# Patient Record
Sex: MALE | Race: BLACK OR AFRICAN AMERICAN | NOT HISPANIC OR LATINO | Employment: FULL TIME | URBAN - METROPOLITAN AREA
[De-identification: names, ages, dates, MRNs, and addresses within clinical notes are randomized per-mention and may not be internally consistent; named-entity substitution may affect disease eponyms.]

---

## 2018-01-06 ENCOUNTER — HOSPITAL ENCOUNTER (EMERGENCY)
Facility: HOSPITAL | Age: 28
Discharge: HOME | End: 2018-01-06
Attending: EMERGENCY MEDICINE
Payer: MEDICAID

## 2018-01-06 VITALS
OXYGEN SATURATION: 99 % | RESPIRATION RATE: 18 BRPM | DIASTOLIC BLOOD PRESSURE: 68 MMHG | SYSTOLIC BLOOD PRESSURE: 118 MMHG | HEIGHT: 66 IN | HEART RATE: 67 BPM | WEIGHT: 160 LBS | BODY MASS INDEX: 25.71 KG/M2 | TEMPERATURE: 98 F

## 2018-01-06 DIAGNOSIS — R07.9 CHEST PAIN: ICD-10-CM

## 2018-01-06 DIAGNOSIS — R05.9 COUGH: ICD-10-CM

## 2018-01-06 DIAGNOSIS — B34.9 VIRAL ILLNESS: Primary | ICD-10-CM

## 2018-01-06 DIAGNOSIS — J40 BRONCHITIS: ICD-10-CM

## 2018-01-06 LAB
ANISOCYTOSIS BLD QL SMEAR: SLIGHT
APTT BLDCRRT: 27.1 SEC
BASOPHILS # BLD AUTO: 0.1 K/UL
BASOPHILS NFR BLD: 0.7 %
DIFFERENTIAL METHOD: ABNORMAL
EOSINOPHIL # BLD AUTO: 2.4 K/UL
EOSINOPHIL NFR BLD: 16.1 %
ERYTHROCYTE [DISTWIDTH] IN BLOOD BY AUTOMATED COUNT: 16 %
HCT VFR BLD AUTO: 32.4 %
HGB BLD-MCNC: 11.7 G/DL
INR PPP: 1.1
LYMPHOCYTES # BLD AUTO: 6.9 K/UL
LYMPHOCYTES NFR BLD: 45.9 %
MCH RBC QN AUTO: 29.8 PG
MCHC RBC AUTO-ENTMCNC: 36.1 G/DL
MCV RBC AUTO: 82 FL
MONOCYTES # BLD AUTO: 1.4 K/UL
MONOCYTES NFR BLD: 9.2 %
NEUTROPHILS # BLD AUTO: 4.2 K/UL
NEUTROPHILS NFR BLD: 28.3 %
PLATELET # BLD AUTO: 670 K/UL
PMV BLD AUTO: 9.3 FL
POLYCHROMASIA BLD QL SMEAR: ABNORMAL
PROTHROMBIN TIME: 11.1 SEC
RBC # BLD AUTO: 3.93 M/UL
TARGETS BLD QL SMEAR: ABNORMAL
WBC # BLD AUTO: 15.04 K/UL

## 2018-01-06 PROCEDURE — 85730 THROMBOPLASTIN TIME PARTIAL: CPT

## 2018-01-06 PROCEDURE — 85610 PROTHROMBIN TIME: CPT

## 2018-01-06 PROCEDURE — 93005 ELECTROCARDIOGRAM TRACING: CPT

## 2018-01-06 PROCEDURE — 99284 EMERGENCY DEPT VISIT MOD MDM: CPT | Mod: 25

## 2018-01-06 PROCEDURE — 25000003 PHARM REV CODE 250: Performed by: PHYSICIAN ASSISTANT

## 2018-01-06 PROCEDURE — 93010 ELECTROCARDIOGRAM REPORT: CPT | Mod: ,,, | Performed by: INTERNAL MEDICINE

## 2018-01-06 PROCEDURE — 85025 COMPLETE CBC W/AUTO DIFF WBC: CPT

## 2018-01-06 RX ORDER — LEVOFLOXACIN 750 MG/1
750 TABLET ORAL DAILY
Qty: 5 TABLET | Refills: 0 | Status: SHIPPED | OUTPATIENT
Start: 2018-01-06 | End: 2018-01-11

## 2018-01-06 RX ORDER — OSELTAMIVIR PHOSPHATE 75 MG/1
75 CAPSULE ORAL
Status: COMPLETED | OUTPATIENT
Start: 2018-01-06 | End: 2018-01-06

## 2018-01-06 RX ORDER — OSELTAMIVIR PHOSPHATE 75 MG/1
75 CAPSULE ORAL 2 TIMES DAILY
Qty: 10 CAPSULE | Refills: 0 | Status: SHIPPED | OUTPATIENT
Start: 2018-01-06 | End: 2018-01-11

## 2018-01-06 RX ORDER — ALBUTEROL SULFATE 90 UG/1
1-2 AEROSOL, METERED RESPIRATORY (INHALATION) EVERY 6 HOURS PRN
Qty: 1 INHALER | Refills: 0 | Status: ON HOLD | OUTPATIENT
Start: 2018-01-06 | End: 2019-08-20

## 2018-01-06 RX ADMIN — OSELTAMIVIR PHOSPHATE 75 MG: 75 CAPSULE ORAL at 10:01

## 2018-01-07 NOTE — ED PROVIDER NOTES
Encounter Date: 1/6/2018    SCRIBE #1 NOTE: I, Neptali Arellano, am scribing for, and in the presence of,  Cornelius Rai PA-C. I have scribed the following portions of the note - Other sections scribed: HPI/ROS.       History     Chief Complaint   Patient presents with    Hemoptysis     spitting up blood and coughing x 3 days; denies fever; chest pain with coughing-intermittent-none at this time;      CC: Hemoptysis     HPI: This 27 y.o. male with a medical history of sickle cell anemia presents to the ED for an evaluation of acute onset, intermittent hemoptysis (thick, dark, red blood), intermittent midsternal chest pain, and nosebleeds when sneezing for the past 3 days. Chest pain is sharp and stabbing that usually lasts 5-6 seconds. His last sickle cell crisis was 1 year ago. No recent sickness. No traumas to the chest. No chest or lung issues or surgeries. No modifying factors or prior tx today. Otherwise, patient denies fever, chills, back pain, abdominal pain, N/V/D, weight change, light-headedness, dizziness, numbness, weakness, and IV drug use. No alleviating or exacerbating factors. Symptoms intermittent.      The history is provided by the patient. No  was used.     Review of patient's allergies indicates:   Allergen Reactions    Penicillins Anaphylaxis     Past Medical History:   Diagnosis Date    Broken thumb     Sickle cell anemia     Sickle cell crisis      Past Surgical History:   Procedure Laterality Date    HAND SURGERY      spleenectomy       History reviewed. No pertinent family history.  Social History   Substance Use Topics    Smoking status: Former Smoker    Smokeless tobacco: Never Used    Alcohol use No     Review of Systems   Constitutional: Negative for chills, fever and unexpected weight change.   HENT: Positive for nosebleeds. Negative for congestion, ear pain, rhinorrhea and sore throat.    Eyes: Negative for pain and visual disturbance.   Respiratory:  Negative for shortness of breath.         (+) hemoptysis    Cardiovascular: Positive for chest pain.   Gastrointestinal: Negative for abdominal pain, diarrhea, nausea and vomiting.   Genitourinary: Negative for dysuria.   Musculoskeletal: Negative for back pain and neck pain.   Skin: Negative for rash.   Neurological: Negative for dizziness, light-headedness and headaches.       Physical Exam     Initial Vitals [01/06/18 2057]   BP Pulse Resp Temp SpO2   122/60 76 16 98.9 °F (37.2 °C) 96 %      MAP       80.67         Physical Exam    Nursing note and vitals reviewed.  Constitutional: He appears well-developed and well-nourished. He is not diaphoretic. No distress.   HENT:   Head: Normocephalic and atraumatic.   Nose: Nose normal.   Mouth/Throat: Oropharynx is clear and moist.   Eyes: Conjunctivae and EOM are normal. Pupils are equal, round, and reactive to light.   Neck: Normal range of motion. Neck supple.   Cardiovascular: Normal heart sounds and intact distal pulses.   No murmur heard.  Pulmonary/Chest: Breath sounds normal. No respiratory distress. He has no wheezes. He has no rhonchi. He exhibits no tenderness.   Abdominal: Soft. Bowel sounds are normal. He exhibits no distension and no mass. There is no tenderness. There is no rebound and no guarding.   Musculoskeletal: Normal range of motion. He exhibits no tenderness.   Neurological: He is alert and oriented to person, place, and time. He has normal strength.   Skin: Skin is warm and dry. Capillary refill takes less than 2 seconds. No rash and no abscess noted. No erythema.   Psychiatric: He has a normal mood and affect. His behavior is normal. Judgment and thought content normal.         ED Course   Procedures  Labs Reviewed   CBC W/ AUTO DIFFERENTIAL - Abnormal; Notable for the following:        Result Value    WBC 15.04 (*)     RBC 3.93 (*)     Hemoglobin 11.7 (*)     Hematocrit 32.4 (*)     MCHC 36.1 (*)     RDW 16.0 (*)     Platelets 670 (*)     Lymph  # 6.9 (*)     Mono # 1.4 (*)     Eos # 2.4 (*)     Gran% 28.3 (*)     Eosinophil% 16.1 (*)     All other components within normal limits   PROTIME-INR   APTT     EKG Readings: (Independently Interpreted)   EKG in normal sinus rhythm.  Ventricular rate 67 bpm.  Normal NE interval.  Normal QT interval.  Early repolarization.  No ST elevation.          Medical Decision Making:   Initial Assessment:   27-year-old male presents to ED with chief complaint 3 day history of intermittent hemoptysis, midsternal chest pain, blood-tinged nasal drainage.  Differential Diagnosis:   Thrombocytopenia, URI, bronchitis, pneumonia  Clinical Tests:   Lab Tests: Ordered and Reviewed  Radiological Study: Ordered and Reviewed  Medical Tests: Ordered and Reviewed  ED Management:  Patient overall well-appearing, in no acute distress, afebrile, vitals within normal limits.    Patient presents to ED complaining of cough productive of bloody sputum, blood-tinged nasal discharge, and intermittent chest pain over the past 3 days.  He denies shortness of breath.  He denies fever or chills.  No recent illness or recent sick contacts.  He denies lightheadedness or dizziness.  He denies history of anemia.  Patient states he does have a history of sickle cell anemia, status post splenectomy.  Patient takes folic acid daily.  He is overall well-appearing and nontoxic.  Speaking full sentences without issue.  No rib retractions or nasal flaring.  Lungs clear bilaterally.  He is in no respiratory distress.  He is not hypoxic.  He is not tachypneic.  X-ray with evidence of peribronchial cuffing without obvious consolidation.  Patient denies persistent chest pain, no left arm pain, diaphoresis.  I do not suspect sickle cell crisis or ischemia/infarction.  EKG negative.  Patient does have a white count of 15,000.  PT, INR, PTT all within normal limits.  He is not thrombocytopenic.  I do think this represents a viral illness, however I will also treat as  bronchitis with short course of antibiotics.  Patient is within 48 hours, therefore will discharge with Tamiflu.  I do feel he is safe and stable for discharge and management as an outpatient.  Return precautions given.  Other:   I have discussed this case with another health care provider.       <> Summary of the Discussion: I have discussed this case with Dr. Ambrosio.            Scribe Attestation:   Scribe #1: I performed the above scribed service and the documentation accurately describes the services I performed. I attest to the accuracy of the note.    Attending Attestation:     Physician Attestation Statement for NP/PA:   I discussed this assessment and plan of this patient with the NP/PA, but I did not personally examine the patient. The face to face encounter was performed by the NP/PA.        Physician Attestation for Scribe:  Physician Attestation Statement for Scribe #1: I, Cornelius Rai PA-C, reviewed documentation, as scribed by Neptali Arellano in my presence, and it is both accurate and complete.                 ED Course      Clinical Impression:   The primary encounter diagnosis was Viral illness. Diagnoses of Chest pain, Cough, and Bronchitis were also pertinent to this visit.    Disposition:   Disposition: Discharged  Condition: Stable                        Cornelius Rai PA-C  01/07/18 0010       Kyle Ambrosio MD  01/07/18 [id number]

## 2018-01-07 NOTE — ED TRIAGE NOTES
"Pt presents to ED with c/o "spitting up and coughing up blood" x 3 days ago. Reports intermittent chest pain that started 3 days ago. Denies any trauma but reports hand surgery 3 weeks ago. Denies any SOB.  "

## 2018-01-07 NOTE — DISCHARGE INSTRUCTIONS
You have been evaluated in the emergency department due to coughing up blood, chest pain.  You are being treated for bronchitis and a viral illness.    Take medications as prescribed.  Tamiflu twice daily.  Levaquin for the next 5 days.  Use albuterol rescue inhaler as needed for shortness of breath.    Follow-up with and establish care with a primary care physician next week for reevaluation of this treatment plan.  Return to ED if you become febrile, if you begin with shortness of breath or difficulty with respirations, if pain worsens, if you begin with lightheadedness or dizziness, if any other problems occur.

## 2018-02-07 DIAGNOSIS — D57.1 HB-SS DISEASE WITHOUT CRISIS: Primary | ICD-10-CM

## 2018-02-21 ENCOUNTER — HOSPITAL ENCOUNTER (OUTPATIENT)
Dept: RADIOLOGY | Facility: HOSPITAL | Age: 28
Discharge: HOME | End: 2018-02-21
Attending: INTERNAL MEDICINE
Payer: MEDICAID

## 2018-02-21 DIAGNOSIS — D57.1 HB-SS DISEASE WITHOUT CRISIS: ICD-10-CM

## 2018-02-21 PROCEDURE — 70450 CT HEAD/BRAIN W/O DYE: CPT | Mod: TC

## 2018-02-21 PROCEDURE — 70450 CT HEAD/BRAIN W/O DYE: CPT | Mod: 26,,, | Performed by: RADIOLOGY

## 2018-03-21 DIAGNOSIS — R05.9 COUGH: Primary | ICD-10-CM

## 2018-03-28 ENCOUNTER — HOSPITAL ENCOUNTER (OUTPATIENT)
Dept: RADIOLOGY | Facility: HOSPITAL | Age: 28
Discharge: HOME | End: 2018-03-28
Attending: INTERNAL MEDICINE
Payer: MEDICAID

## 2018-03-28 DIAGNOSIS — R05.9 COUGH: ICD-10-CM

## 2018-03-28 PROCEDURE — 71046 X-RAY EXAM CHEST 2 VIEWS: CPT | Mod: 26,,, | Performed by: RADIOLOGY

## 2018-03-28 PROCEDURE — 71046 X-RAY EXAM CHEST 2 VIEWS: CPT | Mod: TC,FY

## 2018-04-18 DIAGNOSIS — M79.642 PAIN OF LEFT HAND: Primary | ICD-10-CM

## 2018-05-11 ENCOUNTER — HOSPITAL ENCOUNTER (EMERGENCY)
Facility: HOSPITAL | Age: 28
Discharge: HOME | End: 2018-05-11
Attending: EMERGENCY MEDICINE
Payer: MEDICAID

## 2018-05-11 VITALS
TEMPERATURE: 99 F | HEART RATE: 68 BPM | OXYGEN SATURATION: 100 % | BODY MASS INDEX: 24.11 KG/M2 | SYSTOLIC BLOOD PRESSURE: 120 MMHG | DIASTOLIC BLOOD PRESSURE: 74 MMHG | RESPIRATION RATE: 16 BRPM | HEIGHT: 66 IN | WEIGHT: 150 LBS

## 2018-05-11 DIAGNOSIS — R06.02 SHORTNESS OF BREATH: Primary | ICD-10-CM

## 2018-05-11 DIAGNOSIS — D57.20 SICKLE CELL DISEASE, TYPE SC, WITHOUT CRISIS: ICD-10-CM

## 2018-05-11 LAB
ALBUMIN SERPL BCP-MCNC: 4.2 G/DL
ALLENS TEST: ABNORMAL
ALP SERPL-CCNC: 61 U/L
ALT SERPL W/O P-5'-P-CCNC: 16 U/L
ANION GAP SERPL CALC-SCNC: 7 MMOL/L
ANISOCYTOSIS BLD QL SMEAR: SLIGHT
AST SERPL-CCNC: 15 U/L
BASOPHILS # BLD AUTO: 0.11 K/UL
BASOPHILS NFR BLD: 1.3 %
BILIRUB SERPL-MCNC: 0.9 MG/DL
BNP SERPL-MCNC: 13 PG/ML
BUN SERPL-MCNC: 5 MG/DL
CALCIUM SERPL-MCNC: 9.5 MG/DL
CHLORIDE SERPL-SCNC: 106 MMOL/L
CO2 SERPL-SCNC: 26 MMOL/L
CREAT SERPL-MCNC: 0.9 MG/DL
DELSYS: ABNORMAL
DIFFERENTIAL METHOD: ABNORMAL
EOSINOPHIL # BLD AUTO: 0.4 K/UL
EOSINOPHIL NFR BLD: 5.1 %
ERYTHROCYTE [DISTWIDTH] IN BLOOD BY AUTOMATED COUNT: 15.5 %
EST. GFR  (AFRICAN AMERICAN): >60 ML/MIN/1.73 M^2
EST. GFR  (NON AFRICAN AMERICAN): >60 ML/MIN/1.73 M^2
GLUCOSE SERPL-MCNC: 52 MG/DL
HCO3 UR-SCNC: 26.4 MMOL/L (ref 24–28)
HCT VFR BLD AUTO: 32.9 %
HGB BLD-MCNC: 12.2 G/DL
LYMPHOCYTES # BLD AUTO: 3.8 K/UL
LYMPHOCYTES NFR BLD: 44.1 %
MCH RBC QN AUTO: 31.2 PG
MCHC RBC AUTO-ENTMCNC: 37.1 G/DL
MCV RBC AUTO: 84 FL
MONOCYTES # BLD AUTO: 1.1 K/UL
MONOCYTES NFR BLD: 12.7 %
NEUTROPHILS # BLD AUTO: 3.2 K/UL
NEUTROPHILS NFR BLD: 37.4 %
PCO2 BLDA: 42.7 MMHG (ref 35–45)
PH SMN: 7.4 [PH] (ref 7.35–7.45)
PLATELET # BLD AUTO: 547 K/UL
PMV BLD AUTO: 10.3 FL
PO2 BLDA: 30 MMHG (ref 40–60)
POC BE: 1 MMOL/L
POC SATURATED O2: 58 % (ref 95–100)
POC TCO2: 28 MMOL/L (ref 24–29)
POTASSIUM SERPL-SCNC: 3.8 MMOL/L
PROT SERPL-MCNC: 7.5 G/DL
RBC # BLD AUTO: 3.91 M/UL
RETICS/RBC NFR AUTO: 5.9 %
SAMPLE: ABNORMAL
SITE: ABNORMAL
SODIUM SERPL-SCNC: 139 MMOL/L
TARGETS BLD QL SMEAR: ABNORMAL
TROPONIN I SERPL DL<=0.01 NG/ML-MCNC: <0.006 NG/ML
WBC # BLD AUTO: 8.61 K/UL

## 2018-05-11 PROCEDURE — 99284 EMERGENCY DEPT VISIT MOD MDM: CPT

## 2018-05-11 PROCEDURE — 36600 WITHDRAWAL OF ARTERIAL BLOOD: CPT

## 2018-05-11 PROCEDURE — 80053 COMPREHEN METABOLIC PANEL: CPT

## 2018-05-11 PROCEDURE — 99900035 HC TECH TIME PER 15 MIN (STAT)

## 2018-05-11 PROCEDURE — 85025 COMPLETE CBC W/AUTO DIFF WBC: CPT

## 2018-05-11 PROCEDURE — 82803 BLOOD GASES ANY COMBINATION: CPT

## 2018-05-11 PROCEDURE — 83880 ASSAY OF NATRIURETIC PEPTIDE: CPT

## 2018-05-11 PROCEDURE — 85045 AUTOMATED RETICULOCYTE COUNT: CPT

## 2018-05-11 PROCEDURE — 84484 ASSAY OF TROPONIN QUANT: CPT

## 2018-05-11 NOTE — ED PROVIDER NOTES
"Encounter Date: 5/11/2018    SCRIBE #1 NOTE: I, Anais Pattie, am scribing for, and in the presence of,  Kyle Ambrosio MD. I have scribed the following portions of the note - Other sections scribed: HPI amd ROS .       History     Chief Complaint   Patient presents with    Shortness of Breath     " I came for a referral for my shortness of breath."      Chief Complaint: SOB    HPI: This 28 y.o. Male with sickle cell anemia and hx of a splenectomy presents to the ED c/o SOB. Symptoms have been intermittent x 2-3 months. There's associated intermittent cough and chest pain. Patient is attempting treatment with Albuterol, he is also on a folic acid regimen. Patient wants a referral for symptoms. He denies fever, chills, nausea, vomiting, diarrhea, or abdominal pain.      The history is provided by the patient. No  was used.     Review of patient's allergies indicates:   Allergen Reactions    Penicillins Anaphylaxis     Past Medical History:   Diagnosis Date    Broken thumb     Sickle cell anemia     Sickle cell crisis      Past Surgical History:   Procedure Laterality Date    HAND SURGERY      spleenectomy       History reviewed. No pertinent family history.  Social History   Substance Use Topics    Smoking status: Current Every Day Smoker     Packs/day: 0.25    Smokeless tobacco: Never Used      Comment: encouraged to quit.     Alcohol use No     Review of Systems   Constitutional: Negative for chills and fever.   HENT: Negative for ear pain and sore throat.    Eyes: Negative for pain.   Respiratory: Positive for cough and shortness of breath.    Cardiovascular: Positive for chest pain.   Gastrointestinal: Negative for abdominal pain, diarrhea, nausea and vomiting.   Genitourinary: Negative for dysuria.   Musculoskeletal: Negative for myalgias (arm or leg pain).   Skin: Negative for rash.   Neurological: Negative for headaches.       Physical Exam     Initial Vitals [05/11/18 1541]   BP " Pulse Resp Temp SpO2   139/65 83 20 98.7 °F (37.1 °C) 100 %      MAP       89.67         Physical Exam  The patient was examined specifically for the following:   General:No significant distress, Good color, Warm and dry. Head and neck:Scalp atraumatic, Neck supple. Neurological:Appropriate conversation, Gross motor deficits. Eyes:Conjugate gaze, Clear corneas. ENT: No epistaxis. Cardiac: Regular rate and rhythm, Grossly normal heart tones. Pulmonary: Wheezing, Rales. Gastrointestinal: Abdominal tenderness, Abdominal distention. Musculoskeletal: Extremity deformity, Apparent pain with range of motion of the joints. Skin: Rash.   The findings on examination were normal.  Lungs are clear.  The heart tones are normal. The abdomen is nontender.  Extremities are nontender.  There is no pain with range of motion of any joints.  The patient has no rash.  ED Course   Procedures  Labs Reviewed   COMPREHENSIVE METABOLIC PANEL - Abnormal; Notable for the following:        Result Value    Glucose 52 (*)     BUN, Bld 5 (*)     Anion Gap 7 (*)     All other components within normal limits   CBC W/ AUTO DIFFERENTIAL - Abnormal; Notable for the following:     RBC 3.91 (*)     Hemoglobin 12.2 (*)     Hematocrit 32.9 (*)     MCH 31.2 (*)     MCHC 37.1 (*)     RDW 15.5 (*)     Platelets 547 (*)     Mono # 1.1 (*)     Gran% 37.4 (*)     All other components within normal limits   RETICULOCYTES - Abnormal; Notable for the following:     Retic 5.9 (*)     All other components within normal limits   ISTAT PROCEDURE - Abnormal; Notable for the following:     POC PO2 30 (*)     POC SATURATED O2 58 (*)     All other components within normal limits   TROPONIN I   B-TYPE NATRIURETIC PEPTIDE     EKG Readings: (Independently Interpreted)   This patient is in a normal sinus rhythm with a heart rate of 67.  The p.r. QRS and QT intervals are normal. This patient may have left ventricular hypertrophy.  There are nonspecific ST segment changes.  This  looks like early repolarization.       X-Rays:   Independently Interpreted Readings:   Other Readings:  Chest x-ray fails to reveal pneumothorax pneumonia pleural effusion.        Medical decision making:  Given the above this patient complains of shortness of breath coming and going off and on for 3 months.  The patient has no clinical evidence of respiratory distress today.  His diagnostic evaluation is unremarkably is no significant anemia pneumothorax pneumonia pleural effusion pulmonary edema. I doubt pulmonary embolus.  The patient is not tachycardic he has normal blood gases.  Back the patient had a venous blood gas drawn showing a normal pH and normal pCO2.  His oxygen saturations are 100%.  The patient is not short of breath now.  I could find no malignant abnormalities that with redness patient in the short term.  I will refer him to follow up with Hematology.          Scribe Attestation:   Scribe #1: I performed the above scribed service and the documentation accurately describes the services I performed. I attest to the accuracy of the note.    Attending Attestation:           Physician Attestation for Scribe:  Physician Attestation Statement for Scribe #1: I, Kyle Flores MD, reviewed documentation, as scribed by Anais Blankenship in my presence, and it is both accurate and complete.                    Clinical Impression:   The primary encounter diagnosis was Shortness of breath. A diagnosis of Sickle cell disease, type sc, without crisis was also pertinent to this visit.                           Kyle Ambrosio MD  05/11/18 [id number]

## 2018-05-11 NOTE — DISCHARGE INSTRUCTIONS
Please return immediately ifYou get worse or if new problems develop.  Please make an appointment to see your primary care doctor this week.  Rest.  You may also see the hematologist above.  Usual medicines.

## 2018-08-19 ENCOUNTER — HOSPITAL ENCOUNTER (EMERGENCY)
Facility: HOSPITAL | Age: 28
Discharge: HOME | End: 2018-08-19
Attending: EMERGENCY MEDICINE
Payer: MEDICAID

## 2018-08-19 VITALS
DIASTOLIC BLOOD PRESSURE: 69 MMHG | TEMPERATURE: 98 F | HEART RATE: 73 BPM | OXYGEN SATURATION: 96 % | HEIGHT: 66 IN | WEIGHT: 165 LBS | SYSTOLIC BLOOD PRESSURE: 129 MMHG | BODY MASS INDEX: 26.52 KG/M2 | RESPIRATION RATE: 18 BRPM

## 2018-08-19 DIAGNOSIS — S62.341A NONDISPLACED FRACTURE OF BASE OF SECOND METACARPAL BONE, LEFT HAND, INITIAL ENCOUNTER FOR CLOSED FRACTURE: Primary | ICD-10-CM

## 2018-08-19 PROCEDURE — 99283 EMERGENCY DEPT VISIT LOW MDM: CPT | Mod: 25

## 2018-08-19 PROCEDURE — 29125 APPL SHORT ARM SPLINT STATIC: CPT | Mod: LT

## 2018-08-19 RX ORDER — OXYCODONE AND ACETAMINOPHEN 5; 325 MG/1; MG/1
1 TABLET ORAL EVERY 4 HOURS PRN
Qty: 12 TABLET | Refills: 0 | Status: ON HOLD | OUTPATIENT
Start: 2018-08-19 | End: 2019-08-20 | Stop reason: SDUPTHER

## 2018-08-19 RX ORDER — FERROUS SULFATE, DRIED 160(50) MG
1 TABLET, EXTENDED RELEASE ORAL 2 TIMES DAILY WITH MEALS
COMMUNITY
End: 2023-04-11

## 2018-08-19 RX ORDER — IBUPROFEN 600 MG/1
600 TABLET ORAL EVERY 6 HOURS PRN
Qty: 20 TABLET | Refills: 0 | Status: SHIPPED | OUTPATIENT
Start: 2018-08-19 | End: 2019-10-29
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
## 2018-08-20 NOTE — DISCHARGE INSTRUCTIONS
Please rest your injury. RICE - Rest, Ice, Compress, Elevate    Refer to the handout for instructions on how to care for your splint.    Take Ibuprofen for pain and Percocet for breakthrough pain.  Please do not take Percocet while working, drinking alcohol, driving/operating heavy machinery, swimming. It may cause drowsiness, impair judgment, and reduce physical capabilities.    Follow up with orthopedics in 1 week if symptoms have not improved.  You can call the number listed or get a referral from your primary care doctor.    If you would like to follow-up with the [facility] orthopedic clinic for further care of your fracture, please ensure that you have a CD with your x-rays or other images taken at your visit today.  Please bring the CD and discharge papers [facility], [address] [place] [phone], first floor Registration Desk in the Ambulatory Care building.  Upon receipt of your information and CD, you will be scheduled for your follow-up in the orthopedic clinic.    Please return to the ER for any new or worsening symptoms or concerns.                Fiberglass Splint Care    Follow these guidelines when caring for your splint:  It will take up to 2 hours for your fiberglass splint to fully harden. Don?t put any pressure on it during that time or it may break.  To prevent swelling under the splint, do this for the first 2 days (48 hours):  For a splint on your arm, keep it in a sling or raised to shoulder level when you are sitting or standing. Rest it on your chest or on a pillow at your side when you are lying down.  For a splint on your foot, keep it propped up above the level of your waist when sitting or lying. Avoid crutch walking as much as possible during this time.  Keep the splint or cast dry at all times. Bathe with your splint or cast well out of the water. Protect it with a large plastic bag, rubber-banded at the top end. If a fiberglass cast or splint gets wet, you can dry  it with a hair dryer.  Put an ice pack on the injured area. Do this for 20 minutes every 1 to 2 hours the first day for pain relief. You can make an ice pack by wrapping a plastic bag of ice cubes in a thin towel. As the ice melts, be careful that the splint doesn?t get wet. Continue using the ice pack 3 to 4 times a day for the next 2 days. Then use the ice pack as needed to ease pain and swelling.  Your health care provider may prescribe medicines for pain or swelling. Follow your provider?s instructions for taking these medicines. If no pain medicine was prescribed, you may use acetaminophen or ibuprofen to control pain. If you have chronic liver or kidney disease, talk with your health care provider before using these medicines. Also talk with your provider if you?ve had a stomach ulcer or GI bleeding.  Follow-up care  Follow up with your health care provider, or as advised.  When to seek medical advice  Call your health care provider right away if any of these occur:  Bad odor from the splint or wound fluid stains the splint  The splint cracks or stays wet for more than 24 hours  Tightness or pressure under the splint gets worse  Fingers or toes become swollen, cold, blue, numb, or tingly  You can?t move your fingers or toes  Pain under the splint gets worse  The skin around the splint becomes red  Fever of more than 101°F (38°C)  Date Last Reviewed: 2/17/2015  © 5186-7673 [employer]. [address] [place] [address], [place] [address]. All rights reserved. This information is not intended as a substitute for professional medical care. Always follow your healthcare professional's instructions.

## 2018-12-10 DIAGNOSIS — R07.9 CHEST PAIN, UNSPECIFIED: Primary | ICD-10-CM

## 2019-08-18 ENCOUNTER — HOSPITAL ENCOUNTER (INPATIENT)
Facility: HOSPITAL | Age: 29
LOS: 2 days | Discharge: HOME | DRG: 202 | End: 2019-08-20
Attending: EMERGENCY MEDICINE | Admitting: EMERGENCY MEDICINE
Payer: MEDICAID

## 2019-08-18 DIAGNOSIS — R06.89 HYPERCAPNIA: ICD-10-CM

## 2019-08-18 DIAGNOSIS — R06.03 RESPIRATORY DISTRESS: ICD-10-CM

## 2019-08-18 DIAGNOSIS — D57.00 HB-SS DISEASE WITH CRISIS: ICD-10-CM

## 2019-08-18 DIAGNOSIS — J45.41 MODERATE PERSISTENT ASTHMA WITH ACUTE EXACERBATION: Primary | ICD-10-CM

## 2019-08-18 DIAGNOSIS — R09.02 HYPOXIA: ICD-10-CM

## 2019-08-18 DIAGNOSIS — J45.901 EXACERBATION OF ASTHMA, UNSPECIFIED ASTHMA SEVERITY, UNSPECIFIED WHETHER PERSISTENT: ICD-10-CM

## 2019-08-18 DIAGNOSIS — R06.02 SHORTNESS OF BREATH: ICD-10-CM

## 2019-08-18 PROBLEM — D57.1 SICKLE CELL ANEMIA: Status: ACTIVE | Noted: 2019-08-18

## 2019-08-18 PROBLEM — Z72.0 TOBACCO ABUSE: Status: ACTIVE | Noted: 2019-08-18

## 2019-08-18 LAB
ALBUMIN SERPL BCP-MCNC: 3.9 G/DL (ref 3.5–5.2)
ALLENS TEST: ABNORMAL
ALLENS TEST: ABNORMAL
ALP SERPL-CCNC: 68 U/L (ref 55–135)
ALT SERPL W/O P-5'-P-CCNC: 11 U/L (ref 10–44)
ANION GAP SERPL CALC-SCNC: 8 MMOL/L (ref 8–16)
ANISOCYTOSIS BLD QL SMEAR: SLIGHT
AST SERPL-CCNC: 15 U/L (ref 10–40)
BASOPHILS # BLD AUTO: 0.11 K/UL (ref 0–0.2)
BASOPHILS NFR BLD: 0.6 % (ref 0–1.9)
BILIRUB SERPL-MCNC: 1.3 MG/DL (ref 0.1–1)
BUN SERPL-MCNC: 5 MG/DL (ref 6–20)
CALCIUM SERPL-MCNC: 9 MG/DL (ref 8.7–10.5)
CHLORIDE SERPL-SCNC: 105 MMOL/L (ref 95–110)
CO2 SERPL-SCNC: 29 MMOL/L (ref 23–29)
CREAT SERPL-MCNC: 0.8 MG/DL (ref 0.5–1.4)
DELSYS: ABNORMAL
DELSYS: ABNORMAL
DIFFERENTIAL METHOD: ABNORMAL
EOSINOPHIL # BLD AUTO: 4.2 K/UL (ref 0–0.5)
EOSINOPHIL NFR BLD: 23 % (ref 0–8)
ERYTHROCYTE [DISTWIDTH] IN BLOOD BY AUTOMATED COUNT: 16.4 % (ref 11.5–14.5)
ERYTHROCYTE [SEDIMENTATION RATE] IN BLOOD BY WESTERGREN METHOD: 12 MM/H
EST. GFR  (AFRICAN AMERICAN): >60 ML/MIN/1.73 M^2
EST. GFR  (NON AFRICAN AMERICAN): >60 ML/MIN/1.73 M^2
FIO2: 40
FLOW: 15
GLUCOSE SERPL-MCNC: 90 MG/DL (ref 70–110)
HCO3 UR-SCNC: 27.4 MMOL/L (ref 24–28)
HCO3 UR-SCNC: 28.5 MMOL/L (ref 24–28)
HCT VFR BLD AUTO: 30.6 % (ref 40–54)
HGB BLD-MCNC: 10.6 G/DL (ref 14–18)
HYPOCHROMIA BLD QL SMEAR: ABNORMAL
LACTATE SERPL-SCNC: 1.1 MMOL/L (ref 0.5–2.2)
LYMPHOCYTES # BLD AUTO: 5.5 K/UL (ref 1–4.8)
LYMPHOCYTES NFR BLD: 29.9 % (ref 18–48)
MCH RBC QN AUTO: 28.8 PG (ref 27–31)
MCHC RBC AUTO-ENTMCNC: 34.6 G/DL (ref 32–36)
MCV RBC AUTO: 83 FL (ref 82–98)
MODE: ABNORMAL
MODE: ABNORMAL
MONOCYTES # BLD AUTO: 2.4 K/UL (ref 0.3–1)
MONOCYTES NFR BLD: 12.9 % (ref 4–15)
NEUTROPHILS # BLD AUTO: 6.1 K/UL (ref 1.8–7.7)
NEUTROPHILS NFR BLD: 33.6 % (ref 38–73)
OVALOCYTES BLD QL SMEAR: ABNORMAL
PCO2 BLDA: 54.4 MMHG (ref 35–45)
PCO2 BLDA: 59 MMHG (ref 35–45)
PH SMN: 7.28 [PH] (ref 7.35–7.45)
PH SMN: 7.33 [PH] (ref 7.35–7.45)
PLATELET # BLD AUTO: 747 K/UL (ref 150–350)
PLATELET BLD QL SMEAR: ABNORMAL
PMV BLD AUTO: 9 FL (ref 9.2–12.9)
PO2 BLDA: 46 MMHG (ref 40–60)
PO2 BLDA: 49 MMHG (ref 80–100)
POC BE: 0 MMOL/L
POC BE: 2 MMOL/L
POC SATURATED O2: 75 % (ref 95–100)
POC SATURATED O2: 80 % (ref 95–100)
POC TCO2: 29 MMOL/L (ref 24–29)
POC TCO2: 30 MMOL/L (ref 23–27)
POIKILOCYTOSIS BLD QL SMEAR: SLIGHT
POLYCHROMASIA BLD QL SMEAR: ABNORMAL
POTASSIUM SERPL-SCNC: 3.7 MMOL/L (ref 3.5–5.1)
PROT SERPL-MCNC: 7.8 G/DL (ref 6–8.4)
RBC # BLD AUTO: 3.68 M/UL (ref 4.6–6.2)
RETICS/RBC NFR AUTO: 6.7 % (ref 0.4–2)
SAMPLE: ABNORMAL
SAMPLE: ABNORMAL
SITE: ABNORMAL
SITE: ABNORMAL
SODIUM SERPL-SCNC: 142 MMOL/L (ref 136–145)
SP02: 100
SP02: 98
TARGETS BLD QL SMEAR: ABNORMAL
TROPONIN I SERPL DL<=0.01 NG/ML-MCNC: <0.006 NG/ML (ref 0–0.03)
WBC # BLD AUTO: 18.42 K/UL (ref 3.9–12.7)

## 2019-08-18 PROCEDURE — 36600 WITHDRAWAL OF ARTERIAL BLOOD: CPT

## 2019-08-18 PROCEDURE — 27100171 HC OXYGEN HIGH FLOW UP TO 24 HOURS

## 2019-08-18 PROCEDURE — 93010 ELECTROCARDIOGRAM REPORT: CPT | Mod: ,,, | Performed by: INTERNAL MEDICINE

## 2019-08-18 PROCEDURE — 25000242 PHARM REV CODE 250 ALT 637 W/ HCPCS: Performed by: EMERGENCY MEDICINE

## 2019-08-18 PROCEDURE — 84484 ASSAY OF TROPONIN QUANT: CPT

## 2019-08-18 PROCEDURE — 94761 N-INVAS EAR/PLS OXIMETRY MLT: CPT

## 2019-08-18 PROCEDURE — 96365 THER/PROPH/DIAG IV INF INIT: CPT

## 2019-08-18 PROCEDURE — 99900035 HC TECH TIME PER 15 MIN (STAT)

## 2019-08-18 PROCEDURE — 93005 ELECTROCARDIOGRAM TRACING: CPT

## 2019-08-18 PROCEDURE — 85025 COMPLETE CBC W/AUTO DIFF WBC: CPT

## 2019-08-18 PROCEDURE — 93010 EKG 12-LEAD: ICD-10-PCS | Mod: ,,, | Performed by: INTERNAL MEDICINE

## 2019-08-18 PROCEDURE — 27100092 HC HIGH FLOW DELIVERY CANNULA

## 2019-08-18 PROCEDURE — 94644 CONT INHLJ TX 1ST HOUR: CPT

## 2019-08-18 PROCEDURE — 63600175 PHARM REV CODE 636 W HCPCS: Performed by: EMERGENCY MEDICINE

## 2019-08-18 PROCEDURE — 99291 CRITICAL CARE FIRST HOUR: CPT | Mod: 25

## 2019-08-18 PROCEDURE — 96366 THER/PROPH/DIAG IV INF ADDON: CPT

## 2019-08-18 PROCEDURE — 96375 TX/PRO/DX INJ NEW DRUG ADDON: CPT

## 2019-08-18 PROCEDURE — 96372 THER/PROPH/DIAG INJ SC/IM: CPT | Mod: 59

## 2019-08-18 PROCEDURE — 83605 ASSAY OF LACTIC ACID: CPT

## 2019-08-18 PROCEDURE — 25000003 PHARM REV CODE 250: Performed by: EMERGENCY MEDICINE

## 2019-08-18 PROCEDURE — 80053 COMPREHEN METABOLIC PANEL: CPT

## 2019-08-18 PROCEDURE — 85045 AUTOMATED RETICULOCYTE COUNT: CPT

## 2019-08-18 PROCEDURE — 12000002 HC ACUTE/MED SURGE SEMI-PRIVATE ROOM

## 2019-08-18 PROCEDURE — 96376 TX/PRO/DX INJ SAME DRUG ADON: CPT

## 2019-08-18 PROCEDURE — 82803 BLOOD GASES ANY COMBINATION: CPT

## 2019-08-18 RX ORDER — IPRATROPIUM BROMIDE AND ALBUTEROL SULFATE 2.5; .5 MG/3ML; MG/3ML
3 SOLUTION RESPIRATORY (INHALATION) EVERY 4 HOURS PRN
Status: DISCONTINUED | OUTPATIENT
Start: 2019-08-18 | End: 2019-08-18

## 2019-08-18 RX ORDER — METHYLPREDNISOLONE SOD SUCC 125 MG
80 VIAL (EA) INJECTION EVERY 8 HOURS
Status: DISCONTINUED | OUTPATIENT
Start: 2019-08-18 | End: 2019-08-19

## 2019-08-18 RX ORDER — IPRATROPIUM BROMIDE 0.5 MG/2.5ML
1.5 SOLUTION RESPIRATORY (INHALATION) CONTINUOUS
Status: DISCONTINUED | OUTPATIENT
Start: 2019-08-18 | End: 2019-08-18

## 2019-08-18 RX ORDER — METHYLPREDNISOLONE SOD SUCC 125 MG
125 VIAL (EA) INJECTION
Status: COMPLETED | OUTPATIENT
Start: 2019-08-18 | End: 2019-08-18

## 2019-08-18 RX ORDER — OXYCODONE AND ACETAMINOPHEN 5; 325 MG/1; MG/1
1 TABLET ORAL EVERY 4 HOURS PRN
Status: DISCONTINUED | OUTPATIENT
Start: 2019-08-18 | End: 2019-08-20 | Stop reason: HOSPADM

## 2019-08-18 RX ORDER — IBUPROFEN 200 MG
1 TABLET ORAL DAILY
Status: DISCONTINUED | OUTPATIENT
Start: 2019-08-19 | End: 2019-08-20 | Stop reason: HOSPADM

## 2019-08-18 RX ORDER — MONTELUKAST SODIUM 10 MG/1
10 TABLET ORAL DAILY
Status: DISCONTINUED | OUTPATIENT
Start: 2019-08-18 | End: 2019-08-20 | Stop reason: HOSPADM

## 2019-08-18 RX ORDER — AMOXICILLIN 250 MG
1 CAPSULE ORAL 2 TIMES DAILY
Status: DISCONTINUED | OUTPATIENT
Start: 2019-08-18 | End: 2019-08-20 | Stop reason: HOSPADM

## 2019-08-18 RX ORDER — MAGNESIUM SULFATE HEPTAHYDRATE 40 MG/ML
2 INJECTION, SOLUTION INTRAVENOUS
Status: COMPLETED | OUTPATIENT
Start: 2019-08-18 | End: 2019-08-18

## 2019-08-18 RX ORDER — SODIUM CHLORIDE 0.9 % (FLUSH) 0.9 %
10 SYRINGE (ML) INJECTION
Status: DISCONTINUED | OUTPATIENT
Start: 2019-08-18 | End: 2019-08-20 | Stop reason: HOSPADM

## 2019-08-18 RX ORDER — EPINEPHRINE 0.3 MG/.3ML
0.3 INJECTION SUBCUTANEOUS
Status: COMPLETED | OUTPATIENT
Start: 2019-08-18 | End: 2019-08-18

## 2019-08-18 RX ORDER — ACETAMINOPHEN 325 MG/1
650 TABLET ORAL EVERY 6 HOURS PRN
Status: DISCONTINUED | OUTPATIENT
Start: 2019-08-18 | End: 2019-08-20 | Stop reason: HOSPADM

## 2019-08-18 RX ORDER — ALBUTEROL SULFATE 2.5 MG/.5ML
15 SOLUTION RESPIRATORY (INHALATION) CONTINUOUS
Status: DISCONTINUED | OUTPATIENT
Start: 2019-08-18 | End: 2019-08-18

## 2019-08-18 RX ORDER — ENOXAPARIN SODIUM 100 MG/ML
40 INJECTION SUBCUTANEOUS EVERY 24 HOURS
Status: DISCONTINUED | OUTPATIENT
Start: 2019-08-18 | End: 2019-08-20 | Stop reason: HOSPADM

## 2019-08-18 RX ORDER — IPRATROPIUM BROMIDE AND ALBUTEROL SULFATE 2.5; .5 MG/3ML; MG/3ML
3 SOLUTION RESPIRATORY (INHALATION) EVERY 6 HOURS
Status: DISCONTINUED | OUTPATIENT
Start: 2019-08-18 | End: 2019-08-20 | Stop reason: HOSPADM

## 2019-08-18 RX ADMIN — ENOXAPARIN SODIUM 40 MG: 100 INJECTION SUBCUTANEOUS at 09:08

## 2019-08-18 RX ADMIN — SODIUM CHLORIDE 1000 ML: 0.9 INJECTION, SOLUTION INTRAVENOUS at 04:08

## 2019-08-18 RX ADMIN — SENNOSIDES, DOCUSATE SODIUM 1 TABLET: 50; 8.6 TABLET, FILM COATED ORAL at 09:08

## 2019-08-18 RX ADMIN — EPINEPHRINE 0.3 MG: 0.3 INJECTION INTRAMUSCULAR at 04:08

## 2019-08-18 RX ADMIN — METHYLPREDNISOLONE SODIUM SUCCINATE 80 MG: 125 INJECTION, POWDER, FOR SOLUTION INTRAMUSCULAR; INTRAVENOUS at 09:08

## 2019-08-18 RX ADMIN — MAGNESIUM SULFATE 2 G: 2 INJECTION INTRAVENOUS at 04:08

## 2019-08-18 RX ADMIN — IPRATROPIUM BROMIDE 1.5 MG: 0.5 SOLUTION RESPIRATORY (INHALATION) at 04:08

## 2019-08-18 RX ADMIN — ALBUTEROL SULFATE 15 MG: 2.5 SOLUTION RESPIRATORY (INHALATION) at 04:08

## 2019-08-18 RX ADMIN — METHYLPREDNISOLONE SODIUM SUCCINATE 125 MG: 125 INJECTION, POWDER, FOR SOLUTION INTRAMUSCULAR; INTRAVENOUS at 04:08

## 2019-08-18 NOTE — ED TRIAGE NOTES
Pt reports shortness of breath starting this morning. Hx of asthma. Is out of asthma medications.

## 2019-08-18 NOTE — ED PROVIDER NOTES
Encounter Date: 8/18/2019       History     Chief Complaint   Patient presents with    Shortness of Breath     PMH of asthma, with audible wheezes in triage. Pt denies having breathing tx today. + retractions.      Chief complaint:  Shortness of breath    History of present illness:  29-year-old male with history of asthma and sickle cell disease and frequent pneumonias presents emergency department with shortness of breath that progressed throughout the day today.  Patient also has a cough.  Patient is unable to provide further history due to shortness of breath.        Review of patient's allergies indicates:   Allergen Reactions    Penicillins Anaphylaxis     Past Medical History:   Diagnosis Date    Asthma     Broken thumb     Sickle cell anemia     Sickle cell crisis      Past Surgical History:   Procedure Laterality Date    HAND SURGERY      spleenectomy       History reviewed. No pertinent family history.  Social History     Tobacco Use    Smoking status: Current Every Day Smoker     Packs/day: 0.50     Types: Cigarettes    Smokeless tobacco: Never Used    Tobacco comment: encouraged to quit.    Substance Use Topics    Alcohol use: Yes     Comment: socially    Drug use: No     Review of Systems   Unable to perform ROS: Acuity of condition       Physical Exam     Initial Vitals   BP Pulse Resp Temp SpO2   08/18/19 1626 08/18/19 1626 08/18/19 1626 08/18/19 1646 08/18/19 1626   (!) 142/88 (!) 124 (!) 24 97.6 °F (36.4 °C) (!) 85 %      MAP       --                Physical Exam    Nursing note and vitals reviewed.  Constitutional: He appears well-developed and well-nourished. He is not diaphoretic. He appears distressed.   HENT:   Head: Normocephalic and atraumatic.   Right Ear: External ear normal.   Left Ear: External ear normal.   Nose: Nose normal.   Mouth/Throat: Oropharynx is clear and moist.   Eyes: Conjunctivae and EOM are normal. Pupils are equal, round, and reactive to light. Right eye  exhibits no discharge. Left eye exhibits no discharge. No scleral icterus.   Neck: Normal range of motion. Neck supple. No JVD present.   Cardiovascular: Regular rhythm, normal heart sounds and intact distal pulses. Exam reveals no gallop and no friction rub.    No murmur heard.  Tachycardic, regular rhythm.   Pulmonary/Chest: No stridor. He is in respiratory distress. He has wheezes. He has no rhonchi. He has no rales. He exhibits no tenderness.   Patient is in severe respiratory distress.  There are wheezes auscultated throughout all lung fields.  No rales or rhonchi.   Abdominal: Soft. Bowel sounds are normal. He exhibits no distension and no mass. There is no tenderness. There is no rebound and no guarding.   Musculoskeletal: Normal range of motion. He exhibits no edema or tenderness.   Neurological: He is alert and oriented to person, place, and time. He has normal strength. No cranial nerve deficit or sensory deficit.   Skin: Skin is warm and dry. No rash noted. No erythema. No pallor.   Psychiatric: He has a normal mood and affect. His behavior is normal. Judgment and thought content normal.         ED Course   Critical Care  Date/Time: 8/18/2019 5:47 PM  Performed by: Alexis Moran Jr., MD  Authorized by: Alexis Moran Jr., MD   Direct patient critical care time: 10 minutes  Additional history critical care time: 10 minutes  Ordering / reviewing critical care time: 5 minutes  Documentation critical care time: 5 minutes  Consulting other physicians critical care time: 5 minutes  Consult with family critical care time: 5 minutes  Total critical care time (exclusive of procedural time) : 40 minutes  Critical care was necessary to treat or prevent imminent or life-threatening deterioration of the following conditions: respiratory failure.  Critical care was time spent personally by me on the following activities: development of treatment plan with patient or surrogate, discussions with consultants,  interpretation of cardiac output measurements, evaluation of patient's response to treatment, examination of patient, obtaining history from patient or surrogate, ordering and performing treatments and interventions, ordering and review of laboratory studies, ordering and review of radiographic studies, pulse oximetry, re-evaluation of patient's condition and review of old charts.  Comments: BiPAP        Labs Reviewed   CBC W/ AUTO DIFFERENTIAL - Abnormal; Notable for the following components:       Result Value    WBC 18.42 (*)     RBC 3.68 (*)     Hemoglobin 10.6 (*)     Hematocrit 30.6 (*)     RDW 16.4 (*)     Platelets 747 (*)     MPV 9.0 (*)     Lymph # 5.5 (*)     Mono # 2.4 (*)     Eos # 4.2 (*)     Gran% 33.6 (*)     Eosinophil% 23.0 (*)     Platelet Estimate Increased (*)     All other components within normal limits   COMPREHENSIVE METABOLIC PANEL - Abnormal; Notable for the following components:    BUN, Bld 5 (*)     Total Bilirubin 1.3 (*)     All other components within normal limits   RETICULOCYTES - Abnormal; Notable for the following components:    Retic 6.7 (*)     All other components within normal limits   ISTAT PROCEDURE - Abnormal; Notable for the following components:    POC PH 7.275 (*)     POC PCO2 59.0 (*)     POC SATURATED O2 75 (*)     All other components within normal limits   LACTIC ACID, PLASMA   TROPONIN I     EKG Readings: (Independently Interpreted)   Initial Reading: No STEMI. Ectopy: No Ectopy.   EKG reviewed and interpreted by me shows sinus tachycardia rate of 106.  P.r., QRS, QT intervals within normal limits.  There is normal axis.  There is normal R-wave progression.  There are no ST segment or T-wave ischemic findings.         Imaging Results          X-Ray Chest AP Portable (Final result)  Result time 08/18/19 16:59:11    Final result by Osiel Newton MD (08/18/19 16:59:11)                 Impression:      Mild diffuse nonspecific interstitial coarsening which can be seen  with pulmonary edema, interstitial pneumonia or chronic interstitial lung disease, without focal consolidation.      Electronically signed by: Osiel Newton MD  Date:    08/18/2019  Time:    16:59             Narrative:    EXAMINATION:  XR CHEST AP PORTABLE    CLINICAL HISTORY:  Shortness of breath    TECHNIQUE:  Single frontal view of the chest was performed.    COMPARISON:  Chest radiograph 05/11/2018    FINDINGS:  Monitoring leads overlie the chest.  Mild diffuse nonspecific interstitial coarsening.  No focal consolidation, pleural effusion or pneumothorax.  Cardiomediastinal silhouette is midline and within normal limits.  Hilar contours are within normal limits.  Osseous structures appear intact.  PA and lateral views can be obtained.                              X-Rays:   Independently Interpreted Readings:   Other Readings:  No infiltrate or consolidation or pneumothorax.  There is nonspecific diffuse interstitial coarsening, according to radiology read.    Medical Decision Making:   ED Management:  This is the emergent evaluation of a 29-year-old male presents emergency department with shortness of breath. Differential diagnosis at the time of initial evaluation included, but was not limited to:  Asthma exacerbation, pneumonia, pneumothorax.  I considered but doubt pulmonary embolus.  I also considered sepsis secondary to pneumonia.     This patient was in significant respiratory distress and was agitated upon arrival.  He received continuous DuoNeb treatments in addition to 2 g of magnesium, Solu-Medrol, and intramuscular epinephrine.  He did improve significantly with his work of breathing but he continues to have wheezing and accessory muscle use.  Initial oxygen saturations were between 78 and 85%.  His oxygenation has improved.  However, on VBG, he is hypercapnic.  He appears tired but is easily arousable and follows commands.  At this time, I will put him on BiPAP.  The rest of his laboratory evaluation  reveals chronic anemia as well as leukocytosis.  He does not have a fever.  Chest x-ray reveals nonspecific interstitial findings but nothing to suggest acute pneumonia.  I doubt acute chest syndrome.  Given the above, patient will need ICU monitoring.   I discussed the case with the admitting ICU hospitalist, Dr. Ernst.  He also advised consulting Pulmonology and getting an ABG and 3 hr which I have ordered.    Update:  Patient would not tolerate BiPAP as he did not like the way it felt on his face.  Therefore, I ordered high-flow Vapotherm nasal cannula.  This was communicated to the respiratory therapist.                      Clinical Impression:   Acute asthma exacerbation, sickle cell disease, hypoxia, hypercapnia                             Alexis Moran Jr., MD  08/18/19 [id number]       Alexis Moran Jr., MD  08/18/19 3114

## 2019-08-18 NOTE — CARE UPDATE
Continuous tx given as ordered. VBG collected abd BIPAP order by MD. Patient placed on 10/5, but didn't not tolerate the BIPAP for longer than a minute. multiple attempts made. MD notified that patient didn't tolerate device. Vapotherm ordered. Patient placed on Vapotherm 15 lpm 40% without issue.

## 2019-08-19 PROBLEM — J96.01 ACUTE RESPIRATORY FAILURE WITH HYPOXIA: Status: ACTIVE | Noted: 2019-08-18

## 2019-08-19 LAB
ALBUMIN SERPL BCP-MCNC: 3.5 G/DL (ref 3.5–5.2)
ALLENS TEST: ABNORMAL
ALP SERPL-CCNC: 66 U/L (ref 55–135)
ALT SERPL W/O P-5'-P-CCNC: 10 U/L (ref 10–44)
ANION GAP SERPL CALC-SCNC: 7 MMOL/L (ref 8–16)
AST SERPL-CCNC: 11 U/L (ref 10–40)
BASOPHILS # BLD AUTO: 0.03 K/UL (ref 0–0.2)
BASOPHILS NFR BLD: 0.2 % (ref 0–1.9)
BILIRUB SERPL-MCNC: 0.8 MG/DL (ref 0.1–1)
BUN SERPL-MCNC: 6 MG/DL (ref 6–20)
CALCIUM SERPL-MCNC: 9.2 MG/DL (ref 8.7–10.5)
CHLORIDE SERPL-SCNC: 105 MMOL/L (ref 95–110)
CO2 SERPL-SCNC: 27 MMOL/L (ref 23–29)
CREAT SERPL-MCNC: 0.7 MG/DL (ref 0.5–1.4)
DELSYS: ABNORMAL
DIFFERENTIAL METHOD: ABNORMAL
EOSINOPHIL # BLD AUTO: 0 K/UL (ref 0–0.5)
EOSINOPHIL NFR BLD: 0 % (ref 0–8)
ERYTHROCYTE [DISTWIDTH] IN BLOOD BY AUTOMATED COUNT: 16.2 % (ref 11.5–14.5)
ERYTHROCYTE [SEDIMENTATION RATE] IN BLOOD BY WESTERGREN METHOD: 14 MM/H
EST. GFR  (AFRICAN AMERICAN): >60 ML/MIN/1.73 M^2
EST. GFR  (NON AFRICAN AMERICAN): >60 ML/MIN/1.73 M^2
FIO2: 40
FLOW: 15
GLUCOSE SERPL-MCNC: 139 MG/DL (ref 70–110)
HCO3 UR-SCNC: 27.9 MMOL/L (ref 24–28)
HCT VFR BLD AUTO: 30.2 % (ref 40–54)
HGB BLD-MCNC: 10.4 G/DL (ref 14–18)
HYPOCHROMIA BLD QL SMEAR: ABNORMAL
LYMPHOCYTES # BLD AUTO: 1.9 K/UL (ref 1–4.8)
LYMPHOCYTES NFR BLD: 13.7 % (ref 18–48)
MAGNESIUM SERPL-MCNC: 2.2 MG/DL (ref 1.6–2.6)
MCH RBC QN AUTO: 28.8 PG (ref 27–31)
MCHC RBC AUTO-ENTMCNC: 34.4 G/DL (ref 32–36)
MCV RBC AUTO: 84 FL (ref 82–98)
MODE: ABNORMAL
MONOCYTES # BLD AUTO: 0.1 K/UL (ref 0.3–1)
MONOCYTES NFR BLD: 0.9 % (ref 4–15)
NEUTROPHILS # BLD AUTO: 11.8 K/UL (ref 1.8–7.7)
NEUTROPHILS NFR BLD: 85.2 % (ref 38–73)
PCO2 BLDA: 45.6 MMHG (ref 35–45)
PH SMN: 7.39 [PH] (ref 7.35–7.45)
PHOSPHATE SERPL-MCNC: 4.2 MG/DL (ref 2.7–4.5)
PLATELET # BLD AUTO: 674 K/UL (ref 150–350)
PLATELET BLD QL SMEAR: ABNORMAL
PMV BLD AUTO: 10 FL (ref 9.2–12.9)
PO2 BLDA: 100 MMHG (ref 80–100)
POC BE: 3 MMOL/L
POC SATURATED O2: 98 % (ref 95–100)
POC TCO2: 29 MMOL/L (ref 23–27)
POLYCHROMASIA BLD QL SMEAR: ABNORMAL
POTASSIUM SERPL-SCNC: 4.6 MMOL/L (ref 3.5–5.1)
PROT SERPL-MCNC: 7.5 G/DL (ref 6–8.4)
RBC # BLD AUTO: 3.61 M/UL (ref 4.6–6.2)
SAMPLE: ABNORMAL
SITE: ABNORMAL
SODIUM SERPL-SCNC: 139 MMOL/L (ref 136–145)
SP02: 99
TARGETS BLD QL SMEAR: ABNORMAL
WBC # BLD AUTO: 13.91 K/UL (ref 3.9–12.7)

## 2019-08-19 PROCEDURE — 80053 COMPREHEN METABOLIC PANEL: CPT

## 2019-08-19 PROCEDURE — 25000003 PHARM REV CODE 250: Performed by: HOSPITALIST

## 2019-08-19 PROCEDURE — 82803 BLOOD GASES ANY COMBINATION: CPT

## 2019-08-19 PROCEDURE — 94799 UNLISTED PULMONARY SVC/PX: CPT

## 2019-08-19 PROCEDURE — 99233 PR SUBSEQUENT HOSPITAL CARE,LEVL III: ICD-10-PCS | Mod: 25,,, | Performed by: INTERNAL MEDICINE

## 2019-08-19 PROCEDURE — 25000242 PHARM REV CODE 250 ALT 637 W/ HCPCS: Performed by: HOSPITALIST

## 2019-08-19 PROCEDURE — S4991 NICOTINE PATCH NONLEGEND: HCPCS | Performed by: HOSPITALIST

## 2019-08-19 PROCEDURE — 25000003 PHARM REV CODE 250: Performed by: EMERGENCY MEDICINE

## 2019-08-19 PROCEDURE — 84100 ASSAY OF PHOSPHORUS: CPT

## 2019-08-19 PROCEDURE — 63600175 PHARM REV CODE 636 W HCPCS: Performed by: HOSPITALIST

## 2019-08-19 PROCEDURE — 99900035 HC TECH TIME PER 15 MIN (STAT)

## 2019-08-19 PROCEDURE — 99406 PR TOBACCO USE CESSATION INTERMEDIATE 3-10 MINUTES: ICD-10-PCS | Mod: ,,, | Performed by: INTERNAL MEDICINE

## 2019-08-19 PROCEDURE — 63600175 PHARM REV CODE 636 W HCPCS: Performed by: INTERNAL MEDICINE

## 2019-08-19 PROCEDURE — 36600 WITHDRAWAL OF ARTERIAL BLOOD: CPT

## 2019-08-19 PROCEDURE — 99233 SBSQ HOSP IP/OBS HIGH 50: CPT | Mod: 25,,, | Performed by: INTERNAL MEDICINE

## 2019-08-19 PROCEDURE — 83735 ASSAY OF MAGNESIUM: CPT

## 2019-08-19 PROCEDURE — 99406 BEHAV CHNG SMOKING 3-10 MIN: CPT | Mod: ,,, | Performed by: INTERNAL MEDICINE

## 2019-08-19 PROCEDURE — 94761 N-INVAS EAR/PLS OXIMETRY MLT: CPT

## 2019-08-19 PROCEDURE — 94640 AIRWAY INHALATION TREATMENT: CPT

## 2019-08-19 PROCEDURE — 21400001 HC TELEMETRY ROOM

## 2019-08-19 PROCEDURE — 36415 COLL VENOUS BLD VENIPUNCTURE: CPT

## 2019-08-19 PROCEDURE — 85025 COMPLETE CBC W/AUTO DIFF WBC: CPT

## 2019-08-19 PROCEDURE — 27000221 HC OXYGEN, UP TO 24 HOURS

## 2019-08-19 RX ORDER — PREDNISONE 20 MG/1
40 TABLET ORAL DAILY
Status: DISCONTINUED | OUTPATIENT
Start: 2019-08-19 | End: 2019-08-20 | Stop reason: HOSPADM

## 2019-08-19 RX ORDER — SODIUM CHLORIDE 450 MG/100ML
1000 INJECTION, SOLUTION INTRAVENOUS CONTINUOUS
Status: DISCONTINUED | OUTPATIENT
Start: 2019-08-19 | End: 2019-08-19

## 2019-08-19 RX ORDER — FLUTICASONE FUROATE AND VILANTEROL 200; 25 UG/1; UG/1
1 POWDER RESPIRATORY (INHALATION) DAILY
Status: DISCONTINUED | OUTPATIENT
Start: 2019-08-19 | End: 2019-08-20 | Stop reason: HOSPADM

## 2019-08-19 RX ADMIN — MONTELUKAST 10 MG: 10 TABLET, FILM COATED ORAL at 08:08

## 2019-08-19 RX ADMIN — IPRATROPIUM BROMIDE AND ALBUTEROL SULFATE 3 ML: .5; 3 SOLUTION RESPIRATORY (INHALATION) at 12:08

## 2019-08-19 RX ADMIN — PREDNISONE 40 MG: 20 TABLET ORAL at 02:08

## 2019-08-19 RX ADMIN — SODIUM CHLORIDE 1000 ML: 0.45 INJECTION, SOLUTION INTRAVENOUS at 05:08

## 2019-08-19 RX ADMIN — ENOXAPARIN SODIUM 40 MG: 100 INJECTION SUBCUTANEOUS at 05:08

## 2019-08-19 RX ADMIN — SENNOSIDES, DOCUSATE SODIUM 1 TABLET: 50; 8.6 TABLET, FILM COATED ORAL at 08:08

## 2019-08-19 RX ADMIN — NICOTINE 1 PATCH: 21 PATCH, EXTENDED RELEASE TRANSDERMAL at 08:08

## 2019-08-19 RX ADMIN — IPRATROPIUM BROMIDE AND ALBUTEROL SULFATE 3 ML: .5; 3 SOLUTION RESPIRATORY (INHALATION) at 11:08

## 2019-08-19 RX ADMIN — IPRATROPIUM BROMIDE AND ALBUTEROL SULFATE 3 ML: .5; 3 SOLUTION RESPIRATORY (INHALATION) at 08:08

## 2019-08-19 RX ADMIN — METHYLPREDNISOLONE SODIUM SUCCINATE 80 MG: 125 INJECTION, POWDER, FOR SOLUTION INTRAMUSCULAR; INTRAVENOUS at 08:08

## 2019-08-19 NOTE — CONSULTS
[facility]  Pulmonology  Consult Note    Patient Name: Katie Parham  MRN: [id number]  Admission Date: 8/18/2019  Hospital Length of Stay: 1 days  Code Status: Full Code  Attending Physician: Kenna Gudino MD  Primary Care Provider: Kieran Reed MD   Principal Problem: Acute asthma exacerbation    Inpatient consult to Pulmonology  Consult performed by: Arian Ca MD  Consult ordered by: Mauricio Luis MD  Reason for consult: asthma exacerbation        Subjective:     HPI:  29 y.o. male that (in part)  has a past medical history of Asthma, Broken thumb, Sickle cell anemia, and Sickle cell crisis.  has a past surgical history that includes Hand surgery and spleenectomy. Presents to [facility] Emergency Department complaining of severe shortness of breath.  Associated symptoms include audible wheezing.  Use using accessory muscles.  History of severe recurrent asthma as well as pneumonia.  Denies sick contacts or recent travel.  History is limited due to respiratory distress     In the emergency department routine laboratory studies and chest x-ray was obtained along with ABG.  There was no evidence of infiltrate on chest x-ray.  Patient was hypoxemic saturating in the upper 70s with respiratory distress. ABG showed hypoxemia and hypercapnia. BiPAP initiated and was tolerated for short period of time in which he received albuterol, Solu-Medrol, albuterol/Atrovent, epinephrine, and magnesium sulfate. Some improvement with his respiratory status was achieved and patient was able to be weaned to high-flow nasal oxygen , as he did not like the BiPAP.    Pulmonary consulted for asthma exacerbation. Patient currently on 4 L NC. He reports continued smoking.   He is not compliant with his inhaler at home.     Past Medical History:   Diagnosis Date    Asthma     Broken thumb     Sickle cell anemia     Sickle cell crisis        Past Surgical History:    Procedure Laterality Date    HAND SURGERY      spleenectomy         Review of patient's allergies indicates:   Allergen Reactions    Penicillins Anaphylaxis       Family History     None        Tobacco Use    Smoking status: Current Every Day Smoker     Packs/day: 0.50     Types: Cigarettes    Smokeless tobacco: Never Used    Tobacco comment: encouraged to quit.    Substance and Sexual Activity    Alcohol use: Yes     Comment: socially    Drug use: No    Sexual activity: Yes     Partners: Female     Birth control/protection: Condom         Review of Systems   Constitutional: Negative for activity change, appetite change and fever.   HENT: Negative for congestion and postnasal drip.    Eyes: Negative for discharge and itching.   Respiratory: Positive for cough, shortness of breath and wheezing.    Cardiovascular: Negative for chest pain and leg swelling.   Gastrointestinal: Negative for abdominal distention and abdominal pain.   Endocrine: Negative for cold intolerance and heat intolerance.   Genitourinary: Negative for difficulty urinating and dysuria.   Musculoskeletal: Negative for arthralgias and back pain.   Allergic/Immunologic: Negative for environmental allergies and food allergies.   Neurological: Negative for dizziness and facial asymmetry.   Psychiatric/Behavioral: Negative for agitation and confusion.     Objective:     Vital Signs (Most Recent):  Temp: 98 °F (36.7 °C) (08/19/19 1131)  Pulse: 89 (08/19/19 1131)  Resp: 19 (08/19/19 1131)  BP: (!) 121/55 (08/19/19 1131)  SpO2: 97 % (08/19/19 1131) Vital Signs (24h Range):  Temp:  [97.6 °F (36.4 °C)-98.5 °F (36.9 °C)] 98 °F (36.7 °C)  Pulse:  [] 89  Resp:  [10-34] 19  SpO2:  [85 %-100 %] 97 %  BP: ()/(51-88) 121/55     Weight: 62.4 kg (137 lb 9.1 oz)  Body mass index is 22.2 kg/m².      Intake/Output Summary (Last 24 hours) at 8/19/2019 1356  Last data filed at 8/18/2019 1845  Gross per 24 hour   Intake 1050 ml   Output --   Net 1050  ml       Physical Exam    Vents:  Oxygen Concentration (%): 40 (08/19/19 0008)    Lines/Drains/Airways     Peripheral Intravenous Line                 Peripheral IV - Single Lumen 08/18/19 1646 20 G Left Antecubital less than 1 day         Peripheral IV - Single Lumen 08/18/19 1806 20 G Left Forearm less than 1 day                Significant Labs:    CBC/Anemia Profile:  Recent Labs   Lab 08/18/19 1642 08/19/19  0513   WBC 18.42* 13.91*   HGB 10.6* 10.4*   HCT 30.6* 30.2*   * 674*   MCV 83 84   RDW 16.4* 16.2*   RETIC 6.7*  --         Chemistries:  Recent Labs   Lab 08/18/19 1642 08/19/19 0513    139   K 3.7 4.6    105   CO2 29 27   BUN 5* 6   CREATININE 0.8 0.7   CALCIUM 9.0 9.2   ALBUMIN 3.9 3.5   PROT 7.8 7.5   BILITOT 1.3* 0.8   ALKPHOS 68 66   ALT 11 10   AST 15 11   MG  --  2.2   PHOS  --  4.2       All pertinent labs within the past 24 hours have been reviewed.    Significant Imaging:   I have reviewed and interpreted all pertinent imaging results/findings within the past 24 hours.    Assessment/Plan:     * Acute asthma exacerbation  Patient with acute asthma exacerbation. Patient needed Bipap in the ED but currently on NC. Patient is no longer wheezing.   -Wean O2 to keep sat > 90%.   -Will switch steroids to prednisone.   -Start Breo--ease of compliance.       Tobacco abuse  Tobacco cessation counseling for > 5 minutes. Risk of continued smoking on heart and lung disease discussed.               Thank you for your consult. I will sign off. Please contact us if you have any additional questions.     Arian Ca MD  Pulmonology  [facility]

## 2019-08-19 NOTE — ASSESSMENT & PLAN NOTE
Tobacco cessation counseling for > 5 minutes. Risk of continued smoking on heart and lung disease discussed.

## 2019-08-19 NOTE — HPI
29 y.o. male that (in part)  has a past medical history of Asthma, Broken thumb, Sickle cell anemia, and Sickle cell crisis.  has a past surgical history that includes Hand surgery and spleenectomy. Presents to [facility] Emergency Department complaining of severe shortness of breath.  Associated symptoms include audible wheezing.  Use using accessory muscles.  History of severe recurrent asthma as well as pneumonia.  Denies sick contacts or recent travel.  History is limited due to respiratory distress     In the emergency department routine laboratory studies and chest x-ray was obtained along with ABG.  There was no evidence of infiltrate on chest x-ray.  Patient was hypoxemic saturating in the upper 70s with respiratory distress. ABG showed hypoxemia and hypercapnia. BiPAP initiated and was tolerated for short period of time in which he received albuterol, Solu-Medrol, albuterol/Atrovent, epinephrine, and magnesium sulfate. Some improvement with his respiratory status was achieved and patient was able to be weaned to high-flow nasal oxygen , as he did not like the BiPAP.    Pulmonary consulted for asthma exacerbation. Patient currently on 4 L NC. He reports continued smoking.   He is not compliant with his inhaler at home.

## 2019-08-19 NOTE — SUBJECTIVE & OBJECTIVE
Interval History: still is wheezing,  Review of Systems   Constitutional: Positive for activity change and appetite change.   HENT: Negative for congestion and dental problem.    Eyes: Negative for discharge and itching.   Respiratory: Positive for shortness of breath and wheezing.    Cardiovascular: Negative for chest pain and leg swelling.   Gastrointestinal: Negative for abdominal distention and abdominal pain.   Neurological: Negative for dizziness and facial asymmetry.   Hematological: Negative for adenopathy. Does not bruise/bleed easily.     Objective:     Vital Signs (Most Recent):  Temp: 98.5 °F (36.9 °C) (08/19/19 0729)  Pulse: 67 (08/19/19 0729)  Resp: 19 (08/19/19 0729)  BP: 123/60 (08/19/19 0729)  SpO2: 98 % (08/19/19 0729) Vital Signs (24h Range):  Temp:  [97.6 °F (36.4 °C)-98.5 °F (36.9 °C)] 98.5 °F (36.9 °C)  Pulse:  [] 67  Resp:  [10-34] 19  SpO2:  [85 %-100 %] 98 %  BP: ()/(51-88) 123/60     Weight: 62.4 kg (137 lb 9.1 oz)  Body mass index is 22.2 kg/m².    Intake/Output Summary (Last 24 hours) at 8/19/2019 1021  Last data filed at 8/18/2019 1845  Gross per 24 hour   Intake 1050 ml   Output --   Net 1050 ml      Physical Exam   Constitutional: He is oriented to person, place, and time. No distress.   HENT:   Head: Atraumatic.   Neck: Neck supple.   Cardiovascular: Normal rate.   Pulmonary/Chest: Effort normal. He has wheezes.   Musculoskeletal: Normal range of motion.   Neurological: He is oriented to person, place, and time.       Significant Labs:   BMP:   Recent Labs   Lab 08/19/19  0513   *      K 4.6      CO2 27   BUN 6   CREATININE 0.7   CALCIUM 9.2   MG 2.2     CBC:   Recent Labs   Lab 08/18/19  1642 08/19/19  0513   WBC 18.42* 13.91*   HGB 10.6* 10.4*   HCT 30.6* 30.2*   * 674*       Significant Imaging: reviewed.

## 2019-08-19 NOTE — SUBJECTIVE & OBJECTIVE
Past Medical History:   Diagnosis Date    Asthma     Broken thumb     Sickle cell anemia     Sickle cell crisis        Past Surgical History:   Procedure Laterality Date    HAND SURGERY      spleenectomy         Review of patient's allergies indicates:   Allergen Reactions    Penicillins Anaphylaxis       Family History     None        Tobacco Use    Smoking status: Current Every Day Smoker     Packs/day: 0.50     Types: Cigarettes    Smokeless tobacco: Never Used    Tobacco comment: encouraged to quit.    Substance and Sexual Activity    Alcohol use: Yes     Comment: socially    Drug use: No    Sexual activity: Yes     Partners: Female     Birth control/protection: Condom         Review of Systems   Constitutional: Negative for activity change, appetite change and fever.   HENT: Negative for congestion and postnasal drip.    Eyes: Negative for discharge and itching.   Respiratory: Positive for cough, shortness of breath and wheezing.    Cardiovascular: Negative for chest pain and leg swelling.   Gastrointestinal: Negative for abdominal distention and abdominal pain.   Endocrine: Negative for cold intolerance and heat intolerance.   Genitourinary: Negative for difficulty urinating and dysuria.   Musculoskeletal: Negative for arthralgias and back pain.   Allergic/Immunologic: Negative for environmental allergies and food allergies.   Neurological: Negative for dizziness and facial asymmetry.   Psychiatric/Behavioral: Negative for agitation and confusion.     Objective:     Vital Signs (Most Recent):  Temp: 98 °F (36.7 °C) (08/19/19 1131)  Pulse: 89 (08/19/19 1131)  Resp: 19 (08/19/19 1131)  BP: (!) 121/55 (08/19/19 1131)  SpO2: 97 % (08/19/19 1131) Vital Signs (24h Range):  Temp:  [97.6 °F (36.4 °C)-98.5 °F (36.9 °C)] 98 °F (36.7 °C)  Pulse:  [] 89  Resp:  [10-34] 19  SpO2:  [85 %-100 %] 97 %  BP: ()/(51-88) 121/55     Weight: 62.4 kg (137 lb 9.1 oz)  Body mass index is 22.2  kg/m².      Intake/Output Summary (Last 24 hours) at 8/19/2019 1356  Last data filed at 8/18/2019 1845  Gross per 24 hour   Intake 1050 ml   Output --   Net 1050 ml       Physical Exam    Vents:  Oxygen Concentration (%): 40 (08/19/19 0008)    Lines/Drains/Airways     Peripheral Intravenous Line                 Peripheral IV - Single Lumen 08/18/19 1646 20 G Left Antecubital less than 1 day         Peripheral IV - Single Lumen 08/18/19 1806 20 G Left Forearm less than 1 day                Significant Labs:    CBC/Anemia Profile:  Recent Labs   Lab 08/18/19 1642 08/19/19  0513   WBC 18.42* 13.91*   HGB 10.6* 10.4*   HCT 30.6* 30.2*   * 674*   MCV 83 84   RDW 16.4* 16.2*   RETIC 6.7*  --         Chemistries:  Recent Labs   Lab 08/18/19 1642 08/19/19  0513    139   K 3.7 4.6    105   CO2 29 27   BUN 5* 6   CREATININE 0.8 0.7   CALCIUM 9.0 9.2   ALBUMIN 3.9 3.5   PROT 7.8 7.5   BILITOT 1.3* 0.8   ALKPHOS 68 66   ALT 11 10   AST 15 11   MG  --  2.2   PHOS  --  4.2       All pertinent labs within the past 24 hours have been reviewed.    Significant Imaging:   I have reviewed and interpreted all pertinent imaging results/findings within the past 24 hours.

## 2019-08-19 NOTE — ASSESSMENT & PLAN NOTE
Patient with acute asthma exacerbation. Patient needed Bipap in the ED but currently on NC. Patient is no longer wheezing.   -Wean O2 to keep sat > 90%.   -Will switch steroids to prednisone.

## 2019-08-19 NOTE — HOSPITAL COURSE
Katie Parham is a 29 y.o. male that (in part)  has a past medical history of Asthma, Broken thumb, Sickle cell anemia, and Sickle cell crisis.  has a past surgical history that includes Hand surgery and spleenectomy. Presents to [facility] complaining of severe shortness of breath.  Associated symptoms include audible wheezing.  Use using accessory muscles.  History of severe recurrent asthma as well as pneumonia.  Denies sick contacts or recent travel.  History was limited due to respiratory distress  In the emergency department routine laboratory studies and chest x-ray was obtained along with ABG.  There was no evidence of infiltrate on chest x-ray.  Patient was hypoxemic saturating in the upper 70s with respiratory distress. ABG showed hypoxemia and hypercapnia. BiPAP initiated and was tolerated for short period of time in which he received albuterol, Solu-Medrol, albuterol/Atrovent, epinephrine, and magnesium sulfate. Some improvement with his respiratory status was achieved and patient was able to be weaned to high-flow nasal oxygen , as he did not like the BiPAP.  Continue with IV solumedrol,singular,nebulizer,his respiratory status is much improved,wheeizng is resolved,he was stable on RA,patient has been discharged home with home nebulizer,inhaler,sigular,short course of prednison and follow up with PCP in next few days.

## 2019-08-19 NOTE — NURSING
Bedside report received from RAPHAEL Alejo. Patient lying in bed with eyes closed. Respirations even and unlabored. Easily aroused by verbal stimuli. NAD noted at this time. All safety precautions in place. Will continue to monitor.

## 2019-08-19 NOTE — HPI
Katie Parham is a 29 y.o. male that (in part)  has a past medical history of Asthma, Broken thumb, Sickle cell anemia, and Sickle cell crisis.  has a past surgical history that includes Hand surgery and spleenectomy. Presents to [facility] complaining of severe shortness of breath.  Associated symptoms include audible wheezing.  Use using accessory muscles.  History of severe recurrent asthma as well as pneumonia.  Denies sick contacts or recent travel.  History is limited due to respiratory distress    In the emergency department routine laboratory studies and chest x-ray was obtained along with ABG.  There was no evidence of infiltrate on chest x-ray.  Patient was hypoxemic saturating in the upper 70s with respiratory distress. ABG showed hypoxemia and hypercapnia. BiPAP initiated and was tolerated for short period of time in which he received albuterol, Solu-Medrol, albuterol/Atrovent, epinephrine, and magnesium sulfate. Some improvement with his respiratory status was achieved and patient was able to be weaned to high-flow nasal oxygen , as he did not like the BiPAP.    Hospital medicine has been asked to admit for further evaluation and treatment.

## 2019-08-19 NOTE — H&P
[facility] Hospital Medicine  History & Physical    Patient Name: Katie Parham  MRN: [id number]  Admission Date: 08/18/2019  Attending Physician: Mauricio Luis MD, MPH      PCP:     Kieran Reed MD    CC:     Chief Complaint   Patient presents with    Shortness of Breath     PMH of asthma, with audible wheezes in triage. Pt denies having breathing tx today. + retractions.        HISTORY OF PRESENT ILLNESS:     Katie Parham is a 29 y.o. male that (in part)  has a past medical history of Asthma, Broken thumb, Sickle cell anemia, and Sickle cell crisis.  has a past surgical history that includes Hand surgery and spleenectomy. Presents to [facility] complaining of severe shortness of breath.  Associated symptoms include audible wheezing.  Use using accessory muscles.  History of severe recurrent asthma as well as pneumonia.  Denies sick contacts or recent travel.  History is limited due to respiratory distress    In the emergency department routine laboratory studies and chest x-ray was obtained along with ABG.  There was no evidence of infiltrate on chest x-ray.  Patient was hypoxemic saturating in the upper 70s with respiratory distress. ABG showed hypoxemia and hypercapnia. BiPAP initiated and was tolerated for short period of time in which he received albuterol, Solu-Medrol, albuterol/Atrovent, epinephrine, and magnesium sulfate. Some improvement with his respiratory status was achieved and patient was able to be weaned to high-flow nasal oxygen , as he did not like the BiPAP.    Hospital medicine has been asked to admit for further evaluation and treatment.       REVIEW OF SYSTEMS:     -- Constitutional: No fever or chills.  -- Eyes: No visual changes, diplopia, pain, tearing, blind spots, or discharge.   -- Ears, nose, mouth, throat, and face: No congestion, sore throat, epistaxis, d/c, bleeding gums, neck stiffness masses, or dental  issues.  -- Respiratory:  As above in the HPI.  -- Cardiovascular:  Dyspnea with exertion.  No chest pain, syncope, PND, edema, cyanosis, or palpitations.   -- Gastrointestinal: No vomiting, abdominal pain, hematemesis, melena, dyspepsia, or change in bowel habits.  -- Genitourinary: No hematuria, dysuria, frequency, urgency, nocturia, polyuria, stones, or incontinence.  -- Integument/breast: No rash, pruritis, pigmentation changes, dryness, or changes in hair  -- Hematologic/lymphatic:  History of sickle cell anemia.  No easy bruising or lymphadenopathy.   -- Musculoskeletal: No acute arthralgias, acute myalgias, joint swelling, acute limitations of ROM, or acute muscular weakness.  -- Neurological: No seizures, headaches, incoordination, paraesthesias, ataxia, vertigo, or tremors.  -- Behavioral/Psych: No auditory or visual hallucinations, depression, or suicidal/homicidal ideations.  -- Endocrine: No heat or cold intolerance, polydipsia, or unintentional weight gain / loss.  -- Allergy/Immunologic: No recurrent infections or adverse reaction to food, insects, or difficulty breathing.        PAST MEDICAL / SURGICAL HISTORY:     Past Medical History:   Diagnosis Date    Asthma     Broken thumb     Sickle cell anemia     Sickle cell crisis      Past Surgical History:   Procedure Laterality Date    HAND SURGERY      spleenectomy           FAMILY HISTORY:     Family history of cardiovascular disease      SOCIAL HISTORY:     Social History     Socioeconomic History    Marital status: Single     Spouse name: Not on file    Number of children: Not on file    Years of education: Not on file    Highest education level: Not on file   Occupational History    Not on file   Social Needs    Financial resource strain: Not on file    Food insecurity:     Worry: Not on file     Inability: Not on file    Transportation needs:     Medical: Not on file     Non-medical: Not on file   Tobacco Use    Smoking status:  Current Every Day Smoker     Packs/day: 0.50     Types: Cigarettes    Smokeless tobacco: Never Used    Tobacco comment: encouraged to quit.    Substance and Sexual Activity    Alcohol use: Yes     Comment: socially    Drug use: No    Sexual activity: Yes     Partners: Female     Birth control/protection: Condom   Lifestyle    Physical activity:     Days per week: Not on file     Minutes per session: Not on file    Stress: Not on file   Relationships    Social connections:     Talks on phone: Not on file     Gets together: Not on file     Attends Religion service: Not on file     Active member of club or organization: Not on file     Attends meetings of clubs or organizations: Not on file     Relationship status: Not on file   Other Topics Concern    Not on file   Social History Narrative    Not on file         ALLERGIES:       Review of patient's allergies indicates:   Allergen Reactions    Penicillins Anaphylaxis       HOME MEDICATIONS:     Prior to Admission medications    Medication Sig Start Date End Date Taking? Authorizing Provider   calcium-vitamin D3 500 mg(1,250mg) -200 unit per tablet Take 1 tablet by mouth 2 (two) times daily with meals.   Yes Historical Provider, MD   FOLIC ACID ORAL Take by mouth.   Yes Historical Provider, MD   ibuprofen (ADVIL,MOTRIN) 600 MG tablet Take 1 tablet (600 mg total) by mouth every 6 (six) hours as needed. 8/19/18  Yes Abril Fraga NP   oxyCODONE-acetaminophen (PERCOCET) 5-325 mg per tablet Take 1 tablet by mouth every 4 (four) hours as needed for Pain. 8/19/18  Yes Abril Fraga NP   albuterol 90 mcg/actuation inhaler Inhale 1-2 puffs into the lungs every 6 (six) hours as needed for Wheezing or Shortness of Breath. Rescue 1/6/18 1/6/19  Cornelius Rai PAElisabethC          [facility] MEDICATIONS:     Scheduled Meds:    albuterol-ipratropium  3 mL Nebulization Q6H    enoxaparin  40 mg Subcutaneous Daily    methylPREDNISolone sodium succinate  80 mg  Intravenous Q8H    senna-docusate 8.6-50 mg  1 tablet Oral BID     Continuous Infusions:   PRN Meds: acetaminophen, oxyCODONE-acetaminophen, sodium chloride 0.9%      PHYSICAL EXAM:     Wt Readings from Last 1 Encounters:   08/18/19 1626 74.8 kg (165 lb)     Body mass index is 26.63 kg/m².  Vitals:    08/18/19 1839 08/18/19 1902 08/18/19 1931 08/18/19 2019   BP: (!) 113/56 (!) 108/58 108/60    BP Location:       Patient Position:       Pulse: 87 89 83 82   Resp: 11 (!) 21 13 10   Temp:       TempSrc:       SpO2: 96% 97% 99% 97%   Weight:       Height:              -- General appearance: well developed. appears stated age   -- Head: normocephalic, atraumatic   -- Eyes: conjunctivae clear. Extraocular muscles intact  -- Nose:  High-flow nasal cannula in place.  Nares normal. Septum midline.   -- Mouth/Throat: lips, mucosa, and tongue normal. no throat erythema.   -- Neck: supple, symmetrical, trachea midline, no JVD and thyroid not grossly enlarged, appears symmetric  -- Lungs:  Diffuse expiratory wheezing bilaterally. normal respiratory effort. No use of accessory muscles.   -- Chest wall: no tenderness. equal bilateral chest rise   -- Heart: regular rate and regular rhythm. S1, S2 normal.  no click, rub or gallop   -- Abdomen: soft, non-tender, non-distended, non-tympanic; bowel sounds normal; no masses  -- Extremities: no cyanosis, clubbing or edema.   -- Pulses: 2+ and symmetric   -- Skin:  Turgor normal. Color normal. Texture normal. No rashes or lesions.   -- Neurologic: Normal strength and tone. No focal numbness or weakness. CNII-XII intact. Johnny coma scale: eyes open spontaneously-4, oriented & converses-5, obeys commands-6.      LABORATORY STUDIES:     Recent Results (from the past 36 hour(s))   CBC auto differential    Collection Time: 08/18/19  4:42 PM   Result Value Ref Range    WBC 18.42 (H) 3.90 - 12.70 K/uL    RBC 3.68 (L) 4.60 - 6.20 M/uL    Hemoglobin 10.6 (L) 14.0 - 18.0 g/dL    Hematocrit 30.6  (L) 40.0 - 54.0 %    Mean Corpuscular Volume 83 82 - 98 fL    Mean Corpuscular Hemoglobin 28.8 27.0 - 31.0 pg    Mean Corpuscular Hemoglobin Conc 34.6 32.0 - 36.0 g/dL    RDW 16.4 (H) 11.5 - 14.5 %    Platelets 747 (H) 150 - 350 K/uL    MPV 9.0 (L) 9.2 - 12.9 fL    Gran # (ANC) 6.1 1.8 - 7.7 K/uL    Lymph # 5.5 (H) 1.0 - 4.8 K/uL    Mono # 2.4 (H) 0.3 - 1.0 K/uL    Eos # 4.2 (H) 0.0 - 0.5 K/uL    Baso # 0.11 0.00 - 0.20 K/uL    Gran% 33.6 (L) 38.0 - 73.0 %    Lymph% 29.9 18.0 - 48.0 %    Mono% 12.9 4.0 - 15.0 %    Eosinophil% 23.0 (H) 0.0 - 8.0 %    Basophil% 0.6 0.0 - 1.9 %    Platelet Estimate Increased (A)     Aniso Slight     Poik Slight     Poly Occasional     Hypo Occasional     Ovalocytes Occasional     Target Cells Moderate     Differential Method Automated    Comprehensive metabolic panel    Collection Time: 08/18/19  4:42 PM   Result Value Ref Range    Sodium 142 136 - 145 mmol/L    Potassium 3.7 3.5 - 5.1 mmol/L    Chloride 105 95 - 110 mmol/L    CO2 29 23 - 29 mmol/L    Glucose 90 70 - 110 mg/dL    BUN, Bld 5 (L) 6 - 20 mg/dL    Creatinine 0.8 0.5 - 1.4 mg/dL    Calcium 9.0 8.7 - 10.5 mg/dL    Total Protein 7.8 6.0 - 8.4 g/dL    Albumin 3.9 3.5 - 5.2 g/dL    Total Bilirubin 1.3 (H) 0.1 - 1.0 mg/dL    Alkaline Phosphatase 68 55 - 135 U/L    AST 15 10 - 40 U/L    ALT 11 10 - 44 U/L    Anion Gap 8 8 - 16 mmol/L    eGFR if African American >60 >60 mL/min/1.73 m^2    eGFR if non African American >60 >60 mL/min/1.73 m^2   Reticulocytes    Collection Time: 08/18/19  4:42 PM   Result Value Ref Range    Retic 6.7 (H) 0.4 - 2.0 %   Troponin I    Collection Time: 08/18/19  4:42 PM   Result Value Ref Range    Troponin I <0.006 0.000 - 0.026 ng/mL   Lactic acid, plasma    Collection Time: 08/18/19  5:20 PM   Result Value Ref Range    Lactate (Lactic Acid) 1.1 0.5 - 2.2 mmol/L   ISTAT PROCEDURE    Collection Time: 08/18/19  5:43 PM   Result Value Ref Range    POC PH 7.275 (L) 7.35 - 7.45    POC PCO2 59.0 (H) 35 - 45  mmHg    POC PO2 46 40 - 60 mmHg    POC HCO3 27.4 24 - 28 mmol/L    POC BE 0 -2 to 2 mmol/L    POC SATURATED O2 75 (L) 95 - 100 %    POC TCO2 29 24 - 29 mmol/L    Sample VENOUS     Site Other     Allens Test N/A     DelSys Aero Mask     Mode SPONT     Sp02 100    ISTAT PROCEDURE    Collection Time: 08/18/19  8:19 PM   Result Value Ref Range    POC PH 7.328 (L) 7.35 - 7.45    POC PCO2 54.4 (H) 35 - 45 mmHg    POC PO2 49 (LL) 80 - 100 mmHg    POC HCO3 28.5 (H) 24 - 28 mmol/L    POC BE 2 -2 to 2 mmol/L    POC SATURATED O2 80 (L) 95 - 100 %    POC TCO2 30 (H) 23 - 27 mmol/L    Rate 12     Sample ARTERIAL     Site RR     Allens Test Pass     DelSys HFDD     Mode SPONT     Flow 15     FiO2 40     Sp02 98        Lab Results   Component Value Date    INR 1.1 01/06/2018     No results found for: HGBA1C  No results for input(s): POCTGLUCOSE in the last 72 hours.            IMAGING:     Imaging Results          X-Ray Chest AP Portable (Final result)  Result time 08/18/19 16:59:11    Final result by Osiel Newton MD (08/18/19 16:59:11)                 Impression:      Mild diffuse nonspecific interstitial coarsening which can be seen with pulmonary edema, interstitial pneumonia or chronic interstitial lung disease, without focal consolidation.      Electronically signed by: Osiel Newton MD  Date:    08/18/2019  Time:    16:59             Narrative:    EXAMINATION:  XR CHEST AP PORTABLE    CLINICAL HISTORY:  Shortness of breath    TECHNIQUE:  Single frontal view of the chest was performed.    COMPARISON:  Chest radiograph 05/11/2018    FINDINGS:  Monitoring leads overlie the chest.  Mild diffuse nonspecific interstitial coarsening.  No focal consolidation, pleural effusion or pneumothorax.  Cardiomediastinal silhouette is midline and within normal limits.  Hilar contours are within normal limits.  Osseous structures appear intact.  PA and lateral views can be obtained.                                  CONSULTS:     IP CONSULT  TO PULMONOLOGY       ASSESSMENT & PLAN:     Primary Diagnosis:  Acute asthma exacerbation    Active Hospital Problems    Diagnosis  POA    *Acute asthma exacerbation [J45.901]  Yes     Priority: 1 - High    Hypoxia [R09.02]  Yes     Priority: 2     Sickle cell anemia [D57.1]  Yes    Tobacco abuse [Z72.0]  Yes      Resolved Hospital Problems   No resolved problems to display.         acute asthma exacerbation with hypoxemia  · History of severe recurrent asthma as well as history of recurrent pneumonias.  · No evidence of infiltrate on chest x-ray.  Patient was hypoxemic saturating in the upper 70s with respiratory distress. BiPAP initiated and was tolerated for short period of time in which she received albuterol, Solu-Medrol, albuterol/Atrovent, epinephrine, and magnesium sulfate.  · Some improvement with his respiratory status and patient was able to be weaned to high-flow nasal oxygen.  He did not like the BiPAP.  · Continue duo nebs and supplemental oxygen  · Continue steroids  · Pulmonology consult  · Repeat ABG shows minimal improvement, but is going the right direction.    Tobacco abuse   · Chronic tobacco use  · Tobacco cessation counseling  · Nicotine patch offered; consider Wellbutrin or Chantix through PCP as an outpatient (will require closer monitoring)  · I discussed with the patient regarding the hazardous effects of smoking on increasing risk of heart attack and stroke, worsening lung functions, and increasing cancer risk.   Patient was urged to stop smoking now.  I also offered nicotine taper (such as nicotine patch and gum) to help ease the craving to smoke.    Sickle cell Anemia   · The patient's H/H is stable and consistent with previous laboratory measurements.  · Elevated reticulocyte count of 6.7  · The patient exhibits no signs or symptoms of acute bleeding.  · There is no indication for transfusion.  · Will continue to monitor.      VTE Risk Mitigation (From admission, onward)         Ordered     enoxaparin injection 40 mg  Daily      08/18/19 1949     Place ELENA hose  Until discontinued      08/18/19 1949            Adult PRN medications available   DVT prophylaxis given       DISPOSITION:     Will admit to the Hospital Medicine service for further evaluation and treatment.    Chart reviewed and updated where applicable.    High Risk Conditions:  Patient has a condition that poses threat to life and bodily function: Severe Respiratory Distress      ===============================================================    Mauricio Luis MD, MPH  Department of Hospital Medicine   [facility]  [phone] pg  (7pm - 6am)          This note is dictated using [employer] voice recognition software.  There are word recognition mistakes that are occasionally missed on review.

## 2019-08-19 NOTE — ASSESSMENT & PLAN NOTE
Will continue with solumedrol,nebulzier and singular,Patient at this time is stable on high flow NC O 2,still is wheezing,

## 2019-08-19 NOTE — CARE UPDATE
Results for ADITYA, MR BURGESS (MRN [id number]) as of 8/18/2019 21:20   Ref. Range 8/18/2019 20:19   POC PH Latest Ref Range: 7.35 - 7.45  7.328 (L)   POC PCO2 Latest Ref Range: 35 - 45 mmHg 54.4 (H)   POC PO2 Latest Ref Range: 80 - 100 mmHg 49 (LL)   POC BE Latest Ref Range: -2 to 2 mmol/L 2   POC HCO3 Latest Ref Range: 24 - 28 mmol/L 28.5 (H)   POC SATURATED O2 Latest Ref Range: 95 - 100 % 80 (L)   POC TCO2 Latest Ref Range: 23 - 27 mmol/L 30 (H)   FiO2 Unknown 40   Flow Unknown 15   Sample Unknown ARTERIAL   DelSys Unknown HFDD   Allens Test Unknown Pass   Site Unknown RR   Mode Unknown SPONT   Rate Unknown 12     VBG results reported to Dr. Neto MD.

## 2019-08-19 NOTE — PROGRESS NOTES
[facility] Hospital Medicine  Progress Note    Patient Name: Katie Parham  MRN: [id number]  Patient Class: IP- Inpatient   Admission Date: 8/18/2019  Length of Stay: 1 days  Attending Physician: Kenna Gudino MD  Primary Care Provider: Kieran Reed MD        Subjective:     Principal Problem:Acute asthma exacerbation        HPI:  Katie Parham is a 29 y.o. male that (in part)  has a past medical history of Asthma, Broken thumb, Sickle cell anemia, and Sickle cell crisis.  has a past surgical history that includes Hand surgery and spleenectomy. Presents to [facility] Emergency Department complaining of severe shortness of breath.  Associated symptoms include audible wheezing.  Use using accessory muscles.  History of severe recurrent asthma as well as pneumonia.  Denies sick contacts or recent travel.  History is limited due to respiratory distress    In the emergency department routine laboratory studies and chest x-ray was obtained along with ABG.  There was no evidence of infiltrate on chest x-ray.  Patient was hypoxemic saturating in the upper 70s with respiratory distress. ABG showed hypoxemia and hypercapnia. BiPAP initiated and was tolerated for short period of time in which he received albuterol, Solu-Medrol, albuterol/Atrovent, epinephrine, and magnesium sulfate. Some improvement with his respiratory status was achieved and patient was able to be weaned to high-flow nasal oxygen , as he did not like the BiPAP.    Hospital medicine has been asked to admit for further evaluation and treatment.       Overview/Hospital Course:  Katie Parham is a 29 y.o. male that (in part)  has a past medical history of Asthma, Broken thumb, Sickle cell anemia, and Sickle cell crisis.  has a past surgical history that includes Hand surgery and spleenectomy. Presents to [facility] Emergency Department complaining of severe shortness of breath.   Associated symptoms include audible wheezing.  Use using accessory muscles.  History of severe recurrent asthma as well as pneumonia.  Denies sick contacts or recent travel.  History is limited due to respiratory distress    In the emergency department routine laboratory studies and chest x-ray was obtained along with ABG.  There was no evidence of infiltrate on chest x-ray.  Patient was hypoxemic saturating in the upper 70s with respiratory distress. ABG showed hypoxemia and hypercapnia. BiPAP initiated and was tolerated for short period of time in which he received albuterol, Solu-Medrol, albuterol/Atrovent, epinephrine, and magnesium sulfate. Some improvement with his respiratory status was achieved and patient was able to be weaned to high-flow nasal oxygen , as he did not like the BiPAP.  Patient at this time is stable on high flow NC O 2,still is wheezing,    Interval History: still is wheezing,  Review of Systems   Constitutional: Positive for activity change and appetite change.   HENT: Negative for congestion and dental problem.    Eyes: Negative for discharge and itching.   Respiratory: Positive for shortness of breath and wheezing.    Cardiovascular: Negative for chest pain and leg swelling.   Gastrointestinal: Negative for abdominal distention and abdominal pain.   Neurological: Negative for dizziness and facial asymmetry.   Hematological: Negative for adenopathy. Does not bruise/bleed easily.     Objective:     Vital Signs (Most Recent):  Temp: 98.5 °F (36.9 °C) (08/19/19 0729)  Pulse: 67 (08/19/19 0729)  Resp: 19 (08/19/19 0729)  BP: 123/60 (08/19/19 0729)  SpO2: 98 % (08/19/19 0729) Vital Signs (24h Range):  Temp:  [97.6 °F (36.4 °C)-98.5 °F (36.9 °C)] 98.5 °F (36.9 °C)  Pulse:  [] 67  Resp:  [10-34] 19  SpO2:  [85 %-100 %] 98 %  BP: ()/(51-88) 123/60     Weight: 62.4 kg (137 lb 9.1 oz)  Body mass index is 22.2 kg/m².    Intake/Output Summary (Last 24 hours) at 8/19/2019 1021  Last data  filed at 8/18/2019 1841  Gross per 24 hour   Intake 1050 ml   Output --   Net 1050 ml      Physical Exam   Constitutional: He is oriented to person, place, and time. No distress.   HENT:   Head: Atraumatic.   Neck: Neck supple.   Cardiovascular: Normal rate.   Pulmonary/Chest: Effort normal. He has wheezes.   Musculoskeletal: Normal range of motion.   Neurological: He is oriented to person, place, and time.       Significant Labs:   BMP:   Recent Labs   Lab 08/19/19  0513   *      K 4.6      CO2 27   BUN 6   CREATININE 0.7   CALCIUM 9.2   MG 2.2     CBC:   Recent Labs   Lab 08/18/19  1642 08/19/19  0513   WBC 18.42* 13.91*   HGB 10.6* 10.4*   HCT 30.6* 30.2*   * 674*       Significant Imaging: reviewed.      Assessment/Plan:      * Acute asthma exacerbation  Will continue with solumedrol,nebulzier and singular,Patient at this time is stable on high flow NC O 2,still is wheezing,      Tobacco abuse  Consulted over 5 minutes need quit smoking.      Sickle cell anemia  Stable,will be monitored,      Acute respiratory failure with hypoxia  Duo to asthma exacerbation,on high flow oxygen.        VTE Risk Mitigation (From admission, onward)        Ordered     enoxaparin injection 40 mg  Daily      08/18/19 1949     Place ELENA hose  Until discontinued      08/18/19 1949                Kenna Gudino MD  Department of Hospital Medicine   [facility]

## 2019-08-20 VITALS
HEIGHT: 66 IN | WEIGHT: 137.56 LBS | TEMPERATURE: 98 F | HEART RATE: 92 BPM | BODY MASS INDEX: 22.11 KG/M2 | DIASTOLIC BLOOD PRESSURE: 61 MMHG | SYSTOLIC BLOOD PRESSURE: 117 MMHG | OXYGEN SATURATION: 96 % | RESPIRATION RATE: 18 BRPM

## 2019-08-20 PROCEDURE — 27000221 HC OXYGEN, UP TO 24 HOURS

## 2019-08-20 PROCEDURE — 94640 AIRWAY INHALATION TREATMENT: CPT

## 2019-08-20 PROCEDURE — 94761 N-INVAS EAR/PLS OXIMETRY MLT: CPT

## 2019-08-20 PROCEDURE — 63600175 PHARM REV CODE 636 W HCPCS: Performed by: INTERNAL MEDICINE

## 2019-08-20 PROCEDURE — 25000242 PHARM REV CODE 250 ALT 637 W/ HCPCS: Performed by: INTERNAL MEDICINE

## 2019-08-20 PROCEDURE — S4991 NICOTINE PATCH NONLEGEND: HCPCS | Performed by: HOSPITALIST

## 2019-08-20 PROCEDURE — 25000242 PHARM REV CODE 250 ALT 637 W/ HCPCS: Performed by: HOSPITALIST

## 2019-08-20 PROCEDURE — 25000003 PHARM REV CODE 250: Performed by: HOSPITALIST

## 2019-08-20 RX ORDER — ALBUTEROL SULFATE 90 UG/1
1-2 AEROSOL, METERED RESPIRATORY (INHALATION) EVERY 6 HOURS PRN
Qty: 1 INHALER | Refills: 0 | Status: SHIPPED | OUTPATIENT
Start: 2019-08-20 | End: 2019-09-19

## 2019-08-20 RX ORDER — BUDESONIDE AND FORMOTEROL FUMARATE DIHYDRATE 80; 4.5 UG/1; UG/1
2 AEROSOL RESPIRATORY (INHALATION) 2 TIMES DAILY
Qty: 1 INHALER | Refills: 0 | Status: SHIPPED | OUTPATIENT
Start: 2019-08-20 | End: 2019-11-26

## 2019-08-20 RX ORDER — MONTELUKAST SODIUM 10 MG/1
10 TABLET ORAL DAILY
Qty: 30 TABLET | Refills: 0 | Status: SHIPPED | OUTPATIENT
Start: 2019-08-20 | End: 2019-08-20

## 2019-08-20 RX ORDER — OXYCODONE AND ACETAMINOPHEN 5; 325 MG/1; MG/1
1 TABLET ORAL EVERY 4 HOURS PRN
Qty: 12 TABLET | Refills: 0 | Status: SHIPPED | OUTPATIENT
Start: 2019-08-20 | End: 2019-12-15 | Stop reason: CLARIF

## 2019-08-20 RX ORDER — PREDNISONE 10 MG/1
10 TABLET ORAL DAILY
Qty: 5 TABLET | Refills: 0 | Status: SHIPPED | OUTPATIENT
Start: 2019-08-20 | End: 2019-08-20 | Stop reason: SDUPTHER

## 2019-08-20 RX ORDER — ALBUTEROL SULFATE 0.63 MG/3ML
0.63 SOLUTION RESPIRATORY (INHALATION) EVERY 6 HOURS PRN
Qty: 1 BOX | Refills: 0 | Status: SHIPPED | OUTPATIENT
Start: 2019-08-20 | End: 2019-08-20 | Stop reason: SDUPTHER

## 2019-08-20 RX ORDER — BUDESONIDE AND FORMOTEROL FUMARATE DIHYDRATE 80; 4.5 UG/1; UG/1
2 AEROSOL RESPIRATORY (INHALATION) 2 TIMES DAILY
Qty: 1 INHALER | Refills: 0 | Status: SHIPPED | OUTPATIENT
Start: 2019-08-20 | End: 2019-08-20 | Stop reason: SDUPTHER

## 2019-08-20 RX ORDER — PREDNISONE 10 MG/1
10 TABLET ORAL DAILY
Qty: 5 TABLET | Refills: 0 | Status: SHIPPED | OUTPATIENT
Start: 2019-08-20 | End: 2019-08-25

## 2019-08-20 RX ORDER — ALBUTEROL SULFATE 0.63 MG/3ML
0.63 SOLUTION RESPIRATORY (INHALATION) EVERY 6 HOURS PRN
Qty: 1 BOX | Refills: 0 | Status: SHIPPED | OUTPATIENT
Start: 2019-08-20 | End: 2019-09-19

## 2019-08-20 RX ORDER — MONTELUKAST SODIUM 10 MG/1
10 TABLET ORAL DAILY
Qty: 30 TABLET | Refills: 0 | Status: SHIPPED | OUTPATIENT
Start: 2019-08-20 | End: 2019-09-19

## 2019-08-20 RX ADMIN — IPRATROPIUM BROMIDE AND ALBUTEROL SULFATE 3 ML: .5; 3 SOLUTION RESPIRATORY (INHALATION) at 07:08

## 2019-08-20 RX ADMIN — FLUTICASONE FUROATE AND VILANTEROL TRIFENATATE 1 PUFF: 200; 25 POWDER RESPIRATORY (INHALATION) at 09:08

## 2019-08-20 RX ADMIN — MONTELUKAST 10 MG: 10 TABLET, FILM COATED ORAL at 09:08

## 2019-08-20 RX ADMIN — PREDNISONE 40 MG: 20 TABLET ORAL at 09:08

## 2019-08-20 RX ADMIN — IPRATROPIUM BROMIDE AND ALBUTEROL SULFATE 3 ML: .5; 3 SOLUTION RESPIRATORY (INHALATION) at 12:08

## 2019-08-20 RX ADMIN — NICOTINE 1 PATCH: 21 PATCH, EXTENDED RELEASE TRANSDERMAL at 10:08

## 2019-08-20 NOTE — PROGRESS NOTES
Patient says he goes to [facility] for sickle cell anemia and goes to Dr. Derek Quiros for his asthma..Linette Veloz RN, BSN, [facility]  8/20/2019  '

## 2019-08-20 NOTE — PLAN OF CARE
"   08/20/19 1052   Final Note   Assessment Type Final Discharge Note   Anticipated Discharge Disposition Home   What phone number can be called within the next 1-3 days to see how you are doing after discharge?   (Listed in chart)   Hospital Follow Up  Appt(s) scheduled? Yes   Discharge plans and expectations educations in teach back method with documentation complete? Yes   Right Care Referral Info   Post Acute Recommendation No Care     EDUCATION:  TN provided with educational information on Asthma.  Information reviewed and placed in :My Healthcare Packet" to be brought home for pt to use as resource after discharge.  Information included:  signs and symptoms to look for and call the doctor if experiencing, and symptoms that may indicate a medical emergency: CALL 911.  All questions answered.  Teach back method used.    Patient stated, "I will call if I have a problem ".    TN informed floor nurseKalyani, care management is complete and can proceed with discharge teaching.        "

## 2019-08-20 NOTE — NURSING
Patient discharge instructions given to patient at the time of discharge. Patient girlfriend with him. Both state understanding of information provided. IV and tele monitor discontinued. Tolerated well. All patient belongings with him at the time of discharge. Work excuse given to patient from 8/18/19-8/21/19. Patient walked independently to personal vehicle. Accompanied by myself.

## 2019-08-20 NOTE — PLAN OF CARE
08/20/19 1048   Discharge Assessment   Assessment Type Discharge Planning Assessment   Confirmed/corrected address and phone number on facesheet? Yes   Assessment information obtained from? Patient   Communicated expected length of stay with patient/caregiver no   Prior to hospitilization cognitive status: Alert/Oriented   Prior to hospitalization functional status: Independent   Current cognitive status: Alert/Oriented   Current Functional Status: Independent   Lives With alone   Able to Return to Prior Arrangements yes   Is patient able to care for self after discharge? Yes   Who are your caregiver(s) and their phone number(s)?   (Significant other, Latrice Deal, [phone])   Patient's perception of discharge disposition home or selfcare   Readmission Within the Last 30 Days no previous admission in last 30 days   Patient currently being followed by outpatient case management? No   Equipment Currently Used at Home none   Do you have any problems affording any of your prescribed medications? No   Is the patient taking medications as prescribed? yes   Does the patient have transportation home? Yes   Transportation Anticipated family or friend will provide   Does the patient receive services at the [facility]madin Clinic? No   Discharge Plan A Home

## 2019-08-20 NOTE — PROGRESS NOTES
1018 TN contacted PCP's office, Dr. Reed, at [phone]; spoke with Gladys; scheduled a f/u appt on 11:40 AM.    1025 TN contacted respiratory and informed approval from [facility] for nebulizer; will bring to room and educate pt.

## 2019-08-20 NOTE — NURSING
Bedside report received from RAPHAEL Doherty. Patient lying in bed with eyes closed. Easily aroused by verbal stimuli. NAD noted at this time. All safety precautions in place. Will continue to monitor.

## 2019-08-20 NOTE — NURSING
Bedside report given to RAPHAEL Doherty. Patient awake and alert. NAD noted at this time. All safety precautions in place.   12 hr chart check complete

## 2019-08-20 NOTE — PROGRESS NOTES
Follow-up Information     Kieran Reed MD On 8/29/2019.    Specialties:  Internal Medicine, Pediatrics  Why:  Outpatient Services PCP Follow-Up Thursday at 11:40 AM  Contact information:  [address]  [address]  [facility] [address]  [phone]                 PLEASE BRING TO ALL FOLLOW UP APPOINTMENTS:   1) A COPY YOUR DISCHARGE INSTRUCTIONS   2) ALL MEDICINES YOU ARE CURRENTLY TAKING IN THEIR ORIGINAL BOTTLES   3) IDENTIFICATION CARD   4) INSURANCE CARD    **PLEASE ARRIVE 15 MINUTES AHEAD OF SCHEDULED APPOINTMENT TIME   ++PLEASE CALL 24 HOURS IN ADVANCE IF YOU MUST RESCHEDULE YOUR APPOINTMENT DAY AND/OR TIME     [facility] CARE MANAGEMENT WRITTEN DISCHARGE INFORMATION    APPOINTMENTS AND RESOURCES TO HELP YOU MANAGE YOUR CARE AT HOME BASED ON YOUR PREFERENCES:  (If an appointment is not scheduled for you when you leave the hospital, call your doctor to schedule a follow up visit within a week)        Healthy Living Instructions to HELP MANAGE YOUR CARE AT HOME:  Things You are responsible for:  1.    Getting your prescriptions filled   2.    Taking your medications as directed, DO NOT MISS ANY DOSES!  3.    Following the diet and exercise recommended by your doctor  4.    Going to your follow-up doctor appointment. This is important because it allows the doctor to monitor your progress and determine if any changes need to made to your treatment plan.  5. If you have any questions about MANAGING YOUR CARE AT HOME Call the Nurse Care Line for 24/7 Assistance [phone]       Please answer any calls you may receive from [facility]. We want to continue to support you as you manage your healthcare needs. [facility] is happy to have the opportunity to serve you.      Thank you for choosing [facility] for your healthcare needs!  Your [facility] Case Management Team,    Asha Dumont, RN [place]  Registered Nurse Transition Navigator  [phone]

## 2019-08-20 NOTE — DISCHARGE SUMMARY
[facility] Hospital Medicine  Discharge Summary      Patient Name: Katie Parham  MRN: [id number]  Admission Date: 8/18/2019  Hospital Length of Stay: 2 days  Discharge Date and Time:  08/20/2019 1:50 PM  Attending Physician: Kenna Gudino MD   Discharging Provider: Kenna Gudino MD  Primary Care Provider: Kieran Reed MD      HPI:   Katie Parham is a 29 y.o. male that (in part)  has a past medical history of Asthma, Broken thumb, Sickle cell anemia, and Sickle cell crisis.  has a past surgical history that includes Hand surgery and spleenectomy. Presents to [facility] Emergency Department complaining of severe shortness of breath.  Associated symptoms include audible wheezing.  Use using accessory muscles.  History of severe recurrent asthma as well as pneumonia.  Denies sick contacts or recent travel.  History is limited due to respiratory distress    In the emergency department routine laboratory studies and chest x-ray was obtained along with ABG.  There was no evidence of infiltrate on chest x-ray.  Patient was hypoxemic saturating in the upper 70s with respiratory distress. ABG showed hypoxemia and hypercapnia. BiPAP initiated and was tolerated for short period of time in which he received albuterol, Solu-Medrol, albuterol/Atrovent, epinephrine, and magnesium sulfate. Some improvement with his respiratory status was achieved and patient was able to be weaned to high-flow nasal oxygen , as he did not like the BiPAP.    Hospital medicine has been asked to admit for further evaluation and treatment.       * No surgery found *      Hospital Course:   Katie Parham is a 29 y.o. male that (in part)  has a past medical history of Asthma, Broken thumb, Sickle cell anemia, and Sickle cell crisis.  has a past surgical history that includes Hand surgery and spleenectomy. Presents to [facility] Emergency Department complaining of  severe shortness of breath.  Associated symptoms include audible wheezing.  Use using accessory muscles.  History of severe recurrent asthma as well as pneumonia.  Denies sick contacts or recent travel.  History was limited due to respiratory distress  In the emergency department routine laboratory studies and chest x-ray was obtained along with ABG.  There was no evidence of infiltrate on chest x-ray.  Patient was hypoxemic saturating in the upper 70s with respiratory distress. ABG showed hypoxemia and hypercapnia. BiPAP initiated and was tolerated for short period of time in which he received albuterol, Solu-Medrol, albuterol/Atrovent, epinephrine, and magnesium sulfate. Some improvement with his respiratory status was achieved and patient was able to be weaned to high-flow nasal oxygen , as he did not like the BiPAP.  Continue with IV solumedrol,singular,nebulizer,his respiratory status is much improved,wheeizng is resolved,he was stable on RA,patient has been discharged home with home nebulizer,inhaler,sigular,short course of prednison and follow up with PCP in next few days.     Consults:   Consults (From admission, onward)        Status Ordering Provider     Inpatient consult to Pulmonology  Once     Provider:  Arian Ca MD    Completed KIRAN AMES          No new Assessment & Plan notes have been filed under this hospital service since the last note was generated.  Service: Hospital Medicine    Final Active Diagnoses:    Diagnosis Date Noted POA    PRINCIPAL PROBLEM:  Acute asthma exacerbation [J45.901] 08/18/2019 Yes    Acute respiratory failure with hypoxia [J96.01] 08/18/2019 Yes    Sickle cell anemia [D57.1] 08/18/2019 Yes    Tobacco abuse [Z72.0] 08/18/2019 Yes      Problems Resolved During this Admission:       Discharged Condition: stable    Disposition: Home or Self Care    Follow Up:  Follow-up Information     Kieran Reed MD On 8/29/2019.    Specialties:  Internal  "Medicine, Pediatrics  Why:  Outpatient Services PCP Follow-Up Thursday at 11:40 AM  Contact information:  [address]   [address]  [facility] [address]  [phone]             [facility]    Specialty:  DME Provider  Why:  DME, Nebulizer  Contact information:  [address]  [address]  [facility] [address]  [phone]             Schedule an appointment as soon as possible for a visit with [facility].    Specialty:  Internal Medicine  Why:  out patient services SEES SICKLE CELL ANEMIA/ HEMO PHYSICIAN AT [facility]  Contact information:  [address] [facility] [address]  [phone]                 Patient Instructions:      NEBULIZER FOR HOME USE     Order Specific Question Answer Comments   Height: 5' 6" (1.676 m)    Weight: 62.4 kg (137 lb 9.1 oz)    Does patient have medical equipment at home? none    Length of need (1-99 months): 99      NEBULIZER KIT (SUPPLIES) FOR HOME USE     Order Specific Question Answer Comments   Height: 5' 6" (1.676 m)    Weight: 62.4 kg (137 lb 9.1 oz)    Does patient have medical equipment at home? none    Length of need (1-99 months): 99    Mask or Mouthpiece? Mask      Activity as tolerated       Significant Diagnostic Studies: Labs:   BMP:   Recent Labs   Lab 08/18/19  1642 08/19/19  0513   GLU 90 139*    139   K 3.7 4.6    105   CO2 29 27   BUN 5* 6   CREATININE 0.8 0.7   CALCIUM 9.0 9.2   MG  --  2.2    and CBC   Recent Labs   Lab 08/18/19  1642 08/19/19  0513   WBC 18.42* 13.91*   HGB 10.6* 10.4*   HCT 30.6* 30.2*   * 674*     Radiology: X-Ray: CXR: X-Ray Chest 1 View (CXR): No results found for this visit on 08/18/19. and X-Ray Chest PA and Lateral (CXR): No results found for this visit on 08/18/19.    Pending Diagnostic Studies:     None         Medications:  Reconciled Home Medications:      Medication List      START taking these medications    budesonide-formoterol 80-4.5 mcg 80-4.5 mcg/actuation Hfaa  Commonly known " as:  SYMBICORT  Inhale 2 puffs into the lungs 2 (two) times daily. Controller     montelukast 10 mg tablet  Commonly known as:  SINGULAIR  Take 1 tablet (10 mg total) by mouth once daily.     predniSONE 10 MG tablet  Commonly known as:  DELTASONE  Take 1 tablet (10 mg total) by mouth once daily. for 5 days        CHANGE how you take these medications    * albuterol 90 mcg/actuation inhaler  Commonly known as:  PROVENTIL/VENTOLIN HFA  Inhale 1-2 puffs into the lungs every 6 (six) hours as needed for Wheezing or Shortness of Breath. Rescue  What changed:  Another medication with the same name was added. Make sure you understand how and when to take each.     * albuterol 0.63 mg/3 mL Nebu  Commonly known as:  ACCUNEB  Take 3 mLs (0.63 mg total) by nebulization every 6 (six) hours as needed. Rescue  What changed:  You were already taking a medication with the same name, and this prescription was added. Make sure you understand how and when to take each.         * This list has 2 medication(s) that are the same as other medications prescribed for you. Read the directions carefully, and ask your doctor or other care provider to review them with you.            CONTINUE taking these medications    calcium-vitamin D3 500 mg(1,250mg) -200 unit per tablet  Commonly known as:  OS-JOÃO 500 + D3  Take 1 tablet by mouth 2 (two) times daily with meals.     FOLIC ACID ORAL  Take by mouth.     ibuprofen 600 MG tablet  Commonly known as:  ADVIL,MOTRIN  Take 1 tablet (600 mg total) by mouth every 6 (six) hours as needed.     oxyCODONE-acetaminophen 5-325 mg per tablet  Commonly known as:  PERCOCET  Take 1 tablet by mouth every 4 (four) hours as needed for Pain.            Indwelling Lines/Drains at time of discharge:   Lines/Drains/Airways          None          Time spent on the discharge of patient: over 30  minutes  Patient was seen and examined on the date of discharge and determined to be suitable for discharge.         Kenna  MD Shahab  Department of Hospital Medicine  [facility]

## 2019-08-20 NOTE — PLAN OF CARE
08/20/19 1047   Post-Acute Status   Post-Acute Authorization Placement  (Home)   Post-Acute Placement Status Set-up Complete   Discharge Delays   (Awaiting nebulizer to be brought to pt's room.)

## 2019-08-21 NOTE — PHYSICIAN QUERY
PT Name: Katie Parham  MR #: [id number]    Physician Query Form - Asthma Clarification      CDS/: Sheryl Alejandro               Contact information:[email or web address]  This form is a permanent document in the medical record.    Query Date: August 21, 2019    By submitting this query, we are merely seeking further clarification of documentation. Please utilize your independent clinical judgment when addressing the question(s) below.    The Medical Record contains the following:     Indicators Supporting Clinical Findings Location in Medical Record   x Asthma or Reactive Airway Disease documented Acute asthma exacerbation    DS 8/20   x Acute/Chronic Illness Acute asthma exacerbation     Acute respiratory failure with hypoxia      DS 8/20   x Radiology Findings Mild diffuse nonspecific interstitial coarsening which can be seen with pulmonary edema, interstitial pneumonia or chronic interstitial lung disease, without focal consolidation.   CXR 8/18   x RR     Blood Gases     O2 sats ABG showed hypoxemia and hypercapnia    O2 Sat = 85% RA  RR = 24-34   DS 8/20    V/S Flowsheet 8/18   x BiPAP/Intubation/Supplemental O2 BiPAP initiated     was able to be weaned to high-flow nasal oxygen      DS 8/20   x SOB, Wheezing, Productive Cough, Use of Accessory Muscles, Respiratory Distress, Hypoxia, etc. complaining of severe shortness of breath. Associated symptoms include audible wheezing. Use using accessory muscles.    History is limited due to respiratory distress     Patient was hypoxemic saturating in the upper 70s with respiratory distress             DS 8/20   x Treatment he received albuterol, Solu-Medrol, albuterol/Atrovent, epinephrine, and magnesium sulfate.    DS 8/20    Other       Provider, please specify diagnosis or diagnoses associated with above clinical findings.  [   ] Mild intermittent asthma, with acute exacerbation   [   ] Mild persistent asthma, with acute exacerbation   [x   ] Moderate  persistent asthma, with acute exacerbation   [   ] Severe persistent asthma, with acute exacerbation   [   ] Other asthma type (please specify): ___   [   ]  Clinically Undetermined       Please document in your progress notes daily for the duration of treatment until resolved and include in your discharge summary.

## 2019-09-05 ENCOUNTER — HOSPITAL ENCOUNTER (OUTPATIENT)
Facility: HOSPITAL | Age: 29
Discharge: HOME | End: 2019-09-06
Attending: EMERGENCY MEDICINE | Admitting: EMERGENCY MEDICINE
Payer: MEDICAID

## 2019-09-05 DIAGNOSIS — M25.579 ANKLE PAIN: ICD-10-CM

## 2019-09-05 DIAGNOSIS — L03.115 CELLULITIS OF BOTH FEET: Primary | ICD-10-CM

## 2019-09-05 DIAGNOSIS — L03.116 CELLULITIS OF BOTH FEET: Primary | ICD-10-CM

## 2019-09-05 DIAGNOSIS — D57.00 SICKLE CELL ANEMIA WITH PAIN: ICD-10-CM

## 2019-09-05 PROBLEM — J45.909 ASTHMA: Status: ACTIVE | Noted: 2019-09-05

## 2019-09-05 LAB
ALBUMIN SERPL BCP-MCNC: 3.3 G/DL (ref 3.5–5.2)
ALP SERPL-CCNC: 60 U/L (ref 55–135)
ALT SERPL W/O P-5'-P-CCNC: 15 U/L (ref 10–44)
ANION GAP SERPL CALC-SCNC: 6 MMOL/L (ref 8–16)
ANISOCYTOSIS BLD QL SMEAR: SLIGHT
AST SERPL-CCNC: 13 U/L (ref 10–40)
BASOPHILS # BLD AUTO: 0.09 K/UL (ref 0–0.2)
BASOPHILS NFR BLD: 0.6 % (ref 0–1.9)
BILIRUB SERPL-MCNC: 0.6 MG/DL (ref 0.1–1)
BILIRUB UR QL STRIP: NEGATIVE
BUN SERPL-MCNC: 3 MG/DL (ref 6–20)
CALCIUM SERPL-MCNC: 8.6 MG/DL (ref 8.7–10.5)
CHLORIDE SERPL-SCNC: 103 MMOL/L (ref 95–110)
CLARITY UR: CLEAR
CO2 SERPL-SCNC: 29 MMOL/L (ref 23–29)
COLOR UR: YELLOW
CREAT SERPL-MCNC: 0.7 MG/DL (ref 0.5–1.4)
CRP SERPL-MCNC: 24.8 MG/L (ref 0–8.2)
DIFFERENTIAL METHOD: ABNORMAL
EOSINOPHIL # BLD AUTO: 1.8 K/UL (ref 0–0.5)
EOSINOPHIL NFR BLD: 12.6 % (ref 0–8)
ERYTHROCYTE [DISTWIDTH] IN BLOOD BY AUTOMATED COUNT: 16.1 % (ref 11.5–14.5)
ERYTHROCYTE [SEDIMENTATION RATE] IN BLOOD BY WESTERGREN METHOD: 10 MM/HR (ref 0–10)
EST. GFR  (AFRICAN AMERICAN): >60 ML/MIN/1.73 M^2
EST. GFR  (NON AFRICAN AMERICAN): >60 ML/MIN/1.73 M^2
GLUCOSE SERPL-MCNC: 90 MG/DL (ref 70–110)
GLUCOSE UR QL STRIP: NEGATIVE
HCT VFR BLD AUTO: 30 % (ref 40–54)
HGB BLD-MCNC: 10.6 G/DL (ref 14–18)
HGB UR QL STRIP: NEGATIVE
HYPOCHROMIA BLD QL SMEAR: ABNORMAL
KETONES UR QL STRIP: NEGATIVE
LACTATE SERPL-SCNC: 0.9 MMOL/L (ref 0.5–2.2)
LEUKOCYTE ESTERASE UR QL STRIP: NEGATIVE
LYMPHOCYTES # BLD AUTO: 3.1 K/UL (ref 1–4.8)
LYMPHOCYTES NFR BLD: 21.8 % (ref 18–48)
MAGNESIUM SERPL-MCNC: 1.9 MG/DL (ref 1.6–2.6)
MCH RBC QN AUTO: 29.2 PG (ref 27–31)
MCHC RBC AUTO-ENTMCNC: 35.3 G/DL (ref 32–36)
MCV RBC AUTO: 83 FL (ref 82–98)
MONOCYTES # BLD AUTO: 2.2 K/UL (ref 0.3–1)
MONOCYTES NFR BLD: 15.5 % (ref 4–15)
NEUTROPHILS # BLD AUTO: 7.1 K/UL (ref 1.8–7.7)
NEUTROPHILS NFR BLD: 49.9 % (ref 38–73)
NITRITE UR QL STRIP: NEGATIVE
OVALOCYTES BLD QL SMEAR: ABNORMAL
PH UR STRIP: 8 [PH] (ref 5–8)
PLATELET # BLD AUTO: 541 K/UL (ref 150–350)
PLATELET BLD QL SMEAR: ABNORMAL
PMV BLD AUTO: 9.7 FL (ref 9.2–12.9)
POIKILOCYTOSIS BLD QL SMEAR: SLIGHT
POLYCHROMASIA BLD QL SMEAR: ABNORMAL
POTASSIUM SERPL-SCNC: 4.1 MMOL/L (ref 3.5–5.1)
PROT SERPL-MCNC: 6.7 G/DL (ref 6–8.4)
PROT UR QL STRIP: NEGATIVE
RBC # BLD AUTO: 3.63 M/UL (ref 4.6–6.2)
RETICS/RBC NFR AUTO: 2.9 % (ref 0.4–2)
SODIUM SERPL-SCNC: 138 MMOL/L (ref 136–145)
SP GR UR STRIP: 1.01 (ref 1–1.03)
TARGETS BLD QL SMEAR: ABNORMAL
URN SPEC COLLECT METH UR: NORMAL
UROBILINOGEN UR STRIP-ACNC: NEGATIVE EU/DL
WBC # BLD AUTO: 14.25 K/UL (ref 3.9–12.7)

## 2019-09-05 PROCEDURE — 99285 EMERGENCY DEPT VISIT HI MDM: CPT | Mod: 25

## 2019-09-05 PROCEDURE — 85025 COMPLETE CBC W/AUTO DIFF WBC: CPT

## 2019-09-05 PROCEDURE — S0077 INJECTION, CLINDAMYCIN PHOSP: HCPCS | Performed by: EMERGENCY MEDICINE

## 2019-09-05 PROCEDURE — 83735 ASSAY OF MAGNESIUM: CPT

## 2019-09-05 PROCEDURE — 83605 ASSAY OF LACTIC ACID: CPT

## 2019-09-05 PROCEDURE — 80053 COMPREHEN METABOLIC PANEL: CPT

## 2019-09-05 PROCEDURE — 25000003 PHARM REV CODE 250: Performed by: EMERGENCY MEDICINE

## 2019-09-05 PROCEDURE — 86140 C-REACTIVE PROTEIN: CPT

## 2019-09-05 PROCEDURE — 81003 URINALYSIS AUTO W/O SCOPE: CPT

## 2019-09-05 PROCEDURE — G0378 HOSPITAL OBSERVATION PER HR: HCPCS

## 2019-09-05 PROCEDURE — 96365 THER/PROPH/DIAG IV INF INIT: CPT

## 2019-09-05 PROCEDURE — 63600175 PHARM REV CODE 636 W HCPCS: Performed by: EMERGENCY MEDICINE

## 2019-09-05 PROCEDURE — S5010 5% DEXTROSE AND 0.45% SALINE: HCPCS | Performed by: EMERGENCY MEDICINE

## 2019-09-05 PROCEDURE — 96361 HYDRATE IV INFUSION ADD-ON: CPT

## 2019-09-05 PROCEDURE — 96375 TX/PRO/DX INJ NEW DRUG ADDON: CPT

## 2019-09-05 PROCEDURE — 87040 BLOOD CULTURE FOR BACTERIA: CPT | Mod: 59

## 2019-09-05 PROCEDURE — 85652 RBC SED RATE AUTOMATED: CPT

## 2019-09-05 PROCEDURE — 85045 AUTOMATED RETICULOCYTE COUNT: CPT

## 2019-09-05 PROCEDURE — 96376 TX/PRO/DX INJ SAME DRUG ADON: CPT

## 2019-09-05 RX ORDER — MORPHINE SULFATE 10 MG/ML
4 INJECTION INTRAMUSCULAR; INTRAVENOUS; SUBCUTANEOUS
Status: COMPLETED | OUTPATIENT
Start: 2019-09-05 | End: 2019-09-05

## 2019-09-05 RX ORDER — CLINDAMYCIN PHOSPHATE 600 MG/50ML
600 INJECTION, SOLUTION INTRAVENOUS
Status: DISCONTINUED | OUTPATIENT
Start: 2019-09-06 | End: 2019-09-06 | Stop reason: HOSPADM

## 2019-09-05 RX ORDER — MONTELUKAST SODIUM 10 MG/1
10 TABLET ORAL DAILY
Status: DISCONTINUED | OUTPATIENT
Start: 2019-09-06 | End: 2019-09-06 | Stop reason: HOSPADM

## 2019-09-05 RX ORDER — KETOROLAC TROMETHAMINE 30 MG/ML
30 INJECTION, SOLUTION INTRAMUSCULAR; INTRAVENOUS EVERY 6 HOURS PRN
Status: DISCONTINUED | OUTPATIENT
Start: 2019-09-05 | End: 2019-09-05

## 2019-09-05 RX ORDER — IPRATROPIUM BROMIDE AND ALBUTEROL SULFATE 2.5; .5 MG/3ML; MG/3ML
3 SOLUTION RESPIRATORY (INHALATION) EVERY 4 HOURS PRN
Status: DISCONTINUED | OUTPATIENT
Start: 2019-09-05 | End: 2019-09-06 | Stop reason: HOSPADM

## 2019-09-05 RX ORDER — FOLIC ACID 1 MG/1
1 TABLET ORAL DAILY
Status: DISCONTINUED | OUTPATIENT
Start: 2019-09-06 | End: 2019-09-06 | Stop reason: HOSPADM

## 2019-09-05 RX ORDER — DEXTROSE MONOHYDRATE AND SODIUM CHLORIDE 5; .45 G/100ML; G/100ML
INJECTION, SOLUTION INTRAVENOUS CONTINUOUS
Status: DISCONTINUED | OUTPATIENT
Start: 2019-09-05 | End: 2019-09-06 | Stop reason: HOSPADM

## 2019-09-05 RX ORDER — MORPHINE SULFATE 10 MG/ML
5 INJECTION INTRAMUSCULAR; INTRAVENOUS; SUBCUTANEOUS EVERY 4 HOURS PRN
Status: DISCONTINUED | OUTPATIENT
Start: 2019-09-05 | End: 2019-09-06

## 2019-09-05 RX ORDER — AMOXICILLIN 250 MG
1 CAPSULE ORAL 2 TIMES DAILY
Status: DISCONTINUED | OUTPATIENT
Start: 2019-09-05 | End: 2019-09-06 | Stop reason: HOSPADM

## 2019-09-05 RX ORDER — SODIUM CHLORIDE 0.9 % (FLUSH) 0.9 %
10 SYRINGE (ML) INJECTION
Status: DISCONTINUED | OUTPATIENT
Start: 2019-09-05 | End: 2019-09-06 | Stop reason: HOSPADM

## 2019-09-05 RX ORDER — DIPHENHYDRAMINE HCL 25 MG
25 CAPSULE ORAL EVERY 4 HOURS PRN
Status: DISCONTINUED | OUTPATIENT
Start: 2019-09-05 | End: 2019-09-05

## 2019-09-05 RX ORDER — OXYCODONE HYDROCHLORIDE 5 MG/1
5 TABLET ORAL EVERY 6 HOURS PRN
Status: DISCONTINUED | OUTPATIENT
Start: 2019-09-05 | End: 2019-09-06 | Stop reason: HOSPADM

## 2019-09-05 RX ORDER — ACETAMINOPHEN 500 MG
500 TABLET ORAL EVERY 6 HOURS PRN
Status: DISCONTINUED | OUTPATIENT
Start: 2019-09-05 | End: 2019-09-06 | Stop reason: HOSPADM

## 2019-09-05 RX ORDER — NALOXONE HCL 0.4 MG/ML
0.02 VIAL (ML) INJECTION
Status: DISCONTINUED | OUTPATIENT
Start: 2019-09-05 | End: 2019-09-06 | Stop reason: HOSPADM

## 2019-09-05 RX ORDER — CLINDAMYCIN PHOSPHATE 600 MG/50ML
600 INJECTION, SOLUTION INTRAVENOUS
Status: COMPLETED | OUTPATIENT
Start: 2019-09-05 | End: 2019-09-05

## 2019-09-05 RX ADMIN — CLINDAMYCIN IN 5 PERCENT DEXTROSE 600 MG: 12 INJECTION, SOLUTION INTRAVENOUS at 06:09

## 2019-09-05 RX ADMIN — DEXTROSE AND SODIUM CHLORIDE: 5; .45 INJECTION, SOLUTION INTRAVENOUS at 09:09

## 2019-09-05 RX ADMIN — MORPHINE SULFATE 5 MG: 10 INJECTION INTRAVENOUS at 06:09

## 2019-09-05 RX ADMIN — MORPHINE SULFATE 5 MG: 10 INJECTION INTRAVENOUS at 11:09

## 2019-09-05 RX ADMIN — MORPHINE SULFATE 4 MG: 10 INJECTION INTRAVENOUS at 04:09

## 2019-09-05 RX ADMIN — SENNOSIDES, DOCUSATE SODIUM 1 TABLET: 50; 8.6 TABLET, FILM COATED ORAL at 09:09

## 2019-09-05 RX ADMIN — MORPHINE SULFATE 4 MG: 10 INJECTION INTRAVENOUS at 02:09

## 2019-09-05 RX ADMIN — SODIUM CHLORIDE 2040 ML: 0.9 INJECTION, SOLUTION INTRAVENOUS at 02:09

## 2019-09-05 NOTE — ED PROVIDER NOTES
Encounter Date: 9/5/2019    SCRIBE #1 NOTE: I, Rivera Boswell, am scribing for, and in the presence of,  Souarv Brunson MD. I have scribed the following portions of the note - Other sections scribed: HPI, ROS, PE.       History     Chief Complaint   Patient presents with    Sickle Cell Pain Crisis     pain to bilat legs and feet swollen     CC: Sickle Cell Pain Crisis    HPI:  This 29 y.o male with medical h/o sickle cell anemia, spleenectomy presents to the ED for emergent evaluation of constant, severe (9/10) bilateral leg pain and bilateral ankle swelling (R>L) since yesterday. Pt also c/o intermittent chills and diaphoresis since onset. He has experienced sickle cell crises in the past but not in the lower extremities before. He denies any recent falls, trauma. Denies fever, CP, SOB, nausea, dysuria, hematuria, frequency changes, penile discharge, rashes. Reports compliance with daily meds (Folic acid, Percocet, inhaler). He has no other complaints at this time.   Reports  Spending a lot time on his feet at his job at this [employer]    The history is provided by the patient. No  was used.     Review of patient's allergies indicates:   Allergen Reactions    Penicillins Anaphylaxis     Past Medical History:   Diagnosis Date    Asthma     Broken thumb     Sickle cell anemia     Sickle cell crisis      Past Surgical History:   Procedure Laterality Date    HAND SURGERY      spleenectomy       History reviewed. No pertinent family history.  Social History     Tobacco Use    Smoking status: Current Every Day Smoker     Packs/day: 0.50     Types: Cigarettes    Smokeless tobacco: Never Used    Tobacco comment: encouraged to quit.    Substance Use Topics    Alcohol use: Yes     Comment: socially    Drug use: No     Review of Systems   Constitutional: Positive for diaphoresis. Negative for chills and fever.   HENT: Negative for ear pain, rhinorrhea and sore throat.    Eyes:  Negative for visual disturbance.   Respiratory: Negative for cough and shortness of breath.    Cardiovascular: Negative for chest pain.   Gastrointestinal: Negative for abdominal pain, nausea and vomiting.   Genitourinary: Negative for discharge, dysuria, frequency and hematuria.   Musculoskeletal: Positive for joint swelling (bilateral ankle, R>L) and myalgias (bilateral leg). Negative for back pain and neck pain.   Skin: Negative for rash.   Neurological: Negative for syncope.   All other systems reviewed and are negative.      Physical Exam     Initial Vitals [09/05/19 1227]   BP Pulse Resp Temp SpO2   124/60 98 15 98.6 °F (37 °C) 96 %      MAP       --         Physical Exam    Nursing note and vitals reviewed.  Constitutional: He is not diaphoretic. No distress.   HENT:   Head: Normocephalic and atraumatic.   Mouth/Throat: Oropharynx is clear and moist.   Eyes: Conjunctivae and EOM are normal. Pupils are equal, round, and reactive to light. No scleral icterus.   Neck: Normal range of motion. Neck supple. No JVD present.   Cardiovascular: Normal rate, regular rhythm and intact distal pulses.   Pulmonary/Chest: Breath sounds normal. No stridor. No respiratory distress.   Abdominal: Soft. Bowel sounds are normal. He exhibits no distension. There is no tenderness.   Musculoskeletal: Normal range of motion. He exhibits edema and tenderness.   Mild edema to bilateral ankle and feet with erythema, warmth to touch. No purulence or fluctuance. Small scabs to the distal anterior tibia bilaterally, possible insect bites.   Neurological: He is alert and oriented to person, place, and time. He has normal strength. No cranial nerve deficit or sensory deficit.   Skin: Skin is warm and dry. No rash noted.   Psychiatric: He has a normal mood and affect.         ED Course   Procedures  Labs Reviewed   CBC W/ AUTO DIFFERENTIAL - Abnormal; Notable for the following components:       Result Value    WBC 14.25 (*)     RBC 3.63 (*)      Hemoglobin 10.6 (*)     Hematocrit 30.0 (*)     RDW 16.1 (*)     Platelets 541 (*)     Mono # 2.2 (*)     Eos # 1.8 (*)     Mono% 15.5 (*)     Eosinophil% 12.6 (*)     Platelet Estimate Increased (*)     All other components within normal limits   COMPREHENSIVE METABOLIC PANEL - Abnormal; Notable for the following components:    BUN, Bld 3 (*)     Calcium 8.6 (*)     Albumin 3.3 (*)     Anion Gap 6 (*)     All other components within normal limits    Narrative:     Recoll. [id number] by [facility] at 09/05/2019 14:55, reason: Specimen   hemolyzed 09/05/2019  14:54   RETICULOCYTES - Abnormal; Notable for the following components:    Retic 2.9 (*)     All other components within normal limits   C-REACTIVE PROTEIN - Abnormal; Notable for the following components:    CRP 24.8 (*)     All other components within normal limits    Narrative:     Recoll. [id number] by [facility] at 09/05/2019 14:55, reason: Specimen   hemolyzed 09/05/2019  14:54   LACTIC ACID, PLASMA   URINALYSIS, REFLEX TO URINE CULTURE    Narrative:     Preferred Collection Type->Urine, Clean Catch   MAGNESIUM    Narrative:     Recoll. [id number] by [facility] at 09/05/2019 14:55, reason: Specimen   hemolyzed 09/05/2019  14:54   SEDIMENTATION RATE   LACTIC ACID, PLASMA          Imaging Results          X-Ray Ankle Complete Bilateral (Final result)  Result time 09/05/19 13:58:14    Final result by Jose Vivar MD (09/05/19 13:58:14)                 Impression:      1. Soft tissue swelling over the right lateral malleolus.  2. Ankle radiographs otherwise are unremarkable.  No fractures or dislocations.      Electronically signed by: Jose Vivar MD  Date:    09/05/2019  Time:    13:58             Narrative:    EXAMINATION:  XR ANKLE COMPLETE 3 VIEW BILATERAL    CLINICAL HISTORY:  Pain in unspecified ankle and joints of unspecified foot    TECHNIQUE:  AP, lateral and oblique views of both ankles were performed.    COMPARISON:  None    FINDINGS:  Right ankle:    There  is soft tissue swelling over the lateral malleolus.  No acute fractures or dislocations.  Ankle mortise is within normal limits.  Normal appearance of the talus and the calcaneus.  There are no osteoblastic or lytic lesions.    Left ankle: There are no acute fractures or dislocations.  Ankle mortise is within normal limits.  Soft tissues are unremarkable.  There is a small ossific density along the superior margin of the distal talus possibly representing an old fracture.  No definite signs for acute fracture however noted.  Normal appearance of the subtalar joints.                                 Medical Decision Making:   History:   Old Medical Records: I decided to obtain old medical records.  Old Records Summarized: records from previous admission(s).       <> Summary of Records:  Recent admission for asthma exacerbation  Differential Diagnosis:   Cellulitis,  Vaso-occlusive crisis, dependent edema,  Dactylitis, hand/foot syndrome  Clinical Tests:   Lab Tests: Ordered and Reviewed  Radiological Study: Ordered and Reviewed  ED Management:   Patient is afebrile and in mild painful distress at time history and physical.  He has bilateral erythema  And edema to bilateral feet.  He has no calf edema or calf tenderness.   There is warmth to touch.  Labs with a white count of 14.2.  Unclear if this is related to patient's asplenia and sickle cell.    Patient given IV hydration and analgesia in the emergency department.  On reassessment he continues to have hand.  Given additional analgesia.   Due to  Ongoing pain and concern for cellulitis  Patient is to be placed in observation for pain control, IV antibiotics, Hematology evaluation  Other:   I have discussed this case with another health care provider.       <> Summary of the Discussion: I have discussed the patient's presentation and workup with the hospitalist who agrees with placing the patient in the hospital.  I have placed orders for the hospitalist.     This  chart was completed using dictation software, as a result there may be some transcription errors.                       Clinical Impression:       ICD-10-CM ICD-9-CM   1. Cellulitis of both feet L03.115 682.7    L03.116    2. Ankle pain M25.579 719.47   3. Sickle cell anemia with pain D57.00 282.62       Scribe Attestation: I, Sourav Brunson , personally performed the services described in this documentation. All medical record entries made by the scribe were at my direction and in my presence. I have reviewed the chart and agree that the record reflects my personal performance and is accurate and complete.   Disposition:   Disposition: Placed in Observation  Condition: Stable                        Sourav Brunson MD  09/05/19 6531

## 2019-09-05 NOTE — ASSESSMENT & PLAN NOTE
Has bilateral swelling of both ankle with increased warmth. No wounds seen, afebrile lactic acid normal. Leukocytosis of 14, near baseline.   -Clindamycin  -blood cultures pending  Continue IVF  Anti-pyretics

## 2019-09-05 NOTE — ASSESSMENT & PLAN NOTE
H&H today of 10.6&30.0. No indication for transfusion at this time. No bette seen on CBC, bilirubin normal, retic count 2.9. He does not appear to be in a crisis  Will start IVF with D5 1/2NS  Pain control  Heme/Onc consulted  Incentive spirometry

## 2019-09-05 NOTE — H&P
[facility] Hospital Medicine  History & Physical    Patient Name: Katie Parham  MRN: [id number]  Admission Date: 9/5/2019  Attending Physician: Eunice Fitzpatrick MD   Primary Care Provider: Kieran Reed MD         Patient information was obtained from patient, past medical records and ER records.     Subjective:     Principal Problem:Cellulitis of both feet    Chief Complaint:   Chief Complaint   Patient presents with    Sickle Cell Pain Crisis     pain to bilat legs and feet swollen        HPI: Katie Parham 29 y.o. male with asthma, sickle cell disease presents to the hospital with a chief complaint of sickle cell crisis. He reports this morning he began to experience severe pain to his bilateral feet described as a throbbing without radiation and was constant. It has been helped with pain medication in the ED. He reports he normally experiences pain from sickle cell crisis in his thighs and back. He denies any fever. He is not on hydroxyurea due to what he reports a physician reporting they need to add run more tests before starting. He denies any trauma, scratches, or itching to his bilateral feet. He has never experienced lower pain such as this before. He does wear steel toe boots for work. He denies fever, chest pain, SOB, N/V/D, abdominal pain, back pain, dizziness, syncope, and numbness in feet. He reports he has been experiencing frequent sickle cell crisis this year. He smokes 1/2 ppd.     Per  his last pain prescription was 8/2/2019 for a 5 day supply.    In the ED, WBC of 14.25, H&H 10.6&30.0, retic 2.9, no bette on smear, bilirubin 0.6, ankle x-ray with soft tissue swelling over the right lateral malleolus.     Past Medical History:   Diagnosis Date    Asthma     Broken thumb     Sickle cell anemia     Sickle cell crisis        Past Surgical History:   Procedure Laterality Date    HAND SURGERY      spleenectomy         Review of patient's allergies indicates:   Allergen  Reactions    Penicillins Anaphylaxis       No current facility-administered medications on file prior to encounter.      Current Outpatient Medications on File Prior to Encounter   Medication Sig    albuterol (ACCUNEB) 0.63 mg/3 mL Nebu Take 3 mLs (0.63 mg total) by nebulization every 6 (six) hours as needed. Rescue    albuterol (PROVENTIL/VENTOLIN HFA) 90 mcg/actuation inhaler Inhale 1-2 puffs into the lungs every 6 (six) hours as needed for Wheezing or Shortness of Breath. Rescue    budesonide-formoterol 80-4.5 mcg (SYMBICORT) 80-4.5 mcg/actuation HFAA Inhale 2 puffs into the lungs 2 (two) times daily. Controller    calcium-vitamin D3 500 mg(1,250mg) -200 unit per tablet Take 1 tablet by mouth 2 (two) times daily with meals.    FOLIC ACID ORAL Take by mouth.    ibuprofen (ADVIL,MOTRIN) 600 MG tablet Take 1 tablet (600 mg total) by mouth every 6 (six) hours as needed.    montelukast (SINGULAIR) 10 mg tablet Take 1 tablet (10 mg total) by mouth once daily.    oxyCODONE-acetaminophen (PERCOCET) 5-325 mg per tablet Take 1 tablet by mouth every 4 (four) hours as needed for Pain.     Family History     None        Tobacco Use    Smoking status: Current Every Day Smoker     Packs/day: 0.50     Types: Cigarettes    Smokeless tobacco: Never Used    Tobacco comment: encouraged to quit.    Substance and Sexual Activity    Alcohol use: Yes     Comment: socially    Drug use: No    Sexual activity: Yes     Partners: Female     Birth control/protection: Condom     Review of Systems   Constitutional: Negative for chills and fever.   HENT: Negative for nosebleeds and tinnitus.    Eyes: Negative for photophobia and visual disturbance.   Respiratory: Negative for shortness of breath and wheezing.    Cardiovascular: Negative for chest pain, palpitations and leg swelling.   Gastrointestinal: Negative for abdominal distention, nausea and vomiting.   Genitourinary: Negative for dysuria, flank pain and hematuria.    Musculoskeletal: Positive for arthralgias and joint swelling. Negative for gait problem.   Skin: Positive for color change. Negative for rash and wound.   Neurological: Negative for seizures and syncope.     Objective:     Vital Signs (Most Recent):  Temp: 98.6 °F (37 °C) (09/05/19 1227)  Pulse: 77 (09/05/19 1732)  Resp: 16 (09/05/19 1732)  BP: 118/86 (09/05/19 1732)  SpO2: 100 % (09/05/19 1732) Vital Signs (24h Range):  Temp:  [98.6 °F (37 °C)] 98.6 °F (37 °C)  Pulse:  [73-98] 77  Resp:  [15-19] 16  SpO2:  [96 %-100 %] 100 %  BP: (111-137)/(60-86) 118/86     Weight: 68 kg (150 lb)  Body mass index is 23.49 kg/m².    Physical Exam   Constitutional: He is oriented to person, place, and time. He appears well-developed and well-nourished. No distress.   HENT:   Head: Normocephalic and atraumatic.   Right Ear: External ear normal.   Left Ear: External ear normal.   Eyes: Conjunctivae and EOM are normal. Right eye exhibits no discharge. Left eye exhibits no discharge.   Neck: Normal range of motion. No thyromegaly present.   Cardiovascular: Normal rate and regular rhythm.   No murmur heard.  Pulmonary/Chest: Effort normal and breath sounds normal. No respiratory distress.   Abdominal: Soft. Bowel sounds are normal. He exhibits no distension and no mass. There is no tenderness.   Musculoskeletal: He exhibits edema (bilateral lateral ankles). He exhibits no deformity.   2+ dorsalis pedis pulses. Picture of ankle swelling placed in chart. ROM of ankle intact though limited secondary to pain   Neurological: He is alert and oriented to person, place, and time.   Skin: Skin is warm and dry.   Psychiatric: He has a normal mood and affect. His behavior is normal.   Nursing note and vitals reviewed.        CRANIAL NERVES     CN III, IV, VI   Extraocular motions are normal.        Significant Labs:   CBC:   Recent Labs   Lab 09/05/19  1419   WBC 14.25*   HGB 10.6*   HCT 30.0*   *     CMP:   Recent Labs   Lab  09/05/19  1524      K 4.1      CO2 29   GLU 90   BUN 3*   CREATININE 0.7   CALCIUM 8.6*   PROT 6.7   ALBUMIN 3.3*   BILITOT 0.6   ALKPHOS 60   AST 13   ALT 15   ANIONGAP 6*   EGFRNONAA >60     Lactic Acid:   Recent Labs   Lab 09/05/19  1419   LACTATE 0.9     Urine Studies:   Recent Labs   Lab 09/05/19  1340   COLORU Yellow   APPEARANCEUA Clear   PHUR 8.0   SPECGRAV 1.010   PROTEINUA Negative   GLUCUA Negative   KETONESU Negative   BILIRUBINUA Negative   OCCULTUA Negative   NITRITE Negative   UROBILINOGEN Negative   LEUKOCYTESUR Negative       Significant Imaging:   Imaging Results          X-Ray Ankle Complete Bilateral (Final result)  Result time 09/05/19 13:58:14    Final result by Jose Vivar MD (09/05/19 13:58:14)                 Impression:      1. Soft tissue swelling over the right lateral malleolus.  2. Ankle radiographs otherwise are unremarkable.  No fractures or dislocations.      Electronically signed by: Jose Vivar MD  Date:    09/05/2019  Time:    13:58             Narrative:    EXAMINATION:  XR ANKLE COMPLETE 3 VIEW BILATERAL    CLINICAL HISTORY:  Pain in unspecified ankle and joints of unspecified foot    TECHNIQUE:  AP, lateral and oblique views of both ankles were performed.    COMPARISON:  None    FINDINGS:  Right ankle:    There is soft tissue swelling over the lateral malleolus.  No acute fractures or dislocations.  Ankle mortise is within normal limits.  Normal appearance of the talus and the calcaneus.  There are no osteoblastic or lytic lesions.    Left ankle: There are no acute fractures or dislocations.  Ankle mortise is within normal limits.  Soft tissues are unremarkable.  There is a small ossific density along the superior margin of the distal talus possibly representing an old fracture.  No definite signs for acute fracture however noted.  Normal appearance of the subtalar joints.                                  Assessment/Plan:     * Cellulitis of both feet  Has  bilateral swelling of both ankle with increased warmth. No wounds seen, afebrile lactic acid normal. Leukocytosis of 14, near baseline.   -Clindamycin  -blood cultures pending  Continue IVF  Anti-pyretics    Asthma  No complaints of chest pain/SOB. No wheezing on exam. Prn duo-nebs    Tobacco abuse  Reports smokes 1/2ppd. Greater than 3 minutes spent counseling patient on dangers of continued tobacco use. Will provide tobacco cessation education    Sickle cell anemia  H&H today of 10.6&30.0. No indication for transfusion at this time. No bette seen on CBC, bilirubin normal, retic count 2.9. He does not appear to be in a crisis  Will start IVF with D5 1/2NS  Pain control  Heme/Onc consulted  Incentive spirometry      VTE Risk Mitigation (From admission, onward)        Ordered     IP VTE HIGH RISK PATIENT  Once      09/05/19 1806     Place sequential compression device  Until discontinued      09/05/19 1806     Place ELENA hose  Until discontinued      09/05/19 1806        VTE: ELENA/SCD  Code: Full  Diet: regular  Dispo: pending antibiotics overnight and Heme/Onc marisol Littlejohn PA-C  Department of Hospital Medicine   [facility]

## 2019-09-05 NOTE — HPI
Katie Parham 29 y.o. male with asthma, sickle cell disease presents to the hospital with a chief complaint of sickle cell crisis. He reports this morning he began to experience severe pain to his bilateral feet described as a throbbing without radiation and was constant. It has been helped with pain medication in the ED. He reports he normally experiences pain from sickle cell crisis in his thighs and back. He denies any fever. He is not on hydroxyurea due to what he reports a physician reporting they need to add run more tests before starting. He denies any trauma, scratches, or itching to his bilateral feet. He has never experienced lower pain such as this before. He does wear steel toe boots for work. He denies fever, chest pain, SOB, N/V/D, abdominal pain, back pain, dizziness, syncope, and numbness in feet. He reports he has been experiencing frequent sickle cell crisis this year. He smokes 1/2 ppd.     Per  his last pain prescription was 8/2/2019 for a 5 day supply.    In the ED, WBC of 14.25, H&H 10.6&30.0, retic 2.9, no bette on smear, bilirubin 0.6, ankle x-ray with soft tissue swelling over the right lateral malleolus.

## 2019-09-05 NOTE — ASSESSMENT & PLAN NOTE
Reports smokes 1/2ppd. Greater than 3 minutes spent counseling patient on dangers of continued tobacco use. Will provide tobacco cessation education

## 2019-09-05 NOTE — SUBJECTIVE & OBJECTIVE
Past Medical History:   Diagnosis Date    Asthma     Broken thumb     Sickle cell anemia     Sickle cell crisis        Past Surgical History:   Procedure Laterality Date    HAND SURGERY      spleenectomy         Review of patient's allergies indicates:   Allergen Reactions    Penicillins Anaphylaxis       No current facility-administered medications on file prior to encounter.      Current Outpatient Medications on File Prior to Encounter   Medication Sig    albuterol (ACCUNEB) 0.63 mg/3 mL Nebu Take 3 mLs (0.63 mg total) by nebulization every 6 (six) hours as needed. Rescue    albuterol (PROVENTIL/VENTOLIN HFA) 90 mcg/actuation inhaler Inhale 1-2 puffs into the lungs every 6 (six) hours as needed for Wheezing or Shortness of Breath. Rescue    budesonide-formoterol 80-4.5 mcg (SYMBICORT) 80-4.5 mcg/actuation HFAA Inhale 2 puffs into the lungs 2 (two) times daily. Controller    calcium-vitamin D3 500 mg(1,250mg) -200 unit per tablet Take 1 tablet by mouth 2 (two) times daily with meals.    FOLIC ACID ORAL Take by mouth.    ibuprofen (ADVIL,MOTRIN) 600 MG tablet Take 1 tablet (600 mg total) by mouth every 6 (six) hours as needed.    montelukast (SINGULAIR) 10 mg tablet Take 1 tablet (10 mg total) by mouth once daily.    oxyCODONE-acetaminophen (PERCOCET) 5-325 mg per tablet Take 1 tablet by mouth every 4 (four) hours as needed for Pain.     Family History     None        Tobacco Use    Smoking status: Current Every Day Smoker     Packs/day: 0.50     Types: Cigarettes    Smokeless tobacco: Never Used    Tobacco comment: encouraged to quit.    Substance and Sexual Activity    Alcohol use: Yes     Comment: socially    Drug use: No    Sexual activity: Yes     Partners: Female     Birth control/protection: Condom     Review of Systems   Constitutional: Negative for chills and fever.   HENT: Negative for nosebleeds and tinnitus.    Eyes: Negative for photophobia and visual disturbance.   Respiratory:  Negative for shortness of breath and wheezing.    Cardiovascular: Negative for chest pain, palpitations and leg swelling.   Gastrointestinal: Negative for abdominal distention, nausea and vomiting.   Genitourinary: Negative for dysuria, flank pain and hematuria.   Musculoskeletal: Positive for arthralgias and joint swelling. Negative for gait problem.   Skin: Positive for color change. Negative for rash and wound.   Neurological: Negative for seizures and syncope.     Objective:     Vital Signs (Most Recent):  Temp: 98.6 °F (37 °C) (09/05/19 1227)  Pulse: 77 (09/05/19 1732)  Resp: 16 (09/05/19 1732)  BP: 118/86 (09/05/19 1732)  SpO2: 100 % (09/05/19 1732) Vital Signs (24h Range):  Temp:  [98.6 °F (37 °C)] 98.6 °F (37 °C)  Pulse:  [73-98] 77  Resp:  [15-19] 16  SpO2:  [96 %-100 %] 100 %  BP: (111-137)/(60-86) 118/86     Weight: 68 kg (150 lb)  Body mass index is 23.49 kg/m².    Physical Exam   Constitutional: He is oriented to person, place, and time. He appears well-developed and well-nourished. No distress.   HENT:   Head: Normocephalic and atraumatic.   Right Ear: External ear normal.   Left Ear: External ear normal.   Eyes: Conjunctivae and EOM are normal. Right eye exhibits no discharge. Left eye exhibits no discharge.   Neck: Normal range of motion. No thyromegaly present.   Cardiovascular: Normal rate and regular rhythm.   No murmur heard.  Pulmonary/Chest: Effort normal and breath sounds normal. No respiratory distress.   Abdominal: Soft. Bowel sounds are normal. He exhibits no distension and no mass. There is no tenderness.   Musculoskeletal: He exhibits edema (bilateral lateral ankles). He exhibits no deformity.   2+ dorsalis pedis pulses. Picture of ankle swelling placed in chart. ROM of ankle intact though limited secondary to pain   Neurological: He is alert and oriented to person, place, and time.   Skin: Skin is warm and dry.   Psychiatric: He has a normal mood and affect. His behavior is normal.    Nursing note and vitals reviewed.        CRANIAL NERVES     CN III, IV, VI   Extraocular motions are normal.        Significant Labs:   CBC:   Recent Labs   Lab 09/05/19  1419   WBC 14.25*   HGB 10.6*   HCT 30.0*   *     CMP:   Recent Labs   Lab 09/05/19  1524      K 4.1      CO2 29   GLU 90   BUN 3*   CREATININE 0.7   CALCIUM 8.6*   PROT 6.7   ALBUMIN 3.3*   BILITOT 0.6   ALKPHOS 60   AST 13   ALT 15   ANIONGAP 6*   EGFRNONAA >60     Lactic Acid:   Recent Labs   Lab 09/05/19  1419   LACTATE 0.9     Urine Studies:   Recent Labs   Lab 09/05/19  1340   COLORU Yellow   APPEARANCEUA Clear   PHUR 8.0   SPECGRAV 1.010   PROTEINUA Negative   GLUCUA Negative   KETONESU Negative   BILIRUBINUA Negative   OCCULTUA Negative   NITRITE Negative   UROBILINOGEN Negative   LEUKOCYTESUR Negative       Significant Imaging:   Imaging Results          X-Ray Ankle Complete Bilateral (Final result)  Result time 09/05/19 13:58:14    Final result by Jose Vivar MD (09/05/19 13:58:14)                 Impression:      1. Soft tissue swelling over the right lateral malleolus.  2. Ankle radiographs otherwise are unremarkable.  No fractures or dislocations.      Electronically signed by: Jose Vivar MD  Date:    09/05/2019  Time:    13:58             Narrative:    EXAMINATION:  XR ANKLE COMPLETE 3 VIEW BILATERAL    CLINICAL HISTORY:  Pain in unspecified ankle and joints of unspecified foot    TECHNIQUE:  AP, lateral and oblique views of both ankles were performed.    COMPARISON:  None    FINDINGS:  Right ankle:    There is soft tissue swelling over the lateral malleolus.  No acute fractures or dislocations.  Ankle mortise is within normal limits.  Normal appearance of the talus and the calcaneus.  There are no osteoblastic or lytic lesions.    Left ankle: There are no acute fractures or dislocations.  Ankle mortise is within normal limits.  Soft tissues are unremarkable.  There is a small ossific density along the  superior margin of the distal talus possibly representing an old fracture.  No definite signs for acute fracture however noted.  Normal appearance of the subtalar joints.

## 2019-09-06 VITALS
DIASTOLIC BLOOD PRESSURE: 84 MMHG | RESPIRATION RATE: 18 BRPM | HEIGHT: 67 IN | TEMPERATURE: 98 F | BODY MASS INDEX: 24.26 KG/M2 | HEART RATE: 76 BPM | WEIGHT: 154.56 LBS | SYSTOLIC BLOOD PRESSURE: 128 MMHG | OXYGEN SATURATION: 98 %

## 2019-09-06 LAB
ALBUMIN SERPL BCP-MCNC: 2.9 G/DL (ref 3.5–5.2)
ALP SERPL-CCNC: 57 U/L (ref 55–135)
ALT SERPL W/O P-5'-P-CCNC: 14 U/L (ref 10–44)
ANION GAP SERPL CALC-SCNC: 5 MMOL/L (ref 8–16)
ANISOCYTOSIS BLD QL SMEAR: SLIGHT
AST SERPL-CCNC: 9 U/L (ref 10–40)
BASOPHILS # BLD AUTO: 0.07 K/UL (ref 0–0.2)
BASOPHILS NFR BLD: 0.6 % (ref 0–1.9)
BILIRUB SERPL-MCNC: 0.6 MG/DL (ref 0.1–1)
BUN SERPL-MCNC: 4 MG/DL (ref 6–20)
CALCIUM SERPL-MCNC: 8.3 MG/DL (ref 8.7–10.5)
CHLORIDE SERPL-SCNC: 106 MMOL/L (ref 95–110)
CO2 SERPL-SCNC: 30 MMOL/L (ref 23–29)
CREAT SERPL-MCNC: 0.7 MG/DL (ref 0.5–1.4)
DIFFERENTIAL METHOD: ABNORMAL
EOSINOPHIL # BLD AUTO: 1.7 K/UL (ref 0–0.5)
EOSINOPHIL NFR BLD: 15.2 % (ref 0–8)
ERYTHROCYTE [DISTWIDTH] IN BLOOD BY AUTOMATED COUNT: 16.1 % (ref 11.5–14.5)
EST. GFR  (AFRICAN AMERICAN): >60 ML/MIN/1.73 M^2
EST. GFR  (NON AFRICAN AMERICAN): >60 ML/MIN/1.73 M^2
GLUCOSE SERPL-MCNC: 90 MG/DL (ref 70–110)
HCT VFR BLD AUTO: 29.6 % (ref 40–54)
HGB BLD-MCNC: 10.4 G/DL (ref 14–18)
HYPOCHROMIA BLD QL SMEAR: ABNORMAL
LYMPHOCYTES # BLD AUTO: 4.9 K/UL (ref 1–4.8)
LYMPHOCYTES NFR BLD: 42.5 % (ref 18–48)
MCH RBC QN AUTO: 29.5 PG (ref 27–31)
MCHC RBC AUTO-ENTMCNC: 35.1 G/DL (ref 32–36)
MCV RBC AUTO: 84 FL (ref 82–98)
MONOCYTES # BLD AUTO: 1.5 K/UL (ref 0.3–1)
MONOCYTES NFR BLD: 13.1 % (ref 4–15)
NEUTROPHILS # BLD AUTO: 3.3 K/UL (ref 1.8–7.7)
NEUTROPHILS NFR BLD: 28.8 % (ref 38–73)
OVALOCYTES BLD QL SMEAR: ABNORMAL
PLATELET # BLD AUTO: 560 K/UL (ref 150–350)
PLATELET BLD QL SMEAR: ABNORMAL
PMV BLD AUTO: 9.6 FL (ref 9.2–12.9)
POIKILOCYTOSIS BLD QL SMEAR: SLIGHT
POLYCHROMASIA BLD QL SMEAR: ABNORMAL
POTASSIUM SERPL-SCNC: 3.4 MMOL/L (ref 3.5–5.1)
PROT SERPL-MCNC: 6 G/DL (ref 6–8.4)
RBC # BLD AUTO: 3.53 M/UL (ref 4.6–6.2)
RETICS/RBC NFR AUTO: 2.6 % (ref 0.4–2)
SODIUM SERPL-SCNC: 141 MMOL/L (ref 136–145)
TARGETS BLD QL SMEAR: ABNORMAL
URATE SERPL-MCNC: 4.5 MG/DL (ref 3.4–7)
WBC # BLD AUTO: 11.41 K/UL (ref 3.9–12.7)

## 2019-09-06 PROCEDURE — 94761 N-INVAS EAR/PLS OXIMETRY MLT: CPT

## 2019-09-06 PROCEDURE — 99900035 HC TECH TIME PER 15 MIN (STAT)

## 2019-09-06 PROCEDURE — 25000003 PHARM REV CODE 250: Performed by: EMERGENCY MEDICINE

## 2019-09-06 PROCEDURE — 80053 COMPREHEN METABOLIC PANEL: CPT

## 2019-09-06 PROCEDURE — 96376 TX/PRO/DX INJ SAME DRUG ADON: CPT

## 2019-09-06 PROCEDURE — 27000221 HC OXYGEN, UP TO 24 HOURS

## 2019-09-06 PROCEDURE — 96361 HYDRATE IV INFUSION ADD-ON: CPT

## 2019-09-06 PROCEDURE — S0077 INJECTION, CLINDAMYCIN PHOSP: HCPCS | Performed by: PHYSICIAN ASSISTANT

## 2019-09-06 PROCEDURE — 25000003 PHARM REV CODE 250: Performed by: PHYSICIAN ASSISTANT

## 2019-09-06 PROCEDURE — 85045 AUTOMATED RETICULOCYTE COUNT: CPT

## 2019-09-06 PROCEDURE — 25000003 PHARM REV CODE 250: Performed by: NURSE PRACTITIONER

## 2019-09-06 PROCEDURE — 85025 COMPLETE CBC W/AUTO DIFF WBC: CPT

## 2019-09-06 PROCEDURE — 94799 UNLISTED PULMONARY SVC/PX: CPT

## 2019-09-06 PROCEDURE — G0378 HOSPITAL OBSERVATION PER HR: HCPCS

## 2019-09-06 PROCEDURE — 63600175 PHARM REV CODE 636 W HCPCS: Performed by: EMERGENCY MEDICINE

## 2019-09-06 PROCEDURE — 36415 COLL VENOUS BLD VENIPUNCTURE: CPT

## 2019-09-06 PROCEDURE — S5010 5% DEXTROSE AND 0.45% SALINE: HCPCS | Performed by: EMERGENCY MEDICINE

## 2019-09-06 PROCEDURE — 84550 ASSAY OF BLOOD/URIC ACID: CPT

## 2019-09-06 RX ORDER — CLINDAMYCIN HYDROCHLORIDE 150 MG/1
450 CAPSULE ORAL EVERY 8 HOURS
Qty: 30 CAPSULE | Refills: 0 | Status: ON HOLD | OUTPATIENT
Start: 2019-09-06 | End: 2019-11-13 | Stop reason: HOSPADM

## 2019-09-06 RX ORDER — POTASSIUM CHLORIDE 20 MEQ/1
40 TABLET, EXTENDED RELEASE ORAL ONCE
Status: COMPLETED | OUTPATIENT
Start: 2019-09-06 | End: 2019-09-06

## 2019-09-06 RX ADMIN — OXYCODONE HYDROCHLORIDE 5 MG: 5 TABLET ORAL at 08:09

## 2019-09-06 RX ADMIN — FOLIC ACID 1 MG: 1 TABLET ORAL at 08:09

## 2019-09-06 RX ADMIN — CLINDAMYCIN IN 5 PERCENT DEXTROSE 600 MG: 12 INJECTION, SOLUTION INTRAVENOUS at 12:09

## 2019-09-06 RX ADMIN — CLINDAMYCIN IN 5 PERCENT DEXTROSE 600 MG: 12 INJECTION, SOLUTION INTRAVENOUS at 03:09

## 2019-09-06 RX ADMIN — SENNOSIDES, DOCUSATE SODIUM 1 TABLET: 50; 8.6 TABLET, FILM COATED ORAL at 08:09

## 2019-09-06 RX ADMIN — MORPHINE SULFATE 5 MG: 10 INJECTION INTRAVENOUS at 03:09

## 2019-09-06 RX ADMIN — MONTELUKAST 10 MG: 10 TABLET, FILM COATED ORAL at 08:09

## 2019-09-06 RX ADMIN — DEXTROSE AND SODIUM CHLORIDE: 5; .45 INJECTION, SOLUTION INTRAVENOUS at 06:09

## 2019-09-06 RX ADMIN — POTASSIUM CHLORIDE 40 MEQ: 1500 TABLET, EXTENDED RELEASE ORAL at 12:09

## 2019-09-06 NOTE — HOSPITAL COURSE
Mr. Parham is a 29-year-old female who was admitted to observation for both lower extremity cellulitis. Cellulitis possible from dirty from sewage that got into his boots. No wounds on exam. In ED x-rayed of ankle shows soft tissue swelling over the right lateral malleolus otherwise no fractures or dislocation.  Clindamycin started in ED.  On 09/06/2019, cellulitis improved.  Uric acid 4.5 normal.  Patient able to ambulate.  Patient remained afebrile.  Leukocytosis improved to normal range.  Blood cultures pending on discharge. Encourage smoking cessation.  Patient courage to keep follow-up appoint with hematologist at [facility] next month. Patient hemodynamically stable for discharge home with clindamycin times 10 days.

## 2019-09-06 NOTE — NURSING
"Pt arrive on unit on stretcher with pt transport and friends at his side,asssisted to bed O2@2L per NC started,c/o pain 4/10 prn pain medication was given before pt was transported,pt refused ELENA states "my foot hurt to bad", SCD started,pt AAOX4 verbalize he understands his plan of care,pt oriented to room and call light,repodt given to oncoming nurse,continue monitoring.  "

## 2019-09-06 NOTE — NURSING
Pt discharged to home after discharge instructions explained. Peripheral IV discontinued and dressing applied. Questions answered and pt encouraged to increase fluid intake. Active range of motion encouraged and rationale given for instructions to facilitate ambulation. Pt feet remains tender with steps more on the right foot than left. Gait unstable and pt. needing assistance with ambulating.

## 2019-09-06 NOTE — PROGRESS NOTES
WRITTEN HEALTHCARE DISCHARGE INFORMATION      Things that YOU are RESPONSIBLE for to Manage Your Care At Home:     1. Getting your prescriptions filled.  2. Taking you medications as directed. DO NOT MISS ANY DOSES!  3. Going to your follow-up doctor appointments. This is important because it allows the doctor to monitor your progress and to determine if any changes need to be made to your treatment plan.     If you are unable to make your follow up appointments, please call the number listed and reschedule this appointment.      ____________HELP AT HOME____________________     Experiencing any SIGNS or SYMPTOMS: YOU CAN     Schedule a same day appopintment with your Primary Care Doctor or  you can call [facility] On Call Nurse Care Line for 24/7 assistance at [phone]     If you are experience any signs or symptoms that have become severe, Call 911 and come to your nearest Emergency Room.     Thank you for choosing [facility] and allowing us to care for you.   From your care management team:      You should receive a call from [facility] Discharge Department within 48-72 hours to help manage your care after discharge. Please try to make sure that you answer your phone for this important phone call.    Follow-up Information     Kieran Reed MD. Schedule an appointment as soon as possible for a visit in 3 days.    Specialties:  Internal Medicine, Pediatrics  Contact information:  [address]   [address]  [facility] [address]  [phone]             Estela Ramirez On 9/26/2019.    Why:  please keep scheduled follow up appointment for September 26th at schedueld time.  Contact information:  Estela Ramirez MD    [address] [place] [id number] [phone] (Fax)

## 2019-09-06 NOTE — PROGRESS NOTES
Sickle Cell Pain Crisis  Sickle cell anemia is an inherited disease that affects your red blood cells. It occurs most often in people of  descent. The most common symptom of sickle cell anemia is a pain crisis. This occurs when the red blood cells change shape and block blood flow in the smaller blood vessels. Pain crisis can affect the bones, joints, chest, or abdomen. This may happen if you:  · Are dehydrated  · Have an infection  · Drink too much alcohol  · Are stressed  · Are exhausted  Having too little oxygen in your blood may also trigger a pain crisis.  Home care  Follow these guidelines when caring for yourself at home:  · Drink at least 3 quarts of fluid (12 8-ounce glasses) over the next 24 hours. This will make sure you are not dehydrated.  · Rest until all your pain is gone.  · Apply heat to the painful areas.  · Take any prescribed pain medicines as directed. You may take acetaminophen or ibuprofen instead for milder pain. If you have chronic liver or kidney disease, talk with your healthcare provider before using these medicines. Also talk with your provider if youve had a stomach ulcer or GI bleeding.  Preventing future attacks  · Keep yourself well-hydrated. Drink at least 8 glasses of water and other fluids every day. Drink more when you are sick with a fever, driving at high altitudes, or traveling by air.  · Avoid overexerting yourself, exposure to cold temperatures, and prolonged strenuous work. These may make you overly tired or dehydrated.  · Use oxygen during air travel. Contact the airline to make these arrangements.  · Limit how much alcohol you drink. An occasional drink may be OK when you dont have symptoms.  · Dont allow smoking in your home.  Follow-up care  Follow up with your healthcare provider in 1 week, or as advised.  When to seek medical advice  Call your healthcare provider right away if any of these occur:  · Pain that doesnt get better after taking the medicines  prescribed  · Fever of 100.4°F (38ºC) or higher, or as directed by your healthcare provider  · Cough with dark sputum or shortness of breath  · Blood (pink, brown, or red) in your urine  · Difficulty with speech or vision  · Any painful joint that becomes hot, swollen, or red  · Headache that is different from normal  · Not being able to move, or having part of your body become numb  · Sudden weakness or numbness  · Painful erection that does not go away

## 2019-09-06 NOTE — DISCHARGE SUMMARY
[facility] Hospital Medicine  Discharge Summary      Patient Name: Katie Parham  MRN: [id number]  Admission Date: 9/5/2019  Hospital Length of Stay: 0 days  Discharge Date and Time:  09/06/2019 3:31 PM  Attending Physician: No att. providers found   Discharging Provider: Betzaida Salinas NP  Primary Care Provider: Kieran Reed MD      HPI:   Katie Parham 29 y.o. male with asthma, sickle cell disease presents to the hospital with a chief complaint of sickle cell crisis. He reports this morning he began to experience severe pain to his bilateral feet described as a throbbing without radiation and was constant. It has been helped with pain medication in the ED. He reports he normally experiences pain from sickle cell crisis in his thighs and back. He denies any fever. He is not on hydroxyurea due to what he reports a physician reporting they need to add run more tests before starting. He denies any trauma, scratches, or itching to his bilateral feet. He has never experienced lower pain such as this before. He does wear steel toe boots for work. He denies fever, chest pain, SOB, N/V/D, abdominal pain, back pain, dizziness, syncope, and numbness in feet. He reports he has been experiencing frequent sickle cell crisis this year. He smokes 1/2 ppd.     Per  his last pain prescription was 8/2/2019 for a 5 day supply.    In the ED, WBC of 14.25, H&H 10.6&30.0, retic 2.9, no bette on smear, bilirubin 0.6, ankle x-ray with soft tissue swelling over the right lateral malleolus.     * No surgery found *      Hospital Course:   Mr. Parham is a 29-year-old female who was admitted to observation for both lower extremity cellulitis. Cellulitis possible from dirty from sewage that got into his boots. No wounds on exam. In ED x-rayed of ankle shows soft tissue swelling over the right lateral malleolus otherwise no fractures or dislocation.  Clindamycin started in ED.  On 09/06/2019, cellulitis improved.   Uric acid 4.5 normal.  Patient able to ambulate.  Patient remained afebrile.  Leukocytosis improved to normal range.  Blood cultures pending on discharge. Encourage smoking cessation.  Patient courage to keep follow-up appoint with hematologist at [facility] next month. Patient hemodynamically stable for discharge home with clindamycin times 10 days.     Consults:     No new Assessment & Plan notes have been filed under this hospital service since the last note was generated.  Service: Hospital Medicine    Final Active Diagnoses:    Diagnosis Date Noted POA    PRINCIPAL PROBLEM:  Cellulitis of both feet [L03.115, L03.116] 09/05/2019 Yes    Asthma [J45.909] 09/05/2019 Unknown    Sickle cell anemia [D57.1] 08/18/2019 Yes    Tobacco abuse [Z72.0] 08/18/2019 Yes      Problems Resolved During this Admission:       Discharged Condition: good    Disposition: Home or Self Care    Follow Up:  Follow-up Information     Kieran Reed MD. Schedule an appointment as soon as possible for a visit in 3 days.    Specialties:  Internal Medicine, Pediatrics  Contact information:  [address]  [address]  [facility] [id number] [phone]             Estela Ramirez On 9/26/2019.    Why:  please keep scheduled follow up appointment for September 26th at sche[place] time.  Contact information:  Estela Ramirez MD    [address] [place] [address] [phone] (Fax)                 Patient Instructions:      Notify your health care provider if you experience any of the following:  temperature >100.4     Notify your health care provider if you experience any of the following:  severe uncontrolled pain     Activity as tolerated       Significant Diagnostic Studies: Labs:   BMP:   Recent Labs   Lab 09/05/19  1524 09/06/19  0358   GLU 90 90    141   K 4.1 3.4*    106   CO2 29 30*   BUN 3* 4*   CREATININE 0.7 0.7   CALCIUM 8.6* 8.3*   MG 1.9  --    , CMP   Recent Labs   Lab  09/05/19  1524 09/06/19  0358    141   K 4.1 3.4*    106   CO2 29 30*   GLU 90 90   BUN 3* 4*   CREATININE 0.7 0.7   CALCIUM 8.6* 8.3*   PROT 6.7 6.0   ALBUMIN 3.3* 2.9*   BILITOT 0.6 0.6   ALKPHOS 60 57   AST 13 9*   ALT 15 14   ANIONGAP 6* 5*   ESTGFRAFRICA >60 >60   EGFRNONAA >60 >60   , CBC   Recent Labs   Lab 09/05/19  1419 09/06/19  0358   WBC 14.25* 11.41   HGB 10.6* 10.4*   HCT 30.0* 29.6*   * 560*   , INR   Lab Results   Component Value Date    INR 1.1 01/06/2018   , Lipid Panel No results found for: CHOL, HDL, LDLCALC, TRIG, CHOLHDL, Troponin No results for input(s): TROPONINI in the last 168 hours., A1C: No results for input(s): HGBA1C in the last 4320 hours. and All labs within the past 24 hours have been reviewed    Pending Diagnostic Studies:     None         Medications:  Reconciled Home Medications:      Medication List      START taking these medications    clindamycin 150 MG capsule  Commonly known as:  CLEOCIN  Take 3 capsules (450 mg total) by mouth every 8 (eight) hours.        CONTINUE taking these medications    * albuterol 90 mcg/actuation inhaler  Commonly known as:  PROVENTIL/VENTOLIN HFA  Inhale 1-2 puffs into the lungs every 6 (six) hours as needed for Wheezing or Shortness of Breath. Rescue     * albuterol 0.63 mg/3 mL Nebu  Commonly known as:  ACCUNEB  Take 3 mLs (0.63 mg total) by nebulization every 6 (six) hours as needed. Rescue     budesonide-formoterol 80-4.5 mcg 80-4.5 mcg/actuation Hfaa  Commonly known as:  SYMBICORT  Inhale 2 puffs into the lungs 2 (two) times daily. Controller     calcium-vitamin D3 500 mg(1,250mg) -200 unit per tablet  Commonly known as:  OS-JOÃO 500 + D3  Take 1 tablet by mouth 2 (two) times daily with meals.     FOLIC ACID ORAL  Take by mouth.     ibuprofen 600 MG tablet  Commonly known as:  ADVIL,MOTRIN  Take 1 tablet (600 mg total) by mouth every 6 (six) hours as needed.     montelukast 10 mg tablet  Commonly known as:  SINGULAIR  Take 1  tablet (10 mg total) by mouth once daily.     oxyCODONE-acetaminophen 5-325 mg per tablet  Commonly known as:  PERCOCET  Take 1 tablet by mouth every 4 (four) hours as needed for Pain.         * This list has 2 medication(s) that are the same as other medications prescribed for you. Read the directions carefully, and ask your doctor or other care provider to review them with you.                Indwelling Lines/Drains at time of discharge:   Lines/Drains/Airways          None          Time spent on the discharge of patient: 30 minutes  Patient was seen and examined on the date of discharge and determined to be suitable for discharge.         Betzaida Salinas NP  Department of Hospital Medicine  [facility]

## 2019-09-06 NOTE — PLAN OF CARE
Problem: Adult Inpatient Plan of Care  Goal: Plan of Care Review     09/06/19 0442   Plan of Care Review   Plan of Care Reviewed With patient   Patient currently resting in bed with eyes closed. Easily awaken by touch stimuli. Resp. Even non- labored. No acute distress noted. Pain medication is being administered for pain management. Patient continues on IV ABX for cellulitis to bilateral lower extremity. Safety maintained, bed locked and in lowest position with call light in reach. Will monitor.

## 2019-09-06 NOTE — NURSING
Received patient from [facility] via w/c accompanied by staff and friends. Patient AA&Ox4 able to make needs known. Patient oriented to call light system, restroom and staff. Patient with swelling to Bilat. Lower extremity. Safety maintained, bed alarm activated, bed locked and in lowest position with call light in reach. Will monitor.

## 2019-09-06 NOTE — PLAN OF CARE
09/06/19 1020   Discharge Assessment   Assessment Type Discharge Planning Assessment   Assessment information obtained from? Medical Record   Prior to hospitilization cognitive status: Alert/Oriented   Prior to hospitalization functional status: Independent   Current cognitive status: Alert/Oriented   Current Functional Status: Independent   Facility Arrived From: home   Lives With significant other   Able to Return to Prior Arrangements yes   Is patient able to care for self after discharge? Yes   Who are your caregiver(s) and their phone number(s)? Magdalena   Patient's perception of discharge disposition home or selfcare   Readmission Within the Last 30 Days no previous admission in last 30 days   Patient currently being followed by outpatient case management? No   Patient currently receives any other outside agency services? No   Equipment Currently Used at Home none   Do you have any problems affording any of your prescribed medications? No   Is the patient taking medications as prescribed? yes   Does the patient have transportation home? Yes   Transportation Anticipated family or friend will provide   Does the patient receive services at the [facility]? No   Discharge Plan A Home with family   Discharge Plan B Home with family   DME Needed Upon Discharge  none   Patient/Family in Agreement with Plan yes     [id number] - [place], [place]  [address]  [facility] [address]  [place] [address]  Phone: [phone] Fax: [phone]

## 2019-09-06 NOTE — PLAN OF CARE
"   09/06/19 1300   Final Note   Assessment Type Final Discharge Note   Anticipated Discharge Disposition Home   Hospital Follow Up  Appt(s) scheduled? Yes   Discharge plans and expectations educations in teach back method with documentation complete? Yes   Right Care Referral Info   Post Acute Recommendation No Care   pts nurse Pilar notified that the pt can d/c from CM standpoint.    EDUCATION:   provided with educational information on sickle cell crisis.  Information reviewed and placed in :My Healthcare Packet" to be brought home to use as resource after discharge.  Information included:  signs and symptoms to look for and call the doctor if experiencing, and symptoms that may indicate a medical emergency: CALL 911.      All questions answered.  Teach back method used.    Patient stated, " to go to scheduled appointment and take medications as prescribed  ".        "

## 2019-09-10 LAB
BACTERIA BLD CULT: NORMAL
BACTERIA BLD CULT: NORMAL

## 2019-10-29 ENCOUNTER — HOSPITAL ENCOUNTER (EMERGENCY)
Facility: HOSPITAL | Age: 29
Discharge: HOME | End: 2019-10-29
Attending: EMERGENCY MEDICINE
Payer: MEDICAID

## 2019-10-29 VITALS
TEMPERATURE: 100 F | DIASTOLIC BLOOD PRESSURE: 66 MMHG | BODY MASS INDEX: 24.11 KG/M2 | OXYGEN SATURATION: 98 % | HEIGHT: 66 IN | HEART RATE: 94 BPM | SYSTOLIC BLOOD PRESSURE: 119 MMHG | RESPIRATION RATE: 22 BRPM | WEIGHT: 150 LBS

## 2019-10-29 DIAGNOSIS — Z87.898 HISTORY OF LEFT FLANK PAIN: ICD-10-CM

## 2019-10-29 DIAGNOSIS — R10.9 ACUTE LEFT FLANK PAIN: ICD-10-CM

## 2019-10-29 DIAGNOSIS — J43.9 PULMONARY EMPHYSEMA, UNSPECIFIED EMPHYSEMA TYPE: ICD-10-CM

## 2019-10-29 DIAGNOSIS — R07.9 CHEST PAIN WITH LOW RISK FOR CARDIAC ETIOLOGY: Primary | ICD-10-CM

## 2019-10-29 DIAGNOSIS — D57.20 SICKLE CELL-HEMOGLOBIN C DISEASE WITHOUT CRISIS: ICD-10-CM

## 2019-10-29 LAB
ALBUMIN SERPL BCP-MCNC: 3.7 G/DL (ref 3.5–5.2)
ALP SERPL-CCNC: 75 U/L (ref 55–135)
ALT SERPL W/O P-5'-P-CCNC: 12 U/L (ref 10–44)
ANION GAP SERPL CALC-SCNC: 6 MMOL/L (ref 8–16)
ANISOCYTOSIS BLD QL SMEAR: SLIGHT
AST SERPL-CCNC: 13 U/L (ref 10–40)
BASOPHILS # BLD AUTO: 0.12 K/UL (ref 0–0.2)
BASOPHILS NFR BLD: 0.5 % (ref 0–1.9)
BILIRUB SERPL-MCNC: 1.3 MG/DL (ref 0.1–1)
BILIRUB UR QL STRIP: NEGATIVE
BNP SERPL-MCNC: <10 PG/ML (ref 0–99)
BNP SERPL-MCNC: <10 PG/ML (ref 0–99)
BUN SERPL-MCNC: 4 MG/DL (ref 6–20)
CALCIUM SERPL-MCNC: 8.8 MG/DL (ref 8.7–10.5)
CHLORIDE SERPL-SCNC: 105 MMOL/L (ref 95–110)
CLARITY UR: CLEAR
CO2 SERPL-SCNC: 29 MMOL/L (ref 23–29)
COLOR UR: YELLOW
CREAT SERPL-MCNC: 0.8 MG/DL (ref 0.5–1.4)
DIFFERENTIAL METHOD: ABNORMAL
EOSINOPHIL # BLD AUTO: 1 K/UL (ref 0–0.5)
EOSINOPHIL NFR BLD: 3.9 % (ref 0–8)
ERYTHROCYTE [DISTWIDTH] IN BLOOD BY AUTOMATED COUNT: 14.6 % (ref 11.5–14.5)
EST. GFR  (AFRICAN AMERICAN): >60 ML/MIN/1.73 M^2
EST. GFR  (NON AFRICAN AMERICAN): >60 ML/MIN/1.73 M^2
GLUCOSE SERPL-MCNC: 68 MG/DL (ref 70–110)
GLUCOSE UR QL STRIP: NEGATIVE
HCT VFR BLD AUTO: 33.1 % (ref 40–54)
HGB BLD-MCNC: 11.8 G/DL (ref 14–18)
HGB UR QL STRIP: NEGATIVE
HYPOCHROMIA BLD QL SMEAR: ABNORMAL
IMM GRANULOCYTES # BLD AUTO: 0.15 K/UL (ref 0–0.04)
IMM GRANULOCYTES NFR BLD AUTO: 0.6 % (ref 0–0.5)
KETONES UR QL STRIP: NEGATIVE
LEUKOCYTE ESTERASE UR QL STRIP: NEGATIVE
LYMPHOCYTES # BLD AUTO: 1.9 K/UL (ref 1–4.8)
LYMPHOCYTES NFR BLD: 7.5 % (ref 18–48)
MCH RBC QN AUTO: 31.2 PG (ref 27–31)
MCHC RBC AUTO-ENTMCNC: 35.6 G/DL (ref 32–36)
MCV RBC AUTO: 88 FL (ref 82–98)
MONOCYTES # BLD AUTO: 2.8 K/UL (ref 0.3–1)
MONOCYTES NFR BLD: 10.7 % (ref 4–15)
NEUTROPHILS # BLD AUTO: 19.7 K/UL (ref 1.8–7.7)
NEUTROPHILS NFR BLD: 76.8 % (ref 38–73)
NITRITE UR QL STRIP: NEGATIVE
NRBC BLD-RTO: 0 /100 WBC
OVALOCYTES BLD QL SMEAR: ABNORMAL
PH UR STRIP: 6 [PH] (ref 5–8)
PLATELET # BLD AUTO: 694 K/UL (ref 150–350)
PLATELET BLD QL SMEAR: ABNORMAL
PMV BLD AUTO: 10.2 FL (ref 9.2–12.9)
POLYCHROMASIA BLD QL SMEAR: ABNORMAL
POTASSIUM SERPL-SCNC: 3.6 MMOL/L (ref 3.5–5.1)
PROT SERPL-MCNC: 6.8 G/DL (ref 6–8.4)
PROT UR QL STRIP: NEGATIVE
RBC # BLD AUTO: 3.78 M/UL (ref 4.6–6.2)
RETICS/RBC NFR AUTO: 6.8 % (ref 0.4–2)
SCHISTOCYTES BLD QL SMEAR: ABNORMAL
SODIUM SERPL-SCNC: 140 MMOL/L (ref 136–145)
SP GR UR STRIP: 1.01 (ref 1–1.03)
TARGETS BLD QL SMEAR: ABNORMAL
TROPONIN I SERPL DL<=0.01 NG/ML-MCNC: <0.006 NG/ML (ref 0–0.03)
URN SPEC COLLECT METH UR: NORMAL
UROBILINOGEN UR STRIP-ACNC: NEGATIVE EU/DL
WBC # BLD AUTO: 25.59 K/UL (ref 3.9–12.7)

## 2019-10-29 PROCEDURE — 96376 TX/PRO/DX INJ SAME DRUG ADON: CPT

## 2019-10-29 PROCEDURE — 93010 ELECTROCARDIOGRAM REPORT: CPT | Mod: ,,, | Performed by: INTERNAL MEDICINE

## 2019-10-29 PROCEDURE — 85025 COMPLETE CBC W/AUTO DIFF WBC: CPT

## 2019-10-29 PROCEDURE — 63600175 PHARM REV CODE 636 W HCPCS: Performed by: PHYSICIAN ASSISTANT

## 2019-10-29 PROCEDURE — 94640 AIRWAY INHALATION TREATMENT: CPT

## 2019-10-29 PROCEDURE — 63600175 PHARM REV CODE 636 W HCPCS: Performed by: EMERGENCY MEDICINE

## 2019-10-29 PROCEDURE — 93005 ELECTROCARDIOGRAM TRACING: CPT

## 2019-10-29 PROCEDURE — 85045 AUTOMATED RETICULOCYTE COUNT: CPT

## 2019-10-29 PROCEDURE — 25000242 PHARM REV CODE 250 ALT 637 W/ HCPCS: Performed by: EMERGENCY MEDICINE

## 2019-10-29 PROCEDURE — 96361 HYDRATE IV INFUSION ADD-ON: CPT

## 2019-10-29 PROCEDURE — 83880 ASSAY OF NATRIURETIC PEPTIDE: CPT | Mod: 91

## 2019-10-29 PROCEDURE — 84484 ASSAY OF TROPONIN QUANT: CPT

## 2019-10-29 PROCEDURE — 96374 THER/PROPH/DIAG INJ IV PUSH: CPT | Mod: 59

## 2019-10-29 PROCEDURE — 81003 URINALYSIS AUTO W/O SCOPE: CPT

## 2019-10-29 PROCEDURE — 96375 TX/PRO/DX INJ NEW DRUG ADDON: CPT | Mod: 59

## 2019-10-29 PROCEDURE — 99285 EMERGENCY DEPT VISIT HI MDM: CPT | Mod: 25

## 2019-10-29 PROCEDURE — 80053 COMPREHEN METABOLIC PANEL: CPT

## 2019-10-29 PROCEDURE — 25000003 PHARM REV CODE 250: Performed by: EMERGENCY MEDICINE

## 2019-10-29 PROCEDURE — 93010 EKG 12-LEAD: ICD-10-PCS | Mod: ,,, | Performed by: INTERNAL MEDICINE

## 2019-10-29 PROCEDURE — 94760 N-INVAS EAR/PLS OXIMETRY 1: CPT

## 2019-10-29 PROCEDURE — 83880 ASSAY OF NATRIURETIC PEPTIDE: CPT

## 2019-10-29 PROCEDURE — 96365 THER/PROPH/DIAG IV INF INIT: CPT

## 2019-10-29 RX ORDER — MORPHINE SULFATE 10 MG/ML
2 INJECTION INTRAMUSCULAR; INTRAVENOUS; SUBCUTANEOUS
Status: COMPLETED | OUTPATIENT
Start: 2019-10-29 | End: 2019-10-29

## 2019-10-29 RX ORDER — IBUPROFEN 600 MG/1
600 TABLET ORAL EVERY 6 HOURS PRN
Qty: 20 TABLET | Refills: 0 | Status: SHIPPED | OUTPATIENT
Start: 2019-10-29 | End: 2021-09-08

## 2019-10-29 RX ORDER — AZITHROMYCIN 500 MG/1
500 TABLET, FILM COATED ORAL DAILY
Qty: 7 TABLET | Refills: 0 | Status: SHIPPED | OUTPATIENT
Start: 2019-10-29 | End: 2019-11-05

## 2019-10-29 RX ORDER — HYDROMORPHONE HYDROCHLORIDE 2 MG/ML
1 INJECTION, SOLUTION INTRAMUSCULAR; INTRAVENOUS; SUBCUTANEOUS
Status: COMPLETED | OUTPATIENT
Start: 2019-10-29 | End: 2019-10-29

## 2019-10-29 RX ORDER — DIPHENHYDRAMINE HYDROCHLORIDE 50 MG/ML
25 INJECTION INTRAMUSCULAR; INTRAVENOUS
Status: COMPLETED | OUTPATIENT
Start: 2019-10-29 | End: 2019-10-29

## 2019-10-29 RX ORDER — IPRATROPIUM BROMIDE AND ALBUTEROL SULFATE 2.5; .5 MG/3ML; MG/3ML
3 SOLUTION RESPIRATORY (INHALATION)
Status: COMPLETED | OUTPATIENT
Start: 2019-10-29 | End: 2019-10-29

## 2019-10-29 RX ORDER — AZITHROMYCIN 250 MG/1
500 TABLET, FILM COATED ORAL
Status: COMPLETED | OUTPATIENT
Start: 2019-10-29 | End: 2019-10-29

## 2019-10-29 RX ORDER — KETOROLAC TROMETHAMINE 30 MG/ML
15 INJECTION, SOLUTION INTRAMUSCULAR; INTRAVENOUS
Status: COMPLETED | OUTPATIENT
Start: 2019-10-29 | End: 2019-10-29

## 2019-10-29 RX ORDER — ALBUTEROL SULFATE 90 UG/1
1-2 AEROSOL, METERED RESPIRATORY (INHALATION) EVERY 6 HOURS PRN
Qty: 1 INHALER | Refills: 1 | Status: SHIPPED | OUTPATIENT
Start: 2019-10-29 | End: 2019-11-26 | Stop reason: SDUPTHER

## 2019-10-29 RX ADMIN — SODIUM CHLORIDE 1000 ML: 0.9 INJECTION, SOLUTION INTRAVENOUS at 01:10

## 2019-10-29 RX ADMIN — SODIUM CHLORIDE 1000 ML: 0.9 INJECTION, SOLUTION INTRAVENOUS at 02:10

## 2019-10-29 RX ADMIN — PROMETHAZINE HYDROCHLORIDE 12.5 MG: 25 INJECTION INTRAMUSCULAR; INTRAVENOUS at 02:10

## 2019-10-29 RX ADMIN — SODIUM CHLORIDE 1000 ML: 0.9 INJECTION, SOLUTION INTRAVENOUS at 05:10

## 2019-10-29 RX ADMIN — AZITHROMYCIN MONOHYDRATE 500 MG: 250 TABLET ORAL at 05:10

## 2019-10-29 RX ADMIN — HYDROMORPHONE HYDROCHLORIDE 1 MG: 2 INJECTION, SOLUTION INTRAMUSCULAR; INTRAVENOUS; SUBCUTANEOUS at 02:10

## 2019-10-29 RX ADMIN — IPRATROPIUM BROMIDE AND ALBUTEROL SULFATE 3 ML: .5; 3 SOLUTION RESPIRATORY (INHALATION) at 02:10

## 2019-10-29 RX ADMIN — DIPHENHYDRAMINE HYDROCHLORIDE 25 MG: 50 INJECTION INTRAMUSCULAR; INTRAVENOUS at 02:10

## 2019-10-29 RX ADMIN — MORPHINE SULFATE 2 MG: 10 INJECTION INTRAVENOUS at 04:10

## 2019-10-29 RX ADMIN — SODIUM CHLORIDE 1000 ML: 0.9 INJECTION, SOLUTION INTRAVENOUS at 04:10

## 2019-10-29 RX ADMIN — MORPHINE SULFATE 2 MG: 10 INJECTION INTRAVENOUS at 08:10

## 2019-10-29 RX ADMIN — KETOROLAC TROMETHAMINE 15 MG: 30 INJECTION, SOLUTION INTRAMUSCULAR; INTRAVENOUS at 01:10

## 2019-10-29 NOTE — ED PROVIDER NOTES
Encounter Date: 10/29/2019  SORT:   30 y/o male with history of sickle cell anemia and asthma presenting for evaluation of L lumbar back pain and chest pain that began this morning. He reports having cough since yesterday, SOB, wheezing. Uses inhaler at home which he used today. He reports similar to asthma exacerbation and pain crisis. Denies fever. VANESSA Valentine PA-C   SCRIBE #1 NOTE: I, Neptali Arellano, am scribing for, and in the presence of,  Kyle Ambrosio MD. I have scribed the following portions of the note - Other sections scribed: HPI/ROS.       History     Chief Complaint   Patient presents with    Chest Pain     Reports chest pain that started on this morning; reports having back pain and coughing since yesterday. Hx of sickle cell     CC: Chest Pain     HPI: This 29 y.o. male with a medical history of sickle cell anemia and crisis presents to the ED for an emergent evaluation of intermittent sternal chest pain and L flank pain beginning this AM. Chest pain is exacerbated with coughing, laughing, deep breathing, and palpation. Flank pain is exacerbated with movement. No recent falls, traumas, or injuries. Pt also reports chills and a productive cough with green sputum. Pt notes intermittent SOB this AM, but symptom has now resolved. No hx of kidney stones or kidney/bladder infections. He is allergic to penicillins. 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 No recent IV drug use. He does smoke cigarettes. Otherwise, pt denies fever, rhinorrhea, dysuria, sore throat, headache, ear pain, SOB, abdominal pain, arm/leg pain, and any other associated symptoms.    The history is provided by the patient. No  was used.     Review of patient's allergies indicates:   Allergen Reactions    Penicillins Anaphylaxis     Past Medical History:   Diagnosis Date    Asthma     Broken thumb     Cigarette smoker     Sickle cell anemia     Sickle cell crisis      Past Surgical History:    Procedure Laterality Date    HAND SURGERY Left 12/21/2017    ORIF    ORIF mandible  01/31/2013    spleenectomy       History reviewed. No pertinent family history.  Social History     Tobacco Use    Smoking status: Current Every Day Smoker     Packs/day: 0.50     Types: Cigarettes    Smokeless tobacco: Never Used    Tobacco comment: encouraged to quit.    Substance Use Topics    Alcohol use: Yes     Comment: socially    Drug use: No     Review of Systems   Constitutional: Positive for chills. Negative for fever.   HENT: Negative for congestion, rhinorrhea and sore throat.    Eyes: Negative for pain and visual disturbance.   Respiratory: Positive for cough and shortness of breath (now resolved).    Cardiovascular: Positive for chest pain.   Gastrointestinal: Negative for abdominal pain, diarrhea, nausea and vomiting.   Genitourinary: Positive for flank pain. Negative for dysuria.   Musculoskeletal: Negative for myalgias and neck stiffness.   Skin: Negative for rash.   Neurological: Negative for headaches.       Physical Exam     Initial Vitals [10/29/19 1221]   BP Pulse Resp Temp SpO2   131/70 (!) 119 20 98.3 °F (36.8 °C) 96 %      MAP       --         Physical Exam  The patient was examined specifically for the following:   General:No significant distress, Good color, Warm and dry. Head and neck:Scalp atraumatic, Neck supple. Neurological:Appropriate conversation, Gross motor deficits. Eyes:Conjugate gaze, Clear corneas. ENT: No epistaxis. Cardiac: Regular rate and rhythm, Grossly normal heart tones. Pulmonary: Wheezing, Rales. Gastrointestinal: Abdominal tenderness, Abdominal distention. Musculoskeletal: Extremity deformity, Apparent pain with range of motion of the joints. Skin: Rash.   The findings on examination were normal except for the following:  The patient has tenderness of the left flank and of the high sternal chest.  Palpation there reproduces the pain of the chief complaint.  The lungs are  clear, except for mild scattered wheezes.  The heart tones are normal. The patient has regular tachycardia.  He appears to be in pain.  The abdomen is nontender.  ED Course   Procedures  Labs Reviewed   CBC W/ AUTO DIFFERENTIAL - Abnormal; Notable for the following components:       Result Value    WBC 25.59 (*)     RBC 3.78 (*)     Hemoglobin 11.8 (*)     Hematocrit 33.1 (*)     Mean Corpuscular Hemoglobin 31.2 (*)     RDW 14.6 (*)     Platelets 694 (*)     Immature Granulocytes 0.6 (*)     Gran # (ANC) 19.7 (*)     Immature Grans (Abs) 0.15 (*)     Mono # 2.8 (*)     Eos # 1.0 (*)     Gran% 76.8 (*)     Lymph% 7.5 (*)     Platelet Estimate Increased (*)     All other components within normal limits   COMPREHENSIVE METABOLIC PANEL - Abnormal; Notable for the following components:    Glucose 68 (*)     BUN, Bld 4 (*)     Total Bilirubin 1.3 (*)     Anion Gap 6 (*)     All other components within normal limits    Narrative:     Recoll. [id number] by [facility] at 10/29/2019 13:39, reason: Specimen   hemolyzed,Tube has been refrigerated. called to Meron Garcia   10/29/2019  13:39   RETICULOCYTES - Abnormal; Notable for the following components:    Retic 6.8 (*)     All other components within normal limits   TROPONIN I    Narrative:     Recoll. [id number] by [facility] at 10/29/2019 13:40, reason: Specimen   hemolyzed.Tube has been refrigerated.   B-TYPE NATRIURETIC PEPTIDE   URINALYSIS, REFLEX TO URINE CULTURE    Narrative:     Preferred Collection Type->Urine, Clean Catch   B-TYPE NATRIURETIC PEPTIDE     EKG Readings: (Independently Interpreted)   This patient such cardiogram reveals a sinus rhythm with a heart rate of 124.  There are nonspecific ST segment and T-wave changes.  There is no evidence of acute myocardial infarction or malignant arrhythmia.     ECG Results          EKG 12-lead (In process)  Result time 10/29/19 13:25:12    In process by Interface, Lab In [facility] (10/29/19 13:25:12)                 Narrative:     Test Reason : R07.9,    Vent. Rate : 124 BPM     Atrial Rate : 124 BPM     P-R Int : 154 ms          QRS Dur : 094 ms      QT Int : 298 ms       P-R-T Axes : 076 074 080 degrees     QTc Int : 428 ms    Sinus tachycardia  Biatrial enlargement  Nonspecific ST and T wave abnormality  Abnormal ECG  When compared with ECG of 18-AUG-2019 16:42,  Significant changes have occurred    Referred By: [email or web address]   SELF           Confirmed By:                   In process by Interface, Lab In [facility] (10/29/19 13:24:48)                 Narrative:    Test Reason : R07.9,    Vent. Rate : 124 BPM     Atrial Rate : 124 BPM     P-R Int : 154 ms          QRS Dur : 094 ms      QT Int : 298 ms       P-R-T Axes : 076 074 080 degrees     QTc Int : 428 ms    Sinus tachycardia  Biatrial enlargement  Nonspecific ST and T wave abnormality  Abnormal ECG  When compared with ECG of 18-AUG-2019 16:42,  Significant changes have occurred    Referred By: [email or web address]   SELF           Confirmed By:                   In process by Interface, Lab In [facility] (10/29/19 13:18:55)                 Narrative:    Test Reason : R07.9,    Vent. Rate : 124 BPM     Atrial Rate : 124 BPM     P-R Int : 154 ms          QRS Dur : 094 ms      QT Int : 298 ms       P-R-T Axes : 076 074 080 degrees     QTc Int : 428 ms    Sinus tachycardia  Biatrial enlargement  Nonspecific ST and T wave abnormality  Abnormal ECG  When compared with ECG of 18-AUG-2019 16:42,  Significant changes have occurred    Referred By: [email or web address]   SELF           Confirmed By:                             Imaging Results          X-Ray Chest PA And Lateral (Final result)  Result time 10/29/19 13:21:05    Final result by Alonzo Albright MD (10/29/19 13:21:05)                 Impression:      Interval increased interstitial markings with hilar prominence, findings may be reflective of reactive airways disease, however cannot exclude underlying causes of previously noted nonspecific  interstitial changes such as interstitial pneumonia or chronic interstitial lung disease.      Electronically signed by: Alonzo Clements  Date:    10/29/2019  Time:    13:21             Narrative:    EXAMINATION:  XR CHEST PA AND LATERAL    CLINICAL HISTORY:  Chest pain, unspecified    TECHNIQUE:  PA and lateral views of the chest were performed.    COMPARISON:  08/18/2019    FINDINGS:  The trachea approximates midline.  The cardiomediastinal silhouette is normal in stable in size and contour.  Prominent interstitial markings are again noted with increased prominence of the bilateral nicole.  There is no focal consolidation, pleural effusion or pneumothorax.  The visualized abdomen and osseous structures are intact..                              Medical decision making:  Given the above, this patient presents to the emergency room with a cough and sharp chest pain with cough.  The patient initially presented with a heart rate of 119.  The tachycardia resolved with fluids and IV narcotics.  The patient has not been febrile.  The chest x-ray is chronically abnormal with changes that suggest interstitial lung disease or bronchial pulmonary infiltrates.  Review of the old record reveals that this has been studied extensively [facility].  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  The patient has left flank pain. It is worse with movement , and patient is tender there.  I doubt sickle cell pain crisis.  I believe the flank pain is more likely musculoskeletal given of the tenderness, exacerbation with movement and unilateral nature of this pain. The patient is on narcotics chronically and apparently has a pain contract.  He declined Percocet prescription at discharge. He reports that this flank pain is a sickle cell pain crisis, and would like to be admitted for pain control.  There is no clinical evidence of respiratory distress.  The tachycardia resolved with narcotics and IV  fluids.  There is no evidence of urinary tract infection.  I doubt pyelonephritis.  Ureteral calculus seems unlikely as well. I will treat the patient with IV antibiotics in case there is a bacterial bronchopneumonia.  Note should be made that the chest x-ray is chronically abnormal but may be worse today.  I reviewed this case in its entirety with Dr. Camacho, who agrees with the disposition and plan.                  Scribe Attestation:   Scribe #1: I performed the above scribed service and the documentation accurately describes the services I performed. I attest to the accuracy of the note.              I personally performed the services described in this documentation.  All medical record  entries made by the scribe are at my direction and in my presence.  Signed, Dr. Ambrosio  Clinical Impression:       ICD-10-CM ICD-9-CM   1. Chest pain with low risk for cardiac etiology R07.9 786.50   2. Acute left flank pain R10.9 789.09     338.19   3. History of left flank pain Z87.898 V13.89   4. Pulmonary emphysema, unspecified emphysema type J43.9 492.8   5. 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

## 2019-10-29 NOTE — ED TRIAGE NOTES
Pt presents to ED with c/o of intermittent chest pain substernal 5/10 and lower back pain 10/10. Pt reports taking Oxycontin at 9am this morning. Pt denies n/v, and dizziness but reports weakness.

## 2019-10-29 NOTE — DISCHARGE INSTRUCTIONS
Please return immediately if you get worse or if new problems develop.  Please follow-up with your primary care doctor this week.  Rest.  Please follow-up with your pulmonologist as scheduled.  Antibiotics as directed.  Continue albuterol.  Ibuprofen for pain. Please follow-up with your primary care doctor for pain management.

## 2019-11-09 ENCOUNTER — HOSPITAL ENCOUNTER (INPATIENT)
Facility: HOSPITAL | Age: 29
LOS: 4 days | Discharge: HOME | DRG: 871 | End: 2019-11-13
Attending: EMERGENCY MEDICINE | Admitting: EMERGENCY MEDICINE
Payer: MEDICAID

## 2019-11-09 DIAGNOSIS — R07.9 CHEST PAIN: ICD-10-CM

## 2019-11-09 DIAGNOSIS — J18.9 PNEUMONIA OF LEFT UPPER LOBE DUE TO INFECTIOUS ORGANISM: Primary | ICD-10-CM

## 2019-11-09 DIAGNOSIS — Z78.9 FAILURE OF OUTPATIENT TREATMENT: ICD-10-CM

## 2019-11-09 PROCEDURE — 93010 EKG 12-LEAD: ICD-10-PCS | Mod: ,,, | Performed by: INTERNAL MEDICINE

## 2019-11-09 PROCEDURE — 93005 ELECTROCARDIOGRAM TRACING: CPT

## 2019-11-09 PROCEDURE — 99285 EMERGENCY DEPT VISIT HI MDM: CPT | Mod: 25

## 2019-11-09 PROCEDURE — 12000002 HC ACUTE/MED SURGE SEMI-PRIVATE ROOM

## 2019-11-09 PROCEDURE — 93010 ELECTROCARDIOGRAM REPORT: CPT | Mod: ,,, | Performed by: INTERNAL MEDICINE

## 2019-11-09 RX ORDER — KETOROLAC TROMETHAMINE 30 MG/ML
15 INJECTION, SOLUTION INTRAMUSCULAR; INTRAVENOUS
Status: COMPLETED | OUTPATIENT
Start: 2019-11-09 | End: 2019-11-10

## 2019-11-09 RX ORDER — MORPHINE SULFATE 10 MG/ML
4 INJECTION INTRAMUSCULAR; INTRAVENOUS; SUBCUTANEOUS
Status: COMPLETED | OUTPATIENT
Start: 2019-11-09 | End: 2019-11-10

## 2019-11-10 PROBLEM — J18.9 HCAP (HEALTHCARE-ASSOCIATED PNEUMONIA): Status: ACTIVE | Noted: 2019-11-10

## 2019-11-10 PROBLEM — J18.9 PNEUMONIA OF LEFT UPPER LOBE DUE TO INFECTIOUS ORGANISM: Status: ACTIVE | Noted: 2019-11-10

## 2019-11-10 LAB
ALBUMIN SERPL BCP-MCNC: 3.2 G/DL (ref 3.5–5.2)
ALBUMIN SERPL BCP-MCNC: 3.4 G/DL (ref 3.5–5.2)
ALP SERPL-CCNC: 72 U/L (ref 55–135)
ALP SERPL-CCNC: 76 U/L (ref 55–135)
ALT SERPL W/O P-5'-P-CCNC: 19 U/L (ref 10–44)
ALT SERPL W/O P-5'-P-CCNC: 22 U/L (ref 10–44)
ANION GAP SERPL CALC-SCNC: 10 MMOL/L (ref 8–16)
ANION GAP SERPL CALC-SCNC: 12 MMOL/L (ref 8–16)
ANISOCYTOSIS BLD QL SMEAR: SLIGHT
AST SERPL-CCNC: 17 U/L (ref 10–40)
AST SERPL-CCNC: 19 U/L (ref 10–40)
BASOPHILS # BLD AUTO: 0.1 K/UL (ref 0–0.2)
BASOPHILS # BLD AUTO: 0.14 K/UL (ref 0–0.2)
BASOPHILS NFR BLD: 0.5 % (ref 0–1.9)
BASOPHILS NFR BLD: 0.6 % (ref 0–1.9)
BILIRUB SERPL-MCNC: 1 MG/DL (ref 0.1–1)
BILIRUB SERPL-MCNC: 1.1 MG/DL (ref 0.1–1)
BILIRUB UR QL STRIP: NEGATIVE
BNP SERPL-MCNC: <10 PG/ML (ref 0–99)
BUN SERPL-MCNC: 4 MG/DL (ref 6–20)
BUN SERPL-MCNC: 5 MG/DL (ref 6–20)
CALCIUM SERPL-MCNC: 9.2 MG/DL (ref 8.7–10.5)
CALCIUM SERPL-MCNC: 9.3 MG/DL (ref 8.7–10.5)
CHLORIDE SERPL-SCNC: 97 MMOL/L (ref 95–110)
CHLORIDE SERPL-SCNC: 98 MMOL/L (ref 95–110)
CLARITY UR: CLEAR
CO2 SERPL-SCNC: 26 MMOL/L (ref 23–29)
CO2 SERPL-SCNC: 29 MMOL/L (ref 23–29)
COLOR UR: YELLOW
CREAT SERPL-MCNC: 0.8 MG/DL (ref 0.5–1.4)
CREAT SERPL-MCNC: 0.8 MG/DL (ref 0.5–1.4)
D DIMER PPP IA.FEU-MCNC: 0.73 MG/L FEU
DACRYOCYTES BLD QL SMEAR: ABNORMAL
DACRYOCYTES BLD QL SMEAR: ABNORMAL
DIFFERENTIAL METHOD: ABNORMAL
DIFFERENTIAL METHOD: ABNORMAL
EOSINOPHIL # BLD AUTO: 3.3 K/UL (ref 0–0.5)
EOSINOPHIL # BLD AUTO: 4.4 K/UL (ref 0–0.5)
EOSINOPHIL NFR BLD: 17.9 % (ref 0–8)
EOSINOPHIL NFR BLD: 19.7 % (ref 0–8)
ERYTHROCYTE [DISTWIDTH] IN BLOOD BY AUTOMATED COUNT: 17.4 % (ref 11.5–14.5)
ERYTHROCYTE [DISTWIDTH] IN BLOOD BY AUTOMATED COUNT: 17.4 % (ref 11.5–14.5)
EST. GFR  (AFRICAN AMERICAN): >60 ML/MIN/1.73 M^2
EST. GFR  (AFRICAN AMERICAN): >60 ML/MIN/1.73 M^2
EST. GFR  (NON AFRICAN AMERICAN): >60 ML/MIN/1.73 M^2
EST. GFR  (NON AFRICAN AMERICAN): >60 ML/MIN/1.73 M^2
GLUCOSE SERPL-MCNC: 95 MG/DL (ref 70–110)
GLUCOSE SERPL-MCNC: 99 MG/DL (ref 70–110)
GLUCOSE UR QL STRIP: NEGATIVE
HCT VFR BLD AUTO: 27.4 % (ref 40–54)
HCT VFR BLD AUTO: 27.7 % (ref 40–54)
HGB BLD-MCNC: 9.7 G/DL (ref 14–18)
HGB BLD-MCNC: 9.9 G/DL (ref 14–18)
HGB UR QL STRIP: NEGATIVE
HIV1+2 IGG SERPL QL IA.RAPID: NEGATIVE
HYPOCHROMIA BLD QL SMEAR: ABNORMAL
HYPOCHROMIA BLD QL SMEAR: ABNORMAL
IMM GRANULOCYTES # BLD AUTO: 0.08 K/UL (ref 0–0.04)
IMM GRANULOCYTES # BLD AUTO: 0.09 K/UL (ref 0–0.04)
IMM GRANULOCYTES NFR BLD AUTO: 0.4 % (ref 0–0.5)
IMM GRANULOCYTES NFR BLD AUTO: 0.4 % (ref 0–0.5)
INFLUENZA A, MOLECULAR: NEGATIVE
INFLUENZA B, MOLECULAR: NEGATIVE
KETONES UR QL STRIP: NEGATIVE
LEUKOCYTE ESTERASE UR QL STRIP: NEGATIVE
LIPASE SERPL-CCNC: 7 U/L (ref 4–60)
LYMPHOCYTES # BLD AUTO: 4.7 K/UL (ref 1–4.8)
LYMPHOCYTES # BLD AUTO: 5.7 K/UL (ref 1–4.8)
LYMPHOCYTES NFR BLD: 25.9 % (ref 18–48)
LYMPHOCYTES NFR BLD: 25.9 % (ref 18–48)
MCH RBC QN AUTO: 29.3 PG (ref 27–31)
MCH RBC QN AUTO: 29.3 PG (ref 27–31)
MCHC RBC AUTO-ENTMCNC: 35.4 G/DL (ref 32–36)
MCHC RBC AUTO-ENTMCNC: 35.7 G/DL (ref 32–36)
MCV RBC AUTO: 82 FL (ref 82–98)
MCV RBC AUTO: 83 FL (ref 82–98)
MONOCYTES # BLD AUTO: 1.4 K/UL (ref 0.3–1)
MONOCYTES # BLD AUTO: 2.2 K/UL (ref 0.3–1)
MONOCYTES NFR BLD: 10.1 % (ref 4–15)
MONOCYTES NFR BLD: 7.6 % (ref 4–15)
NEUTROPHILS # BLD AUTO: 8.7 K/UL (ref 1.8–7.7)
NEUTROPHILS # BLD AUTO: 9.6 K/UL (ref 1.8–7.7)
NEUTROPHILS NFR BLD: 43.3 % (ref 38–73)
NEUTROPHILS NFR BLD: 47.7 % (ref 38–73)
NITRITE UR QL STRIP: NEGATIVE
NRBC BLD-RTO: 0 /100 WBC
NRBC BLD-RTO: 0 /100 WBC
OVALOCYTES BLD QL SMEAR: ABNORMAL
PH UR STRIP: 6 [PH] (ref 5–8)
PLATELET # BLD AUTO: 820 K/UL (ref 150–350)
PLATELET # BLD AUTO: 820 K/UL (ref 150–350)
PLATELET BLD QL SMEAR: ABNORMAL
PLATELET BLD QL SMEAR: ABNORMAL
PMV BLD AUTO: 9.3 FL (ref 9.2–12.9)
PMV BLD AUTO: 9.5 FL (ref 9.2–12.9)
POLYCHROMASIA BLD QL SMEAR: ABNORMAL
POLYCHROMASIA BLD QL SMEAR: ABNORMAL
POTASSIUM SERPL-SCNC: 3.6 MMOL/L (ref 3.5–5.1)
POTASSIUM SERPL-SCNC: 3.8 MMOL/L (ref 3.5–5.1)
PROCALCITONIN SERPL IA-MCNC: 0.13 NG/ML
PROT SERPL-MCNC: 8 G/DL (ref 6–8.4)
PROT SERPL-MCNC: 8.1 G/DL (ref 6–8.4)
PROT UR QL STRIP: NEGATIVE
RBC # BLD AUTO: 3.31 M/UL (ref 4.6–6.2)
RBC # BLD AUTO: 3.38 M/UL (ref 4.6–6.2)
RETICS/RBC NFR AUTO: 3.3 % (ref 0.4–2)
SCHISTOCYTES BLD QL SMEAR: ABNORMAL
SODIUM SERPL-SCNC: 135 MMOL/L (ref 136–145)
SODIUM SERPL-SCNC: 137 MMOL/L (ref 136–145)
SP GR UR STRIP: 1.01 (ref 1–1.03)
SPECIMEN SOURCE: NORMAL
TARGETS BLD QL SMEAR: ABNORMAL
TARGETS BLD QL SMEAR: ABNORMAL
TROPONIN I SERPL DL<=0.01 NG/ML-MCNC: 0.02 NG/ML (ref 0–0.03)
TROPONIN I SERPL DL<=0.01 NG/ML-MCNC: <0.006 NG/ML (ref 0–0.03)
URN SPEC COLLECT METH UR: ABNORMAL
UROBILINOGEN UR STRIP-ACNC: ABNORMAL EU/DL
WBC # BLD AUTO: 18.23 K/UL (ref 3.9–12.7)
WBC # BLD AUTO: 22.1 K/UL (ref 3.9–12.7)

## 2019-11-10 PROCEDURE — 63600175 PHARM REV CODE 636 W HCPCS: Performed by: HOSPITALIST

## 2019-11-10 PROCEDURE — 87116 MYCOBACTERIA CULTURE: CPT

## 2019-11-10 PROCEDURE — 85025 COMPLETE CBC W/AUTO DIFF WBC: CPT | Mod: 91

## 2019-11-10 PROCEDURE — S4991 NICOTINE PATCH NONLEGEND: HCPCS | Performed by: HOSPITALIST

## 2019-11-10 PROCEDURE — 63600175 PHARM REV CODE 636 W HCPCS: Performed by: EMERGENCY MEDICINE

## 2019-11-10 PROCEDURE — 96374 THER/PROPH/DIAG INJ IV PUSH: CPT

## 2019-11-10 PROCEDURE — 94640 AIRWAY INHALATION TREATMENT: CPT

## 2019-11-10 PROCEDURE — 25000242 PHARM REV CODE 250 ALT 637 W/ HCPCS: Performed by: HOSPITALIST

## 2019-11-10 PROCEDURE — 87206 SMEAR FLUORESCENT/ACID STAI: CPT

## 2019-11-10 PROCEDURE — 84145 PROCALCITONIN (PCT): CPT

## 2019-11-10 PROCEDURE — 83690 ASSAY OF LIPASE: CPT

## 2019-11-10 PROCEDURE — 85045 AUTOMATED RETICULOCYTE COUNT: CPT

## 2019-11-10 PROCEDURE — 96375 TX/PRO/DX INJ NEW DRUG ADDON: CPT

## 2019-11-10 PROCEDURE — 94799 UNLISTED PULMONARY SVC/PX: CPT

## 2019-11-10 PROCEDURE — 83036 HEMOGLOBIN GLYCOSYLATED A1C: CPT

## 2019-11-10 PROCEDURE — 84484 ASSAY OF TROPONIN QUANT: CPT

## 2019-11-10 PROCEDURE — 85379 FIBRIN DEGRADATION QUANT: CPT

## 2019-11-10 PROCEDURE — 81003 URINALYSIS AUTO W/O SCOPE: CPT

## 2019-11-10 PROCEDURE — 99233 SBSQ HOSP IP/OBS HIGH 50: CPT | Mod: ,,, | Performed by: INTERNAL MEDICINE

## 2019-11-10 PROCEDURE — 80053 COMPREHEN METABOLIC PANEL: CPT

## 2019-11-10 PROCEDURE — 87040 BLOOD CULTURE FOR BACTERIA: CPT | Mod: 59

## 2019-11-10 PROCEDURE — 94761 N-INVAS EAR/PLS OXIMETRY MLT: CPT

## 2019-11-10 PROCEDURE — 80053 COMPREHEN METABOLIC PANEL: CPT | Mod: 91

## 2019-11-10 PROCEDURE — 11000001 HC ACUTE MED/SURG PRIVATE ROOM

## 2019-11-10 PROCEDURE — 86703 HIV-1/HIV-2 1 RESULT ANTBDY: CPT

## 2019-11-10 PROCEDURE — 87015 SPECIMEN INFECT AGNT CONCNTJ: CPT

## 2019-11-10 PROCEDURE — 25000003 PHARM REV CODE 250: Performed by: HOSPITALIST

## 2019-11-10 PROCEDURE — 36415 COLL VENOUS BLD VENIPUNCTURE: CPT

## 2019-11-10 PROCEDURE — 99233 PR SUBSEQUENT HOSPITAL CARE,LEVL III: ICD-10-PCS | Mod: ,,, | Performed by: INTERNAL MEDICINE

## 2019-11-10 PROCEDURE — 84484 ASSAY OF TROPONIN QUANT: CPT | Mod: 91

## 2019-11-10 PROCEDURE — 87502 INFLUENZA DNA AMP PROBE: CPT

## 2019-11-10 PROCEDURE — 83880 ASSAY OF NATRIURETIC PEPTIDE: CPT

## 2019-11-10 PROCEDURE — 99900035 HC TECH TIME PER 15 MIN (STAT)

## 2019-11-10 RX ORDER — FLUTICASONE FUROATE AND VILANTEROL 100; 25 UG/1; UG/1
1 POWDER RESPIRATORY (INHALATION) DAILY
Status: DISCONTINUED | OUTPATIENT
Start: 2019-11-10 | End: 2019-11-13 | Stop reason: HOSPADM

## 2019-11-10 RX ORDER — IBUPROFEN 200 MG
1 TABLET ORAL DAILY
Status: DISCONTINUED | OUTPATIENT
Start: 2019-11-10 | End: 2019-11-13 | Stop reason: HOSPADM

## 2019-11-10 RX ORDER — CEFEPIME HYDROCHLORIDE 1 G/50ML
1 INJECTION, SOLUTION INTRAVENOUS
Status: DISCONTINUED | OUTPATIENT
Start: 2019-11-11 | End: 2019-11-13

## 2019-11-10 RX ORDER — OXYCODONE AND ACETAMINOPHEN 5; 325 MG/1; MG/1
1 TABLET ORAL EVERY 4 HOURS PRN
Status: DISCONTINUED | OUTPATIENT
Start: 2019-11-10 | End: 2019-11-13 | Stop reason: HOSPADM

## 2019-11-10 RX ORDER — VANCOMYCIN HCL IN 5 % DEXTROSE 1G/250ML
15 PLASTIC BAG, INJECTION (ML) INTRAVENOUS
Status: DISCONTINUED | OUTPATIENT
Start: 2019-11-10 | End: 2019-11-11

## 2019-11-10 RX ORDER — IPRATROPIUM BROMIDE AND ALBUTEROL SULFATE 2.5; .5 MG/3ML; MG/3ML
3 SOLUTION RESPIRATORY (INHALATION) EVERY 6 HOURS
Status: DISCONTINUED | OUTPATIENT
Start: 2019-11-10 | End: 2019-11-13 | Stop reason: HOSPADM

## 2019-11-10 RX ORDER — GUAIFENESIN/DEXTROMETHORPHAN 100-10MG/5
10 SYRUP ORAL EVERY 6 HOURS
Status: DISCONTINUED | OUTPATIENT
Start: 2019-11-10 | End: 2019-11-13 | Stop reason: HOSPADM

## 2019-11-10 RX ORDER — ONDANSETRON 2 MG/ML
4 INJECTION INTRAMUSCULAR; INTRAVENOUS EVERY 8 HOURS PRN
Status: DISCONTINUED | OUTPATIENT
Start: 2019-11-10 | End: 2019-11-13 | Stop reason: HOSPADM

## 2019-11-10 RX ORDER — ONDANSETRON 2 MG/ML
8 INJECTION INTRAMUSCULAR; INTRAVENOUS EVERY 8 HOURS PRN
Status: DISCONTINUED | OUTPATIENT
Start: 2019-11-10 | End: 2019-11-13 | Stop reason: HOSPADM

## 2019-11-10 RX ORDER — AMOXICILLIN 250 MG
1 CAPSULE ORAL 2 TIMES DAILY
Status: DISCONTINUED | OUTPATIENT
Start: 2019-11-10 | End: 2019-11-13 | Stop reason: HOSPADM

## 2019-11-10 RX ORDER — MORPHINE SULFATE 10 MG/ML
4 INJECTION INTRAMUSCULAR; INTRAVENOUS; SUBCUTANEOUS
Status: COMPLETED | OUTPATIENT
Start: 2019-11-10 | End: 2019-11-10

## 2019-11-10 RX ORDER — IBUPROFEN 600 MG/1
600 TABLET ORAL EVERY 6 HOURS PRN
Status: DISCONTINUED | OUTPATIENT
Start: 2019-11-10 | End: 2019-11-13 | Stop reason: HOSPADM

## 2019-11-10 RX ORDER — ACETAMINOPHEN 325 MG/1
650 TABLET ORAL EVERY 8 HOURS PRN
Status: DISCONTINUED | OUTPATIENT
Start: 2019-11-10 | End: 2019-11-13 | Stop reason: HOSPADM

## 2019-11-10 RX ORDER — HYDROMORPHONE HYDROCHLORIDE 2 MG/ML
0.5 INJECTION, SOLUTION INTRAMUSCULAR; INTRAVENOUS; SUBCUTANEOUS EVERY 4 HOURS PRN
Status: DISCONTINUED | OUTPATIENT
Start: 2019-11-10 | End: 2019-11-13 | Stop reason: HOSPADM

## 2019-11-10 RX ORDER — BENZONATATE 100 MG/1
200 CAPSULE ORAL 3 TIMES DAILY
Status: DISCONTINUED | OUTPATIENT
Start: 2019-11-10 | End: 2019-11-13 | Stop reason: HOSPADM

## 2019-11-10 RX ORDER — ONDANSETRON 2 MG/ML
4 INJECTION INTRAMUSCULAR; INTRAVENOUS EVERY 8 HOURS PRN
Status: DISCONTINUED | OUTPATIENT
Start: 2019-11-10 | End: 2019-11-10

## 2019-11-10 RX ORDER — FAMOTIDINE 20 MG/1
20 TABLET, FILM COATED ORAL 2 TIMES DAILY
Status: DISCONTINUED | OUTPATIENT
Start: 2019-11-10 | End: 2019-11-13 | Stop reason: HOSPADM

## 2019-11-10 RX ORDER — HYDROMORPHONE HYDROCHLORIDE 2 MG/ML
1 INJECTION, SOLUTION INTRAMUSCULAR; INTRAVENOUS; SUBCUTANEOUS EVERY 4 HOURS PRN
Status: DISCONTINUED | OUTPATIENT
Start: 2019-11-10 | End: 2019-11-13 | Stop reason: HOSPADM

## 2019-11-10 RX ORDER — CEFEPIME HYDROCHLORIDE 1 G/50ML
1 INJECTION, SOLUTION INTRAVENOUS
Status: COMPLETED | OUTPATIENT
Start: 2019-11-10 | End: 2019-11-10

## 2019-11-10 RX ORDER — SODIUM CHLORIDE 0.9 % (FLUSH) 0.9 %
10 SYRINGE (ML) INJECTION
Status: DISCONTINUED | OUTPATIENT
Start: 2019-11-10 | End: 2019-11-13 | Stop reason: HOSPADM

## 2019-11-10 RX ADMIN — VANCOMYCIN HYDROCHLORIDE 1000 MG: 1 INJECTION, POWDER, LYOPHILIZED, FOR SOLUTION INTRAVENOUS at 05:11

## 2019-11-10 RX ADMIN — GUAIFENESIN AND DEXTROMETHORPHAN 10 ML: 100; 10 SYRUP ORAL at 11:11

## 2019-11-10 RX ADMIN — VANCOMYCIN HYDROCHLORIDE 1250 MG: 1.25 INJECTION, POWDER, LYOPHILIZED, FOR SOLUTION INTRAVENOUS at 03:11

## 2019-11-10 RX ADMIN — HYDROMORPHONE HYDROCHLORIDE 1 MG: 2 INJECTION, SOLUTION INTRAMUSCULAR; INTRAVENOUS; SUBCUTANEOUS at 08:11

## 2019-11-10 RX ADMIN — KETOROLAC TROMETHAMINE 15 MG: 30 INJECTION, SOLUTION INTRAMUSCULAR; INTRAVENOUS at 12:11

## 2019-11-10 RX ADMIN — HYDROMORPHONE HYDROCHLORIDE 1 MG: 2 INJECTION, SOLUTION INTRAMUSCULAR; INTRAVENOUS; SUBCUTANEOUS at 03:11

## 2019-11-10 RX ADMIN — MORPHINE SULFATE 4 MG: 10 INJECTION INTRAVENOUS at 12:11

## 2019-11-10 RX ADMIN — SENNOSIDES AND DOCUSATE SODIUM 1 TABLET: 8.6; 5 TABLET ORAL at 08:11

## 2019-11-10 RX ADMIN — BENZONATATE 200 MG: 100 CAPSULE ORAL at 05:11

## 2019-11-10 RX ADMIN — FAMOTIDINE 20 MG: 20 TABLET ORAL at 08:11

## 2019-11-10 RX ADMIN — IPRATROPIUM BROMIDE AND ALBUTEROL SULFATE 3 ML: .5; 3 SOLUTION RESPIRATORY (INHALATION) at 07:11

## 2019-11-10 RX ADMIN — MORPHINE SULFATE 4 MG: 10 INJECTION INTRAVENOUS at 02:11

## 2019-11-10 RX ADMIN — BENZONATATE 200 MG: 100 CAPSULE ORAL at 03:11

## 2019-11-10 RX ADMIN — GUAIFENESIN AND DEXTROMETHORPHAN 10 ML: 100; 10 SYRUP ORAL at 05:11

## 2019-11-10 RX ADMIN — NICOTINE 1 PATCH: 21 PATCH, EXTENDED RELEASE TRANSDERMAL at 08:11

## 2019-11-10 RX ADMIN — HYDROMORPHONE HYDROCHLORIDE 1 MG: 2 INJECTION, SOLUTION INTRAMUSCULAR; INTRAVENOUS; SUBCUTANEOUS at 11:11

## 2019-11-10 RX ADMIN — FLUTICASONE FUROATE AND VILANTEROL TRIFENATATE 1 PUFF: 100; 25 POWDER RESPIRATORY (INHALATION) at 07:11

## 2019-11-10 RX ADMIN — BENZONATATE 200 MG: 100 CAPSULE ORAL at 08:11

## 2019-11-10 RX ADMIN — HYDROMORPHONE HYDROCHLORIDE 1 MG: 2 INJECTION, SOLUTION INTRAMUSCULAR; INTRAVENOUS; SUBCUTANEOUS at 05:11

## 2019-11-10 RX ADMIN — CEFEPIME HYDROCHLORIDE 1 G: 1 INJECTION, SOLUTION INTRAVENOUS at 02:11

## 2019-11-10 NOTE — H&P
[facility] Hospital Medicine  History & Physical    Patient Name: Katie Parham  MRN: [id number]  Admission Date: 11/10/2019  Attending Physician: Mauricio Luis MD, MPH      PCP:     Kieran Reed MD    CC:     Chief Complaint   Patient presents with    Chest Pain     hx of sickle cell disease. reports left sided for the past hr/hr and a half. took his oxycodone earlier w/o relief and reports continued throbbing pain.        HISTORY OF PRESENT ILLNESS:     Katie Parham is a 29 y.o. male that (in part)  has a past medical history of Asthma, Broken thumb, Cigarette smoker, Sickle cell anemia, and Sickle cell crisis.  has a past surgical history that includes Hand surgery (Left, 12/21/2017); spleenectomy; and ORIF mandible (01/31/2013). Presents to [facility] Emergency Department complaining of shortness of breath and pain on the left side of his chest.  Subacute onset today with progressive worsening.  Patient to take oxycodone thinking this was associated with his sickle cell anemia however he did not have any relief.  He describes a throbbing pain that is associated with coughing and worsened with exertion and deep inspiration.  Located in his right chest.  Feels generalized malaise and subjective fever.  Denies palpitations, syncope, or paresthesias.  Moderate to severe intensity.    In the emergency department routine laboratory studies, chest x-ray, EKG, cardiac enzymes were obtained.  There is concern for right upper lobe consolidation.  It is a CT of the chest was ordered which demonstrated evidence of pneumonia.  He was given intravenous antibiotics after cultures were obtained.  He was hospitalized in late September and therefore being treated for hospital community-acquired pneumonia.    Hospital medicine has been asked to admit for further evaluation and treatment.       REVIEW OF SYSTEMS:     -- Constitutional: No fever or chills.  -- Eyes: No visual  changes, diplopia, pain, tearing, blind spots, or discharge.   -- Ears, nose, mouth, throat, and face: No congestion, sore throat, epistaxis, d/c, bleeding gums, neck stiffness masses, or dental issues.  -- Respiratory:  As above in the HPI.  -- Cardiovascular:  As above in the HPI.   -- Gastrointestinal: No vomiting, abdominal pain, hematemesis, melena, dyspepsia, or change in bowel habits.  -- Genitourinary: No hematuria, dysuria, frequency, urgency, nocturia, polyuria, stones, or incontinence.  -- Integument/breast: No rash, pruritis, pigmentation changes, dryness, or changes in hair  -- Hematologic/lymphatic: No easy bruising or lymphadenopathy.   -- Musculoskeletal: No acute arthralgias, acute myalgias, joint swelling, acute limitations of ROM, or acute muscular weakness.  -- Neurological: No seizures, headaches, incoordination, paraesthesias, ataxia, vertigo, or tremors.  -- Behavioral/Psych: No auditory or visual hallucinations, depression, or suicidal/homicidal ideations.  -- Endocrine: No heat or cold intolerance, polydipsia, or unintentional weight gain / loss.  -- Allergy/Immunologic: No recurrent infections or adverse reaction to food, insects, or difficulty breathing.      PAST MEDICAL / SURGICAL HISTORY:     Past Medical History:   Diagnosis Date    Asthma     Broken thumb     Cigarette smoker     Sickle cell anemia     Sickle cell crisis      Past Surgical History:   Procedure Laterality Date    HAND SURGERY Left 12/21/2017    ORIF    ORIF mandible  01/31/2013    spleenectomy           FAMILY HISTORY:     Family history of cardiovascular disease      SOCIAL HISTORY:     Social History     Socioeconomic History    Marital status: Single     Spouse name: Not on file    Number of children: Not on file    Years of education: Not on file    Highest education level: Not on file   Occupational History    Not on file   Social Needs    Financial resource strain: Not on file    Food insecurity:      Worry: Not on file     Inability: Not on file    Transportation needs:     Medical: Not on file     Non-medical: Not on file   Tobacco Use    Smoking status: Current Every Day Smoker     Packs/day: 0.50     Types: Cigarettes    Smokeless tobacco: Never Used    Tobacco comment: encouraged to quit.    Substance and Sexual Activity    Alcohol use: Not Currently     Comment: socially    Drug use: No    Sexual activity: Yes     Partners: Female     Birth control/protection: Condom   Lifestyle    Physical activity:     Days per week: Not on file     Minutes per session: Not on file    Stress: Not on file   Relationships    Social connections:     Talks on phone: Not on file     Gets together: Not on file     Attends Quaker service: Not on file     Active member of club or organization: Not on file     Attends meetings of clubs or organizations: Not on file     Relationship status: Not on file   Other Topics Concern    Not on file   Social History Narrative    Not on file         ALLERGIES:       Review of patient's allergies indicates:   Allergen Reactions    Penicillins Anaphylaxis         HEALTH SCREENING:     Influenza vaccine not up-to-date for this season.  Prevnar 13 pneumonia vaccine =  evidence of previous vaccination found in the medical record      HOME MEDICATIONS:     Prior to Admission medications    Medication Sig Start Date End Date Taking? Authorizing Provider   albuterol (PROVENTIL/VENTOLIN HFA) 90 mcg/actuation inhaler Inhale 1-2 puffs into the lungs every 6 (six) hours as needed for Wheezing. 10/29/19 11/28/19 Yes Kyle Ambrosio MD   calcium-vitamin D3 500 mg(1,250mg) -200 unit per tablet Take 1 tablet by mouth 2 (two) times daily with meals.   Yes Historical Provider, MD   FOLIC ACID ORAL Take by mouth.   Yes Historical Provider, MD   ibuprofen (ADVIL,MOTRIN) 600 MG tablet Take 1 tablet (600 mg total) by mouth every 6 (six) hours as needed for Pain. 10/29/19  Yes Kyle Ambrosio  "MD   oxyCODONE-acetaminophen (PERCOCET) 5-325 mg per tablet Take 1 tablet by mouth every 4 (four) hours as needed for Pain. 8/20/19  Yes Kenna Gudino MD   budesonide-formoterol 80-4.5 mcg (SYMBICORT) 80-4.5 mcg/actuation HFAA Inhale 2 puffs into the lungs 2 (two) times daily. Controller 8/20/19 9/19/19  Kenna Gudino MD   clindamycin (CLEOCIN) 150 MG capsule Take 3 capsules (450 mg total) by mouth every 8 (eight) hours. 9/6/19   Betzaida Salinas NP          HOSPITAL MEDICATIONS:     Scheduled Meds:    albuterol-ipratropium  3 mL Nebulization Q6H    benzonatate  200 mg Oral TID    ceFEPime (MAXIPIME) IVPB  1 g Intravenous Q24H    dextromethorphan-guaifenesin  mg/5 ml  10 mL Oral Q6H    famotidine  20 mg Oral BID    fluticasone furoate-vilanterol  1 puff Inhalation Daily    nicotine  1 patch Transdermal Daily    senna-docusate 8.6-50 mg  1 tablet Oral BID    vancomycin (VANCOCIN) IVPB  15 mg/kg Intravenous Q12H     Continuous Infusions:   PRN Meds: acetaminophen, HYDROmorphone, HYDROmorphone, ibuprofen, ondansetron, ondansetron, oxyCODONE-acetaminophen, sodium chloride 0.9%      PHYSICAL EXAM:     Wt Readings from Last 1 Encounters:   11/10/19 0340 64 kg (141 lb 1.5 oz)   11/09/19 2220 68 kg (150 lb)     Body mass index is 22.77 kg/m².  Vitals:    11/10/19 0239 11/10/19 0301 11/10/19 0332 11/10/19 0340   BP: 119/77 109/63 (!) 119/56    BP Location: Right arm  Left arm    Patient Position: Sitting  Lying    Pulse: 100 90 89    Resp: 20 13 18    Temp: 97.8 °F (36.6 °C)  97.6 °F (36.4 °C)    TempSrc: Oral  Oral    SpO2: (!) 94% (!) 92% (!) 93%    Weight:    64 kg (141 lb 1.5 oz)   Height:    5' 6" (1.676 m)        .  -- General appearance: well developed. appears stated age   -- Head: normocephalic, atraumatic   -- Eyes: conjunctivae clear. Extraocular muscles intact  -- Nose: Nares normal. Septum midline.   -- Mouth/Throat: lips, mucosa, and tongue normal. no throat erythema.   -- " Neck: supple, symmetrical, trachea midline, no JVD and thyroid not grossly enlarged, appears symmetric  -- Lungs:  Rales and rhonchi and right superior lung field.  Minimal expiratory wheezing clear to auscultation bilaterally. normal respiratory effort. No use of accessory muscles.   -- Chest wall: no tenderness. equal bilateral chest rise   -- Heart:  Rapid rate and regular rhythm. S1, S2 normal.  no click, rub or gallop   -- Abdomen: soft, non-tender, non-distended, non-tympanic; bowel sounds normal; no masses  -- Extremities: no cyanosis, clubbing or edema.   -- Pulses: 2+ and symmetric   -- Skin:  Turgor normal. Color normal. Texture normal. No rashes or lesions.   -- Neurologic: Normal strength and tone. No focal numbness or weakness. CNII-XII intact. [place] coma scale: eyes open spontaneously-4, oriented & converses-5, obeys commands-6.      LABORATORY STUDIES:     Recent Results (from the past 36 hour(s))   CBC auto differential    Collection Time: 11/10/19 12:31 AM   Result Value Ref Range    WBC 18.23 (H) 3.90 - 12.70 K/uL    RBC 3.38 (L) 4.60 - 6.20 M/uL    Hemoglobin 9.9 (L) 14.0 - 18.0 g/dL    Hematocrit 27.7 (L) 40.0 - 54.0 %    Mean Corpuscular Volume 82 82 - 98 fL    Mean Corpuscular Hemoglobin 29.3 27.0 - 31.0 pg    Mean Corpuscular Hemoglobin Conc 35.7 32.0 - 36.0 g/dL    RDW 17.4 (H) 11.5 - 14.5 %    Platelets 820 (H) 150 - 350 K/uL    MPV 9.5 9.2 - 12.9 fL    Immature Granulocytes 0.4 0.0 - 0.5 %    Gran # (ANC) 8.7 (H) 1.8 - 7.7 K/uL    Immature Grans (Abs) 0.08 (H) 0.00 - 0.04 K/uL    Lymph # 4.7 1.0 - 4.8 K/uL    Mono # 1.4 (H) 0.3 - 1.0 K/uL    Eos # 3.3 (H) 0.0 - 0.5 K/uL    Baso # 0.10 0.00 - 0.20 K/uL    nRBC 0 0 /100 WBC    Gran% 47.7 38.0 - 73.0 %    Lymph% 25.9 18.0 - 48.0 %    Mono% 7.6 4.0 - 15.0 %    Eosinophil% 17.9 (H) 0.0 - 8.0 %    Basophil% 0.5 0.0 - 1.9 %    Platelet Estimate Increased (A)     Poly Occasional     Hypo Occasional     Target Cells Moderate     Tear Drop Cells  Occasional     Fragmented Cells Occasional     Differential Method Automated    Comprehensive metabolic panel    Collection Time: 11/10/19 12:31 AM   Result Value Ref Range    Sodium 135 (L) 136 - 145 mmol/L    Potassium 3.6 3.5 - 5.1 mmol/L    Chloride 97 95 - 110 mmol/L    CO2 26 23 - 29 mmol/L    Glucose 99 70 - 110 mg/dL    BUN, Bld 4 (L) 6 - 20 mg/dL    Creatinine 0.8 0.5 - 1.4 mg/dL    Calcium 9.2 8.7 - 10.5 mg/dL    Total Protein 8.0 6.0 - 8.4 g/dL    Albumin 3.4 (L) 3.5 - 5.2 g/dL    Total Bilirubin 1.1 (H) 0.1 - 1.0 mg/dL    Alkaline Phosphatase 72 55 - 135 U/L    AST 17 10 - 40 U/L    ALT 22 10 - 44 U/L    Anion Gap 12 8 - 16 mmol/L    eGFR if African American >60 >60 mL/min/1.73 m^2    eGFR if non African American >60 >60 mL/min/1.73 m^2   Troponin I #1    Collection Time: 11/10/19 12:31 AM   Result Value Ref Range    Troponin I <0.006 0.000 - 0.026 ng/mL   B-Type natriuretic peptide (BNP)    Collection Time: 11/10/19 12:31 AM   Result Value Ref Range    BNP <10 0 - 99 pg/mL   D dimer, quantitative    Collection Time: 11/10/19 12:31 AM   Result Value Ref Range    D-Dimer 0.73 (H) <0.50 mg/L FEU   Lipase    Collection Time: 11/10/19 12:31 AM   Result Value Ref Range    Lipase 7 4 - 60 U/L   Reticulocytes    Collection Time: 11/10/19 12:31 AM   Result Value Ref Range    Retic 3.3 (H) 0.4 - 2.0 %   Urinalysis, Reflex to Urine Culture Urine, Clean Catch    Collection Time: 11/10/19  1:00 AM   Result Value Ref Range    Specimen UA Urine, Clean Catch     Color, UA Yellow Yellow, Straw, Sheryl    Appearance, UA Clear Clear    pH, UA 6.0 5.0 - 8.0    Specific Gravity, UA 1.010 1.005 - 1.030    Protein, UA Negative Negative    Glucose, UA Negative Negative    Ketones, UA Negative Negative    Bilirubin (UA) Negative Negative    Occult Blood UA Negative Negative    Nitrite, UA Negative Negative    Urobilinogen, UA 2.0-3.0 (A) <2.0 EU/dL    Leukocytes, UA Negative Negative       Lab Results   Component Value Date     INR 1.1 01/06/2018     No results found for: HGBA1C  No results for input(s): POCTGLUCOSE in the last 72 hours.        MICROBIOLOGY DATA:     No results found for: LABGENI, LABREFE, LABUPPE, LABURIN, LABAFB    Microbiology x 7d:   Microbiology Results (last 7 days)     Procedure Component Value Units Date/Time    Influenza A & B by Molecular [[id number]]     Order Status:  No result Specimen:  Nasopharyngeal Swab     Blood culture [[id number]] Collected:  11/10/19 0100    Order Status:  Sent Specimen:  Blood from Peripheral, Antecubital, Right Updated:  11/10/19 0107    Blood Culture #1 **CANNOT BE ORDERED STAT** [[id number]] Collected:  11/10/19 0030    Order Status:  Sent Specimen:  Blood from Peripheral, Antecubital, Left Updated:  11/10/19 0049            IMAGING:     Imaging Results          CT Chest Without Contrast (Final result)  Result time 11/10/19 01:35:24    Final result by Nixon Cano MD (11/10/19 01:35:24)                 Impression:      Dense consolidation within the left upper lobe.  Findings are most suggestive for pneumonia given short-term interval development since prior radiograph from 10/29/2019.  Future radiographic follow-up is recommended to ensure resolution.      Electronically signed by: Nixon Cano MD  Date:    11/10/2019  Time:    01:35             Narrative:    EXAMINATION:  CT CHEST WITHOUT CONTRAST    CLINICAL HISTORY:  Chest pain or SOB, pleurisy or effusion suspected;Hx of recurrent pneumonia with mechanical obstruction;    TECHNIQUE:  Low dose axial images, sagittal and coronal reformations were obtained from the thoracic inlet to the lung bases. Contrast was not administered.    COMPARISON:  Chest radiographs from 11/09/2019 and 10/29/2019.    FINDINGS:  Structures at the base of the neck are unremarkable.  Aorta is non-aneurysmal.  The heart is normal in size without pericardial effusion. Prominent mediastinal lymph nodes are seen with enlarged left-sided mediastinal  lymph node seen measuring 1.5 cm in short axis, possibly reactive in nature.  The esophagus is unremarkable along its course.    The trachea and bronchi are patent.  The lungs are symmetrically expanded.  Dense consolidative changes are seen within the left upper lobe.  Mild atelectasis or scarring is seen in the lateral aspect of the right middle lobe.  No evidence of pneumothorax or pleural effusion.    The visualized abdominal structures are unremarkable.  No acute osseous abnormality identified.  Extrathoracic soft tissues are unremarkable.                               X-Ray Chest PA And Lateral (Final result)  Result time 11/09/19 23:07:40    Final result by Nixon Cano MD (11/09/19 23:07:40)                 Impression:      New left upper lobe consolidation, most suggestive for pneumonia.  Future radiographic follow-up is recommended to ensure resolution.      Electronically signed by: Nixon Cano MD  Date:    11/09/2019  Time:    23:07             Narrative:    EXAMINATION:  XR CHEST PA AND LATERAL    CLINICAL HISTORY:  Chest Pain;    TECHNIQUE:  PA and lateral views of the chest were performed.    COMPARISON:  10/29/2019.    FINDINGS:  Cardiac silhouette is normal in size.  Lungs are symmetrically expanded.  Dense consolidation is seen within the left upper lobe perihilar region.  Right lung is clear.  No evidence of pneumothorax or pleural effusion.  No acute osseous abnormality identified.                                  CONSULTS:     IP CONSULT TO PULMONOLOGY  PHARMACY TO DOSE VANCOMYCIN CONSULT       ASSESSMENT & PLAN:     Primary Diagnosis:  Pneumonia of left upper lobe due to infectious organism    Active Hospital Problems    Diagnosis  POA    *Pneumonia of left upper lobe due to infectious organism [J18.1]  Yes     Priority: 1 - High    HCAP (healthcare-associated pneumonia) [J18.9]  Yes    Asthma [J45.909]  Yes    Sickle cell anemia [D57.1]  Yes    Tobacco abuse [Z72.0]  Yes       Resolved Hospital Problems   No resolved problems to display.         Sepsis secondary to Healthcare associated Pneumonia  · As evidence by history, physical exam, chest x-ray  · CT demonstrates Dense consolidation within the left upper lobe consistent with pneumonia   · 2 out of 4 SIRS criteria  · Initiate IV antibiotics for community-acquired pneumonia with cefepime and vancomycin.    · If clinical improvement is not seen by tomorrow, broaden antibiotic coverage, further tailored by sputum Gram stain and culture results  · Robitussin  · Tessalon Perles  · Incentive spirometry, aspiration precautions  · Consider chest physiotherapy as course dictates  · Nebulizer treatments scheduled  · If clinical improvement is not obtained with the treatment above consider ordering Upper Respiratory Panel TEM-PCR, PPD, quantiferon gold, histoplasma, blastomycosis urine antigens, aspergillus antigen, cryptococcus antigen, procalcitonin, and/or modified AFB.  · Pulmonology consult     History of asthma  · Duo nebs scheduled along with inhaled steroids  · Complicated by HCAP as noted above  · Not in respiratory distress currently  · Expiratory wheezing improving with treatment    Tobacco abuse   · Chronic tobacco use  · Tobacco cessation counseling  · Nicotine patch offered; consider Wellbutrin or Chantix through PCP as an outpatient (will require closer monitoring)  · I discussed with the patient regarding the hazardous effects of smoking on increasing risk of heart attack and stroke, worsening lung functions, and increasing cancer risk.   Patient was urged to stop smoking now.  I also offered nicotine taper (such as nicotine patch and gum) to help ease the craving to smoke.        Sickle cell anemia  · The patient's H/H is stable and consistent with previous laboratory measurements.  · The patient exhibits no signs or symptoms of acute bleeding.  · There is no indication for transfusion.  · Will continue to  monitor.  · Reticulocyte count 3.3        VTE Risk Mitigation (From admission, onward)         Ordered     IP VTE HIGH RISK PATIENT  Once      11/10/19 0341     Place ELENA hose  Until discontinued      11/10/19 0341     Place sequential compression device  Until discontinued      11/10/19 0341                  Adult PRN medications available   DVT prophylaxis given       DISPOSITION:     Will admit to the Hospital Medicine service for further evaluation and treatment.    Chart reviewed and updated where applicable.    High Risk Conditions:  Patient has a condition that poses threat to life and bodily function:  Healthcare associated pneumonia      ===============================================================    Mauricio Luis MD, MPH  Department of Hospital Medicine   [facility]  [phone] pg  (7pm - 6am)          This note is dictated using "[employer] voice recognition software.  There are word recognition mistakes that are occasionally missed on review.

## 2019-11-10 NOTE — NURSING
Patient brought to floor from ED via stretcher accompanied by transport. Patient able to get self in bed. Patient oriented to room and call light in reach.

## 2019-11-10 NOTE — PLAN OF CARE
Problem: Adult Inpatient Plan of Care  Goal: Plan of Care Review  Outcome: Ongoing, Progressing  Flowsheets (Taken 11/10/2019 6444)  Plan of Care Reviewed With: patient   Pt free from falls, injury or any further trauma throughout shift. Pt AAOx4. Continued medications as ordered. Complaints of pain throughout shift, moderately controlled with medications. Breathing Tx per orders, awaiting sputum sample. Pt in no distress. Will cont to monitor.

## 2019-11-10 NOTE — ED TRIAGE NOTES
Pt reports to ED with CC of left-sided 8/10 chest pain x 2 hours. Pt reports current sinus infection.     Pt denies N/V/D, SOB.    Pt is AAOx4, able to verbalize and follow commands, ambulate independently, brother at the bedside.

## 2019-11-10 NOTE — PLAN OF CARE
"SW met with patient to complete discharge needs assessment. SW reviewed with patient contents of "[facility] Packet" including "help at home", "things patient responsible for to manage his health at home" and "preferences". Patient was able to verbalize his help at home is Magdalena Deal.   SW discussed with patient the things he's responsible for to manage his health at home would be by going on his doctor appointments, taking medications as prescribed, and getting prescriptions filled. SW wrote name and phone number on white communication board. Patient prefers afternoon appointments.    Kieran Reed MD     Extended Emergency Contact Information  Primary Emergency Contact: SayMagdalena  Address: [address] [place] [address] [address]           [place] [address] [facility]  Home Phone: [phone]  Mobile Phone: [phone]  Relation: Significant other       [id number] [address] [facility] [place] [address] [address]  [facility] [address]  Phone: [phone] Fax: [phone]       11/10/19 1402   Discharge Assessment   Assessment Type Discharge Planning Assessment   Assessment information obtained from? Patient   Prior to hospitilization cognitive status: Alert/Oriented   Prior to hospitalization functional status: Independent   Current cognitive status: Alert/Oriented   Current Functional Status: Independent   Lives With significant other   Able to Return to Prior Arrangements yes   Is patient able to care for self after discharge? Yes   Who are your caregiver(s) and their phone number(s)? Magdalena Deal (significant other/ [phone])   Patient's perception of discharge disposition home or selfcare   Readmission Within the Last 30 Days no previous admission in last 30 days   Patient currently being followed by outpatient case management? No   Patient currently receives any other outside agency services? No   Equipment " Currently Used at Home none   Do you have any problems affording any of your prescribed medications? No   Is the patient taking medications as prescribed? yes   Does the patient have transportation home? Yes   Transportation Anticipated family or friend will provide   Does the patient receive services at the Coumadin Clinic? No   Discharge Plan A Home   DME Needed Upon Discharge  none   Patient/Family in Agreement with Plan yes

## 2019-11-10 NOTE — NURSING
Pt educated for need of sputum culture. Specimen cup placed @ bedside, pt stated understanding. Will cont to monitor.

## 2019-11-10 NOTE — SUBJECTIVE & OBJECTIVE
Past Medical History:   Diagnosis Date    Asthma     Broken thumb     Cigarette smoker     Sickle cell anemia     Sickle cell crisis        Past Surgical History:   Procedure Laterality Date    HAND SURGERY Left 12/21/2017    ORIF    ORIF mandible  01/31/2013    spleenectomy         Review of patient's allergies indicates:   Allergen Reactions    Penicillins Anaphylaxis       Family History     None        Tobacco Use    Smoking status: Current Every Day Smoker     Packs/day: 0.50     Types: Cigarettes    Smokeless tobacco: Never Used    Tobacco comment: encouraged to quit.    Substance and Sexual Activity    Alcohol use: Not Currently     Comment: socially    Drug use: No    Sexual activity: Yes     Partners: Female     Birth control/protection: Condom         Review of Systems   Constitutional: Positive for activity change, appetite change, chills, diaphoresis, fatigue and fever.   HENT: Negative for facial swelling, postnasal drip, rhinorrhea and sinus pressure.    Eyes: Negative for redness.   Respiratory: Positive for cough and shortness of breath. Negative for wheezing and stridor.    Cardiovascular: Positive for chest pain. Negative for palpitations and leg swelling.   Gastrointestinal: Negative for diarrhea and nausea.   Musculoskeletal: Negative for myalgias.   Skin: Negative for color change.   Neurological: Negative for syncope.   Hematological: Does not bruise/bleed easily.   Psychiatric/Behavioral: Negative for sleep disturbance.     Objective:     Vital Signs (Most Recent):  Temp: 97.4 °F (36.3 °C) (11/10/19 1149)  Pulse: 93 (11/10/19 1149)  Resp: 18 (11/10/19 1149)  BP: 113/66 (11/10/19 1149)  SpO2: (!) 92 % (11/10/19 1149) Vital Signs (24h Range):  Temp:  [97.4 °F (36.3 °C)-98.6 °F (37 °C)] 97.4 °F (36.3 °C)  Pulse:  [] 93  Resp:  [13-22] 18  SpO2:  [90 %-100 %] 92 %  BP: (109-129)/(56-77) 113/66     Weight: 64 kg (141 lb 1.5 oz)  Body mass index is 22.77  kg/m².      Intake/Output Summary (Last 24 hours) at 11/10/2019 1312  Last data filed at 11/10/2019 1153  Gross per 24 hour   Intake 240 ml   Output 400 ml   Net -160 ml       Physical Exam   Constitutional: He is oriented to person, place, and time. He appears well-nourished. He appears distressed.   HENT:   Mouth/Throat: Oropharynx is clear and moist.   Eyes: Pupils are equal, round, and reactive to light. No scleral icterus.   Neck: No JVD present. No tracheal deviation present.   Cardiovascular: Normal rate, regular rhythm and intact distal pulses.   Pulmonary/Chest: Effort normal and breath sounds normal. No stridor. He has no wheezes. He has no rales. He exhibits no tenderness.   Abdominal: Bowel sounds are normal.   Musculoskeletal: Normal range of motion. He exhibits no edema.   Neurological: He is alert and oriented to person, place, and time.   Skin: Skin is warm and dry. Capillary refill takes less than 2 seconds. No erythema.   Psychiatric: He has a normal mood and affect.   Nursing note and vitals reviewed.      Vents:       Lines/Drains/Airways     Peripheral Intravenous Line                 Peripheral IV - Single Lumen 11/10/19 0232 20 G Right Antecubital less than 1 day                Significant Labs:    CBC/Anemia Profile:  Recent Labs   Lab 11/10/19  0031 11/10/19  0539   WBC 18.23* 22.10*   HGB 9.9* 9.7*   HCT 27.7* 27.4*   * 820*   MCV 82 83   RDW 17.4* 17.4*   RETIC 3.3*  --         Chemistries:  Recent Labs   Lab 11/10/19  0031 11/10/19  0539   * 137   K 3.6 3.8   CL 97 98   CO2 26 29   BUN 4* 5*   CREATININE 0.8 0.8   CALCIUM 9.2 9.3   ALBUMIN 3.4* 3.2*   PROT 8.0 8.1   BILITOT 1.1* 1.0   ALKPHOS 72 76   ALT 22 19   AST 17 19     AFB smear/culture on 7.12.19 from bronchoscopy at [facility]  Culture, Acid Fast Bacilli Acid Fast Culture Positive (AA)     Culture, 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    This is an edited result. Previous organism was Mycobacterium species on 8/22/2019 at 1152 CDT     Acid Fast Stain Smear Negative for Acid Fast Bacilli          All pertinent labs within the past 24 hours have been reviewed.    Significant Imaging:   I have reviewed and interpreted all pertinent imaging results/findings within the past 24 hours.     CXR 11.9.19 Images personally reviewed. I agree w/ the radiologist who notes  New left upper lobe consolidation, most suggestive for pneumonia.     CT Chest 11.10.19 Images personally reviewed. I agree w/ the radiologist who notes  Dense consolidation within the left upper lobe.  Findings are most suggestive for pneumonia given short-term interval development since prior radiograph from 10/29/2019.     CT Chest 7.10.19- care everywhere  1. Worsened left lower lobe consolidation as well as increased linear bandlike opacity in the right middle lobe and new tree-in-bud opacities within the left lung apex. Findings may reflect that of multifocal infection. Follow-up to ensure resolution.  2. Persistent left bronchial wall thickening, bronchiectasis, and mucous plugging. Follow-up to ensure resolution.  3. Persistent mediastinal lymphadenopathy.  4. No definite evidence of central.

## 2019-11-10 NOTE — PLAN OF CARE
Problem: Adult Inpatient Plan of Care  Goal: Plan of Care Review  Outcome: Ongoing, Progressing  Flowsheets (Taken 11/10/2019 8213)  Plan of Care Reviewed With: patient

## 2019-11-10 NOTE — CARE UPDATE
Patient has been seen and examinated,agree with A/P of ,he is afebrile at this time,will continue with   broad spectrum IV Abx for HCAP.

## 2019-11-10 NOTE — PROGRESS NOTES
Pharmacokinetic Initial Assessment: IV Vancomycin    Assessment/Plan:    Initiate intravenous vancomycin with loading dose of 1250 mg once followed by a maintenance dose of vancomycin 1000mg IV every 12 hours  Desired empiric serum trough concentration is 10 to 20 mcg/mL  Draw vancomycin trough level 30 min prior to fourth dose on 11/11/19 at approximately 1445   Pharmacy will continue to follow and monitor vancomycin.      Please contact pharmacy at extension [phone] with any questions regarding this assessment.     Thank you for the consult,   Alesha Villafuerte       Patient brief summary:  Katie Parham is a 29 y.o. male initiated on antimicrobial therapy with IV Vancomycin for treatment of suspected pneumonia     Drug Allergies:   Review of patient's allergies indicates:   Allergen Reactions    Penicillins Anaphylaxis       Actual Body Weight:   64 kg    Renal Function:   Estimated Creatinine Clearance: 122.9 mL/min (based on SCr of 0.8 mg/dL).,     Dialysis Method (if applicable):  N/A    CBC (last 72 hours):  Recent Labs   Lab Result Units 11/10/19  0031   WBC K/uL 18.23*   Hemoglobin g/dL 9.9*   Hematocrit % 27.7*   Platelets K/uL 820*   Gran% % 47.7   Lymph% % 25.9   Mono% % 7.6   Eosinophil% % 17.9*   Basophil% % 0.5   Differential Method  Automated       Metabolic Panel (last 72 hours):  Recent Labs   Lab Result Units 11/10/19  0031 11/10/19  0100   Sodium mmol/L 135*  --    Potassium mmol/L 3.6  --    Chloride mmol/L 97  --    CO2 mmol/L 26  --    Glucose mg/dL 99  --    Glucose, UA   --  Negative   BUN, Bld mg/dL 4*  --    Creatinine mg/dL 0.8  --    Albumin g/dL 3.4*  --    Total Bilirubin mg/dL 1.1*  --    Alkaline Phosphatase U/L 72  --    AST U/L 17  --    ALT U/L 22  --        Drug levels (last 3 results):  No results for input(s): VANCOMYCINRA, VANCOMYCINPE, VANCOMYCINTR in the last 72 hours.    Microbiologic Results:  Microbiology Results (last 7 days)       Procedure Component Value Units Date/Time     Influenza A & B by Molecular [[id number]]     Order Status:  No result Specimen:  Nasopharyngeal Swab     Blood culture [[id number]] Collected:  11/10/19 0100    Order Status:  Sent Specimen:  Blood from Peripheral, Antecubital, Right Updated:  11/10/19 0107    Blood Culture #1 **CANNOT BE ORDERED STAT** [[id number]] Collected:  11/10/19 0030    Order Status:  Sent Specimen:  Blood from Peripheral, Antecubital, Left Updated:  11/10/19 0049

## 2019-11-10 NOTE — ED PROVIDER NOTES
Encounter Date: 11/9/2019    SCRIBE #1 NOTE: IOsiel, am scribing for, and in the presence of, Vern Ellis MD. Other sections scribed: HPI, ROS, PE.       History     Chief Complaint   Patient presents with    Chest Pain     hx of sickle cell disease. reports left sided for the past hr/hr and a half. took his oxycodone earlier w/o relief and reports continued throbbing pain.      This is a 29 y.o. Male with a PMHx of sickle cell anemia, and asthma who presents to the ED with c/o continuous, left sided, throbbing chest pain with pleuritic pain that began 1.5 hrs pta. Pain rated 8/10. He took oxycodone tonight with no relief. Denies any sick contact, fever, chills, abd pain, leg swelling, recent travel, or any other worsening or alleviating factors. Pt was taking abx by mouth for sinus infection pta. The pt takes folic acid, nebulizer, and oxycodone as prescribed. He is allergic to penicillins. Current daily smoker of cigarettes.    The history is provided by the patient and medical records.     Review of patient's allergies indicates:   Allergen Reactions    Penicillins Anaphylaxis     Past Medical History:   Diagnosis Date    Asthma     Broken thumb     Cigarette smoker     Sickle cell anemia     Sickle cell crisis      Past Surgical History:   Procedure Laterality Date    HAND SURGERY Left 12/21/2017    ORIF    ORIF mandible  01/31/2013    spleenectomy       History reviewed. No pertinent family history.  Social History     Tobacco Use    Smoking status: Current Every Day Smoker     Packs/day: 0.50     Types: Cigarettes    Smokeless tobacco: Never Used    Tobacco comment: encouraged to quit.    Substance Use Topics    Alcohol use: Not Currently     Comment: socially    Drug use: No     Review of Systems   Constitutional: Negative for chills, diaphoresis and fever.   HENT: Negative for congestion and sore throat.    Eyes: Negative.  Negative for visual disturbance.   Respiratory: Negative  for cough and shortness of breath.    Cardiovascular: Positive for chest pain. Negative for palpitations.   Gastrointestinal: Negative for abdominal pain, blood in stool, diarrhea, nausea and vomiting.        NO melena or rectal bleeding   Genitourinary: Negative for dysuria, flank pain, frequency and testicular pain.   Musculoskeletal: Negative for back pain.   Skin: Negative for rash and wound.   Neurological: Negative for dizziness, syncope, speech difficulty, weakness, numbness and headaches.        No numbness   Psychiatric/Behavioral: Negative for confusion.   All other systems reviewed and are negative.      Physical Exam     Initial Vitals [11/09/19 2221]   BP Pulse Resp Temp SpO2   129/65 109 15 98.6 °F (37 °C) 96 %      MAP       --         Physical Exam    Nursing note and vitals reviewed.  Constitutional: He appears well-developed and well-nourished. He is not diaphoretic. No distress.   HENT:   Head: Normocephalic and atraumatic.   Eyes: Conjunctivae and EOM are normal. Pupils are equal, round, and reactive to light. Right eye exhibits no discharge. Left eye exhibits no discharge. No scleral icterus.   Neck: Normal range of motion. Neck supple. No tracheal deviation present.   Cardiovascular: Normal rate, regular rhythm, normal heart sounds and intact distal pulses. Exam reveals no gallop and no friction rub.    No murmur heard.  Pulmonary/Chest: No stridor. No respiratory distress. He has no wheezes. He has rhonchi. He has no rales. He exhibits no tenderness.   Abdominal: Soft. Bowel sounds are normal. He exhibits no distension and no mass. There is no tenderness. There is CVA tenderness (left flank). There is no rebound and no guarding.   Musculoskeletal: Normal range of motion. He exhibits no edema or tenderness.   Neurological: He is alert and oriented to person, place, and time. He has normal strength. No cranial nerve deficit.   Skin: Skin is warm and dry. No rash noted.   Psychiatric: He has a  normal mood and affect. His behavior is normal. Judgment and thought content normal.         ED Course   Procedures  Labs Reviewed   CBC W/ AUTO DIFFERENTIAL - Abnormal; Notable for the following components:       Result Value    WBC 18.23 (*)     RBC 3.38 (*)     Hemoglobin 9.9 (*)     Hematocrit 27.7 (*)     RDW 17.4 (*)     Platelets 820 (*)     Gran # (ANC) 8.7 (*)     Immature Grans (Abs) 0.08 (*)     Mono # 1.4 (*)     Eos # 3.3 (*)     Eosinophil% 17.9 (*)     Platelet Estimate Increased (*)     All other components within normal limits   COMPREHENSIVE METABOLIC PANEL - Abnormal; Notable for the following components:    Sodium 135 (*)     BUN, Bld 4 (*)     Albumin 3.4 (*)     Total Bilirubin 1.1 (*)     All other components within normal limits   D DIMER, QUANTITATIVE - Abnormal; Notable for the following components:    D-Dimer 0.73 (*)     All other components within normal limits   URINALYSIS, REFLEX TO URINE CULTURE - Abnormal; Notable for the following components:    Urobilinogen, UA 2.0-3.0 (*)     All other components within normal limits    Narrative:     Preferred Collection Type->Urine, Clean Catch   RETICULOCYTES - Abnormal; Notable for the following components:    Retic 3.3 (*)     All other components within normal limits   TROPONIN I   B-TYPE NATRIURETIC PEPTIDE   LIPASE     EKG Readings: (Independently Interpreted)   Initial Reading: No STEMI. Rhythm: Sinus Tachycardia. Heart Rate: 107 BPM. Ectopy: No Ectopy. ST Segments: Normal ST Segments. T Waves: Normal. Clinical Impression: Sinus Tachycardia   Right ventricular conduction delay       Imaging Results          CT Chest Without Contrast (Final result)  Result time 11/10/19 01:35:24    Final result by Nixon Cano MD (11/10/19 01:35:24)                 Impression:      Dense consolidation within the left upper lobe.  Findings are most suggestive for pneumonia given short-term interval development since prior radiograph from 10/29/2019.   Future radiographic follow-up is recommended to ensure resolution.      Electronically signed by: Nixon Cano MD  Date:    11/10/2019  Time:    01:35             Narrative:    EXAMINATION:  CT CHEST WITHOUT CONTRAST    CLINICAL HISTORY:  Chest pain or SOB, pleurisy or effusion suspected;Hx of recurrent pneumonia with mechanical obstruction;    TECHNIQUE:  Low dose axial images, sagittal and coronal reformations were obtained from the thoracic inlet to the lung bases. Contrast was not administered.    COMPARISON:  Chest radiographs from 11/09/2019 and 10/29/2019.    FINDINGS:  Structures at the base of the neck are unremarkable.  Aorta is non-aneurysmal.  The heart is normal in size without pericardial effusion. Prominent mediastinal lymph nodes are seen with enlarged left-sided mediastinal lymph node seen measuring 1.5 cm in short axis, possibly reactive in nature.  The esophagus is unremarkable along its course.    The trachea and bronchi are patent.  The lungs are symmetrically expanded.  Dense consolidative changes are seen within the left upper lobe.  Mild atelectasis or scarring is seen in the lateral aspect of the right middle lobe.  No evidence of pneumothorax or pleural effusion.    The visualized abdominal structures are unremarkable.  No acute osseous abnormality identified.  Extrathoracic soft tissues are unremarkable.                               X-Ray Chest PA And Lateral (Final result)  Result time 11/09/19 23:07:40    Final result by Nixon Cano MD (11/09/19 23:07:40)                 Impression:      New left upper lobe consolidation, most suggestive for pneumonia.  Future radiographic follow-up is recommended to ensure resolution.      Electronically signed by: Nixon Cano MD  Date:    11/09/2019  Time:    23:07             Narrative:    EXAMINATION:  XR CHEST PA AND LATERAL    CLINICAL HISTORY:  Chest Pain;    TECHNIQUE:  PA and lateral views of the chest were  performed.    COMPARISON:  10/29/2019.    FINDINGS:  Cardiac silhouette is normal in size.  Lungs are symmetrically expanded.  Dense consolidation is seen within the left upper lobe perihilar region.  Right lung is clear.  No evidence of pneumothorax or pleural effusion.  No acute osseous abnormality identified.                                 Medical Decision Making:   History:   Old Medical Records: I decided to obtain old medical records.  Initial Assessment:   29-year-old male with history of sickle cell disease, recurrent pneumonia presenting with cough, shortness of breath, pleuritic left-sided chest pain. Pain is sharp, worse with inspiration and cough.  Denies hemoptysis, lower extremity edema, history of blood clots.  History of sickle cell disease.  Patient is perc negative. Doubt PE.  Pneumonia is in same location as prior episodes of pneumonia that had been associated with mucus plug.  CT without evidence of mucous plug, confirms pneumonia as seen on chest x-ray.  Patient had been on outpatient azithromycin for sinusitis.  Patient requiring multiple episodes of morphine.  Given comorbidity, requiring pain control, high risk patient given sickle cell disease, will admit for failure of outpatient treatment and treatment of pneumonia in a sickle cell patient.  Clinical Tests:   Radiological Study: Ordered and Reviewed  Medical Tests: Ordered and Reviewed            Scribe Attestation:   Scribe #1: I performed the above scribed service and the documentation accurately describes the services I performed. I attest to the accuracy of the note.                          Clinical Impression:       ICD-10-CM ICD-9-CM   1. Pneumonia of left upper lobe due to infectious organism J18.1 486   2. Chest pain R07.9 786.50   3. Failure of outpatient treatment Z78.9 V49.89         Disposition:   Disposition: Admitted  Condition: Stable     I, Vern Ellis, personally performed the services described in this  documentation. All medical record entries made by the scribe were at my direction and in my presence.  I have reviewed the chart and agree that the record reflects my personal performance and is accurate and complete                  Vern Ellis MD  11/10/19 0794

## 2019-11-10 NOTE — ASSESSMENT & PLAN NOTE
New consolidation on CXR from 10.29.19. CT in July did have RANULFO TIB opacities and bronchoscopy afb culture was positive from m gordonae at 2 months. It's possible he has an underlying NTM infection with a community acquired bacterial superinfection. Patient does have chronic constitutional symptoms although he does have SS disease with frequent flares.     - Induced sputum for afb smear/culture, ordered  - He needs pulmonary follow up with a CT Chest in 6-8 weeks to eval for resolution  - Respiratory culture pending  - Influenza swab negative  - Check RVP  - Pain control per primary  - Check CXR if patient develops hypoxia. If CXR shows developing, bilateral opacities, should consider acute chest syndrome     - currently on RA  - IVF per primary

## 2019-11-10 NOTE — CONSULTS
[facility]  Pulmonology  Consult Note    Patient Name: Katie Parham  MRN: [id number]  Admission Date: 11/9/2019  Hospital Length of Stay: 0 days  Code Status: Full Code  Attending Physician: Kenna Gudino MD  Primary Care Provider: Kieran Reed MD   Principal Problem: Pneumonia of left upper lobe due to infectious organism    Inpatient consult to Pulmonology  Consult performed by: Desean Casillas MD  Consult ordered by: Mauricio Luis MD        Subjective:     HPI:  Mr Parham is a 30 yo w/ h/o sickle cell anemia with recent flare, tobacco abuse and h/o persistent PNA ni 7/2019 who presents w/ subacute onset of left sided chest pain. Patient states the pain is similar to the pain he experiences during sickle cell crises, but he reports never having the pain in his chest. He took an oxycodone without improvement and presented to the ED.    Patient reports a daily cough that is productive of green sputum. Cough and sputum production have worsened in the last 2-3 days. He also reports weight loss, malaise and night sweats.     In July of this year, he was hospitalized at [facility] with a PNA. He had persistent opacities in his LLL and underwent a bronchoscopy. Cultures grew GAS and his sx improved w/ 2 weeks of doxy. Biopsies were negative for infection, but afb culture grew m gordonae at 2 months. He did have RANULFO TIB opacities at that time.    In the ED, a CXR showed a RANULFO opacity that was new from Xray on 10.29. A CT Chest showed a RANULFO consolidation. Patient admitted for pain control and treatment of pneumonia.    Pulmonary consulted for pneumonia.    Past Medical History:   Diagnosis Date    Asthma     Broken thumb     Cigarette smoker     Sickle cell anemia     Sickle cell crisis        Past Surgical History:   Procedure Laterality Date    HAND SURGERY Left 12/21/2017    ORIF    ORIF mandible  01/31/2013    spleenectomy         Review of patient's allergies indicates:    Allergen Reactions    Penicillins Anaphylaxis       Family History     None        Tobacco Use    Smoking status: Current Every Day Smoker     Packs/day: 0.50     Types: Cigarettes    Smokeless tobacco: Never Used    Tobacco comment: encouraged to quit.    Substance and Sexual Activity    Alcohol use: Not Currently     Comment: socially    Drug use: No    Sexual activity: Yes     Partners: Female     Birth control/protection: Condom         Review of Systems   Constitutional: Positive for activity change, appetite change, chills, diaphoresis, fatigue and fever.   HENT: Negative for facial swelling, postnasal drip, rhinorrhea and sinus pressure.    Eyes: Negative for redness.   Respiratory: Positive for cough and shortness of breath. Negative for wheezing and stridor.    Cardiovascular: Positive for chest pain. Negative for palpitations and leg swelling.   Gastrointestinal: Negative for diarrhea and nausea.   Musculoskeletal: Negative for myalgias.   Skin: Negative for color change.   Neurological: Negative for syncope.   Hematological: Does not bruise/bleed easily.   Psychiatric/Behavioral: Negative for sleep disturbance.     Objective:     Vital Signs (Most Recent):  Temp: 97.4 °F (36.3 °C) (11/10/19 1149)  Pulse: 93 (11/10/19 1149)  Resp: 18 (11/10/19 1149)  BP: 113/66 (11/10/19 1149)  SpO2: (!) 92 % (11/10/19 1149) Vital Signs (24h Range):  Temp:  [97.4 °F (36.3 °C)-98.6 °F (37 °C)] 97.4 °F (36.3 °C)  Pulse:  [] 93  Resp:  [13-22] 18  SpO2:  [90 %-100 %] 92 %  BP: (109-129)/(56-77) 113/66     Weight: 64 kg (141 lb 1.5 oz)  Body mass index is 22.77 kg/m².      Intake/Output Summary (Last 24 hours) at 11/10/2019 1312  Last data filed at 11/10/2019 1153  Gross per 24 hour   Intake 240 ml   Output 400 ml   Net -160 ml       Physical Exam   Constitutional: He is oriented to person, place, and time. He appears well-nourished. He appears distressed.   HENT:   Mouth/Throat: Oropharynx is clear and moist.    Eyes: Pupils are equal, round, and reactive to light. No scleral icterus.   Neck: No JVD present. No tracheal deviation present.   Cardiovascular: Normal rate, regular rhythm and intact distal pulses.   Pulmonary/Chest: Effort normal and breath sounds normal. No stridor. He has no wheezes. He has no rales. He exhibits no tenderness.   Abdominal: Bowel sounds are normal.   Musculoskeletal: Normal range of motion. He exhibits no edema.   Neurological: He is alert and oriented to person, place, and time.   Skin: Skin is warm and dry. Capillary refill takes less than 2 seconds. No erythema.   Psychiatric: He has a normal mood and affect.   Nursing note and vitals reviewed.      Vents:       Lines/Drains/Airways     Peripheral Intravenous Line                 Peripheral IV - Single Lumen 11/10/19 0232 20 G Right Antecubital less than 1 day                Significant Labs:    CBC/Anemia Profile:  Recent Labs   Lab 11/10/19  0031 11/10/19  0539   WBC 18.23* 22.10*   HGB 9.9* 9.7*   HCT 27.7* 27.4*   * 820*   MCV 82 83   RDW 17.4* 17.4*   RETIC 3.3*  --         Chemistries:  Recent Labs   Lab 11/10/19  0031 11/10/19  0539   * 137   K 3.6 3.8   CL 97 98   CO2 26 29   BUN 4* 5*   CREATININE 0.8 0.8   CALCIUM 9.2 9.3   ALBUMIN 3.4* 3.2*   PROT 8.0 8.1   BILITOT 1.1* 1.0   ALKPHOS 72 76   ALT 22 19   AST 17 19     AFB smear/culture on 7.12.19 from bronchoscopy at [facility]  Culture, Acid Fast Bacilli Acid Fast Culture Positive (AA)     Culture, Acid Fast Bacilli From Broth Only Mycobacterium gordonae (A)   Comment:  Identification performed by [facility], [address], [place] [address].    This is an edited result. Previous organism was Mycobacterium species on 8/22/2019 at 1152 CDT     Acid Fast Stain Smear Negative for Acid Fast Bacilli          All pertinent labs within the past 24 hours have been reviewed.    Significant Imaging:   I have reviewed and interpreted all pertinent imaging  results/findings within the past 24 hours.     CXR 11.9.19 Images personally reviewed. I agree w/ the radiologist who notes  New left upper lobe consolidation, most suggestive for pneumonia.     CT Chest 11.10.19 Images personally reviewed. I agree w/ the radiologist who notes  Dense consolidation within the left upper lobe.  Findings are most suggestive for pneumonia given short-term interval development since prior radiograph from 10/29/2019.     CT Chest 7.10.19- care everywhere  1. Worsened left lower lobe consolidation as well as increased linear bandlike opacity in the right middle lobe and new tree-in-bud opacities within the left lung apex. Findings may reflect that of multifocal infection. Follow-up to ensure resolution.  2. Persistent left bronchial wall thickening, bronchiectasis, and mucous plugging. Follow-up to ensure resolution.  3. Persistent mediastinal lymphadenopathy.  4. No definite evidence of central.        Assessment/Plan:     * Pneumonia of left upper lobe due to infectious organism  New consolidation on CXR from 10.29.19. CT in July did have RANULFO TIB opacities and bronchoscopy afb culture was positive from m gordonae at 2 months. It's possible he has an underlying NTM infection with a community acquired bacterial superinfection. Patient does have chronic constitutional symptoms although he does have SS disease with frequent flares.     - Induced sputum for afb smear/culture, ordered  - Agree w/ BSAbx as patient at risk for resistant organisms. If no MRSA at 48hrs, can d/c vanc  - He needs pulmonary follow up with a CT Chest in 6-8 weeks to eval for resolution  - Respiratory culture pending  - Influenza swab negative  - Check RVP  - Pain control per primary  - Check CXR if patient develops hypoxia. If CXR shows developing, bilateral opacities, should consider acute chest syndrome     - currently on RA  - IVF per primary     Tobacco abuse  Counseled extensively on cessation. Denies using  electronic forms of tobacco or marijuana    Sickle cell anemia  Management per primary. Retic count elevated, but Hgb at baseline and TBili normal.           Thank you for your consult. I will sign off. Please contact us if you have any additional questions.     Desean Casillas MD  Pulmonology  [facility]

## 2019-11-10 NOTE — HPI
Katie Parham is a 29 y.o. male that (in part)  has a past medical history of Asthma, Broken thumb, Cigarette smoker, Sickle cell anemia, and Sickle cell crisis.  has a past surgical history that includes Hand surgery (Left, 12/21/2017); spleenectomy; and ORIF mandible (01/31/2013). Presents to [facility] Emergency Department complaining of shortness of breath and pain on the left side of his chest.  Subacute onset today with progressive worsening.  Patient to take oxycodone thinking this was associated with his sickle cell anemia however he did not have any relief.  He describes a throbbing pain that is associated with coughing and worsened with exertion and deep inspiration.  Located in his right chest.  Feels generalized malaise and subjective fever.  Denies palpitations, syncope, or paresthesias.  Moderate to severe intensity.    In the emergency department routine laboratory studies, chest x-ray, EKG, cardiac enzymes were obtained.  There is concern for right upper lobe consolidation.  It is a CT of the chest was ordered which demonstrated evidence of pneumonia.  He was given intravenous antibiotics after cultures were obtained.  He was hospitalized in late September and therefore being treated for hospital community-acquired pneumonia.    Hospital medicine has been asked to admit for further evaluation and treatment.

## 2019-11-10 NOTE — HPI
Mr Parham is a 28 yo w/ h/o sickle cell anemia with recent flare, tobacco abuse and h/o persistent PNA ni 7/2019 who presents w/ subacute onset of left sided chest pain. Patient states the pain is similar to the pain he experiences during sickle cell crises, but he reports never having the pain in his chest. He took an oxycodone without improvement and presented to the ED.    Patient reports a daily cough that is productive of green sputum. Cough and sputum production have worsened in the last 2-3 days. He also reports weight loss, malaise and night sweats.     In July of this year, he was hospitalized at [facility] with a PNA. He had persistent opacities in his LLL and underwent a bronchoscopy. Cultures grew GAS and his sx improved w/ 2 weeks of doxy. Biopsies were negative for infection, but afb culture grew m gordonae at 2 months. He did have RANULFO TIB opacities at that time.    In the ED, a CXR showed a RANULFO opacity that was new from Xray on 10.29. A CT Chest showed a RANULFO consolidation. Patient admitted for pain control and treatment of pneumonia.    Pulmonary consulted for pneumonia.

## 2019-11-11 LAB
ALBUMIN SERPL BCP-MCNC: 3 G/DL (ref 3.5–5.2)
ALP SERPL-CCNC: 71 U/L (ref 55–135)
ALT SERPL W/O P-5'-P-CCNC: 16 U/L (ref 10–44)
ANION GAP SERPL CALC-SCNC: 6 MMOL/L (ref 8–16)
ANISOCYTOSIS BLD QL SMEAR: SLIGHT
AST SERPL-CCNC: 13 U/L (ref 10–40)
BASOPHILS # BLD AUTO: 0.14 K/UL (ref 0–0.2)
BASOPHILS NFR BLD: 0.5 % (ref 0–1.9)
BILIRUB SERPL-MCNC: 1 MG/DL (ref 0.1–1)
BUN SERPL-MCNC: 4 MG/DL (ref 6–20)
CALCIUM SERPL-MCNC: 9.1 MG/DL (ref 8.7–10.5)
CHLORIDE SERPL-SCNC: 99 MMOL/L (ref 95–110)
CO2 SERPL-SCNC: 29 MMOL/L (ref 23–29)
CREAT SERPL-MCNC: 0.8 MG/DL (ref 0.5–1.4)
DACRYOCYTES BLD QL SMEAR: ABNORMAL
DIFFERENTIAL METHOD: ABNORMAL
EOSINOPHIL # BLD AUTO: 2.2 K/UL (ref 0–0.5)
EOSINOPHIL NFR BLD: 8.4 % (ref 0–8)
ERYTHROCYTE [DISTWIDTH] IN BLOOD BY AUTOMATED COUNT: 17.8 % (ref 11.5–14.5)
EST. GFR  (AFRICAN AMERICAN): >60 ML/MIN/1.73 M^2
EST. GFR  (NON AFRICAN AMERICAN): >60 ML/MIN/1.73 M^2
ESTIMATED AVG GLUCOSE: ABNORMAL MG/DL (ref 68–131)
GLUCOSE SERPL-MCNC: 72 MG/DL (ref 70–110)
HBA1C MFR BLD HPLC: <4 % (ref 4–5.6)
HCT VFR BLD AUTO: 27.3 % (ref 40–54)
HGB BLD-MCNC: 9.7 G/DL (ref 14–18)
HYPOCHROMIA BLD QL SMEAR: ABNORMAL
IMM GRANULOCYTES # BLD AUTO: 0.13 K/UL (ref 0–0.04)
IMM GRANULOCYTES NFR BLD AUTO: 0.5 % (ref 0–0.5)
LYMPHOCYTES # BLD AUTO: 3.3 K/UL (ref 1–4.8)
LYMPHOCYTES NFR BLD: 12.9 % (ref 18–48)
MAGNESIUM SERPL-MCNC: 2.1 MG/DL (ref 1.6–2.6)
MCH RBC QN AUTO: 29.1 PG (ref 27–31)
MCHC RBC AUTO-ENTMCNC: 35.5 G/DL (ref 32–36)
MCV RBC AUTO: 82 FL (ref 82–98)
MONOCYTES # BLD AUTO: 2 K/UL (ref 0.3–1)
MONOCYTES NFR BLD: 7.9 % (ref 4–15)
NEUTROPHILS # BLD AUTO: 18.1 K/UL (ref 1.8–7.7)
NEUTROPHILS NFR BLD: 69.8 % (ref 38–73)
NRBC BLD-RTO: 0 /100 WBC
OVALOCYTES BLD QL SMEAR: ABNORMAL
PHOSPHATE SERPL-MCNC: 3.5 MG/DL (ref 2.7–4.5)
PLATELET # BLD AUTO: 891 K/UL (ref 150–350)
PLATELET BLD QL SMEAR: ABNORMAL
PMV BLD AUTO: 9.4 FL (ref 9.2–12.9)
POLYCHROMASIA BLD QL SMEAR: ABNORMAL
POTASSIUM SERPL-SCNC: 3.8 MMOL/L (ref 3.5–5.1)
PROT SERPL-MCNC: 7.8 G/DL (ref 6–8.4)
RBC # BLD AUTO: 3.33 M/UL (ref 4.6–6.2)
SODIUM SERPL-SCNC: 134 MMOL/L (ref 136–145)
STOMATOCYTES BLD QL SMEAR: PRESENT
TARGETS BLD QL SMEAR: ABNORMAL
VANCOMYCIN TROUGH SERPL-MCNC: 4.4 UG/ML (ref 10–22)
WBC # BLD AUTO: 25.91 K/UL (ref 3.9–12.7)

## 2019-11-11 PROCEDURE — 63600175 PHARM REV CODE 636 W HCPCS: Performed by: HOSPITALIST

## 2019-11-11 PROCEDURE — 11000001 HC ACUTE MED/SURG PRIVATE ROOM

## 2019-11-11 PROCEDURE — 80202 ASSAY OF VANCOMYCIN: CPT

## 2019-11-11 PROCEDURE — 80053 COMPREHEN METABOLIC PANEL: CPT

## 2019-11-11 PROCEDURE — 83735 ASSAY OF MAGNESIUM: CPT

## 2019-11-11 PROCEDURE — 94640 AIRWAY INHALATION TREATMENT: CPT

## 2019-11-11 PROCEDURE — 94799 UNLISTED PULMONARY SVC/PX: CPT

## 2019-11-11 PROCEDURE — 85025 COMPLETE CBC W/AUTO DIFF WBC: CPT

## 2019-11-11 PROCEDURE — 94761 N-INVAS EAR/PLS OXIMETRY MLT: CPT

## 2019-11-11 PROCEDURE — 84100 ASSAY OF PHOSPHORUS: CPT

## 2019-11-11 PROCEDURE — 99900035 HC TECH TIME PER 15 MIN (STAT)

## 2019-11-11 PROCEDURE — 25000242 PHARM REV CODE 250 ALT 637 W/ HCPCS: Performed by: HOSPITALIST

## 2019-11-11 PROCEDURE — 25000003 PHARM REV CODE 250: Performed by: HOSPITALIST

## 2019-11-11 PROCEDURE — S4991 NICOTINE PATCH NONLEGEND: HCPCS | Performed by: HOSPITALIST

## 2019-11-11 PROCEDURE — 36415 COLL VENOUS BLD VENIPUNCTURE: CPT

## 2019-11-11 RX ADMIN — NICOTINE 1 PATCH: 21 PATCH, EXTENDED RELEASE TRANSDERMAL at 08:11

## 2019-11-11 RX ADMIN — GUAIFENESIN AND DEXTROMETHORPHAN 10 ML: 100; 10 SYRUP ORAL at 01:11

## 2019-11-11 RX ADMIN — GUAIFENESIN AND DEXTROMETHORPHAN 10 ML: 100; 10 SYRUP ORAL at 12:11

## 2019-11-11 RX ADMIN — IPRATROPIUM BROMIDE AND ALBUTEROL SULFATE 3 ML: .5; 3 SOLUTION RESPIRATORY (INHALATION) at 12:11

## 2019-11-11 RX ADMIN — SENNOSIDES AND DOCUSATE SODIUM 1 TABLET: 8.6; 5 TABLET ORAL at 09:11

## 2019-11-11 RX ADMIN — VANCOMYCIN HYDROCHLORIDE 1000 MG: 1 INJECTION, POWDER, LYOPHILIZED, FOR SOLUTION INTRAVENOUS at 06:11

## 2019-11-11 RX ADMIN — HYDROMORPHONE HYDROCHLORIDE 0.5 MG: 2 INJECTION, SOLUTION INTRAMUSCULAR; INTRAVENOUS; SUBCUTANEOUS at 01:11

## 2019-11-11 RX ADMIN — BENZONATATE 200 MG: 100 CAPSULE ORAL at 10:11

## 2019-11-11 RX ADMIN — HYDROMORPHONE HYDROCHLORIDE 0.5 MG: 2 INJECTION, SOLUTION INTRAMUSCULAR; INTRAVENOUS; SUBCUTANEOUS at 06:11

## 2019-11-11 RX ADMIN — HYDROMORPHONE HYDROCHLORIDE 1 MG: 2 INJECTION, SOLUTION INTRAMUSCULAR; INTRAVENOUS; SUBCUTANEOUS at 10:11

## 2019-11-11 RX ADMIN — FAMOTIDINE 20 MG: 20 TABLET ORAL at 09:11

## 2019-11-11 RX ADMIN — BENZONATATE 200 MG: 100 CAPSULE ORAL at 08:11

## 2019-11-11 RX ADMIN — FLUTICASONE FUROATE AND VILANTEROL TRIFENATATE 1 PUFF: 100; 25 POWDER RESPIRATORY (INHALATION) at 08:11

## 2019-11-11 RX ADMIN — GUAIFENESIN AND DEXTROMETHORPHAN 10 ML: 100; 10 SYRUP ORAL at 06:11

## 2019-11-11 RX ADMIN — CEFEPIME HYDROCHLORIDE 1 G: 1 INJECTION, SOLUTION INTRAVENOUS at 02:11

## 2019-11-11 RX ADMIN — SENNOSIDES AND DOCUSATE SODIUM 1 TABLET: 8.6; 5 TABLET ORAL at 08:11

## 2019-11-11 RX ADMIN — IPRATROPIUM BROMIDE AND ALBUTEROL SULFATE 3 ML: .5; 3 SOLUTION RESPIRATORY (INHALATION) at 07:11

## 2019-11-11 RX ADMIN — HYDROMORPHONE HYDROCHLORIDE 1 MG: 2 INJECTION, SOLUTION INTRAMUSCULAR; INTRAVENOUS; SUBCUTANEOUS at 04:11

## 2019-11-11 RX ADMIN — BENZONATATE 200 MG: 100 CAPSULE ORAL at 06:11

## 2019-11-11 RX ADMIN — IPRATROPIUM BROMIDE AND ALBUTEROL SULFATE 3 ML: .5; 3 SOLUTION RESPIRATORY (INHALATION) at 08:11

## 2019-11-11 RX ADMIN — FAMOTIDINE 20 MG: 20 TABLET ORAL at 08:11

## 2019-11-11 RX ADMIN — OXYCODONE HYDROCHLORIDE AND ACETAMINOPHEN 1 TABLET: 5; 325 TABLET ORAL at 08:11

## 2019-11-11 RX ADMIN — VANCOMYCIN HYDROCHLORIDE 1000 MG: 1 INJECTION, POWDER, LYOPHILIZED, FOR SOLUTION INTRAVENOUS at 05:11

## 2019-11-11 RX ADMIN — GUAIFENESIN AND DEXTROMETHORPHAN 10 ML: 100; 10 SYRUP ORAL at 05:11

## 2019-11-11 RX ADMIN — HYDROMORPHONE HYDROCHLORIDE 1 MG: 2 INJECTION, SOLUTION INTRAMUSCULAR; INTRAVENOUS; SUBCUTANEOUS at 12:11

## 2019-11-11 NOTE — ASSESSMENT & PLAN NOTE
Per CT and clinical presentation,with recent Hospitalization,  he is on broad spectrum IV Abx with vanc and cefepime,still has leucocytosis,

## 2019-11-11 NOTE — SUBJECTIVE & OBJECTIVE
Past Medical History:   Diagnosis Date    Asthma     Broken thumb     Cigarette smoker     Sickle cell anemia     Sickle cell crisis        Past Surgical History:   Procedure Laterality Date    HAND SURGERY Left 12/21/2017    ORIF    ORIF mandible  01/31/2013    spleenectomy         Review of patient's allergies indicates:   Allergen Reactions    Penicillins Anaphylaxis       Family History     None        Tobacco Use    Smoking status: Current Every Day Smoker     Packs/day: 0.50     Types: Cigarettes    Smokeless tobacco: Never Used    Tobacco comment: encouraged to quit.    Substance and Sexual Activity    Alcohol use: Not Currently     Comment: socially    Drug use: No    Sexual activity: Yes     Partners: Female     Birth control/protection: Condom         Review of Systems   Constitutional: Positive for activity change, appetite change, chills, diaphoresis, fatigue and fever.   HENT: Negative for facial swelling, postnasal drip, rhinorrhea and sinus pressure.    Eyes: Negative for redness.   Respiratory: Positive for cough and shortness of breath. Negative for wheezing and stridor.    Cardiovascular: Positive for chest pain. Negative for palpitations and leg swelling.   Gastrointestinal: Negative for diarrhea and nausea.   Musculoskeletal: Negative for myalgias.   Skin: Negative for color change.   Neurological: Negative for syncope.   Hematological: Does not bruise/bleed easily.   Psychiatric/Behavioral: Negative for sleep disturbance.     Objective:     Vital Signs (Most Recent):  Temp: 98.2 °F (36.8 °C) (11/11/19 0723)  Pulse: 85 (11/11/19 0808)  Resp: 17 (11/11/19 0808)  BP: (!) 110/56 (11/11/19 0723)  SpO2: 95 % (11/11/19 0808) Vital Signs (24h Range):  Temp:  [97.4 °F (36.3 °C)-98.8 °F (37.1 °C)] 98.2 °F (36.8 °C)  Pulse:  [85-99] 85  Resp:  [17-18] 17  SpO2:  [92 %-97 %] 95 %  BP: (110-122)/(56-66) 110/56     Weight: 64 kg (141 lb 1.5 oz)  Body mass index is 22.77  kg/m².      Intake/Output Summary (Last 24 hours) at 11/11/2019 1034  Last data filed at 11/11/2019 0627  Gross per 24 hour   Intake 480 ml   Output 1800 ml   Net -1320 ml       Physical Exam   Constitutional: He is oriented to person, place, and time. He appears well-nourished. He appears distressed.   HENT:   Mouth/Throat: Oropharynx is clear and moist.   Eyes: Pupils are equal, round, and reactive to light. No scleral icterus.   Neck: No JVD present. No tracheal deviation present.   Cardiovascular: Normal rate, regular rhythm and intact distal pulses.   Pulmonary/Chest: Effort normal and breath sounds normal. No stridor. He has no wheezes. He has no rales. He exhibits no tenderness.   Abdominal: Bowel sounds are normal.   Musculoskeletal: Normal range of motion. He exhibits no edema.   Neurological: He is alert and oriented to person, place, and time.   Skin: Skin is warm and dry. Capillary refill takes less than 2 seconds. No erythema.   Psychiatric: He has a normal mood and affect.   Nursing note and vitals reviewed.      Vents:       Lines/Drains/Airways     Peripheral Intravenous Line                 Peripheral IV - Single Lumen 11/10/19 0232 20 G Right Antecubital 1 day                Significant Labs:    CBC/Anemia Profile:  Recent Labs   Lab 11/10/19  0031 11/10/19  0539 11/11/19  0409   WBC 18.23* 22.10* 25.91*   HGB 9.9* 9.7* 9.7*   HCT 27.7* 27.4* 27.3*   * 820* 891*   MCV 82 83 82   RDW 17.4* 17.4* 17.8*   RETIC 3.3*  --   --         Chemistries:  Recent Labs   Lab 11/10/19  0031 11/10/19  0539 11/11/19  0409   * 137 134*   K 3.6 3.8 3.8   CL 97 98 99   CO2 26 29 29   BUN 4* 5* 4*   CREATININE 0.8 0.8 0.8   CALCIUM 9.2 9.3 9.1   ALBUMIN 3.4* 3.2* 3.0*   PROT 8.0 8.1 7.8   BILITOT 1.1* 1.0 1.0   ALKPHOS 72 76 71   ALT 22 19 16   AST 17 19 13   MG  --   --  2.1   PHOS  --   --  3.5     AFB smear/culture on 7.12.19 from bronchoscopy [facility]  Culture, Acid Fast Bacilli Acid Fast Culture  Positive (AA)     Culture, Acid Fast Bacilli From Broth Only Mycobacterium gordonae (A)   Comment:  Identification performed by [facility], [address], [place] [address].    This is an edited result. Previous organism was Mycobacterium species on 8/22/2019 at 1152 CDT     Acid Fast Stain Smear Negative for Acid Fast Bacilli          All pertinent labs within the past 24 hours have been reviewed.    Significant Imaging:   I have reviewed and interpreted all pertinent imaging results/findings within the past 24 hours.     CXR 11.9.19 Images personally reviewed. I agree w/ the radiologist who notes  New left upper lobe consolidation, most suggestive for pneumonia.     CT Chest 11.10.19 Images personally reviewed. I agree w/ the radiologist who notes  Dense consolidation within the left upper lobe.  Findings are most suggestive for pneumonia given short-term interval development since prior radiograph from 10/29/2019.     CT Chest 7.10.19- care everywhere  1. Worsened left lower lobe consolidation as well as increased linear bandlike opacity in the right middle lobe and new tree-in-bud opacities within the left lung apex. Findings may reflect that of multifocal infection. Follow-up to ensure resolution.  2. Persistent left bronchial wall thickening, bronchiectasis, and mucous plugging. Follow-up to ensure resolution.  3. Persistent mediastinal lymphadenopathy.  4. No definite evidence of central.

## 2019-11-11 NOTE — ASSESSMENT & PLAN NOTE
[er CTa dn clinical presentation,he is on broad spectrum IV Abx with vanc and cefepime,still has leucocytosis,

## 2019-11-11 NOTE — NURSING
Patient complaint of pain treated with PRN PO pain medication @ 0848. Patient called again at 0915 with more complaints of pain. Educated patient on the time it takes for po pain medication to take effect. Patient verbalized understanding. Patient called again at 1000 asking for more pain medication. Educated patient on pain medication time intervals, as well as the patient's respiratory status this A.M. Explained to patient that the pain medication would not be available until 1248. patient verbalized understanding, and frustration. Will continue to monitor.

## 2019-11-11 NOTE — PLAN OF CARE
Problem: Adult Inpatient Plan of Care  Goal: Plan of Care Review  Outcome: Ongoing, Progressing  Goal: Patient-Specific Goal (Individualization)  Outcome: Ongoing, Progressing  Goal: Absence of Hospital-Acquired Illness or Injury  Outcome: Ongoing, Progressing  Goal: Optimal Comfort and Wellbeing  Outcome: Ongoing, Progressing  Goal: Readiness for Transition of Care  Outcome: Ongoing, Progressing  Goal: Rounds/Family Conference  Outcome: Ongoing, Progressing   No falls, trauma or injury thus far this shift. Pain managed with IV Dilaudid every 4 hour as needed. Patient tolerating respiratory treatments. Infection managed with IV antibiotics. Continue with plan of care and continue to monitor patient.

## 2019-11-12 LAB
ALBUMIN SERPL BCP-MCNC: 2.9 G/DL (ref 3.5–5.2)
ALP SERPL-CCNC: 65 U/L (ref 55–135)
ALT SERPL W/O P-5'-P-CCNC: 10 U/L (ref 10–44)
ANION GAP SERPL CALC-SCNC: 7 MMOL/L (ref 8–16)
ANISOCYTOSIS BLD QL SMEAR: SLIGHT
AST SERPL-CCNC: 12 U/L (ref 10–40)
BASOPHILS # BLD AUTO: 0.11 K/UL (ref 0–0.2)
BASOPHILS NFR BLD: 0.5 % (ref 0–1.9)
BILIRUB SERPL-MCNC: 0.8 MG/DL (ref 0.1–1)
BUN SERPL-MCNC: 3 MG/DL (ref 6–20)
CALCIUM SERPL-MCNC: 9.3 MG/DL (ref 8.7–10.5)
CHLORIDE SERPL-SCNC: 99 MMOL/L (ref 95–110)
CO2 SERPL-SCNC: 29 MMOL/L (ref 23–29)
CREAT SERPL-MCNC: 0.8 MG/DL (ref 0.5–1.4)
DACRYOCYTES BLD QL SMEAR: ABNORMAL
DIFFERENTIAL METHOD: ABNORMAL
EOSINOPHIL # BLD AUTO: 2.9 K/UL (ref 0–0.5)
EOSINOPHIL NFR BLD: 14.2 % (ref 0–8)
ERYTHROCYTE [DISTWIDTH] IN BLOOD BY AUTOMATED COUNT: 17.7 % (ref 11.5–14.5)
EST. GFR  (AFRICAN AMERICAN): >60 ML/MIN/1.73 M^2
EST. GFR  (NON AFRICAN AMERICAN): >60 ML/MIN/1.73 M^2
GIANT PLATELETS BLD QL SMEAR: PRESENT
GLUCOSE SERPL-MCNC: 80 MG/DL (ref 70–110)
HCT VFR BLD AUTO: 27.5 % (ref 40–54)
HGB BLD-MCNC: 9.6 G/DL (ref 14–18)
HYPOCHROMIA BLD QL SMEAR: ABNORMAL
IMM GRANULOCYTES # BLD AUTO: 0.11 K/UL (ref 0–0.04)
IMM GRANULOCYTES NFR BLD AUTO: 0.5 % (ref 0–0.5)
LYMPHOCYTES # BLD AUTO: 3.6 K/UL (ref 1–4.8)
LYMPHOCYTES NFR BLD: 17.7 % (ref 18–48)
MCH RBC QN AUTO: 28.7 PG (ref 27–31)
MCHC RBC AUTO-ENTMCNC: 34.9 G/DL (ref 32–36)
MCV RBC AUTO: 82 FL (ref 82–98)
MONOCYTES # BLD AUTO: 2.3 K/UL (ref 0.3–1)
MONOCYTES NFR BLD: 11.2 % (ref 4–15)
NEUTROPHILS # BLD AUTO: 11.3 K/UL (ref 1.8–7.7)
NEUTROPHILS NFR BLD: 55.9 % (ref 38–73)
NRBC BLD-RTO: 0 /100 WBC
OVALOCYTES BLD QL SMEAR: ABNORMAL
PLATELET # BLD AUTO: 932 K/UL (ref 150–350)
PLATELET BLD QL SMEAR: ABNORMAL
PMV BLD AUTO: 9.7 FL (ref 9.2–12.9)
POLYCHROMASIA BLD QL SMEAR: ABNORMAL
POTASSIUM SERPL-SCNC: 3.9 MMOL/L (ref 3.5–5.1)
PROT SERPL-MCNC: 7.8 G/DL (ref 6–8.4)
RBC # BLD AUTO: 3.34 M/UL (ref 4.6–6.2)
SODIUM SERPL-SCNC: 135 MMOL/L (ref 136–145)
STOMATOCYTES BLD QL SMEAR: PRESENT
TARGETS BLD QL SMEAR: ABNORMAL
WBC # BLD AUTO: 20.3 K/UL (ref 3.9–12.7)

## 2019-11-12 PROCEDURE — 25000242 PHARM REV CODE 250 ALT 637 W/ HCPCS: Performed by: HOSPITALIST

## 2019-11-12 PROCEDURE — 63600175 PHARM REV CODE 636 W HCPCS: Performed by: HOSPITALIST

## 2019-11-12 PROCEDURE — 36415 COLL VENOUS BLD VENIPUNCTURE: CPT

## 2019-11-12 PROCEDURE — 94640 AIRWAY INHALATION TREATMENT: CPT

## 2019-11-12 PROCEDURE — S4991 NICOTINE PATCH NONLEGEND: HCPCS | Performed by: HOSPITALIST

## 2019-11-12 PROCEDURE — 80053 COMPREHEN METABOLIC PANEL: CPT

## 2019-11-12 PROCEDURE — 99900035 HC TECH TIME PER 15 MIN (STAT)

## 2019-11-12 PROCEDURE — 85025 COMPLETE CBC W/AUTO DIFF WBC: CPT

## 2019-11-12 PROCEDURE — 25000003 PHARM REV CODE 250: Performed by: HOSPITALIST

## 2019-11-12 PROCEDURE — 11000001 HC ACUTE MED/SURG PRIVATE ROOM

## 2019-11-12 RX ADMIN — FAMOTIDINE 20 MG: 20 TABLET ORAL at 08:11

## 2019-11-12 RX ADMIN — GUAIFENESIN AND DEXTROMETHORPHAN 10 ML: 100; 10 SYRUP ORAL at 12:11

## 2019-11-12 RX ADMIN — BENZONATATE 200 MG: 100 CAPSULE ORAL at 08:11

## 2019-11-12 RX ADMIN — IPRATROPIUM BROMIDE AND ALBUTEROL SULFATE 3 ML: .5; 3 SOLUTION RESPIRATORY (INHALATION) at 08:11

## 2019-11-12 RX ADMIN — CEFEPIME HYDROCHLORIDE 1 G: 1 INJECTION, SOLUTION INTRAVENOUS at 02:11

## 2019-11-12 RX ADMIN — GUAIFENESIN AND DEXTROMETHORPHAN 10 ML: 100; 10 SYRUP ORAL at 05:11

## 2019-11-12 RX ADMIN — HYDROMORPHONE HYDROCHLORIDE 1 MG: 2 INJECTION, SOLUTION INTRAMUSCULAR; INTRAVENOUS; SUBCUTANEOUS at 06:11

## 2019-11-12 RX ADMIN — BENZONATATE 200 MG: 100 CAPSULE ORAL at 02:11

## 2019-11-12 RX ADMIN — NICOTINE 1 PATCH: 21 PATCH, EXTENDED RELEASE TRANSDERMAL at 08:11

## 2019-11-12 RX ADMIN — SENNOSIDES AND DOCUSATE SODIUM 1 TABLET: 8.6; 5 TABLET ORAL at 08:11

## 2019-11-12 RX ADMIN — VANCOMYCIN HYDROCHLORIDE 1250 MG: 1.25 INJECTION, POWDER, LYOPHILIZED, FOR SOLUTION INTRAVENOUS at 05:11

## 2019-11-12 RX ADMIN — HYDROMORPHONE HYDROCHLORIDE 1 MG: 2 INJECTION, SOLUTION INTRAMUSCULAR; INTRAVENOUS; SUBCUTANEOUS at 12:11

## 2019-11-12 RX ADMIN — HYDROMORPHONE HYDROCHLORIDE 0.5 MG: 2 INJECTION, SOLUTION INTRAMUSCULAR; INTRAVENOUS; SUBCUTANEOUS at 02:11

## 2019-11-12 RX ADMIN — IPRATROPIUM BROMIDE AND ALBUTEROL SULFATE 3 ML: .5; 3 SOLUTION RESPIRATORY (INHALATION) at 01:11

## 2019-11-12 RX ADMIN — FLUTICASONE FUROATE AND VILANTEROL TRIFENATATE 1 PUFF: 100; 25 POWDER RESPIRATORY (INHALATION) at 10:11

## 2019-11-12 NOTE — ASSESSMENT & PLAN NOTE
Per CT and clinical presentation,with recent Hospitalization,  he is on broad spectrum IV Abx with vanc and cefepime,still has leucocytosis,will repeat chest x ray today.

## 2019-11-12 NOTE — PROGRESS NOTES
[facility] Hospital Medicine  Progress Note    Patient Name: Katie Parham  MRN: [id number]  Patient Class: IP- Inpatient   Admission Date: 11/9/2019  Length of Stay: 2 days  Attending Physician: Kenna Gudino MD  Primary Care Provider: Kieran Reed MD        Subjective:     Principal Problem:Pneumonia of left upper lobe due to infectious organism        HPI:  Katie Parham is a 29 y.o. male that (in part)  has a past medical history of Asthma, Broken thumb, Cigarette smoker, Sickle cell anemia, and Sickle cell crisis.  has a past surgical history that includes Hand surgery (Left, 12/21/2017); spleenectomy; and ORIF mandible (01/31/2013). Presents to [facility] Emergency Department complaining of shortness of breath and pain on the left side of his chest.  Subacute onset today with progressive worsening.  Patient to take oxycodone thinking this was associated with his sickle cell anemia however he did not have any relief.  He describes a throbbing pain that is associated with coughing and worsened with exertion and deep inspiration.  Located in his right chest.  Feels generalized malaise and subjective fever.  Denies palpitations, syncope, or paresthesias.  Moderate to severe intensity.    In the emergency department routine laboratory studies, chest x-ray, EKG, cardiac enzymes were obtained.  There is concern for right upper lobe consolidation.  It is a CT of the chest was ordered which demonstrated evidence of pneumonia.  He was given intravenous antibiotics after cultures were obtained.  He was hospitalized in late September and therefore being treated for hospital community-acquired pneumonia.    Hospital medicine has been asked to admit for further evaluation and treatment.     Overview/Hospital Course:  Katie Parham is a 29 y.o. male that (in part)  has a past medical history of Asthma, Broken thumb, Cigarette smoker, Sickle cell anemia, and Sickle cell  crisis.  has a past surgical history that includes Hand surgery (Left, 12/21/2017); spleenectomy; and ORIF mandible (01/31/2013). Presents to [facility] Emergency Department complaining of shortness of breath and pain on the left side of his chest.  Subacute onset  with progressive worsening.  Patient to take oxycodone thinking this was associated with his sickle cell anemia however he did not have any relief.  He describes a throbbing pain that is associated with coughing and worsened with exertion and deep inspiration.  Located in his right chest.  Feels generalized malaise and subjective fever.  Denies palpitations, syncope, or paresthesias.  Moderate to severe intensity.  In the emergency department routine laboratory studies, chest x-ray, EKG, cardiac enzymes were obtained.  There is concern for right upper lobe consolidation.  It is a CT of the chest was ordered which demonstrated evidence of pneumonia.  He was given intravenous antibiotics after cultures were obtained.  He was hospitalized in late September and therefore being treated for hospital community-acquired pneumonia.he is on broad spectrum IV Abx with vanc and cefepime,still has leucocytosis,will repeat chest x ray today.        Past Medical History:   Diagnosis Date    Asthma     Broken thumb     Cigarette smoker     Sickle cell anemia     Sickle cell crisis        Past Surgical History:   Procedure Laterality Date    HAND SURGERY Left 12/21/2017    ORIF    ORIF mandible  01/31/2013    spleenectomy         Review of patient's allergies indicates:   Allergen Reactions    Penicillins Anaphylaxis       Family History     None        Tobacco Use    Smoking status: Current Every Day Smoker     Packs/day: 0.50     Types: Cigarettes    Smokeless tobacco: Never Used    Tobacco comment: encouraged to quit.    Substance and Sexual Activity    Alcohol use: Not Currently     Comment: socially    Drug use: No    Sexual  activity: Yes     Partners: Female     Birth control/protection: Condom         Review of Systems   Constitutional: Positive for activity change, appetite change, chills and fatigue. Negative for diaphoresis and fever.   HENT: Negative for facial swelling, postnasal drip, rhinorrhea and sinus pressure.    Eyes: Negative for redness.   Respiratory: Positive for cough. Negative for shortness of breath, wheezing and stridor.    Cardiovascular: Positive for chest pain. Negative for palpitations and leg swelling.   Gastrointestinal: Negative for diarrhea and nausea.   Musculoskeletal: Negative for myalgias.   Skin: Negative for color change.   Neurological: Negative for syncope.   Hematological: Does not bruise/bleed easily.   Psychiatric/Behavioral: Negative for sleep disturbance.     Objective:     Vital Signs (Most Recent):  Temp: 98.4 °F (36.9 °C) (11/12/19 0721)  Pulse: 74 (11/12/19 0808)  Resp: 16 (11/12/19 0808)  BP: 109/62 (11/12/19 0721)  SpO2: 97 % (11/12/19 0808) Vital Signs (24h Range):  Temp:  [97.8 °F (36.6 °C)-98.4 °F (36.9 °C)] 98.4 °F (36.9 °C)  Pulse:  [] 74  Resp:  [16-20] 16  SpO2:  [95 %-99 %] 97 %  BP: (100-120)/(55-66) 109/62     Weight: 64 kg (141 lb 1.5 oz)  Body mass index is 22.77 kg/m².      Intake/Output Summary (Last 24 hours) at 11/12/2019 1040  Last data filed at 11/12/2019 0827  Gross per 24 hour   Intake 1780 ml   Output 1500 ml   Net 280 ml       Physical Exam   Constitutional: He is oriented to person, place, and time. He appears well-nourished. He appears distressed.   HENT:   Mouth/Throat: Oropharynx is clear and moist.   Eyes: Pupils are equal, round, and reactive to light. No scleral icterus.   Neck: No JVD present. No tracheal deviation present.   Cardiovascular: Normal rate, regular rhythm and intact distal pulses.   Pulmonary/Chest: Effort normal and breath sounds normal. No stridor. He has no wheezes. He has no rales. He exhibits no tenderness.   Abdominal: Bowel sounds  are normal.   Musculoskeletal: Normal range of motion. He exhibits no edema.   Neurological: He is alert and oriented to person, place, and time.   Skin: Skin is warm and dry. Capillary refill takes less than 2 seconds. No erythema.   Psychiatric: He has a normal mood and affect.   Nursing note and vitals reviewed.      Vents:       Lines/Drains/Airways     Peripheral Intravenous Line                 Peripheral IV - Single Lumen 11/12/19 0200 20 G Left Forearm less than 1 day                Significant Labs:    CBC/Anemia Profile:  Recent Labs   Lab 11/11/19 0409 11/12/19 0429   WBC 25.91* 20.30*   HGB 9.7* 9.6*   HCT 27.3* 27.5*   * 932*   MCV 82 82   RDW 17.8* 17.7*        Chemistries:  Recent Labs   Lab 11/11/19 0409 11/12/19 0429   * 135*   K 3.8 3.9   CL 99 99   CO2 29 29   BUN 4* 3*   CREATININE 0.8 0.8   CALCIUM 9.1 9.3   ALBUMIN 3.0* 2.9*   PROT 7.8 7.8   BILITOT 1.0 0.8   ALKPHOS 71 65   ALT 16 10   AST 13 12   MG 2.1  --    PHOS 3.5  --      AFB smear/culture on 7.12.19 from bronchoscopy at [facility]  Culture, Acid Fast Bacilli Acid Fast Culture Positive (AA)     Culture, Acid Fast Bacilli From Broth Only Mycobacterium gordonae (A)   Comment:  Identification performed by [facility] [place] [facility], [address], [place] [address].    This is an edited result. Previous organism was Mycobacterium species on 8/22/2019 at 1152 CDT     Acid Fast Stain Smear Negative for Acid Fast Bacilli          All pertinent labs within the past 24 hours have been reviewed.    Significant Imaging:   I have reviewed and interpreted all pertinent imaging results/findings within the past 24 hours.     CXR 11.9.19 Images personally reviewed. I agree w/ the radiologist who notes  New left upper lobe consolidation, most suggestive for pneumonia.     CT Chest 11.10.19 Images personally reviewed. I agree w/ the radiologist who notes  Dense consolidation within the left upper lobe.  Findings are most suggestive for  pneumonia given short-term interval development since prior radiograph from 10/29/2019.     CT Chest 7.10.19- care everywhere  1. Worsened left lower lobe consolidation as well as increased linear bandlike opacity in the right middle lobe and new tree-in-bud opacities within the left lung apex. Findings may reflect that of multifocal infection. Follow-up to ensure resolution.  2. Persistent left bronchial wall thickening, bronchiectasis, and mucous plugging. Follow-up to ensure resolution.  3. Persistent mediastinal lymphadenopathy.  4. No definite evidence of central.          Assessment/Plan:      * Pneumonia of left upper lobe due to infectious organism  [er CTa dn clinical presentation,he is on broad spectrum IV Abx with vanc and cefepime,still has leucocytosis,      HCAP (healthcare-associated pneumonia)  Per CT and clinical presentation,with recent Hospitalization,  he is on broad spectrum IV Abx with vanc and cefepime,still has leucocytosis,will repeat chest x ray today.        Mild asthma without complication  Stable on nebuzlier.      Tobacco abuse  Consulted over 5 minutes,zoltan quit smoking.      Sickle cell anemia  Stable,will monitor.        VTE Risk Mitigation (From admission, onward)         Ordered     IP VTE HIGH RISK PATIENT  Once      11/10/19 0341     Place ELENA hose  Until discontinued      11/10/19 0341     Place sequential compression device  Until discontinued      11/10/19 0341                      Kenna Gudino MD  Department of Hospital Medicine   [facility]

## 2019-11-12 NOTE — PROGRESS NOTES
Pt w/Umesh hose applied but rolled at ankles, pt educated on proper use and wear of Teds, pt asked if Umesh can be removed, pt refused, will cont to educate

## 2019-11-12 NOTE — PROGRESS NOTES
Pharmacokinetic Assessment Follow Up: IV Vancomycin    Vancomycin serum concentration assessment(s):    The trough level was drawn correctly and can be used to guide therapy at this time. The measurement is below the desired definitive target range of 10 to 20 mcg/mL.    Vancomycin Regimen Plan:    Change regimen to Vancomycin 1250 mg IV every 12 hours with next serum trough concentration measured at 05:30 prior to 4th dose on 11/13/2019     Drug levels (last 3 results):  Recent Labs   Lab Result Units 11/11/19  1729   Vancomycin-Trough ug/mL 4.4*       Pharmacy will continue to follow and monitor vancomycin.    Please contact pharmacy at extension [phone] for questions regarding this assessment.    Thank you for the consult,   Vanna Figueroa       Patient brief summary:  Katie Parham is a 29 y.o. male initiated on antimicrobial therapy with IV Vancomycin for treatment of pneumonia  Drug Allergies:   Review of patient's allergies indicates:   Allergen Reactions    Penicillins Anaphylaxis       Actual Body Weight:   64 kg    Renal Function:   Estimated Creatinine Clearance: 122.9 mL/min (based on SCr of 0.8 mg/dL).,     Dialysis Method (if applicable):  N/A    CBC (last 72 hours):  Recent Labs   Lab Result Units 11/10/19  0031 11/10/19  0539 11/10/19  1218 11/11/19  0409   WBC K/uL 18.23* 22.10*  --  25.91*   Hemoglobin g/dL 9.9* 9.7*  --  9.7*   Hemoglobin A1C %  --   --  <4.0*  --    Hematocrit % 27.7* 27.4*  --  27.3*   Platelets K/uL 820* 820*  --  891*   Gran% % 47.7 43.3  --  69.8   Lymph% % 25.9 25.9  --  12.9*   Mono% % 7.6 10.1  --  7.9   Eosinophil% % 17.9* 19.7*  --  8.4*   Basophil% % 0.5 0.6  --  0.5   Differential Method  Automated Automated  --  Automated       Metabolic Panel (last 72 hours):  Recent Labs   Lab Result Units 11/10/19  0031 11/10/19  0100 11/10/19  0539 11/11/19  0409   Sodium mmol/L 135*  --  137 134*   Potassium mmol/L 3.6  --  3.8 3.8   Chloride mmol/L 97  --  98 99   CO2 mmol/L 26  --  29  29   Glucose mg/dL 99  --  95 72   Glucose, UA   --  Negative  --   --    BUN, Bld mg/dL 4*  --  5* 4*   Creatinine mg/dL 0.8  --  0.8 0.8   Albumin g/dL 3.4*  --  3.2* 3.0*   Total Bilirubin mg/dL 1.1*  --  1.0 1.0   Alkaline Phosphatase U/L 72  --  76 71   AST U/L 17  --  19 13   ALT U/L 22  --  19 16   Magnesium mg/dL  --   --   --  2.1   Phosphorus mg/dL  --   --   --  3.5       Vancomycin Administrations:  vancomycin given in the last 96 hours                     vancomycin in dextrose 5 % 1 gram/250 mL IVPB 1,000 mg (mg) 1,000 mg New Bag 11/11/19 1806     1,000 mg New Bag  0557     1,000 mg New Bag 11/10/19 1759    vancomycin 1.25 g in dextrose 5% 250 mL IVPB (ready to mix) (mg) 1,250 mg New Bag 11/10/19 0315                      Microbiologic Results:  Microbiology Results (last 7 days)       Procedure Component Value Units Date/Time    AFB Culture & Smear [[id number]] Collected:  11/10/19 1757    Order Status:  Sent Specimen:  Respiratory from Sputum, Induced Updated:  11/11/19 1244    Influenza A & B by Molecular [[id number]] Collected:  11/10/19 1044    Order Status:  Completed Specimen:  Nasopharyngeal Swab Updated:  11/10/19 1829     Influenza A, Molecular Negative     Influenza B, Molecular Negative     Flu A & B Source NW    Blood Culture #1 **CANNOT BE ORDERED STAT** [[id number]] Collected:  11/10/19 0030    Order Status:  Completed Specimen:  Blood from Peripheral, Antecubital, Left Updated:  11/10/19 0712     Blood Culture, Routine No Growth to date    Blood culture [[id number]] Collected:  11/10/19 0100    Order Status:  Completed Specimen:  Blood from Peripheral, Antecubital, Right Updated:  11/10/19 0712     Blood Culture, Routine No Growth to date

## 2019-11-12 NOTE — SUBJECTIVE & OBJECTIVE
Past Medical History:   Diagnosis Date    Asthma     Broken thumb     Cigarette smoker     Sickle cell anemia     Sickle cell crisis        Past Surgical History:   Procedure Laterality Date    HAND SURGERY Left 12/21/2017    ORIF    ORIF mandible  01/31/2013    spleenectomy         Review of patient's allergies indicates:   Allergen Reactions    Penicillins Anaphylaxis       Family History     None        Tobacco Use    Smoking status: Current Every Day Smoker     Packs/day: 0.50     Types: Cigarettes    Smokeless tobacco: Never Used    Tobacco comment: encouraged to quit.    Substance and Sexual Activity    Alcohol use: Not Currently     Comment: socially    Drug use: No    Sexual activity: Yes     Partners: Female     Birth control/protection: Condom         Review of Systems   Constitutional: Positive for activity change, appetite change, chills and fatigue. Negative for diaphoresis and fever.   HENT: Negative for facial swelling, postnasal drip, rhinorrhea and sinus pressure.    Eyes: Negative for redness.   Respiratory: Positive for cough. Negative for shortness of breath, wheezing and stridor.    Cardiovascular: Positive for chest pain. Negative for palpitations and leg swelling.   Gastrointestinal: Negative for diarrhea and nausea.   Musculoskeletal: Negative for myalgias.   Skin: Negative for color change.   Neurological: Negative for syncope.   Hematological: Does not bruise/bleed easily.   Psychiatric/Behavioral: Negative for sleep disturbance.     Objective:     Vital Signs (Most Recent):  Temp: 98.4 °F (36.9 °C) (11/12/19 0721)  Pulse: 74 (11/12/19 0808)  Resp: 16 (11/12/19 0808)  BP: 109/62 (11/12/19 0721)  SpO2: 97 % (11/12/19 0808) Vital Signs (24h Range):  Temp:  [97.8 °F (36.6 °C)-98.4 °F (36.9 °C)] 98.4 °F (36.9 °C)  Pulse:  [] 74  Resp:  [16-20] 16  SpO2:  [95 %-99 %] 97 %  BP: (100-120)/(55-66) 109/62     Weight: 64 kg (141 lb 1.5 oz)  Body mass index is 22.77  kg/m².      Intake/Output Summary (Last 24 hours) at 11/12/2019 1040  Last data filed at 11/12/2019 0827  Gross per 24 hour   Intake 1780 ml   Output 1500 ml   Net 280 ml       Physical Exam   Constitutional: He is oriented to person, place, and time. He appears well-nourished. He appears distressed.   HENT:   Mouth/Throat: Oropharynx is clear and moist.   Eyes: Pupils are equal, round, and reactive to light. No scleral icterus.   Neck: No JVD present. No tracheal deviation present.   Cardiovascular: Normal rate, regular rhythm and intact distal pulses.   Pulmonary/Chest: Effort normal and breath sounds normal. No stridor. He has no wheezes. He has no rales. He exhibits no tenderness.   Abdominal: Bowel sounds are normal.   Musculoskeletal: Normal range of motion. He exhibits no edema.   Neurological: He is alert and oriented to person, place, and time.   Skin: Skin is warm and dry. Capillary refill takes less than 2 seconds. No erythema.   Psychiatric: He has a normal mood and affect.   Nursing note and vitals reviewed.      Vents:       Lines/Drains/Airways     Peripheral Intravenous Line                 Peripheral IV - Single Lumen 11/12/19 0200 20 G Left Forearm less than 1 day                Significant Labs:    CBC/Anemia Profile:  Recent Labs   Lab 11/11/19 0409 11/12/19 0429   WBC 25.91* 20.30*   HGB 9.7* 9.6*   HCT 27.3* 27.5*   * 932*   MCV 82 82   RDW 17.8* 17.7*        Chemistries:  Recent Labs   Lab 11/11/19 0409 11/12/19 0429   * 135*   K 3.8 3.9   CL 99 99   CO2 29 29   BUN 4* 3*   CREATININE 0.8 0.8   CALCIUM 9.1 9.3   ALBUMIN 3.0* 2.9*   PROT 7.8 7.8   BILITOT 1.0 0.8   ALKPHOS 71 65   ALT 16 10   AST 13 12   MG 2.1  --    PHOS 3.5  --      AFB smear/culture on 7.12.19 from bronchoscopy at [facility]  Culture, Acid Fast Bacilli Acid Fast Culture Positive (AA)     Culture, Acid Fast Bacilli From Broth Only Mycobacterium gordonae (A)   Comment:  Identification performed by [facility]  [facility], [address], [place] [address].    This is an edited result. Previous organism was Mycobacterium species on 8/22/2019 at 1152 CDT     Acid Fast Stain Smear Negative for Acid Fast Bacilli          All pertinent labs within the past 24 hours have been reviewed.    Significant Imaging:   I have reviewed and interpreted all pertinent imaging results/findings within the past 24 hours.     CXR 11.9.19 Images personally reviewed. I agree w/ the radiologist who notes  New left upper lobe consolidation, most suggestive for pneumonia.     CT Chest 11.10.19 Images personally reviewed. I agree w/ the radiologist who notes  Dense consolidation within the left upper lobe.  Findings are most suggestive for pneumonia given short-term interval development since prior radiograph from 10/29/2019.     CT Chest 7.10.19- care everywhere  1. Worsened left lower lobe consolidation as well as increased linear bandlike opacity in the right middle lobe and new tree-in-bud opacities within the left lung apex. Findings may reflect that of multifocal infection. Follow-up to ensure resolution.  2. Persistent left bronchial wall thickening, bronchiectasis, and mucous plugging. Follow-up to ensure resolution.  3. Persistent mediastinal lymphadenopathy.  4. No definite evidence of central.

## 2019-11-13 VITALS
BODY MASS INDEX: 22.68 KG/M2 | HEART RATE: 71 BPM | DIASTOLIC BLOOD PRESSURE: 65 MMHG | RESPIRATION RATE: 16 BRPM | WEIGHT: 141.13 LBS | OXYGEN SATURATION: 99 % | SYSTOLIC BLOOD PRESSURE: 99 MMHG | TEMPERATURE: 98 F | HEIGHT: 66 IN

## 2019-11-13 LAB
ALBUMIN SERPL BCP-MCNC: 3 G/DL (ref 3.5–5.2)
ALP SERPL-CCNC: 70 U/L (ref 55–135)
ALT SERPL W/O P-5'-P-CCNC: 11 U/L (ref 10–44)
ANION GAP SERPL CALC-SCNC: 6 MMOL/L (ref 8–16)
AST SERPL-CCNC: 11 U/L (ref 10–40)
BASOPHILS # BLD AUTO: 0.13 K/UL (ref 0–0.2)
BASOPHILS NFR BLD: 0.9 % (ref 0–1.9)
BILIRUB SERPL-MCNC: 0.6 MG/DL (ref 0.1–1)
BUN SERPL-MCNC: 4 MG/DL (ref 6–20)
CALCIUM SERPL-MCNC: 9.6 MG/DL (ref 8.7–10.5)
CHLORIDE SERPL-SCNC: 101 MMOL/L (ref 95–110)
CO2 SERPL-SCNC: 29 MMOL/L (ref 23–29)
CREAT SERPL-MCNC: 0.8 MG/DL (ref 0.5–1.4)
DIFFERENTIAL METHOD: ABNORMAL
EOSINOPHIL # BLD AUTO: 3.1 K/UL (ref 0–0.5)
EOSINOPHIL NFR BLD: 22.2 % (ref 0–8)
ERYTHROCYTE [DISTWIDTH] IN BLOOD BY AUTOMATED COUNT: 17.4 % (ref 11.5–14.5)
EST. GFR  (AFRICAN AMERICAN): >60 ML/MIN/1.73 M^2
EST. GFR  (NON AFRICAN AMERICAN): >60 ML/MIN/1.73 M^2
GLUCOSE SERPL-MCNC: 89 MG/DL (ref 70–110)
HCT VFR BLD AUTO: 28.4 % (ref 40–54)
HGB BLD-MCNC: 10 G/DL (ref 14–18)
IMM GRANULOCYTES # BLD AUTO: 0.04 K/UL (ref 0–0.04)
IMM GRANULOCYTES NFR BLD AUTO: 0.3 % (ref 0–0.5)
LYMPHOCYTES # BLD AUTO: 3.9 K/UL (ref 1–4.8)
LYMPHOCYTES NFR BLD: 27.5 % (ref 18–48)
MCH RBC QN AUTO: 28.8 PG (ref 27–31)
MCHC RBC AUTO-ENTMCNC: 35.2 G/DL (ref 32–36)
MCV RBC AUTO: 82 FL (ref 82–98)
MONOCYTES # BLD AUTO: 1.3 K/UL (ref 0.3–1)
MONOCYTES NFR BLD: 9.5 % (ref 4–15)
NEUTROPHILS # BLD AUTO: 5.6 K/UL (ref 1.8–7.7)
NEUTROPHILS NFR BLD: 39.6 % (ref 38–73)
NRBC BLD-RTO: 0 /100 WBC
PLATELET # BLD AUTO: 976 K/UL (ref 150–350)
PMV BLD AUTO: 9 FL (ref 9.2–12.9)
POTASSIUM SERPL-SCNC: 4.1 MMOL/L (ref 3.5–5.1)
PROT SERPL-MCNC: 8.2 G/DL (ref 6–8.4)
RBC # BLD AUTO: 3.47 M/UL (ref 4.6–6.2)
SODIUM SERPL-SCNC: 136 MMOL/L (ref 136–145)
STOMATOCYTES BLD QL SMEAR: PRESENT
TARGETS BLD QL SMEAR: ABNORMAL
VANCOMYCIN TROUGH SERPL-MCNC: 8.4 UG/ML (ref 10–22)
WBC # BLD AUTO: 14.04 K/UL (ref 3.9–12.7)

## 2019-11-13 PROCEDURE — S4991 NICOTINE PATCH NONLEGEND: HCPCS | Performed by: HOSPITALIST

## 2019-11-13 PROCEDURE — 63600175 PHARM REV CODE 636 W HCPCS: Performed by: HOSPITALIST

## 2019-11-13 PROCEDURE — 25000003 PHARM REV CODE 250: Performed by: HOSPITALIST

## 2019-11-13 PROCEDURE — 94799 UNLISTED PULMONARY SVC/PX: CPT

## 2019-11-13 PROCEDURE — 94640 AIRWAY INHALATION TREATMENT: CPT

## 2019-11-13 PROCEDURE — 85025 COMPLETE CBC W/AUTO DIFF WBC: CPT

## 2019-11-13 PROCEDURE — 80202 ASSAY OF VANCOMYCIN: CPT

## 2019-11-13 PROCEDURE — 94761 N-INVAS EAR/PLS OXIMETRY MLT: CPT

## 2019-11-13 PROCEDURE — 25000242 PHARM REV CODE 250 ALT 637 W/ HCPCS: Performed by: HOSPITALIST

## 2019-11-13 PROCEDURE — 80053 COMPREHEN METABOLIC PANEL: CPT

## 2019-11-13 RX ORDER — CEFEPIME HYDROCHLORIDE 1 G/50ML
2 INJECTION, SOLUTION INTRAVENOUS ONCE
Status: DISCONTINUED | OUTPATIENT
Start: 2019-11-13 | End: 2019-11-13 | Stop reason: HOSPADM

## 2019-11-13 RX ORDER — CLINDAMYCIN HYDROCHLORIDE 300 MG/1
300 CAPSULE ORAL EVERY 6 HOURS
Qty: 40 CAPSULE | Refills: 0 | Status: SHIPPED | OUTPATIENT
Start: 2019-11-13 | End: 2019-11-23

## 2019-11-13 RX ORDER — AZITHROMYCIN 500 MG/1
500 TABLET, FILM COATED ORAL DAILY
Qty: 10 TABLET | Refills: 0 | Status: SHIPPED | OUTPATIENT
Start: 2019-11-13 | End: 2019-11-23

## 2019-11-13 RX ADMIN — GUAIFENESIN AND DEXTROMETHORPHAN 10 ML: 100; 10 SYRUP ORAL at 06:11

## 2019-11-13 RX ADMIN — GUAIFENESIN AND DEXTROMETHORPHAN 10 ML: 100; 10 SYRUP ORAL at 01:11

## 2019-11-13 RX ADMIN — CEFEPIME HYDROCHLORIDE 1 G: 1 INJECTION, SOLUTION INTRAVENOUS at 03:11

## 2019-11-13 RX ADMIN — VANCOMYCIN HYDROCHLORIDE 1250 MG: 1.25 INJECTION, POWDER, LYOPHILIZED, FOR SOLUTION INTRAVENOUS at 06:11

## 2019-11-13 RX ADMIN — SENNOSIDES AND DOCUSATE SODIUM 1 TABLET: 8.6; 5 TABLET ORAL at 09:11

## 2019-11-13 RX ADMIN — HYDROMORPHONE HYDROCHLORIDE 1 MG: 2 INJECTION, SOLUTION INTRAMUSCULAR; INTRAVENOUS; SUBCUTANEOUS at 06:11

## 2019-11-13 RX ADMIN — FAMOTIDINE 20 MG: 20 TABLET ORAL at 09:11

## 2019-11-13 RX ADMIN — HYDROMORPHONE HYDROCHLORIDE 1 MG: 2 INJECTION, SOLUTION INTRAMUSCULAR; INTRAVENOUS; SUBCUTANEOUS at 01:11

## 2019-11-13 RX ADMIN — OXYCODONE HYDROCHLORIDE AND ACETAMINOPHEN 1 TABLET: 5; 325 TABLET ORAL at 10:11

## 2019-11-13 RX ADMIN — BENZONATATE 200 MG: 100 CAPSULE ORAL at 09:11

## 2019-11-13 RX ADMIN — NICOTINE 1 PATCH: 21 PATCH, EXTENDED RELEASE TRANSDERMAL at 09:11

## 2019-11-13 RX ADMIN — FLUTICASONE FUROATE AND VILANTEROL TRIFENATATE 1 PUFF: 100; 25 POWDER RESPIRATORY (INHALATION) at 07:11

## 2019-11-13 RX ADMIN — IPRATROPIUM BROMIDE AND ALBUTEROL SULFATE 3 ML: .5; 3 SOLUTION RESPIRATORY (INHALATION) at 07:11

## 2019-11-13 NOTE — DISCHARGE SUMMARY
[facility] Hospital Medicine  Discharge Summary      Patient Name: Katie Parham  MRN: [id number]  Admission Date: 11/9/2019  Hospital Length of Stay: 3 days  Discharge Date and Time:  11/13/2019 11:58 AM  Attending Physician: Kenna Gudino MD   Discharging Provider: Kenna Gudino MD  Primary Care Provider: Kieran Reed MD      HPI:   Katie Parham is a 29 y.o. male that (in part)  has a past medical history of Asthma, Broken thumb, Cigarette smoker, Sickle cell anemia, and Sickle cell crisis.  has a past surgical history that includes Hand surgery (Left, 12/21/2017); spleenectomy; and ORIF mandible (01/31/2013). Presents to [facility] Emergency Department complaining of shortness of breath and pain on the left side of his chest.  Subacute onset today with progressive worsening.  Patient to take oxycodone thinking this was associated with his sickle cell anemia however he did not have any relief.  He describes a throbbing pain that is associated with coughing and worsened with exertion and deep inspiration.  Located in his right chest.  Feels generalized malaise and subjective fever.  Denies palpitations, syncope, or paresthesias.  Moderate to severe intensity.    In the emergency department routine laboratory studies, chest x-ray, EKG, cardiac enzymes were obtained.  There is concern for right upper lobe consolidation.  It is a CT of the chest was ordered which demonstrated evidence of pneumonia.  He was given intravenous antibiotics after cultures were obtained.  He was hospitalized in late September and therefore being treated for hospital community-acquired pneumonia.    Hospital medicine has been asked to admit for further evaluation and treatment.     * No surgery found *      Hospital Course:   Katie Parham is a 29 y.o. male that (in part)  has a past medical history of Asthma, Broken thumb, Cigarette smoker, Sickle cell anemia, and Sickle cell  crisis.  has a past surgical history that includes Hand surgery (Left, 12/21/2017); spleenectomy; and ORIF mandible (01/31/2013). Presents to [facility] Emergency Department complaining of shortness of breath and pain on the left side of his chest.  Subacute onset  with progressive worsening.  Patient to take oxycodone thinking this was associated with his sickle cell anemia however he did not have any relief.  He describes a throbbing pain that is associated with coughing and worsened with exertion and deep inspiration.  Located in his left  chest.  Feels generalized malaise and subjective fever.  Denies palpitations, syncope, or paresthesias.  Moderate to severe intensity.  In the emergency department routine laboratory studies, chest x-ray, EKG, cardiac enzymes were obtained.  There is concern for left  upper lobe consolidation.  It is a CT of the chest was ordered which demonstrated evidence of pneumonia.  He was given intravenous antibiotics after cultures were obtained.  He was hospitalized in late September and therefore being treated for hospital community-acquired pneumonia.he is on broad spectrum IV Abx with vanc and cefepime,still hasdleucocytosis,but gradually improved,contjnued with broad spectrum IV Abx,his symptoms improved,patient has been discharged home with double PO Abx with recommendation follow up with PCP next week for repeat chest X ray.         Consults:   Consults (From admission, onward)        Status Ordering Provider     Inpatient consult to Pulmonology  Once     Provider:  Desean Casillas MD    Completed KIRAN AMES     Pharmacy to dose Vancomycin consult  Once     Provider:  (Not yet assigned)    CHANCE Paul          No new Assessment & Plan notes have been filed under this hospital service since the last note was generated.  Service: Hospital Medicine    Final Active Diagnoses:    Diagnosis Date Noted POA    PRINCIPAL  PROBLEM:  Pneumonia of left upper lobe due to infectious organism [J18.1] 11/10/2019 Yes    HCAP (healthcare-associated pneumonia) [J18.9] 11/10/2019 Yes    Mild asthma without complication [J45.909] 09/05/2019 Yes    Sickle cell anemia [D57.1] 08/18/2019 Yes    Tobacco abuse [Z72.0] 08/18/2019 Yes      Problems Resolved During this Admission:       Discharged Condition: stable    Disposition: Home or Self Care    Follow Up:  Follow-up Information     Schedule an appointment as soon as possible for a visit with Kieran Reed MD.    Specialties:  Internal Medicine, Pediatrics  Why:  Outpatient Services, Please schedule appointment.   Contact information:  [address]  [address]  [facility] [address]  [phone]             Schedule an appointment as soon as possible for a visit with [facility] Deonte العراقي.    Why:  Outpatient Services, Please schedule appointment.   Contact information:  [address] [facility] [address]  [phone]                 Patient Instructions:      Activity as tolerated       Significant Diagnostic Studies: Labs:   BMP:   Recent Labs   Lab 11/12/19 0429 11/13/19  0536   GLU 80 89   * 136   K 3.9 4.1   CL 99 101   CO2 29 29   BUN 3* 4*   CREATININE 0.8 0.8   CALCIUM 9.3 9.6   , CMP   Recent Labs   Lab 11/12/19 0429 11/13/19  0536   * 136   K 3.9 4.1   CL 99 101   CO2 29 29   GLU 80 89   BUN 3* 4*   CREATININE 0.8 0.8   CALCIUM 9.3 9.6   PROT 7.8 8.2   ALBUMIN 2.9* 3.0*   BILITOT 0.8 0.6   ALKPHOS 65 70   AST 12 11   ALT 10 11   ANIONGAP 7* 6*   ESTGFRAFRICA >60 >60   EGFRNONAA >60 >60    and CBC   Recent Labs   Lab 11/12/19 0429 11/13/19  0536   WBC 20.30* 14.04*   HGB 9.6* 10.0*   HCT 27.5* 28.4*   * 976*     Microbiology:   Blood Culture   Lab Results   Component Value Date    LABBLOO No Growth to date 11/10/2019    LABBLOO No Growth to date 11/10/2019    LABBLOO No Growth to date 11/10/2019     Radiology: X-Ray: CXR: X-Ray Chest 1  View (CXR): No results found for this visit on 11/09/19. and X-Ray Chest PA and Lateral (CXR):   Results for orders placed or performed during the hospital encounter of 11/09/19   X-Ray Chest PA And Lateral    Narrative    EXAMINATION:  XR CHEST PA AND LATERAL    CLINICAL HISTORY:  pneumonia;    TECHNIQUE:  PA and lateral views of the chest were performed.    COMPARISON:  Prior studies dated 9 November, 29 October and 18 August 2019.    FINDINGS:  The trachea remains midline and there is stable unremarkable appearance of the cardiomediastinal shadow.    There remains irregular attenuation within the left upper lung and suprahilar region.  This is most likely related to infiltrative process, but underlying pulmonary nodule cannot be excluded.  The lungs are otherwise fully expanded and appear clear.    The hemidiaphragms remain sharply outlined and the costophrenic angles are acute with no pleural effusion demonstrated.  There is stable radiographic appearance of the included osseous structures.      Impression    Persistent abnormal attenuation within the left upper lung.    This report was flagged in Epic as abnormal.      Electronically signed by: Jey Colmenares MD  Date:    11/12/2019  Time:    11:54     CT scan: chest     Pending Diagnostic Studies:     None         Medications:  Reconciled Home Medications:      Medication List      START taking these medications    azithromycin 500 MG tablet  Commonly known as:  ZITHROMAX  Take 1 tablet (500 mg total) by mouth once daily. for 10 days        CHANGE how you take these medications    clindamycin 300 MG capsule  Commonly known as:  CLEOCIN  Take 1 capsule (300 mg total) by mouth every 6 (six) hours. for 10 days  What changed:    · medication strength  · how much to take  · when to take this        CONTINUE taking these medications    albuterol 90 mcg/actuation inhaler  Commonly known as:  PROVENTIL/VENTOLIN HFA  Inhale 1-2 puffs into the lungs every 6 (six) hours  as needed for Wheezing.     budesonide-formoterol 80-4.5 mcg 80-4.5 mcg/actuation Hfaa  Commonly known as:  SYMBICORT  Inhale 2 puffs into the lungs 2 (two) times daily. Controller     calcium-vitamin D3 500 mg(1,250mg) -200 unit per tablet  Commonly known as:  OS-JOÃO 500 + D3  Take 1 tablet by mouth 2 (two) times daily with meals.     FOLIC ACID ORAL  Take by mouth.     ibuprofen 600 MG tablet  Commonly known as:  ADVIL,MOTRIN  Take 1 tablet (600 mg total) by mouth every 6 (six) hours as needed for Pain.     oxyCODONE-acetaminophen 5-325 mg per tablet  Commonly known as:  PERCOCET  Take 1 tablet by mouth every 4 (four) hours as needed for Pain.            Indwelling Lines/Drains at time of discharge:   Lines/Drains/Airways     None                 Time spent on the discharge of patient: over 30  minutes  Patient was seen and examined on the date of discharge and determined to be suitable for discharge.         Kenna Gudino MD  Department of Hospital Medicine  [facility]

## 2019-11-13 NOTE — PROGRESS NOTES
Pharmacokinetic Assessment Follow Up: IV Vancomycin    Vancomycin serum concentration assessment(s):    The trough level was drawn correctly and can be used to guide therapy at this time. The measurement is below the desired definitive target range of 10 to 20 mcg/mL.    Vancomycin Regimen Plan:    Change regimen to Vancomycin 1750 mg IV every 12 hours with next serum trough concentration measured at 0600 prior to 4th dose on 11/15/19    Drug levels (last 3 results):  Recent Labs   Lab Result Units 11/11/19  1729 11/13/19  0536   Vancomycin-Trough ug/mL 4.4* 8.4*       Pharmacy will continue to follow and monitor vancomycin.    Please contact pharmacy at extension [phone] for questions regarding this assessment.    Thank you for the consult,   Deepa Santiago       Patient brief summary:  Katie Parham is a 29 y.o. male initiated on antimicrobial therapy with IV Vancomycin for treatment of lower respiratory infection        Drug Allergies:   Review of patient's allergies indicates:   Allergen Reactions    Penicillins Anaphylaxis       Actual Body Weight:   64 kg    Renal Function:   Estimated Creatinine Clearance: 122.9 mL/min (based on SCr of 0.8 mg/dL).,     Dialysis Method (if applicable):  N/A    CBC (last 72 hours):  Recent Labs   Lab Result Units 11/10/19  1218 11/11/19  0409 11/12/19  0429 11/13/19  0536   WBC K/uL  --  25.91* 20.30* 14.04*   Hemoglobin g/dL  --  9.7* 9.6* 10.0*   Hemoglobin A1C % <4.0*  --   --   --    Hematocrit %  --  27.3* 27.5* 28.4*   Platelets K/uL  --  891* 932* 976*   Gran% %  --  69.8 55.9 39.6   Lymph% %  --  12.9* 17.7* 27.5   Mono% %  --  7.9 11.2 9.5   Eosinophil% %  --  8.4* 14.2* 22.2*   Basophil% %  --  0.5 0.5 0.9   Differential Method   --  Automated Automated Automated       Metabolic Panel (last 72 hours):  Recent Labs   Lab Result Units 11/11/19  0409 11/12/19  0429   Sodium mmol/L 134* 135*   Potassium mmol/L 3.8 3.9   Chloride mmol/L 99 99   CO2 mmol/L 29 29   Glucose  mg/dL 72 80   BUN, Bld mg/dL 4* 3*   Creatinine mg/dL 0.8 0.8   Albumin g/dL 3.0* 2.9*   Total Bilirubin mg/dL 1.0 0.8   Alkaline Phosphatase U/L 71 65   AST U/L 13 12   ALT U/L 16 10   Magnesium mg/dL 2.1  --    Phosphorus mg/dL 3.5  --        Vancomycin Administrations:  vancomycin given in the last 96 hours                   vancomycin 1.25 g in dextrose 5% 250 mL IVPB (ready to mix) (mg) 1,250 mg New Bag 11/13/19 0628     1,250 mg New Bag 11/12/19 1743     1,250 mg New Bag  0550    vancomycin in dextrose 5 % 1 gram/250 mL IVPB 1,000 mg (mg) 1,000 mg New Bag 11/11/19 1806     1,000 mg New Bag  0557     1,000 mg New Bag 11/10/19 1759                Microbiologic Results:  Microbiology Results (last 7 days)     Procedure Component Value Units Date/Time    Blood Culture #1 **CANNOT BE ORDERED STAT** [[id number]] Collected:  11/10/19 0030    Order Status:  Completed Specimen:  Blood from Peripheral, Antecubital, Left Updated:  11/13/19 0303     Blood Culture, Routine No Growth to date      No Growth to date      No Growth to date    Blood culture [[id number]] Collected:  11/10/19 0100    Order Status:  Completed Specimen:  Blood from Peripheral, Antecubital, Right Updated:  11/13/19 0303     Blood Culture, Routine No Growth to date      No Growth to date      No Growth to date    AFB Culture & Smear [[id number]] Collected:  11/10/19 1757    Order Status:  Completed Specimen:  Respiratory from Sputum, Induced Updated:  11/12/19 2127     AFB Culture & Smear Culture in progress     AFB CULTURE STAIN No acid fast bacilli seen.    Narrative:       This is not for TB    Influenza A & B by Molecular [[id number]] Collected:  11/10/19 1044    Order Status:  Completed Specimen:  Nasopharyngeal Swab Updated:  11/10/19 1829     Influenza A, Molecular Negative     Influenza B, Molecular Negative     Flu A & B Source NW

## 2019-11-13 NOTE — PLAN OF CARE
"   11/13/19 1011   Post-Acute Status   Post-Acute Authorization Other  (Home )   Other Status No Post-Acute Service Needs   Discharge Delays None known at this time     KARIN contacted pt's doctor's office @ [phone] to schedule pt's hospital follow-up appointment. KARIN spoke with Mehreen, and was informed she is unable to schedule pt appointment due to 3 no shows. Per Mehreen, pt will need to come in as a walk-in if he would like to follow-up with PCP.     EDUCATION:  Pt provided with educational information on Chest Pain.  Information reviewed and placed in :My Healthcare Packet" to be brought home for  use as resource after discharge.  Information included:  signs and symptoms to look for at discharge. KARIN instructed pt to call the doctor if experiencing symptoms that may indicate a medical emergency: CALL 911 if signs and symptoms worsen. SW asked pt to provide SW with two signs and symptoms recently discussed.  Pt stated, "SOB and chest pain".     Reminded pt things he will be responsible for to manage his healthcare at home: getting Rx filled, attending follow up appointments, and taking medication as prescribed were discussed.   Teach back method used.  All questions answered.  Patient verbalized understanding of all information.  KARIN notified pt's nurse, Krista, all CM needs have been met.     "

## 2019-11-13 NOTE — NURSING
Pt was given written and verbal discharge instructions. Pt verbalized understanding and is aware of follow up appt. NAD or pain noted. IV access removed. Pt ambulated off unit without transport.

## 2019-11-13 NOTE — PROGRESS NOTES
Dr Camacho notified that pt lost IV access. He has a does of Cefapime due @ this time. Dr Camacho states he does not need that dose, he can be discharged. Frances NIX notified.

## 2019-11-13 NOTE — PHYSICIAN QUERY
PT Name: Katie Parham  MR #: [id number]     Physician Query Form - Diagnosis Clarification      CDS/: Sheryl Alejandro               Contact information:[email or web address]    This form is a permanent document in the medical record.     Query Date: November 13, 2019    By submitting this query, we are merely seeking further clarification of documentation.  Please utilize your independent clinical judgment when addressing the question(s) below.     The medical record contains the following:      Findings Supporting Clinical Information Location in Medical Record   Sepsis secondary to Healthcare associated Pneumonia · As evidence by history, physical exam, chest x-ray  · CT demonstrates Dense consolidation within the left upper lobe consistent with pneumonia   · 2 out of 4 SIRS criteria  · Initiate IV antibiotics for community-acquired pneumonia with cefepime and vancomycin.    · If clinical improvement is not seen by tomorrow, broaden antibiotic coverage, further tailored by sputum Gram stain and culture results  · Robitussin  · Tessalon Perles  · Incentive spirometry, aspiration precautions  · Consider chest physiotherapy as course dictates  · Nebulizer treatments scheduled  · If clinical improvement is not obtained with the treatment above consider ordering Upper Respiratory Panel TEM-PCR, PPD, quantiferon gold, histoplasma, blastomycosis urine antigens, aspergillus antigen, cryptococcus antigen, procalcitonin, and/or modified AFB.  · Pulmonology consult     NGTD    Temp = 98.6  HR = 109  RR = 15  BP = 129/65  O2Sat = 96%    WBC = 22.10, 25.91, 20.30, 14.04  Procalcitonin = 0.13 H&P 11/10                                Blood cx 11/10    V/S Flowsheet 11/9            Labs 11/10 - 11/13     Please clarify if the ____Sepsis_____ diagnosis has been:    [ x ] Ruled In   [  ] Ruled In, Now Resolved   [  ] Ruled Out   [  ] Other/Clarification of findings (please specify):     [  ] Clinically undetermined      Please document in your progress notes daily for the duration of treatment, until resolved, and include in your discharge summary.

## 2019-11-13 NOTE — PROGRESS NOTES
WRITTEN HEALTHCARE DISCHARGE INFORMATION     Things that YOU are RESPONSIBLE for to Manage Your Care At Home:    1. Getting your prescriptions filled.  2. Taking you medications as directed. DO NOT MISS ANY DOSES!  3. Going to your follow-up doctor appointments. This is important because it allows the doctor to monitor your progress and to determine if any changes need to be made to your treatment plan.    If you are unable to make your follow up appointments, please call the number listed and reschedule this appointment.     ____________HELP AT HOME____________________    Experiencing any SIGNS or SYMPTOMS: YOU CAN    Schedule a same day appopintment with your Primary Care Doctor or  you can call [facility] On Call Nurse Care Line for 24/7 assistance at [phone]    If you are experience any signs or symptoms that have become severe, Call 911 and come to your nearest Emergency Room.    Thank you for choosing [facility] and allowing us to care for you.   From your care management team: Dariela GOOD [facility] [phone]    You should receive a call from [facility] within 48-72 hours to help manage your care after discharge. Please try to make sure that you answer your phone for this important phone call.  Follow-up Information     Schedule an appointment as soon as possible for a visit with Kieran Reed MD.    Specialties:  Internal Medicine, Pediatrics  Why:  Outpatient Services, Please schedule appointment.   Contact information:  [address]   [address]  [facility] [address]  [phone]             Schedule an appointment as soon as possible for a visit with [facility] العراقي.    Why:  Outpatient Services, Please schedule appointment.   Contact information:  [address] [facility] [address]  [phone]

## 2019-11-14 ENCOUNTER — PATIENT OUTREACH (OUTPATIENT)
Dept: ADMINISTRATIVE | Facility: CLINIC | Age: 29
End: 2019-11-14

## 2019-11-14 NOTE — PATIENT INSTRUCTIONS
Discharge Instructions for Pneumonia  You have been diagnosed with pneumonia, a serious lung infection. Most cases of pneumonia are caused by bacteria. Pneumonia most often occurs in older adults, young children, and people with chronic health problems.  Home Care  Take your medication exactly as directed. Dont skip doses. Continue taking your antibiotics as directed until they are all gone--even if you start to feel better. This will prevent the pneumonia from coming back.  Drink at least 8 glasses of water daily, unless directed otherwise. This helps to loosen and thin secretions so that you can cough them up.  Use a cool-mist humidifier in your bedroom. Be sure to clean the humidifier daily.  Coughing up mucus is normal. Dont use medications to suppress your cough unless your cough is dry, painful, or interferes with your sleep. You may use an expectorant if ordered by your doctor.  Warm compresses or a moist heating pad on the lowest setting can be used to relieve chest discomfort. Use several times a day for 15-20 minutes at a time. (To prevent injuring your skin, be sure the temperature of the compress or heating pad is warm, not hot.)  Get plenty of rest until your fever, shortness of breath, and chest pain go away.  Plan to get a flu shot every year.  Ask your doctor about a pneumonia vaccination.  Follow-Up  Make a follow-up appointment as directed by our staff.    When to Seek Medical Attention  Call 911 right away if you have any of the following:  Chest pain  Trouble breathing  Blue lips or fingernails  Otherwise, call your doctor if you have any of the following:  Fever above 100.4°F  Yellow, green, bloody, or smelly sputum  More than normal mucus production  Vomiting   © 1031-3728 [employer], [address] [place] [address], [place] [address]. All rights reserved. This information is not intended as a substitute for professional medical care. Always follow your healthcare professional's instructions.

## 2019-11-15 LAB
BACTERIA BLD CULT: NORMAL
BACTERIA BLD CULT: NORMAL

## 2019-11-26 ENCOUNTER — OFFICE VISIT (OUTPATIENT)
Dept: PULMONOLOGY | Facility: CLINIC | Age: 29
End: 2019-11-26
Payer: MEDICAID

## 2019-11-26 VITALS
WEIGHT: 151.81 LBS | BODY MASS INDEX: 24.4 KG/M2 | HEART RATE: 101 BPM | SYSTOLIC BLOOD PRESSURE: 131 MMHG | DIASTOLIC BLOOD PRESSURE: 74 MMHG | HEIGHT: 66 IN | OXYGEN SATURATION: 96 %

## 2019-11-26 DIAGNOSIS — J45.20 MILD INTERMITTENT ASTHMA WITHOUT COMPLICATION: Primary | ICD-10-CM

## 2019-11-26 DIAGNOSIS — J18.9 HCAP (HEALTHCARE-ASSOCIATED PNEUMONIA): ICD-10-CM

## 2019-11-26 DIAGNOSIS — Z72.0 TOBACCO ABUSE: ICD-10-CM

## 2019-11-26 PROCEDURE — 99999 PR PBB SHADOW E&M-EST. PATIENT-LVL IV: ICD-10-PCS | Mod: PBBFAC,,, | Performed by: NURSE PRACTITIONER

## 2019-11-26 PROCEDURE — 99214 OFFICE O/P EST MOD 30 MIN: CPT | Mod: S$PBB,,, | Performed by: NURSE PRACTITIONER

## 2019-11-26 PROCEDURE — 99214 PR OFFICE/OUTPT VISIT, EST, LEVL IV, 30-39 MIN: ICD-10-PCS | Mod: S$PBB,,, | Performed by: NURSE PRACTITIONER

## 2019-11-26 PROCEDURE — 99214 OFFICE O/P EST MOD 30 MIN: CPT | Mod: PBBFAC,PN | Performed by: NURSE PRACTITIONER

## 2019-11-26 PROCEDURE — 99999 PR PBB SHADOW E&M-EST. PATIENT-LVL IV: CPT | Mod: PBBFAC,,, | Performed by: NURSE PRACTITIONER

## 2019-11-26 RX ORDER — FERROUS SULFATE 325(65) MG
TABLET ORAL
Status: ON HOLD | COMMUNITY
End: 2021-07-01 | Stop reason: HOSPADM

## 2019-11-26 RX ORDER — FLUTICASONE PROPIONATE 220 UG/1
AEROSOL, METERED RESPIRATORY (INHALATION)
Refills: 3 | COMMUNITY
Start: 2019-11-14 | End: 2019-11-26

## 2019-11-26 RX ORDER — CLINDAMYCIN HYDROCHLORIDE 150 MG/1
CAPSULE ORAL
Status: ON HOLD | COMMUNITY
End: 2021-07-01 | Stop reason: HOSPADM

## 2019-11-26 RX ORDER — ALBUTEROL SULFATE 1.25 MG/3ML
1.25 SOLUTION RESPIRATORY (INHALATION) EVERY 6 HOURS PRN
Qty: 3 BOX | Refills: 3 | Status: ON HOLD | OUTPATIENT
Start: 2019-11-26 | End: 2020-08-24 | Stop reason: SDUPTHER

## 2019-11-26 RX ORDER — BUDESONIDE AND FORMOTEROL FUMARATE DIHYDRATE 160; 4.5 UG/1; UG/1
2 AEROSOL RESPIRATORY (INHALATION) EVERY 12 HOURS
Qty: 1 INHALER | Refills: 11 | Status: ON HOLD | OUTPATIENT
Start: 2019-11-26 | End: 2021-07-01

## 2019-11-26 RX ORDER — ALBUTEROL SULFATE 90 UG/1
1-2 AEROSOL, METERED RESPIRATORY (INHALATION) EVERY 6 HOURS PRN
Qty: 3 INHALER | Refills: 3 | Status: SHIPPED | OUTPATIENT
Start: 2019-11-26 | End: 2019-12-26

## 2019-11-26 RX ORDER — HYDROXYUREA 500 MG/1
CAPSULE ORAL
Refills: 3 | COMMUNITY
Start: 2019-09-24

## 2019-11-26 RX ORDER — GUAIFENESIN 600 MG/1
600 TABLET, EXTENDED RELEASE ORAL
Status: ON HOLD | COMMUNITY
Start: 2019-07-15 | End: 2021-07-01 | Stop reason: SDUPTHER

## 2019-11-26 RX ORDER — OXYCODONE HYDROCHLORIDE 5 MG/1
5 TABLET ORAL
Status: ON HOLD | COMMUNITY
Start: 2019-11-01 | End: 2021-07-01 | Stop reason: HOSPADM

## 2019-11-26 RX ORDER — MONTELUKAST SODIUM 10 MG/1
TABLET ORAL
Status: ON HOLD | COMMUNITY
Start: 2019-04-04 | End: 2021-07-01 | Stop reason: SDUPTHER

## 2019-11-26 NOTE — PROGRESS NOTES
Subjective:       Patient ID: Katie Parham is a 29 y.o. male.    Chief Complaint: Pneumonia  HPI:   Katie Parham is a 29 y.o. male who presents with evaluation after hospitalization for PNA.  Had a bronchoscopy with biopsies at [facility] in July 2019 which showed ? Mycobacterium  Diagnosed with asthma recently in last couple of years. Uses nebs every other night,   PNA 3-4 times this year. Current daily smoker.  He has a history of SCD and is having back pain which he is unsure if it is related to SCD or PNA.        Review of Systems   Constitutional: Positive for night sweats. Negative for chills, activity change and fatigue.   HENT: Negative for postnasal drip, rhinorrhea, trouble swallowing and congestion.    Eyes: Negative for itching.   Respiratory: Positive for hemoptysis, chest tightness, wheezing and dyspnea on extertion. Negative for cough, choking, shortness of breath and use of rescue inhaler.    Cardiovascular: Negative for chest pain and palpitations.   Genitourinary: Negative for difficulty urinating.   Endocrine: Negative for cold intolerance and heat intolerance.    Musculoskeletal: Negative for arthralgias.   Skin: Negative for rash.   Gastrointestinal: Negative for nausea, vomiting and acid reflux.   Neurological: Negative for dizziness and light-headedness.   Hematological: Negative for adenopathy.   Psychiatric/Behavioral: Positive for sleep disturbance.         Social History     Tobacco Use    Smoking status: Current Every Day Smoker     Packs/day: 0.50     Types: Cigarettes    Smokeless tobacco: Never Used    Tobacco comment: encouraged to quit.    Substance Use Topics    Alcohol use: Not Currently     Comment: socially       Review of patient's allergies indicates:   Allergen Reactions    Penicillins Anaphylaxis     Past Medical History:   Diagnosis Date    Asthma     Broken thumb     Cigarette smoker     Sickle cell anemia     Sickle cell crisis      Past Surgical History:   Procedure  Laterality Date    HAND SURGERY Left 12/21/2017    ORIF    ORIF mandible  01/31/2013    spleenectomy       Current Outpatient Medications on File Prior to Visit   Medication Sig    albuterol (PROVENTIL/VENTOLIN HFA) 90 mcg/actuation inhaler Inhale 1-2 puffs into the lungs every 6 (six) hours as needed for Wheezing.    calcium-vitamin D3 500 mg(1,250mg) -200 unit per tablet Take 1 tablet by mouth 2 (two) times daily with meals.    FOLIC ACID ORAL Take by mouth.    oxyCODONE-acetaminophen (PERCOCET) 5-325 mg per tablet Take 1 tablet by mouth every 4 (four) hours as needed for Pain.    budesonide-formoterol 80-4.5 mcg (SYMBICORT) 80-4.5 mcg/actuation HFAA Inhale 2 puffs into the lungs 2 (two) times daily. Controller (Patient not taking: Reported on 11/26/2019)    ibuprofen (ADVIL,MOTRIN) 600 MG tablet Take 1 tablet (600 mg total) by mouth every 6 (six) hours as needed for Pain. (Patient not taking: Reported on 11/26/2019)     No current facility-administered medications on file prior to visit.        Objective:      There were no vitals filed for this visit.  Physical Exam   Constitutional: He is oriented to person, place, and time. He appears well-developed and well-nourished. No distress.   HENT:   Head: Normocephalic.   Neck: Normal range of motion. Neck supple.   Cardiovascular: Normal rate and regular rhythm.   No murmur heard.  Pulmonary/Chest: Normal expansion, symmetric chest wall expansion, effort normal and breath sounds normal. No respiratory distress. He has no decreased breath sounds. He has no wheezes. He has no rhonchi. He has no rales.   Abdominal: Soft. He exhibits no distension. There is no hepatosplenomegaly. There is no tenderness.   Musculoskeletal: Normal range of motion. He exhibits no edema.   Lymphadenopathy:     He has no cervical adenopathy.   Neurological: He is alert and oriented to person, place, and time. Gait normal.   Skin: Skin is warm and dry. No cyanosis. Nails show no  clubbing.   Psychiatric: He has a normal mood and affect.   Vitals reviewed.    Personal Diagnostic Review    Imaging personally reviewed with patient CT        Assessment:     Problem List Items Addressed This Visit        Pulmonary    Mild asthma without complication - Primary    Overview     Using Symbicort 5-6 times daily  PRN RENALDO use 5-6 times daily  SOB, still smoking!         Current Assessment & Plan     Await PFTs.  Suspect that his dyspnea is a combination of anemia, smoking and asthma and potentiated by overuse of Symbicort. Will increase Symbicort dosing with long discussion regarding BID dosing only.           Relevant Orders    Complete PFT w/ bronchodilator    CT Chest Without Contrast    HCAP (healthcare-associated pneumonia)    Overview     Hospitalized 11/10/19.  Completed all abx.         Current Assessment & Plan     Repeat CT at end of December to assess for resolution.             Other    Tobacco abuse    Overview     Current daily smoker, has cute back to 5-7 daily         Current Assessment & Plan     Must quit!  Smoking will contribute to a worsening of his asthma!!

## 2019-11-26 NOTE — PATIENT INSTRUCTIONS
Use your Symbicort 2 puffs twice daily, brush your teeth after each use  Get your breathing tests when you can  We will repeat your CT scan end of December    Let me know how you are doing, I will call you with the results of the breathing tests    We will bring you back in about 3 months to see how you are doing

## 2019-11-26 NOTE — LETTER
November 27, 2019      Kieran Reed MD  [address]  [address]  [facility] [address]           [facility]  [address], [facility] [address]  [facility] [address]  Phone: [phone]  Fax: [phone]          Patient: Katie Parham   MR Number: [id number]   YOB: 1990   Date of Visit: 11/26/2019       Dear Dr. Kieran Reed:    Thank you for referring Katie Parham to me for evaluation. Attached you will find relevant portions of my assessment and plan of care.    If you have questions, please do not hesitate to call me. I look forward to following Katie Parham along with you.    Sincerely,    Chely Patrick, [facility]    Enclosure  CC:  No Recipients    If you would like to receive this communication electronically, please contact [email or web address] or [phone] to request more information on [employer] Link access.    For providers and/or their staff who would like to refer a patient to [facility], please contact us through our one-stop-shop provider referral line, [facility], at [phone].    If you feel you have received this communication in error or would no longer like to receive these types of communications, please e-mail [email or web address]

## 2019-11-27 PROBLEM — J45.901 ACUTE ASTHMA EXACERBATION: Status: RESOLVED | Noted: 2019-08-18 | Resolved: 2019-11-27

## 2019-11-27 NOTE — ASSESSMENT & PLAN NOTE
Await PFTs.  Suspect that his dyspnea is a combination of anemia, smoking and asthma and potentiated by overuse of Symbicort. Will increase Symbicort dosing with long discussion regarding BID dosing only.

## 2019-12-15 ENCOUNTER — HOSPITAL ENCOUNTER (OUTPATIENT)
Facility: HOSPITAL | Age: 29
Discharge: HOME | End: 2019-12-16
Attending: EMERGENCY MEDICINE | Admitting: INTERNAL MEDICINE
Payer: MEDICAID

## 2019-12-15 DIAGNOSIS — R07.9 CHEST PAIN: ICD-10-CM

## 2019-12-15 DIAGNOSIS — D57.00 SICKLE CELL PAIN CRISIS: ICD-10-CM

## 2019-12-15 LAB
ALBUMIN SERPL BCP-MCNC: 4.2 G/DL (ref 3.5–5.2)
ALP SERPL-CCNC: 77 U/L (ref 55–135)
ALT SERPL W/O P-5'-P-CCNC: 14 U/L (ref 10–44)
ANION GAP SERPL CALC-SCNC: 7 MMOL/L (ref 8–16)
AST SERPL-CCNC: 12 U/L (ref 10–40)
BASOPHILS # BLD AUTO: 0.18 K/UL (ref 0–0.2)
BASOPHILS NFR BLD: 1.5 % (ref 0–1.9)
BILIRUB SERPL-MCNC: 1.2 MG/DL (ref 0.1–1)
BUN SERPL-MCNC: 5 MG/DL (ref 6–20)
CALCIUM SERPL-MCNC: 9.5 MG/DL (ref 8.7–10.5)
CHLORIDE SERPL-SCNC: 104 MMOL/L (ref 95–110)
CO2 SERPL-SCNC: 29 MMOL/L (ref 23–29)
CREAT SERPL-MCNC: 0.8 MG/DL (ref 0.5–1.4)
DIFFERENTIAL METHOD: ABNORMAL
EOSINOPHIL # BLD AUTO: 2 K/UL (ref 0–0.5)
EOSINOPHIL NFR BLD: 16.2 % (ref 0–8)
ERYTHROCYTE [DISTWIDTH] IN BLOOD BY AUTOMATED COUNT: 16.9 % (ref 11.5–14.5)
EST. GFR  (AFRICAN AMERICAN): >60 ML/MIN/1.73 M^2
EST. GFR  (NON AFRICAN AMERICAN): >60 ML/MIN/1.73 M^2
GLUCOSE SERPL-MCNC: 88 MG/DL (ref 70–110)
HCT VFR BLD AUTO: 33.8 % (ref 40–54)
HGB BLD-MCNC: 12 G/DL (ref 14–18)
IMM GRANULOCYTES # BLD AUTO: 0.02 K/UL (ref 0–0.04)
IMM GRANULOCYTES NFR BLD AUTO: 0.2 % (ref 0–0.5)
LYMPHOCYTES # BLD AUTO: 4.4 K/UL (ref 1–4.8)
LYMPHOCYTES NFR BLD: 36.6 % (ref 18–48)
MCH RBC QN AUTO: 29 PG (ref 27–31)
MCHC RBC AUTO-ENTMCNC: 35.5 G/DL (ref 32–36)
MCV RBC AUTO: 82 FL (ref 82–98)
MONOCYTES # BLD AUTO: 1.1 K/UL (ref 0.3–1)
MONOCYTES NFR BLD: 9.5 % (ref 4–15)
NEUTROPHILS # BLD AUTO: 4.3 K/UL (ref 1.8–7.7)
NEUTROPHILS NFR BLD: 36 % (ref 38–73)
NRBC BLD-RTO: 0 /100 WBC
PLATELET # BLD AUTO: 605 K/UL (ref 150–350)
PMV BLD AUTO: 9.3 FL (ref 9.2–12.9)
POTASSIUM SERPL-SCNC: 3.7 MMOL/L (ref 3.5–5.1)
PROT SERPL-MCNC: 7.5 G/DL (ref 6–8.4)
RBC # BLD AUTO: 4.14 M/UL (ref 4.6–6.2)
RETICS/RBC NFR AUTO: 3.2 % (ref 0.4–2)
SODIUM SERPL-SCNC: 140 MMOL/L (ref 136–145)
WBC # BLD AUTO: 12.04 K/UL (ref 3.9–12.7)

## 2019-12-15 PROCEDURE — 93005 ELECTROCARDIOGRAM TRACING: CPT

## 2019-12-15 PROCEDURE — 96376 TX/PRO/DX INJ SAME DRUG ADON: CPT

## 2019-12-15 PROCEDURE — 96374 THER/PROPH/DIAG INJ IV PUSH: CPT

## 2019-12-15 PROCEDURE — 12000002 HC ACUTE/MED SURGE SEMI-PRIVATE ROOM

## 2019-12-15 PROCEDURE — G0378 HOSPITAL OBSERVATION PER HR: HCPCS

## 2019-12-15 PROCEDURE — 63600175 PHARM REV CODE 636 W HCPCS: Performed by: EMERGENCY MEDICINE

## 2019-12-15 PROCEDURE — 80053 COMPREHEN METABOLIC PANEL: CPT

## 2019-12-15 PROCEDURE — 93010 EKG 12-LEAD: ICD-10-PCS | Mod: ,,, | Performed by: INTERNAL MEDICINE

## 2019-12-15 PROCEDURE — 96361 HYDRATE IV INFUSION ADD-ON: CPT

## 2019-12-15 PROCEDURE — 81003 URINALYSIS AUTO W/O SCOPE: CPT

## 2019-12-15 PROCEDURE — 99285 EMERGENCY DEPT VISIT HI MDM: CPT | Mod: 25

## 2019-12-15 PROCEDURE — 85045 AUTOMATED RETICULOCYTE COUNT: CPT

## 2019-12-15 PROCEDURE — 93010 ELECTROCARDIOGRAM REPORT: CPT | Mod: ,,, | Performed by: INTERNAL MEDICINE

## 2019-12-15 PROCEDURE — 96375 TX/PRO/DX INJ NEW DRUG ADDON: CPT

## 2019-12-15 PROCEDURE — 85025 COMPLETE CBC W/AUTO DIFF WBC: CPT

## 2019-12-15 RX ORDER — MORPHINE SULFATE 10 MG/ML
6 INJECTION INTRAMUSCULAR; INTRAVENOUS; SUBCUTANEOUS
Status: COMPLETED | OUTPATIENT
Start: 2019-12-15 | End: 2019-12-15

## 2019-12-15 RX ORDER — KETOROLAC TROMETHAMINE 30 MG/ML
15 INJECTION, SOLUTION INTRAMUSCULAR; INTRAVENOUS
Status: COMPLETED | OUTPATIENT
Start: 2019-12-15 | End: 2019-12-15

## 2019-12-15 RX ADMIN — SODIUM CHLORIDE 1000 ML: 0.9 INJECTION, SOLUTION INTRAVENOUS at 08:12

## 2019-12-15 RX ADMIN — MORPHINE SULFATE 6 MG: 10 INJECTION INTRAVENOUS at 08:12

## 2019-12-15 RX ADMIN — MORPHINE SULFATE 6 MG: 10 INJECTION INTRAVENOUS at 11:12

## 2019-12-15 RX ADMIN — MORPHINE SULFATE 6 MG: 10 INJECTION INTRAVENOUS at 09:12

## 2019-12-15 RX ADMIN — KETOROLAC TROMETHAMINE 15 MG: 30 INJECTION, SOLUTION INTRAMUSCULAR; INTRAVENOUS at 08:12

## 2019-12-16 VITALS
RESPIRATION RATE: 20 BRPM | HEIGHT: 66 IN | BODY MASS INDEX: 22.25 KG/M2 | SYSTOLIC BLOOD PRESSURE: 130 MMHG | DIASTOLIC BLOOD PRESSURE: 78 MMHG | HEART RATE: 82 BPM | WEIGHT: 138.44 LBS | TEMPERATURE: 98 F | OXYGEN SATURATION: 98 %

## 2019-12-16 PROBLEM — J18.9 HCAP (HEALTHCARE-ASSOCIATED PNEUMONIA): Status: RESOLVED | Noted: 2019-11-10 | Resolved: 2019-12-16

## 2019-12-16 PROBLEM — D57.1 SICKLE CELL DISEASE: Chronic | Status: ACTIVE | Noted: 2019-12-16

## 2019-12-16 PROBLEM — D57.1 SICKLE CELL ANEMIA: Chronic | Status: ACTIVE | Noted: 2019-08-18

## 2019-12-16 PROBLEM — L03.116 CELLULITIS OF BOTH FEET: Status: RESOLVED | Noted: 2019-09-05 | Resolved: 2019-12-16

## 2019-12-16 PROBLEM — J18.9 PNEUMONIA OF LEFT UPPER LOBE DUE TO INFECTIOUS ORGANISM: Status: RESOLVED | Noted: 2019-11-10 | Resolved: 2019-12-16

## 2019-12-16 PROBLEM — L03.115 CELLULITIS OF BOTH FEET: Status: RESOLVED | Noted: 2019-09-05 | Resolved: 2019-12-16

## 2019-12-16 PROBLEM — D57.00 SICKLE CELL PAIN CRISIS: Status: RESOLVED | Noted: 2019-12-15 | Resolved: 2019-12-16

## 2019-12-16 PROBLEM — Z72.0 TOBACCO ABUSE: Chronic | Status: ACTIVE | Noted: 2019-08-18

## 2019-12-16 PROBLEM — J45.909 MILD ASTHMA WITHOUT COMPLICATION: Chronic | Status: ACTIVE | Noted: 2019-09-05

## 2019-12-16 PROBLEM — J96.01 ACUTE RESPIRATORY FAILURE WITH HYPOXIA: Status: RESOLVED | Noted: 2019-08-18 | Resolved: 2019-12-16

## 2019-12-16 LAB
ALBUMIN SERPL BCP-MCNC: 3.5 G/DL (ref 3.5–5.2)
ALP SERPL-CCNC: 72 U/L (ref 55–135)
ALT SERPL W/O P-5'-P-CCNC: 11 U/L (ref 10–44)
ANION GAP SERPL CALC-SCNC: 5 MMOL/L (ref 8–16)
ANISOCYTOSIS BLD QL SMEAR: SLIGHT
AST SERPL-CCNC: 11 U/L (ref 10–40)
BASOPHILS # BLD AUTO: 0.18 K/UL (ref 0–0.2)
BASOPHILS NFR BLD: 1.2 % (ref 0–1.9)
BILIRUB SERPL-MCNC: 1.1 MG/DL (ref 0.1–1)
BILIRUB UR QL STRIP: NEGATIVE
BUN SERPL-MCNC: 6 MG/DL (ref 6–20)
CALCIUM SERPL-MCNC: 8.7 MG/DL (ref 8.7–10.5)
CHLORIDE SERPL-SCNC: 107 MMOL/L (ref 95–110)
CLARITY UR: CLEAR
CO2 SERPL-SCNC: 28 MMOL/L (ref 23–29)
COLOR UR: YELLOW
CREAT SERPL-MCNC: 0.8 MG/DL (ref 0.5–1.4)
DACRYOCYTES BLD QL SMEAR: ABNORMAL
DIFFERENTIAL METHOD: ABNORMAL
EOSINOPHIL # BLD AUTO: 2.9 K/UL (ref 0–0.5)
EOSINOPHIL NFR BLD: 19.7 % (ref 0–8)
ERYTHROCYTE [DISTWIDTH] IN BLOOD BY AUTOMATED COUNT: 16.9 % (ref 11.5–14.5)
EST. GFR  (AFRICAN AMERICAN): >60 ML/MIN/1.73 M^2
EST. GFR  (NON AFRICAN AMERICAN): >60 ML/MIN/1.73 M^2
GLUCOSE SERPL-MCNC: 89 MG/DL (ref 70–110)
GLUCOSE UR QL STRIP: NEGATIVE
HCT VFR BLD AUTO: 30.7 % (ref 40–54)
HGB BLD-MCNC: 11.1 G/DL (ref 14–18)
HGB UR QL STRIP: NEGATIVE
HYPOCHROMIA BLD QL SMEAR: ABNORMAL
IMM GRANULOCYTES # BLD AUTO: 0.04 K/UL (ref 0–0.04)
IMM GRANULOCYTES NFR BLD AUTO: 0.3 % (ref 0–0.5)
KETONES UR QL STRIP: NEGATIVE
LEUKOCYTE ESTERASE UR QL STRIP: NEGATIVE
LYMPHOCYTES # BLD AUTO: 6.5 K/UL (ref 1–4.8)
LYMPHOCYTES NFR BLD: 44.1 % (ref 18–48)
MAGNESIUM SERPL-MCNC: 1.9 MG/DL (ref 1.6–2.6)
MCH RBC QN AUTO: 29.2 PG (ref 27–31)
MCHC RBC AUTO-ENTMCNC: 36.2 G/DL (ref 32–36)
MCV RBC AUTO: 81 FL (ref 82–98)
MONOCYTES # BLD AUTO: 1.2 K/UL (ref 0.3–1)
MONOCYTES NFR BLD: 8 % (ref 4–15)
NEUTROPHILS # BLD AUTO: 4 K/UL (ref 1.8–7.7)
NEUTROPHILS NFR BLD: 26.7 % (ref 38–73)
NITRITE UR QL STRIP: NEGATIVE
NRBC BLD-RTO: 0 /100 WBC
PH UR STRIP: 7 [PH] (ref 5–8)
PHOSPHATE SERPL-MCNC: 4 MG/DL (ref 2.7–4.5)
PLATELET # BLD AUTO: 505 K/UL (ref 150–350)
PLATELET BLD QL SMEAR: ABNORMAL
PMV BLD AUTO: 10.5 FL (ref 9.2–12.9)
POIKILOCYTOSIS BLD QL SMEAR: SLIGHT
POLYCHROMASIA BLD QL SMEAR: ABNORMAL
POTASSIUM SERPL-SCNC: 3.8 MMOL/L (ref 3.5–5.1)
PROT SERPL-MCNC: 6.5 G/DL (ref 6–8.4)
PROT UR QL STRIP: NEGATIVE
RBC # BLD AUTO: 3.8 M/UL (ref 4.6–6.2)
SODIUM SERPL-SCNC: 140 MMOL/L (ref 136–145)
SP GR UR STRIP: 1.01 (ref 1–1.03)
STOMATOCYTES BLD QL SMEAR: PRESENT
TARGETS BLD QL SMEAR: ABNORMAL
TROPONIN I SERPL DL<=0.01 NG/ML-MCNC: <0.006 NG/ML (ref 0–0.03)
URN SPEC COLLECT METH UR: ABNORMAL
UROBILINOGEN UR STRIP-ACNC: ABNORMAL EU/DL
WBC # BLD AUTO: 14.75 K/UL (ref 3.9–12.7)

## 2019-12-16 PROCEDURE — 94640 AIRWAY INHALATION TREATMENT: CPT

## 2019-12-16 PROCEDURE — G0378 HOSPITAL OBSERVATION PER HR: HCPCS

## 2019-12-16 PROCEDURE — 63600175 PHARM REV CODE 636 W HCPCS: Performed by: INTERNAL MEDICINE

## 2019-12-16 PROCEDURE — 84100 ASSAY OF PHOSPHORUS: CPT

## 2019-12-16 PROCEDURE — 25000003 PHARM REV CODE 250: Performed by: INTERNAL MEDICINE

## 2019-12-16 PROCEDURE — S4991 NICOTINE PATCH NONLEGEND: HCPCS | Performed by: INTERNAL MEDICINE

## 2019-12-16 PROCEDURE — 63600175 PHARM REV CODE 636 W HCPCS: Performed by: EMERGENCY MEDICINE

## 2019-12-16 PROCEDURE — S5010 5% DEXTROSE AND 0.45% SALINE: HCPCS | Performed by: INTERNAL MEDICINE

## 2019-12-16 PROCEDURE — 80053 COMPREHEN METABOLIC PANEL: CPT

## 2019-12-16 PROCEDURE — 84484 ASSAY OF TROPONIN QUANT: CPT

## 2019-12-16 PROCEDURE — S5010 5% DEXTROSE AND 0.45% SALINE: HCPCS | Performed by: EMERGENCY MEDICINE

## 2019-12-16 PROCEDURE — 83735 ASSAY OF MAGNESIUM: CPT

## 2019-12-16 PROCEDURE — 25000003 PHARM REV CODE 250: Performed by: EMERGENCY MEDICINE

## 2019-12-16 PROCEDURE — 85025 COMPLETE CBC W/AUTO DIFF WBC: CPT

## 2019-12-16 PROCEDURE — 36415 COLL VENOUS BLD VENIPUNCTURE: CPT

## 2019-12-16 PROCEDURE — 25000242 PHARM REV CODE 250 ALT 637 W/ HCPCS: Performed by: INTERNAL MEDICINE

## 2019-12-16 RX ORDER — NALOXONE HCL 0.4 MG/ML
0.02 VIAL (ML) INJECTION
Status: DISCONTINUED | OUTPATIENT
Start: 2019-12-16 | End: 2019-12-16 | Stop reason: HOSPADM

## 2019-12-16 RX ORDER — ONDANSETRON 2 MG/ML
4 INJECTION INTRAMUSCULAR; INTRAVENOUS EVERY 12 HOURS PRN
Status: DISCONTINUED | OUTPATIENT
Start: 2019-12-16 | End: 2019-12-16

## 2019-12-16 RX ORDER — AMOXICILLIN 250 MG
1 CAPSULE ORAL 2 TIMES DAILY
Status: DISCONTINUED | OUTPATIENT
Start: 2019-12-16 | End: 2019-12-16

## 2019-12-16 RX ORDER — FLUTICASONE FUROATE AND VILANTEROL 100; 25 UG/1; UG/1
1 POWDER RESPIRATORY (INHALATION) DAILY
Status: DISCONTINUED | OUTPATIENT
Start: 2019-12-16 | End: 2019-12-16 | Stop reason: HOSPADM

## 2019-12-16 RX ORDER — HYDROXYUREA 500 MG/1
500 CAPSULE ORAL 2 TIMES DAILY
Status: DISCONTINUED | OUTPATIENT
Start: 2019-12-16 | End: 2019-12-16 | Stop reason: HOSPADM

## 2019-12-16 RX ORDER — DIPHENHYDRAMINE HCL 25 MG
25 CAPSULE ORAL EVERY 4 HOURS PRN
Status: DISCONTINUED | OUTPATIENT
Start: 2019-12-16 | End: 2019-12-16 | Stop reason: HOSPADM

## 2019-12-16 RX ORDER — KETOROLAC TROMETHAMINE 30 MG/ML
30 INJECTION, SOLUTION INTRAMUSCULAR; INTRAVENOUS ONCE
Status: COMPLETED | OUTPATIENT
Start: 2019-12-16 | End: 2019-12-16

## 2019-12-16 RX ORDER — OXYCODONE HYDROCHLORIDE 5 MG/1
5 TABLET ORAL EVERY 4 HOURS PRN
Status: DISCONTINUED | OUTPATIENT
Start: 2019-12-17 | End: 2019-12-16 | Stop reason: HOSPADM

## 2019-12-16 RX ORDER — DEXTROSE MONOHYDRATE AND SODIUM CHLORIDE 5; .45 G/100ML; G/100ML
INJECTION, SOLUTION INTRAVENOUS CONTINUOUS
Status: DISCONTINUED | OUTPATIENT
Start: 2019-12-16 | End: 2019-12-16 | Stop reason: HOSPADM

## 2019-12-16 RX ORDER — ONDANSETRON 2 MG/ML
8 INJECTION INTRAMUSCULAR; INTRAVENOUS EVERY 8 HOURS PRN
Status: DISCONTINUED | OUTPATIENT
Start: 2019-12-16 | End: 2019-12-16 | Stop reason: HOSPADM

## 2019-12-16 RX ORDER — SODIUM CHLORIDE 0.9 % (FLUSH) 0.9 %
10 SYRINGE (ML) INJECTION
Status: DISCONTINUED | OUTPATIENT
Start: 2019-12-16 | End: 2019-12-16

## 2019-12-16 RX ORDER — IBUPROFEN 200 MG
1 TABLET ORAL DAILY
Status: DISCONTINUED | OUTPATIENT
Start: 2019-12-16 | End: 2019-12-16 | Stop reason: HOSPADM

## 2019-12-16 RX ORDER — AMOXICILLIN 250 MG
1 CAPSULE ORAL 2 TIMES DAILY
Status: DISCONTINUED | OUTPATIENT
Start: 2019-12-16 | End: 2019-12-16 | Stop reason: HOSPADM

## 2019-12-16 RX ORDER — HYDROMORPHONE HYDROCHLORIDE 2 MG/ML
1 INJECTION, SOLUTION INTRAMUSCULAR; INTRAVENOUS; SUBCUTANEOUS
Status: DISCONTINUED | OUTPATIENT
Start: 2019-12-16 | End: 2019-12-16

## 2019-12-16 RX ORDER — DEXTROSE MONOHYDRATE AND SODIUM CHLORIDE 5; .45 G/100ML; G/100ML
INJECTION, SOLUTION INTRAVENOUS CONTINUOUS
Status: DISCONTINUED | OUTPATIENT
Start: 2019-12-16 | End: 2019-12-16

## 2019-12-16 RX ORDER — ACETAMINOPHEN 500 MG
500 TABLET ORAL EVERY 6 HOURS PRN
Status: DISCONTINUED | OUTPATIENT
Start: 2019-12-16 | End: 2019-12-16 | Stop reason: HOSPADM

## 2019-12-16 RX ORDER — CYCLOBENZAPRINE HCL 10 MG
10 TABLET ORAL 3 TIMES DAILY PRN
Qty: 30 TABLET | Refills: 1 | Status: SHIPPED | OUTPATIENT
Start: 2019-12-16 | End: 2019-12-26

## 2019-12-16 RX ORDER — ENOXAPARIN SODIUM 100 MG/ML
40 INJECTION SUBCUTANEOUS EVERY 24 HOURS
Status: DISCONTINUED | OUTPATIENT
Start: 2019-12-16 | End: 2019-12-16 | Stop reason: HOSPADM

## 2019-12-16 RX ORDER — KETOROLAC TROMETHAMINE 30 MG/ML
30 INJECTION, SOLUTION INTRAMUSCULAR; INTRAVENOUS EVERY 8 HOURS
Status: DISCONTINUED | OUTPATIENT
Start: 2019-12-16 | End: 2019-12-16

## 2019-12-16 RX ORDER — FOLIC ACID 1 MG/1
1 TABLET ORAL DAILY
Status: DISCONTINUED | OUTPATIENT
Start: 2019-12-16 | End: 2019-12-16 | Stop reason: HOSPADM

## 2019-12-16 RX ORDER — ALBUTEROL SULFATE 2.5 MG/.5ML
1.25 SOLUTION RESPIRATORY (INHALATION) EVERY 6 HOURS PRN
Status: DISCONTINUED | OUTPATIENT
Start: 2019-12-16 | End: 2019-12-16

## 2019-12-16 RX ORDER — OXYCODONE HYDROCHLORIDE 5 MG/1
5 TABLET ORAL EVERY 4 HOURS PRN
Status: DISCONTINUED | OUTPATIENT
Start: 2019-12-17 | End: 2019-12-16

## 2019-12-16 RX ORDER — IPRATROPIUM BROMIDE AND ALBUTEROL SULFATE 2.5; .5 MG/3ML; MG/3ML
3 SOLUTION RESPIRATORY (INHALATION) EVERY 6 HOURS PRN
Status: DISCONTINUED | OUTPATIENT
Start: 2019-12-16 | End: 2019-12-16 | Stop reason: HOSPADM

## 2019-12-16 RX ORDER — OXYCODONE AND ACETAMINOPHEN 5; 325 MG/1; MG/1
1 TABLET ORAL EVERY 4 HOURS PRN
Qty: 15 TABLET | Refills: 0 | Status: ON HOLD | OUTPATIENT
Start: 2019-12-16 | End: 2022-01-27 | Stop reason: HOSPADM

## 2019-12-16 RX ADMIN — FOLIC ACID 1 MG: 1 TABLET ORAL at 09:12

## 2019-12-16 RX ADMIN — DEXTROSE AND SODIUM CHLORIDE: 5; .45 INJECTION, SOLUTION INTRAVENOUS at 09:12

## 2019-12-16 RX ADMIN — IPRATROPIUM BROMIDE AND ALBUTEROL SULFATE 3 ML: .5; 3 SOLUTION RESPIRATORY (INHALATION) at 05:12

## 2019-12-16 RX ADMIN — HYDROMORPHONE HYDROCHLORIDE 1 MG: 2 INJECTION, SOLUTION INTRAMUSCULAR; INTRAVENOUS; SUBCUTANEOUS at 12:12

## 2019-12-16 RX ADMIN — HYDROMORPHONE HYDROCHLORIDE 1 MG: 2 INJECTION, SOLUTION INTRAMUSCULAR; INTRAVENOUS; SUBCUTANEOUS at 07:12

## 2019-12-16 RX ADMIN — NICOTINE 1 PATCH: 21 PATCH, EXTENDED RELEASE TRANSDERMAL at 09:12

## 2019-12-16 RX ADMIN — SENNOSIDES AND DOCUSATE SODIUM 1 TABLET: 8.6; 5 TABLET ORAL at 09:12

## 2019-12-16 RX ADMIN — DEXTROSE AND SODIUM CHLORIDE: 5; .45 INJECTION, SOLUTION INTRAVENOUS at 01:12

## 2019-12-16 RX ADMIN — FLUTICASONE FUROATE AND VILANTEROL TRIFENATATE 1 PUFF: 100; 25 POWDER RESPIRATORY (INHALATION) at 09:12

## 2019-12-16 RX ADMIN — HYDROMORPHONE HYDROCHLORIDE 1 MG: 2 INJECTION, SOLUTION INTRAMUSCULAR; INTRAVENOUS; SUBCUTANEOUS at 03:12

## 2019-12-16 RX ADMIN — DEXTROSE AND SODIUM CHLORIDE: 5; .45 INJECTION, SOLUTION INTRAVENOUS at 02:12

## 2019-12-16 RX ADMIN — KETOROLAC TROMETHAMINE 30 MG: 30 INJECTION, SOLUTION INTRAMUSCULAR; INTRAVENOUS at 09:12

## 2019-12-16 RX ADMIN — HYDROXYUREA 500 MG: 500 CAPSULE ORAL at 09:12

## 2019-12-16 NOTE — PROGRESS NOTES
1026 TN contacted Dr. Reed's office at [phone]; spoke with Janneth; scheduled on 01/03/19 at 10:50 AM. Janneth asked TN to remind pt of balance on his account. TN conveyed to pt.

## 2019-12-16 NOTE — H&P
[facility] Hospital Medicine  History & Physical    Patient Name: Katie Parham  MRN: [id number]  Admission Date: 12/15/2019  Attending Physician: Marco A Ernst MD   Primary Care Provider: Kieran Reed MD         Patient information was obtained from patient.     Subjective:     Principal Problem:Sickle cell pain crisis    Chief Complaint: Chest pain today.    HPI: Mr. Katie Parham is a 29 y.o. male with sickle cell disease, sickle cell anemia, asthma, and tobacco abuse who presents to [facility] ED with complaints of chest pain today.  The pain is localized to the left lower chest, is sharp in quality, and mild in intensity.  He also reports some lower back pain that is describes as achy and is 10/10 in severity at its worst.  He denies any trauma to these areas and says that these pains are typical of his pain crisis.  He also reports some wheezing and a non-productive cough but denies any fevers, chills, nausea, vomiting, hemoptysis, palpitations, diaphoresis, hemoptysis, nor any lower extremity pain or swelling.  He also denies any orthopnea nor PND, and hasn't had any sick contacts nor recent travel.  He denies any hip or shoulder pains and has not experienced any hematuria or priapism.      Chart Review:  Previous Hospitalizations  Date Hospital Diagnosis   Nov 2019 [facility] Pneumonia   Sept 2019 [facility] Bilateral lower extremity cellulitis   Aug 2019 [facility] Asthma exacerbation     Outpatient Follow-Up  Date of Visit Physician Service   Nov 2019 Chely Patrick NP Pulmonology     Past Medical History:   Diagnosis Date    Asthma     Broken thumb     Cigarette smoker     Sickle cell anemia     Sickle cell crisis        Past Surgical History:   Procedure Laterality Date    HAND SURGERY Left 12/21/2017    ORIF    ORIF mandible  01/31/2013    spleenectomy         Review of patient's allergies indicates:   Allergen Reactions    Penicillins Anaphylaxis       No current  facility-administered medications on file prior to encounter.      Current Outpatient Medications on File Prior to Encounter   Medication Sig    FOLIC ACID ORAL Take by mouth.    hydroxyurea (HYDREA) 500 mg Cap TK 1 C PO BID    oxyCODONE (ROXICODONE) 5 MG immediate release tablet Take 5 mg by mouth.    albuterol (ACCUNEB) 1.25 mg/3 mL Nebu Take 3 mLs (1.25 mg total) by nebulization every 6 (six) hours as needed. Rescue    albuterol (PROVENTIL/VENTOLIN HFA) 90 mcg/actuation inhaler Inhale 1-2 puffs into the lungs every 6 (six) hours as needed for Wheezing.    budesonide-formoterol 160-4.5 mcg (SYMBICORT) 160-4.5 mcg/actuation HFAA Inhale 2 puffs into the lungs every 12 (twelve) hours. Controller    calcium-vitamin D3 500 mg(1,250mg) -200 unit per tablet Take 1 tablet by mouth 2 (two) times daily with meals.    clindamycin (CLEOCIN) 150 MG capsule clindamycin HCl 150 mg capsule    ferrous sulfate (FEOSOL) 325 mg (65 mg iron) Tab tablet ferrous sulfate 325 mg (65 mg iron) tablet    guaiFENesin (MUCINEX) 600 mg 12 hr tablet Take 600 mg by mouth.    ibuprofen (ADVIL,MOTRIN) 600 MG tablet Take 1 tablet (600 mg total) by mouth every 6 (six) hours as needed for Pain. (Patient not taking: Reported on 11/26/2019)    montelukast (SINGULAIR) 10 mg tablet montelukast 10 mg tablet     Family History     Reviewed and noncontributory         Tobacco Use    Smoking status: Current Every Day Smoker     Packs/day: 0.50     Types: Cigarettes    Smokeless tobacco: Never Used    Tobacco comment: encouraged to quit.    Substance and Sexual Activity    Alcohol use: Not Currently     Comment: socially    Drug use: No    Sexual activity: Yes     Partners: Female     Birth control/protection: Condom     Review of Systems   Constitutional: Positive for activity change and appetite change. Negative for chills, diaphoresis, fatigue, fever and unexpected weight change.   HENT: Negative.    Eyes: Negative.    Respiratory:  Positive for cough, shortness of breath and wheezing. Negative for chest tightness.    Cardiovascular: Positive for chest pain. Negative for palpitations and leg swelling.   Gastrointestinal: Negative for abdominal distention, abdominal pain, blood in stool, constipation, diarrhea, nausea and vomiting.   Genitourinary: Negative for dysuria, hematuria and penile pain.   Musculoskeletal: Negative.    Skin: Negative.    Neurological: Negative for dizziness, seizures, syncope, weakness and light-headedness.   Psychiatric/Behavioral: Negative.      Objective:     Vital Signs (Most Recent):  Temp: 97.9 °F (36.6 °C) (12/16/19 0447)  Pulse: (!) 56 (12/16/19 0447)  Resp: 18 (12/16/19 0447)  BP: (!) 126/58 (12/16/19 0447)  SpO2: (!) 94 % (12/16/19 0447) Vital Signs (24h Range):  Temp:  [97.9 °F (36.6 °C)-98.2 °F (36.8 °C)] 97.9 °F (36.6 °C)  Pulse:  [56-77] 56  Resp:  [16-18] 18  SpO2:  [94 %-100 %] 94 %  BP: (123-144)/(58-85) 126/58     Weight: 62.8 kg (138 lb 7.2 oz)  Body mass index is 22.35 kg/m².    Physical Exam   Constitutional: He appears well-developed and well-nourished. No distress.   HENT:   Head: Normocephalic and atraumatic.   Right Ear: External ear normal.   Left Ear: External ear normal.   Nose: Nose normal.   Eyes: Right eye exhibits no discharge. Left eye exhibits no discharge.   Neck: Normal range of motion.   Cardiovascular: Normal rate, regular rhythm, normal heart sounds and intact distal pulses. Exam reveals no gallop and no friction rub.   No murmur heard.  Pulmonary/Chest:   Regular rate & rhythm with inspiratory and expiratory wheezing bilaterally, worse on the right   Abdominal: Soft. Bowel sounds are normal. He exhibits no distension. There is no tenderness. There is no rebound and no guarding.   Musculoskeletal: Normal range of motion. He exhibits no edema.   Neurological: He is alert.   Skin: Skin is warm and dry. He is not diaphoretic. No erythema.   Psychiatric: He has a normal mood and  affect. His behavior is normal. Judgment and thought content normal.   Nursing note and vitals reviewed.          Significant Labs: All pertinent labs within the past 24 hours have been reviewed.    Significant Imaging: I have reviewed and interpreted all pertinent imaging results/findings within the past 24 hours.    Assessment/Plan:     * Sickle cell pain crisis  Patient's presentation is consistent with acute pain crisis due to underlying sickle cell disease.  He has a reticulocytosis of 3.2% but interestingly does not have RBC sickling on peripheral smear.  Oxygen saturations are stable, and the chest X-ray, reviewed by me, is without any obvious signs of acute chest syndrome.  There are no focal neurological deficits, and the urinalysis is without blood.  There is no priapism.  Will start supportive with aggressive IV fluid hydration; supplemental oxygen; continue patient's home regimen of hydroxyurea; and provide analgesic support.    Patient being seen by Hematology at [facility] with Dr. 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  Will encourage continued follow-up.    Sickle cell disease  As addressed above.    Sickle cell anemia  As addressed above.    Mild asthma without complication  Clinically-stable without wheezing.  Will continue home regimen of ICS/LABA and have as-needed RENALDO/LAMA available.    Tobacco abuse  Patient was counseled on smoking cessation and he will be provided a nicotine transdermal patch applied while inpatient.  Will provide additional smoking cessation counseling prior to discharge.    VTE Risk Mitigation (From admission, onward)         Ordered     enoxaparin injection 40 mg  Daily      12/16/19 0225     IP VTE HIGH RISK PATIENT  Once      12/16/19 0225                   The patient will be placed in inpatient status.          Claudia Garcia M.D.  Staff Nocturnist  Department of [facility]  Medicine  [facility]  Pager: [phone]          N.B.: Portions of this note was dictated using M*Modal Fluency Direct--there may be voice recognition errors occasionally missed on review.

## 2019-12-16 NOTE — ED TRIAGE NOTES
Arrived c/o sharp lower back and chest pain starting around noon. Sickle cell hx. Last sickle crisis was about 2 months ago. No pain relief with home med. Pt looks very uncomfortable shaking with pain.

## 2019-12-16 NOTE — NURSING
Discharge instructions given to patient verbalized understanding. Patient refused wheelchair discharge. Patient ambulated  to the family vehicle. No distress noted.

## 2019-12-16 NOTE — DISCHARGE SUMMARY
[facility] Medicine  Discharge Summary      Patient Name: Katie Parham  MRN: [id number]  Admission Date: 12/15/2019  Hospital Length of Stay: 1 days  Discharge Date and Time:  12/16/2019 10:07 AM  Attending Physician: Marco A Ernst MD   Discharging Provider: Marco A Ernst MD  Primary Care Provider: Kieran Reed MD      HPI:   Mr. Katie Parham is a 29 y.o. male with sickle cell disease, sickle cell anemia, asthma, and tobacco abuse who presents to [facility] ED with complaints of chest pain today.  The pain is localized to the left lower chest, is sharp in quality, and mild in intensity.  He also reports some lower back pain that is describes as achy and is 10/10 in severity at its worst.  He denies any trauma to these areas and says that these pains are typical of his pain crisis.  He also reports some wheezing and a non-productive cough but denies any fevers, chills, nausea, vomiting, hemoptysis, palpitations, diaphoresis, hemoptysis, nor any lower extremity pain or swelling.  He also denies any orthopnea nor PND, and hasn't had any sick contacts nor recent travel.  He denies any hip or shoulder pains and has not experienced any hematuria or priapism.      * No surgery found *      Hospital Course:   Mr. Katie Parham is a 29 y.o. male with sickle cell disease, sickle cell anemia, asthma, and tobacco abuse who presents to [facility] ED with complaints of chest pain and throbbing back pain. He stated this was his sickle cell anemia flare.  He was admitted for treatment of this. However, I reviewed patient's peripheral smear with hematology and there are NO sickle cells. retic count only at 3.  Had a WBC count of 14- but on review of records, always seems to be elevated.  No other concerning labs or ROS.  He denies trauma to his back. He is not SOB. He was ok going home with some percocet and some muscle relaxer's.  Patient Hgb higher than typical sickle cell patient's. Im  "suspicious of this diagnosis.  The patient will be discharged to home today. Activity as tolerated. Diet regular. Follow up with PCP in one week    PLEASE CHECK A PERIPHERAL SMEAR FOR SICKLE CELLS BEFORE ADMITTING TO HOSPITAL WITH "SICKLE CELL CRISIS"     Consults:     No new Assessment & Plan notes have been filed under this hospital service since the last note was generated.  Service: Hospital Medicine    Final Active Diagnoses:    Diagnosis Date Noted POA    Sickle cell disease [D57.1] 12/16/2019 Yes     Chronic    Mild asthma without complication [J45.909] 09/05/2019 Yes     Chronic    Sickle cell anemia [D57.1] 08/18/2019 Yes     Chronic    Tobacco abuse [Z72.0] 08/18/2019 Yes     Chronic      Problems Resolved During this Admission:    Diagnosis Date Noted Date Resolved POA    PRINCIPAL PROBLEM:  Sickle cell pain crisis [D57.00] 12/15/2019 12/16/2019 Yes       Discharged Condition: good    Disposition: Home or Self Care    Follow Up:  Follow-up Information     Kieran Reed MD In 1 week.    Specialties:  Internal Medicine, Pediatrics  Contact information:  [facility]   [address]  [facility] [address]  [phone]                 Patient Instructions:   No discharge procedures on file.    Significant Diagnostic Studies:    Pending Diagnostic Studies:     Procedure Component Value Units Date/Time    Troponin I [[id number]]     Order Status:  Sent Lab Status:  No result     Specimen:  Blood          Medications:  Reconciled Home Medications:      Medication List      START taking these medications    cyclobenzaprine 10 MG tablet  Commonly known as:  FLEXERIL  Take 1 tablet (10 mg total) by mouth 3 (three) times daily as needed for Muscle spasms.     oxyCODONE-acetaminophen 5-325 mg per tablet  Commonly known as:  PERCOCET  Take 1 tablet by mouth every 4 (four) hours as needed for Pain.        CONTINUE taking these medications    * albuterol 90 mcg/actuation inhaler  Commonly known as:  " PROVENTIL/VENTOLIN HFA  Inhale 1-2 puffs into the lungs every 6 (six) hours as needed for Wheezing.     * albuterol 1.25 mg/3 mL Nebu  Commonly known as:  ACCUNEB  Take 3 mLs (1.25 mg total) by nebulization every 6 (six) hours as needed. Rescue     budesonide-formoterol 160-4.5 mcg 160-4.5 mcg/actuation Hfaa  Commonly known as:  Symbicort  Inhale 2 puffs into the lungs every 12 (twelve) hours. Controller     calcium-vitamin D3 500 mg(1,250mg) -200 unit per tablet  Commonly known as:  OS-JOÃO 500 + D3  Take 1 tablet by mouth 2 (two) times daily with meals.     clindamycin 150 MG capsule  Commonly known as:  CLEOCIN  clindamycin HCl 150 mg capsule     ferrous sulfate 325 mg (65 mg iron) Tab tablet  Commonly known as:  FEOSOL  ferrous sulfate 325 mg (65 mg iron) tablet     FOLIC ACID ORAL  Take by mouth.     guaiFENesin 600 mg 12 hr tablet  Commonly known as:  MUCINEX  Take 600 mg by mouth.     hydroxyurea 500 mg Cap  Commonly known as:  HYDREA  TK 1 C PO BID     ibuprofen 600 MG tablet  Commonly known as:  ADVIL,MOTRIN  Take 1 tablet (600 mg total) by mouth every 6 (six) hours as needed for Pain.     montelukast 10 mg tablet  Commonly known as:  SINGULAIR  montelukast 10 mg tablet     oxyCODONE 5 MG immediate release tablet  Commonly known as:  ROXICODONE  Take 5 mg by mouth.         * This list has 2 medication(s) that are the same as other medications prescribed for you. Read the directions carefully, and ask your doctor or other care provider to review them with you.                Indwelling Lines/Drains at time of discharge:   Lines/Drains/Airways     None                 Time spent on the discharge of patient: < 30 minutes  Patient was seen and examined on the date of discharge and determined to be suitable for discharge.         Marco A Figueroa MD  Department of Hospital Medicine  [facility]

## 2019-12-16 NOTE — ED PROVIDER NOTES
Encounter Date: 12/15/2019    SCRIBE #1 NOTE: I, Shayla De La Torre, am scribing for, and in the presence of,  Vern Ellis MD. I have scribed the following portions of the note - Other sections scribed: HPI, ROS, Physical Exam.       History     Chief Complaint   Patient presents with    Chest Pain     The patient reports an 7/10, intermittent, sharp, mid sternal chest pains that started this after noon while lying down. The Patient also reports lower back pain that started this afternoon. Denies sob, lightheadedness, weakness. Patient reports that this feels like a sickle cell crisis    Back Pain     29 year old male patient with a past medical history of sickle cell anemia presents to the ED complaining of 7/10 chest and back pain onset this afternoon. The patient reports that this chest and back pain feels similar to his previous episodes of sickle cell pain crisis. He reports that his last pain crisis was 1-2 months ago. He denies any shortness of breath, hemoptysis, or abdominal pain. He reports a past medical history of asthma, but states that his current symptoms do not feel like his previous asthma attacks. He reports a past surgical history of splenectomy, jaw surgery, and hand surgery. He reports compliance with his medications today, but states that his normal pain medications did not alleviate his chest and back pain. He reports tobacco use, but denies any alcohol or drug use.    The history is provided by the patient.     Review of patient's allergies indicates:   Allergen Reactions    Penicillins Anaphylaxis     Past Medical History:   Diagnosis Date    Asthma     Broken thumb     Cigarette smoker     Sickle cell anemia     Sickle cell crisis      Past Surgical History:   Procedure Laterality Date    HAND SURGERY Left 12/21/2017    ORIF    ORIF mandible  01/31/2013    spleenectomy       History reviewed. No pertinent family history.  Social History     Tobacco Use    Smoking status:  Current Every Day Smoker     Packs/day: 0.50     Types: Cigarettes    Smokeless tobacco: Never Used    Tobacco comment: encouraged to quit.    Substance Use Topics    Alcohol use: Not Currently     Comment: socially    Drug use: No     Review of Systems   Constitutional: Negative for fever.   HENT: Negative for sore throat.    Eyes: Negative for visual disturbance.   Respiratory: Negative for cough and shortness of breath.    Cardiovascular: Positive for chest pain.   Gastrointestinal: Negative for abdominal pain.   Endocrine: Negative for polydipsia.   Genitourinary: Negative for dysuria.   Musculoskeletal: Positive for back pain.   Skin: Negative for rash.   Neurological: Negative for headaches.   Hematological: Negative for adenopathy.   Psychiatric/Behavioral: Negative for confusion.       Physical Exam     Initial Vitals [12/15/19 2002]   BP Pulse Resp Temp SpO2   127/82 77 16 98.2 °F (36.8 °C) 100 %      MAP       --         Physical Exam    Nursing note and vitals reviewed.  Constitutional: He appears well-developed and well-nourished. He is not diaphoretic. No distress.   Patient appears uncomfortable.   HENT:   Head: Normocephalic and atraumatic.   Eyes: Conjunctivae and EOM are normal. Pupils are equal, round, and reactive to light. No scleral icterus.   Neck: Normal range of motion. Neck supple.   Cardiovascular: Normal rate, regular rhythm, normal heart sounds and intact distal pulses. Exam reveals no gallop and no friction rub.    No murmur heard.  Pulmonary/Chest: Breath sounds normal. No stridor. No respiratory distress. He has no wheezes. He has no rhonchi. He has no rales.   Abdominal: Soft. Bowel sounds are normal. He exhibits no distension. There is no tenderness. There is no rebound and no guarding.   Musculoskeletal: Normal range of motion. He exhibits no edema or tenderness.   Neurological: He is alert and oriented to person, place, and time. He has normal strength. No cranial nerve  deficit.   Skin: Skin is warm and dry. No rash noted.   Psychiatric: He has a normal mood and affect. His behavior is normal.         ED Course   Procedures  Labs Reviewed   CBC W/ AUTO DIFFERENTIAL - Abnormal; Notable for the following components:       Result Value    RBC 4.14 (*)     Hemoglobin 12.0 (*)     Hematocrit 33.8 (*)     RDW 16.9 (*)     Platelets 605 (*)     Mono # 1.1 (*)     Eos # 2.0 (*)     Gran% 36.0 (*)     Eosinophil% 16.2 (*)     All other components within normal limits   COMPREHENSIVE METABOLIC PANEL - Abnormal; Notable for the following components:    BUN, Bld 5 (*)     Total Bilirubin 1.2 (*)     Anion Gap 7 (*)     All other components within normal limits   RETICULOCYTES - Abnormal; Notable for the following components:    Retic 3.2 (*)     All other components within normal limits   URINALYSIS, REFLEX TO URINE CULTURE - Abnormal; Notable for the following components:    Urobilinogen, UA 2.0-3.0 (*)     All other components within normal limits    Narrative:     Preferred Collection Type->Urine, Clean Catch          Imaging Results          X-Ray Chest PA And Lateral (Final result)  Result time 12/15/19 21:06:44    Final result by Maik Arreaga MD (12/15/19 21:06:44)                 Impression:      Significant interval improvement when compared to the prior study however there is a small rounded opacity at the left suprahilar level for which follow-up is recommended as discussed above.    Mild bilateral pattern of interstitial infiltrate.      Electronically signed by: Maik Arreaga  Date:    12/15/2019  Time:    21:06             Narrative:    EXAMINATION:  XR CHEST PA AND LATERAL    CLINICAL HISTORY:  Hb-SS disease with crisis, unspecified    TECHNIQUE:  PA and lateral views of the chest were performed.    COMPARISON:  Prior examinations, most recent is November 12, 2019    FINDINGS:  Two views of the chest are submitted. There is interval improvement when compared to the prior  study, previously identified area of abnormal opacity involving the medial left upper lobe and suprahilar region is predominantly resolved.  There is a round to oval nodular focus at the left suprahilar region noted, projected over the medial aspect of the left 7th rib.  This may relate to pulmonary vascular however does appears somewhat rounded in configuration, and a small residual area of nodular density, to include pulmonary nodule would be difficult to exclude.  Continued follow-up is recommended.    There is appearance suggesting mild bilateral interstitial infiltrate, there is no additional evidence for dense consolidation, significant pleural effusion or pneumothorax.  The osseous structures appear intact.                                 Medical Decision Making:   Initial Assessment:   29-year-old male with history of sickle cell disease presenting with chest pain and back pain similar to prior sickle cell crises.  Patient uncomfortable appearing on exam, shaking foot and apparent pain. Vitals unremarkable.  Exam relatively unremarkable aside from discomfort.  Labs normal or at baseline.  Retic count 3.2.  Chest x-ray without definite evidence of pneumonia, consolidation.  Likely resolution from prior episode of pneumonia.  Patient requiring multiple doses of pain medication.  Will admit to Hospital Medicine for further evaluation and treatment of sickle cell pain crisis.            Scribe Attestation:   Scribe #1: I performed the above scribed service and the documentation accurately describes the services I performed. I attest to the accuracy of the note.                          Clinical Impression:       ICD-10-CM ICD-9-CM   1. Sickle cell pain crisis D57.00 282.62   2. Chest pain R07.9 786.50         Disposition:   Disposition: Admitted  Condition: Stable    Scribe Attestation: I, Vern Ellis, personally performed the services described in this documentation. All medical record entries made by  the scribe were at my direction and in my presence. I have reviewed the chart and agree that the record reflects my personal performance and is accurate and complete.                  Vern Ellis MD  12/16/19 0356

## 2019-12-16 NOTE — CARE UPDATE
"Reviewed H and P by my colleague and agree with A and P with this exception- patient evidently presenting with "sickle cell pain crisis" although his peripheral smear shows no sickling. So this is not a sickle cell pain crisis.  Patient presented with CP. EKG negative.  Will check a trop. X 1. If negative- will d/c to home.     "

## 2019-12-16 NOTE — ASSESSMENT & PLAN NOTE
Patient's presentation is consistent with acute pain crisis due to underlying sickle cell disease.  He has a reticulocytosis of 3.2% but interestingly does not have RBC sickling on peripheral smear.  Oxygen saturations are stable, and the chest X-ray, reviewed by me, is without any obvious signs of acute chest syndrome.  There are no focal neurological deficits, and the urinalysis is without blood.  There is no priapism.  Will start supportive with aggressive IV fluid hydration; supplemental oxygen; continue patient's home regimen of hydroxyurea; and provide analgesic support.    Patient being seen by Hematology at [facility] with Dr. 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  Will encourage continued follow-up.

## 2019-12-16 NOTE — PLAN OF CARE
12/16/19 1256   Final Note   Assessment Type Final Discharge Note   Anticipated Discharge Disposition Home   What phone number can be called within the next 1-3 days to see how you are doing after discharge?   (Listed in chart)   Hospital Follow Up  Appt(s) scheduled? Yes   Discharge plans and expectations educations in teach back method with documentation complete? Yes   Right Care Referral Info   Post Acute Recommendation No Care     [place] informed floor nurse, Keli, care management is complete and can proceed with discharge teaching.

## 2019-12-16 NOTE — HPI
Mr. Katie Parham is a 29 y.o. male with sickle cell disease, sickle cell anemia, asthma, and tobacco abuse who presents to [facility] ED with complaints of chest pain today.  The pain is localized to the left lower chest, is sharp in quality, and mild in intensity.  He also reports some lower back pain that is describes as achy and is 10/10 in severity at its worst.  He denies any trauma to these areas and says that these pains are typical of his pain crisis.  He also reports some wheezing and a non-productive cough but denies any fevers, chills, nausea, vomiting, hemoptysis, palpitations, diaphoresis, hemoptysis, nor any lower extremity pain or swelling.  He also denies any orthopnea nor PND, and hasn't had any sick contacts nor recent travel.  He denies any hip or shoulder pains and has not experienced any hematuria or priapism.

## 2019-12-16 NOTE — SUBJECTIVE & OBJECTIVE
Past Medical History:   Diagnosis Date    Asthma     Broken thumb     Cigarette smoker     Sickle cell anemia     Sickle cell crisis        Past Surgical History:   Procedure Laterality Date    HAND SURGERY Left 12/21/2017    ORIF    ORIF mandible  01/31/2013    spleenectomy         Review of patient's allergies indicates:   Allergen Reactions    Penicillins Anaphylaxis       No current facility-administered medications on file prior to encounter.      Current Outpatient Medications on File Prior to Encounter   Medication Sig    FOLIC ACID ORAL Take by mouth.    hydroxyurea (HYDREA) 500 mg Cap TK 1 C PO BID    oxyCODONE (ROXICODONE) 5 MG immediate release tablet Take 5 mg by mouth.    albuterol (ACCUNEB) 1.25 mg/3 mL Nebu Take 3 mLs (1.25 mg total) by nebulization every 6 (six) hours as needed. Rescue    albuterol (PROVENTIL/VENTOLIN HFA) 90 mcg/actuation inhaler Inhale 1-2 puffs into the lungs every 6 (six) hours as needed for Wheezing.    budesonide-formoterol 160-4.5 mcg (SYMBICORT) 160-4.5 mcg/actuation HFAA Inhale 2 puffs into the lungs every 12 (twelve) hours. Controller    calcium-vitamin D3 500 mg(1,250mg) -200 unit per tablet Take 1 tablet by mouth 2 (two) times daily with meals.    clindamycin (CLEOCIN) 150 MG capsule clindamycin HCl 150 mg capsule    ferrous sulfate (FEOSOL) 325 mg (65 mg iron) Tab tablet ferrous sulfate 325 mg (65 mg iron) tablet    guaiFENesin (MUCINEX) 600 mg 12 hr tablet Take 600 mg by mouth.    ibuprofen (ADVIL,MOTRIN) 600 MG tablet Take 1 tablet (600 mg total) by mouth every 6 (six) hours as needed for Pain. (Patient not taking: Reported on 11/26/2019)    montelukast (SINGULAIR) 10 mg tablet montelukast 10 mg tablet     Family History     Reviewed and noncontributory         Tobacco Use    Smoking status: Current Every Day Smoker     Packs/day: 0.50     Types: Cigarettes    Smokeless tobacco: Never Used    Tobacco comment: encouraged to quit.    Substance and  Sexual Activity    Alcohol use: Not Currently     Comment: socially    Drug use: No    Sexual activity: Yes     Partners: Female     Birth control/protection: Condom     Review of Systems   Constitutional: Positive for activity change and appetite change. Negative for chills, diaphoresis, fatigue, fever and unexpected weight change.   HENT: Negative.    Eyes: Negative.    Respiratory: Positive for cough, shortness of breath and wheezing. Negative for chest tightness.    Cardiovascular: Positive for chest pain. Negative for palpitations and leg swelling.   Gastrointestinal: Negative for abdominal distention, abdominal pain, blood in stool, constipation, diarrhea, nausea and vomiting.   Genitourinary: Negative for dysuria, hematuria and penile pain.   Musculoskeletal: Negative.    Skin: Negative.    Neurological: Negative for dizziness, seizures, syncope, weakness and light-headedness.   Psychiatric/Behavioral: Negative.      Objective:     Vital Signs (Most Recent):  Temp: 97.9 °F (36.6 °C) (12/16/19 0447)  Pulse: (!) 56 (12/16/19 0447)  Resp: 18 (12/16/19 0447)  BP: (!) 126/58 (12/16/19 0447)  SpO2: (!) 94 % (12/16/19 0447) Vital Signs (24h Range):  Temp:  [97.9 °F (36.6 °C)-98.2 °F (36.8 °C)] 97.9 °F (36.6 °C)  Pulse:  [56-77] 56  Resp:  [16-18] 18  SpO2:  [94 %-100 %] 94 %  BP: (123-144)/(58-85) 126/58     Weight: 62.8 kg (138 lb 7.2 oz)  Body mass index is 22.35 kg/m².    Physical Exam   Constitutional: He appears well-developed and well-nourished. No distress.   HENT:   Head: Normocephalic and atraumatic.   Right Ear: External ear normal.   Left Ear: External ear normal.   Nose: Nose normal.   Eyes: Right eye exhibits no discharge. Left eye exhibits no discharge.   Neck: Normal range of motion.   Cardiovascular: Normal rate, regular rhythm, normal heart sounds and intact distal pulses. Exam reveals no gallop and no friction rub.   No murmur heard.  Pulmonary/Chest:   Regular rate & rhythm with inspiratory and  expiratory wheezing bilaterally, worse on the right   Abdominal: Soft. Bowel sounds are normal. He exhibits no distension. There is no tenderness. There is no rebound and no guarding.   Musculoskeletal: Normal range of motion. He exhibits no edema.   Neurological: He is alert.   Skin: Skin is warm and dry. He is not diaphoretic. No erythema.   Psychiatric: He has a normal mood and affect. His behavior is normal. Judgment and thought content normal.   Nursing note and vitals reviewed.          Significant Labs: All pertinent labs within the past 24 hours have been reviewed.    Significant Imaging: I have reviewed and interpreted all pertinent imaging results/findings within the past 24 hours.

## 2019-12-16 NOTE — PROGRESS NOTES
Follow-up Information     Kieran Reed MD On 1/3/2020.    Specialties:  Internal Medicine, Pediatrics  Why:  Outpatient Services PCP Follow-Up Friday at 10:50 AM  Contact information:  [address]  [address]  [facility] [address]  [phone]               PLEASE BRING TO ALL FOLLOW UP APPOINTMENTS:   1) A COPY YOUR DISCHARGE INSTRUCTIONS   2) ALL MEDICINES YOU ARE CURRENTLY TAKING IN THEIR ORIGINAL BOTTLES   3) IDENTIFICATION CARD   4) INSURANCE CARD    **PLEASE ARRIVE 15 MINUTES AHEAD OF SCHEDULED APPOINTMENT TIME   ++PLEASE CALL 24 HOURS IN ADVANCE IF YOU MUST RESCHEDULE YOUR APPOINTMENT DAY AND/OR TIME     [facility] CARE MANAGEMENT WRITTEN DISCHARGE INFORMATION    APPOINTMENTS AND RESOURCES TO HELP YOU MANAGE YOUR CARE AT HOME BASED ON YOUR PREFERENCES:  (If an appointment is not scheduled for you when you leave the hospital, call your doctor to schedule a follow up visit within a week)        Healthy Living Instructions to HELP MANAGE YOUR CARE AT HOME:  Things You are responsible for:  1.    Getting your prescriptions filled   2.    Taking your medications as directed, DO NOT MISS ANY DOSES!  3.    Following the diet and exercise recommended by your doctor  4.    Going to your follow-up doctor appointment. This is important because it allows the doctor to monitor your progress and determine if any changes need to made to your treatment plan.  5. If you have any questions about MANAGING YOUR CARE AT HOME Call the Nurse Care Line for 24/7 Assistance [phone]       Please answer any calls you may receive from [facility]. We want to continue to support you as you manage your healthcare needs. [facility] is happy to have the opportunity to serve you.      Thank you for choosing [facility] for your healthcare needs!  Your [facility] Case Management Team,    Asha Dumont, RN [place]  Registered Nurse Transition Navigator  [phone]

## 2019-12-16 NOTE — HOSPITAL COURSE
Mr. Katie Parham is a 29 y.o. male with sickle cell disease, sickle cell anemia, asthma, and tobacco abuse who presents to [facility] ED with complaints of chest pain and throbbing back pain. He stated this was his sickle cell anemia flare.  He was admitted for treatment of this. However, I reviewed patient's peripheral smear with hematology and there are NO sickle cells. retic count only at 3.  Had a WBC count of 14- but on review of records, always seems to be elevated.  No other concerning labs or ROS.  He denies trauma to his back. He is not SOB. He was ok going home with some percocet and some muscle relaxer's.  Patient Hgb higher than typical sickle cell patient's. Im suspicious of this diagnosis.  The patient will be discharged to home today. Activity as tolerated. Diet regular. Follow up with PCP in one week

## 2019-12-16 NOTE — ASSESSMENT & PLAN NOTE
Clinically-stable without wheezing.  Will continue home regimen of ICS/LABA and have as-needed RENALDO/LAMA available.

## 2019-12-16 NOTE — PLAN OF CARE
Problem: Fall Injury Risk  Goal: Absence of Fall and Fall-Related Injury  Outcome: Ongoing, Progressing     Problem: Adult Inpatient Plan of Care  Goal: Plan of Care Review  Outcome: Ongoing, Progressing     Problem: Adult Inpatient Plan of Care  Goal: Patient-Specific Goal (Individualization)  Outcome: Ongoing, Progressing     Problem: Adult Inpatient Plan of Care  Goal: Absence of Hospital-Acquired Illness or Injury  Outcome: Ongoing, Progressing     Problem: Adult Inpatient Plan of Care  Goal: Optimal Comfort and Wellbeing  Outcome: Ongoing, Progressing     Problem: Adult Inpatient Plan of Care  Goal: Readiness for Transition of Care  Outcome: Ongoing, Progressing     Problem: Skin Injury Risk Increased  Goal: Skin Health and Integrity  Outcome: Ongoing, Progressing

## 2020-01-13 LAB
ACID FAST MOD KINY STN SPEC: NORMAL
MYCOBACTERIUM SPEC QL CULT: NORMAL

## 2020-01-15 ENCOUNTER — HOSPITAL ENCOUNTER (EMERGENCY)
Facility: HOSPITAL | Age: 30
Discharge: HOME | End: 2020-01-15
Attending: EMERGENCY MEDICINE
Payer: MEDICAID

## 2020-01-15 VITALS
BODY MASS INDEX: 24.11 KG/M2 | SYSTOLIC BLOOD PRESSURE: 116 MMHG | HEIGHT: 66 IN | HEART RATE: 75 BPM | WEIGHT: 150 LBS | DIASTOLIC BLOOD PRESSURE: 59 MMHG | RESPIRATION RATE: 18 BRPM | TEMPERATURE: 98 F | OXYGEN SATURATION: 97 %

## 2020-01-15 DIAGNOSIS — J06.9 UPPER RESPIRATORY TRACT INFECTION, UNSPECIFIED TYPE: Primary | ICD-10-CM

## 2020-01-15 DIAGNOSIS — R07.9 CHEST PAIN: ICD-10-CM

## 2020-01-15 LAB
ALBUMIN SERPL BCP-MCNC: 4.2 G/DL (ref 3.5–5.2)
ALP SERPL-CCNC: 76 U/L (ref 55–135)
ALT SERPL W/O P-5'-P-CCNC: 29 U/L (ref 10–44)
ANION GAP SERPL CALC-SCNC: 7 MMOL/L (ref 8–16)
ANISOCYTOSIS BLD QL SMEAR: SLIGHT
AST SERPL-CCNC: 21 U/L (ref 10–40)
BASOPHILS # BLD AUTO: 0.14 K/UL (ref 0–0.2)
BASOPHILS NFR BLD: 1.2 % (ref 0–1.9)
BILIRUB SERPL-MCNC: 1.1 MG/DL (ref 0.1–1)
BNP SERPL-MCNC: <10 PG/ML (ref 0–99)
BUN SERPL-MCNC: 5 MG/DL (ref 6–20)
CALCIUM SERPL-MCNC: 9.5 MG/DL (ref 8.7–10.5)
CHLORIDE SERPL-SCNC: 104 MMOL/L (ref 95–110)
CO2 SERPL-SCNC: 28 MMOL/L (ref 23–29)
CREAT SERPL-MCNC: 0.8 MG/DL (ref 0.5–1.4)
CTP QC/QA: YES
DIFFERENTIAL METHOD: ABNORMAL
EOSINOPHIL # BLD AUTO: 1.7 K/UL (ref 0–0.5)
EOSINOPHIL NFR BLD: 14.4 % (ref 0–8)
ERYTHROCYTE [DISTWIDTH] IN BLOOD BY AUTOMATED COUNT: 15.9 % (ref 11.5–14.5)
EST. GFR  (AFRICAN AMERICAN): >60 ML/MIN/1.73 M^2
EST. GFR  (NON AFRICAN AMERICAN): >60 ML/MIN/1.73 M^2
GLUCOSE SERPL-MCNC: 89 MG/DL (ref 70–110)
HCT VFR BLD AUTO: 34.8 % (ref 40–54)
HGB BLD-MCNC: 12.4 G/DL (ref 14–18)
IMM GRANULOCYTES # BLD AUTO: 0.03 K/UL (ref 0–0.04)
IMM GRANULOCYTES NFR BLD AUTO: 0.3 % (ref 0–0.5)
INR PPP: 1.1 (ref 0.8–1.2)
LYMPHOCYTES # BLD AUTO: 3.4 K/UL (ref 1–4.8)
LYMPHOCYTES NFR BLD: 28.7 % (ref 18–48)
MCH RBC QN AUTO: 29.9 PG (ref 27–31)
MCHC RBC AUTO-ENTMCNC: 35.6 G/DL (ref 32–36)
MCV RBC AUTO: 84 FL (ref 82–98)
MONOCYTES # BLD AUTO: 1.4 K/UL (ref 0.3–1)
MONOCYTES NFR BLD: 11.7 % (ref 4–15)
NEUTROPHILS # BLD AUTO: 5.2 K/UL (ref 1.8–7.7)
NEUTROPHILS NFR BLD: 43.7 % (ref 38–73)
NRBC BLD-RTO: 0 /100 WBC
OVALOCYTES BLD QL SMEAR: ABNORMAL
PLATELET # BLD AUTO: 644 K/UL (ref 150–350)
PLATELET BLD QL SMEAR: ABNORMAL
PMV BLD AUTO: 9.9 FL (ref 9.2–12.9)
POC MOLECULAR INFLUENZA A AGN: NEGATIVE
POC MOLECULAR INFLUENZA B AGN: NEGATIVE
POTASSIUM SERPL-SCNC: 4.1 MMOL/L (ref 3.5–5.1)
PROT SERPL-MCNC: 7.8 G/DL (ref 6–8.4)
PROTHROMBIN TIME: 11 SEC (ref 9–12.5)
RBC # BLD AUTO: 4.15 M/UL (ref 4.6–6.2)
RETICS/RBC NFR AUTO: 4.7 % (ref 0.4–2)
SODIUM SERPL-SCNC: 139 MMOL/L (ref 136–145)
STOMATOCYTES BLD QL SMEAR: PRESENT
TARGETS BLD QL SMEAR: ABNORMAL
TROPONIN I SERPL DL<=0.01 NG/ML-MCNC: <0.006 NG/ML (ref 0–0.03)
WBC # BLD AUTO: 11.93 K/UL (ref 3.9–12.7)

## 2020-01-15 PROCEDURE — 85045 AUTOMATED RETICULOCYTE COUNT: CPT

## 2020-01-15 PROCEDURE — 96361 HYDRATE IV INFUSION ADD-ON: CPT

## 2020-01-15 PROCEDURE — 96375 TX/PRO/DX INJ NEW DRUG ADDON: CPT | Mod: 59

## 2020-01-15 PROCEDURE — 93010 EKG 12-LEAD: ICD-10-PCS | Mod: ,,, | Performed by: INTERNAL MEDICINE

## 2020-01-15 PROCEDURE — 94640 AIRWAY INHALATION TREATMENT: CPT

## 2020-01-15 PROCEDURE — 93005 ELECTROCARDIOGRAM TRACING: CPT

## 2020-01-15 PROCEDURE — 25000242 PHARM REV CODE 250 ALT 637 W/ HCPCS: Performed by: EMERGENCY MEDICINE

## 2020-01-15 PROCEDURE — 85025 COMPLETE CBC W/AUTO DIFF WBC: CPT

## 2020-01-15 PROCEDURE — 85610 PROTHROMBIN TIME: CPT

## 2020-01-15 PROCEDURE — 63600175 PHARM REV CODE 636 W HCPCS: Performed by: EMERGENCY MEDICINE

## 2020-01-15 PROCEDURE — 84484 ASSAY OF TROPONIN QUANT: CPT

## 2020-01-15 PROCEDURE — 99285 EMERGENCY DEPT VISIT HI MDM: CPT | Mod: 25

## 2020-01-15 PROCEDURE — 96376 TX/PRO/DX INJ SAME DRUG ADON: CPT | Mod: 59

## 2020-01-15 PROCEDURE — 83880 ASSAY OF NATRIURETIC PEPTIDE: CPT

## 2020-01-15 PROCEDURE — 87502 INFLUENZA DNA AMP PROBE: CPT

## 2020-01-15 PROCEDURE — 93010 ELECTROCARDIOGRAM REPORT: CPT | Mod: ,,, | Performed by: INTERNAL MEDICINE

## 2020-01-15 PROCEDURE — 25000003 PHARM REV CODE 250: Performed by: EMERGENCY MEDICINE

## 2020-01-15 PROCEDURE — 96374 THER/PROPH/DIAG INJ IV PUSH: CPT

## 2020-01-15 PROCEDURE — 80053 COMPREHEN METABOLIC PANEL: CPT

## 2020-01-15 RX ORDER — HYDROMORPHONE HYDROCHLORIDE 2 MG/ML
1 INJECTION, SOLUTION INTRAMUSCULAR; INTRAVENOUS; SUBCUTANEOUS
Status: COMPLETED | OUTPATIENT
Start: 2020-01-15 | End: 2020-01-15

## 2020-01-15 RX ORDER — NAPROXEN SODIUM 220 MG/1
324 TABLET, FILM COATED ORAL
Status: DISCONTINUED | OUTPATIENT
Start: 2020-01-15 | End: 2020-01-15

## 2020-01-15 RX ORDER — DIPHENHYDRAMINE HYDROCHLORIDE 50 MG/ML
25 INJECTION INTRAMUSCULAR; INTRAVENOUS
Status: COMPLETED | OUTPATIENT
Start: 2020-01-15 | End: 2020-01-15

## 2020-01-15 RX ORDER — KETOROLAC TROMETHAMINE 30 MG/ML
15 INJECTION, SOLUTION INTRAMUSCULAR; INTRAVENOUS
Status: COMPLETED | OUTPATIENT
Start: 2020-01-15 | End: 2020-01-15

## 2020-01-15 RX ORDER — OXYCODONE HYDROCHLORIDE 5 MG/1
5 TABLET ORAL
Status: COMPLETED | OUTPATIENT
Start: 2020-01-15 | End: 2020-01-15

## 2020-01-15 RX ORDER — ALBUTEROL SULFATE 90 UG/1
1-2 AEROSOL, METERED RESPIRATORY (INHALATION) EVERY 6 HOURS PRN
Qty: 1 INHALER | Refills: 0 | Status: SHIPPED | OUTPATIENT
Start: 2020-01-15 | End: 2021-01-14

## 2020-01-15 RX ORDER — ALBUTEROL SULFATE 2.5 MG/.5ML
5 SOLUTION RESPIRATORY (INHALATION)
Status: COMPLETED | OUTPATIENT
Start: 2020-01-15 | End: 2020-01-15

## 2020-01-15 RX ADMIN — HYDROMORPHONE HYDROCHLORIDE 1 MG: 2 INJECTION, SOLUTION INTRAMUSCULAR; INTRAVENOUS; SUBCUTANEOUS at 01:01

## 2020-01-15 RX ADMIN — ALBUTEROL SULFATE 5 MG: 2.5 SOLUTION RESPIRATORY (INHALATION) at 12:01

## 2020-01-15 RX ADMIN — DIPHENHYDRAMINE HYDROCHLORIDE 25 MG: 50 INJECTION INTRAMUSCULAR; INTRAVENOUS at 12:01

## 2020-01-15 RX ADMIN — KETOROLAC TROMETHAMINE 15 MG: 30 INJECTION, SOLUTION INTRAMUSCULAR at 02:01

## 2020-01-15 RX ADMIN — HYDROMORPHONE HYDROCHLORIDE 1 MG: 2 INJECTION, SOLUTION INTRAMUSCULAR; INTRAVENOUS; SUBCUTANEOUS at 12:01

## 2020-01-15 RX ADMIN — SODIUM CHLORIDE 1000 ML: 0.9 INJECTION, SOLUTION INTRAVENOUS at 12:01

## 2020-01-15 RX ADMIN — OXYCODONE HYDROCHLORIDE 5 MG: 5 TABLET ORAL at 04:01

## 2020-01-15 RX ADMIN — HYDROMORPHONE HYDROCHLORIDE 1 MG: 2 INJECTION, SOLUTION INTRAMUSCULAR; INTRAVENOUS; SUBCUTANEOUS at 02:01

## 2020-01-15 NOTE — ED PROVIDER NOTES
Encounter Date: 1/15/2020    SCRIBE #1 NOTE: I, Shayla De La Torre, am scribing for, and in the presence of,  Sreekanth Graff MD. I have scribed the following portions of the note - Other sections scribed: HPI, ROS, PE, Initial Assessment.       History     Chief Complaint   Patient presents with    Chest Pain     chest pain that radites to the back begining today. Pt reports PMHX of sickle cell     29 year old male patient with a past medical history of Sickle Cell Anemia presents to the ED complaining of chest pain onset this morning. He also complains of congestion, productive cough, wheezing, and back pain. He reports that the back pain is similar to his last sickle cell pain crisis 1 month ago, but reports that the chest pain is not normal for him. He states that he reported to this ED for his last sickle cell pain crisis. He denies any fever, abdominal pain, nausea, or emesis. He denies any recent sick contact. He reports that he sees a hematologist at [facility].    The history is provided by the patient.     Review of patient's allergies indicates:   Allergen Reactions    Penicillins Anaphylaxis     Past Medical History:   Diagnosis Date    Asthma     Broken thumb     Cigarette smoker     Sickle cell anemia     Sickle cell crisis      Past Surgical History:   Procedure Laterality Date    HAND SURGERY Left 12/21/2017    ORIF    ORIF mandible  01/31/2013    spleenectomy       History reviewed. No pertinent family history.  Social History     Tobacco Use    Smoking status: Current Every Day Smoker     Packs/day: 0.50     Types: Cigarettes    Smokeless tobacco: Never Used    Tobacco comment: encouraged to quit.    Substance Use Topics    Alcohol use: Not Currently     Comment: socially    Drug use: No     Review of Systems   Constitutional: Negative.    HENT: Positive for congestion.    Eyes: Negative.    Respiratory: Positive for cough (Productive) and wheezing.    Cardiovascular:  Positive for chest pain.   Gastrointestinal: Negative.    Genitourinary: Negative.    Musculoskeletal: Positive for back pain.   Skin: Negative.    Neurological: Negative.        Physical Exam     Initial Vitals [01/15/20 1028]   BP Pulse Resp Temp SpO2   112/70 79 17 98 °F (36.7 °C) 98 %      MAP       --         Physical Exam    Nursing note and vitals reviewed.  Constitutional: He appears well-developed and well-nourished. He is not diaphoretic. No distress.   HENT:   Head: Normocephalic and atraumatic.   Eyes: Conjunctivae and EOM are normal. No scleral icterus.   Neck: Normal range of motion. Neck supple.   Cardiovascular: Normal heart sounds and intact distal pulses.   Pulmonary/Chest: Breath sounds normal. No stridor. No respiratory distress.   Abdominal: Soft. He exhibits no distension.   Musculoskeletal: Normal range of motion. He exhibits tenderness (Diffuse tenderness to bilateral lower extremities). He exhibits no edema.   Neurological: He is alert and oriented to person, place, and time.   Skin: Skin is warm and dry. No rash noted.   Psychiatric: He has a normal mood and affect. His behavior is normal.         ED Course   Procedures  Labs Reviewed   CBC W/ AUTO DIFFERENTIAL - Abnormal; Notable for the following components:       Result Value    RBC 4.15 (*)     Hemoglobin 12.4 (*)     Hematocrit 34.8 (*)     RDW 15.9 (*)     Platelets 644 (*)     Mono # 1.4 (*)     Eos # 1.7 (*)     Eosinophil% 14.4 (*)     Platelet Estimate Increased (*)     All other components within normal limits   COMPREHENSIVE METABOLIC PANEL - Abnormal; Notable for the following components:    BUN, Bld 5 (*)     Total Bilirubin 1.1 (*)     Anion Gap 7 (*)     All other components within normal limits   RETICULOCYTES - Abnormal; Notable for the following components:    Retic 4.7 (*)     All other components within normal limits   B-TYPE NATRIURETIC PEPTIDE   TROPONIN I   PROTIME-INR   POCT INFLUENZA A/B MOLECULAR        ECG  Results          EKG 12-lead (Final result)  Result time 01/15/20 17:09:52    Final result by Interface, Lab In [facility] (01/15/20 17:09:52)                 Narrative:    Test Reason : R07.9,    Vent. Rate : 075 BPM     Atrial Rate : 075 BPM     P-R Int : 180 ms          QRS Dur : 094 ms      QT Int : 386 ms       P-R-T Axes : 082 051 056 degrees     QTc Int : 431 ms    Normal sinus rhythm  Possible Left atrial enlargement  Early repolarization  Borderline Abnormal ECG  When compared with ECG of 15-DEC-2019 19:58,  Significant changes have occurred  Confirmed by Jenny Wong MD ([id number]) on 1/15/2020 5:09:45 PM    Referred By: IANERR   SELF           Confirmed By:Jenny Wong MD                            Imaging Results          X-Ray Chest PA And Lateral (Final result)  Result time 01/15/20 12:32:53    Final result by Sneha Armstrong MD (01/15/20 12:32:53)                 Impression:      No acute abnormality.      Electronically signed by: Sneha Armstrong MD  Date:    01/15/2020  Time:    12:32             Narrative:    EXAMINATION:  XR CHEST PA AND LATERAL    CLINICAL HISTORY:  Chest Pain;    TECHNIQUE:  PA and lateral views of the chest were performed.    COMPARISON:  12/15/2019    FINDINGS:  The lungs are clear, with normal appearance of pulmonary vasculature and no pleural effusion or pneumothorax.    The cardiac silhouette is normal in size. The hilar and mediastinal contours are unremarkable.    Bones are intact.                                 Medical Decision Making:   History:   Old Medical Records: I decided to obtain old medical records.  Initial Assessment:   29 year old male patient with a past medical history of Sickle Cell Anemia presents to the ED complaining of chest pain onset this morning. I will order lab work and a chest x ray. I will reassess the patient once I have reviewed the results.  Independently Interpreted Test(s):   I have ordered and independently interpreted EKG Reading(s) -  see summary below  Clinical Tests:   Lab Tests: Ordered and Reviewed  Radiological Study: Ordered and Reviewed  Medical Tests: Ordered and Reviewed  ED Management:  2:17 PM: I reassessed the patient.            Scribe Attestation:   Scribe #1: I performed the above scribed service and the documentation accurately describes the services I performed. I attest to the accuracy of the note.      29-year-old male with past medical history sickle cell, presents with chest pain with radiation to back.  EKG, troponins negative, flu negative, CBC baseline, no evidence of sickling present on diff, CMP unremarkable, chest x-ray negative, reticulocytes within normal limits. Patient presentation consistent with acute on chronic chest pain, likely precipitated by URI, no evidence of acute coronary event, acute chest or any further malignant etiology.  At further discussion with inpatient team, no evidence of sickling on day of and pain further controlled in the ED no indication at this time for further admission or treatment.  Patient with stable vitals, well-appearing upon reassessment discussed further treatment and evaluation at home with follow-up with PCP in the next 1 week advised.  Patient in understanding of plan further management, all questions answered, patient discharged home improved and stable.                        Clinical Impression:       ICD-10-CM ICD-9-CM   1. Upper respiratory tract infection, unspecified type J06.9 465.9   2. Chest pain R07.9 786.50            I, Sreekanth Graff M.D., personally performed the services described in this documentation. All medical record entries made by the scribe were at my direction and in my presence. I have reviewed the chart and agree that the record reflects my personal performance and is accurate and complete.                 Sreekanth Graff MD  01/16/20 2121

## 2020-01-15 NOTE — ED TRIAGE NOTES
"Pt presents to the ED via personal transportation reporting chest pain that began earlier today. Pt currently reporting the chest pain as "tightness." Pt denies SOB. Currently reporting lower back pain. Denies n/v/d, abdominal pain, dizziness, or weakness. Pt also reports PMHx of sickle cell. Pt AAOx4.  "

## 2020-02-04 ENCOUNTER — TELEPHONE (OUTPATIENT)
Dept: PULMONOLOGY | Facility: CLINIC | Age: 30
End: 2020-02-04

## 2020-02-04 NOTE — TELEPHONE ENCOUNTER
----- Message from Chely Patrick, VIOLETA sent at 2/4/2020  9:44 AM CST -----  Mr. Parham needs to get into clinic with a MD.     Thanks!

## 2020-08-21 ENCOUNTER — HOSPITAL ENCOUNTER (INPATIENT)
Facility: HOSPITAL | Age: 30
LOS: 3 days | Discharge: HOME | DRG: 193 | End: 2020-08-24
Attending: EMERGENCY MEDICINE | Admitting: EMERGENCY MEDICINE
Payer: MEDICAID

## 2020-08-21 DIAGNOSIS — Z20.822 SUSPECTED COVID-19 VIRUS INFECTION: ICD-10-CM

## 2020-08-21 DIAGNOSIS — D57.00 SICKLE CELL PAIN CRISIS: Primary | ICD-10-CM

## 2020-08-21 DIAGNOSIS — R09.02 HYPOXIA: ICD-10-CM

## 2020-08-21 DIAGNOSIS — J18.9 MULTIFOCAL PNEUMONIA: ICD-10-CM

## 2020-08-21 PROBLEM — M62.82 NON-TRAUMATIC RHABDOMYOLYSIS: Status: ACTIVE | Noted: 2020-08-21

## 2020-08-21 LAB
ALBUMIN SERPL BCP-MCNC: 4.1 G/DL (ref 3.5–5.2)
ALLENS TEST: ABNORMAL
ALP SERPL-CCNC: 76 U/L (ref 55–135)
ALT SERPL W/O P-5'-P-CCNC: 35 U/L (ref 10–44)
ANION GAP SERPL CALC-SCNC: 7 MMOL/L (ref 8–16)
ANISOCYTOSIS BLD QL SMEAR: SLIGHT
AST SERPL-CCNC: 42 U/L (ref 10–40)
BACTERIA #/AREA URNS HPF: NORMAL /HPF
BASOPHILS NFR BLD: 1 % (ref 0–1.9)
BILIRUB SERPL-MCNC: 2.9 MG/DL (ref 0.1–1)
BILIRUB UR QL STRIP: NEGATIVE
BNP SERPL-MCNC: 13 PG/ML (ref 0–99)
BUN SERPL-MCNC: 8 MG/DL (ref 6–20)
CALCIUM SERPL-MCNC: 8.7 MG/DL (ref 8.7–10.5)
CHLORIDE SERPL-SCNC: 97 MMOL/L (ref 95–110)
CK SERPL-CCNC: 1509 U/L (ref 20–200)
CLARITY UR: CLEAR
CO2 SERPL-SCNC: 32 MMOL/L (ref 23–29)
COLOR UR: YELLOW
CREAT SERPL-MCNC: 0.8 MG/DL (ref 0.5–1.4)
CRP SERPL-MCNC: 52.2 MG/L (ref 0–8.2)
D DIMER PPP IA.FEU-MCNC: 4.31 MG/L FEU
DELSYS: ABNORMAL
DIFFERENTIAL METHOD: ABNORMAL
EOSINOPHIL NFR BLD: 12 % (ref 0–8)
ERYTHROCYTE [DISTWIDTH] IN BLOOD BY AUTOMATED COUNT: 16.1 % (ref 11.5–14.5)
EST. GFR  (AFRICAN AMERICAN): >60 ML/MIN/1.73 M^2
EST. GFR  (NON AFRICAN AMERICAN): >60 ML/MIN/1.73 M^2
FERRITIN SERPL-MCNC: 2292 NG/ML (ref 20–300)
FLOW: 5
GLUCOSE SERPL-MCNC: 100 MG/DL (ref 70–110)
GLUCOSE UR QL STRIP: NEGATIVE
HCO3 UR-SCNC: 29.9 MMOL/L (ref 24–28)
HCT VFR BLD AUTO: 24.5 % (ref 40–54)
HGB BLD-MCNC: 9 G/DL (ref 14–18)
HGB UR QL STRIP: ABNORMAL
IMM GRANULOCYTES # BLD AUTO: ABNORMAL K/UL (ref 0–0.04)
IMM GRANULOCYTES NFR BLD AUTO: ABNORMAL % (ref 0–0.5)
KETONES UR QL STRIP: NEGATIVE
LACTATE SERPL-SCNC: 1.2 MMOL/L (ref 0.5–2.2)
LDH SERPL L TO P-CCNC: 681 U/L (ref 110–260)
LEUKOCYTE ESTERASE UR QL STRIP: NEGATIVE
LYMPHOCYTES NFR BLD: 25 % (ref 18–48)
MCH RBC QN AUTO: 29.8 PG (ref 27–31)
MCHC RBC AUTO-ENTMCNC: 36.7 G/DL (ref 32–36)
MCV RBC AUTO: 81 FL (ref 82–98)
MICROSCOPIC COMMENT: NORMAL
MODE: ABNORMAL
MONOCYTES NFR BLD: 20 % (ref 4–15)
NEUTROPHILS NFR BLD: 40 % (ref 38–73)
NEUTS BAND NFR BLD MANUAL: 2 %
NITRITE UR QL STRIP: NEGATIVE
NRBC BLD-RTO: 0 /100 WBC
PCO2 BLDA: 48.4 MMHG (ref 35–45)
PH SMN: 7.4 [PH] (ref 7.35–7.45)
PH UR STRIP: 6 [PH] (ref 5–8)
PLATELET # BLD AUTO: 433 K/UL (ref 150–350)
PMV BLD AUTO: 9.8 FL (ref 9.2–12.9)
PO2 BLDA: 80 MMHG (ref 40–60)
POC BE: 4 MMOL/L
POC SATURATED O2: 96 % (ref 95–100)
POC TCO2: 31 MMOL/L (ref 24–29)
POIKILOCYTOSIS BLD QL SMEAR: ABNORMAL
POLYCHROMASIA BLD QL SMEAR: ABNORMAL
POTASSIUM SERPL-SCNC: 3.5 MMOL/L (ref 3.5–5.1)
PROCALCITONIN SERPL IA-MCNC: 0.68 NG/ML
PROT SERPL-MCNC: 7.5 G/DL (ref 6–8.4)
PROT UR QL STRIP: NEGATIVE
RBC # BLD AUTO: 3.02 M/UL (ref 4.6–6.2)
RBC #/AREA URNS HPF: 0 /HPF (ref 0–4)
RETICS/RBC NFR AUTO: 15.2 % (ref 0.4–2)
SAMPLE: ABNORMAL
SARS-COV-2 RDRP RESP QL NAA+PROBE: NEGATIVE
SICKLE CELLS BLD QL SMEAR: ABNORMAL
SITE: ABNORMAL
SODIUM SERPL-SCNC: 136 MMOL/L (ref 136–145)
SP GR UR STRIP: 1.01 (ref 1–1.03)
SP02: 93
SQUAMOUS #/AREA URNS HPF: 1 /HPF
TARGETS BLD QL SMEAR: ABNORMAL
TROPONIN I SERPL DL<=0.01 NG/ML-MCNC: 0.01 NG/ML (ref 0–0.03)
URN SPEC COLLECT METH UR: ABNORMAL
UROBILINOGEN UR STRIP-ACNC: NEGATIVE EU/DL
WBC # BLD AUTO: 23.16 K/UL (ref 3.9–12.7)
WBC #/AREA URNS HPF: 3 /HPF (ref 0–5)

## 2020-08-21 PROCEDURE — 94640 AIRWAY INHALATION TREATMENT: CPT

## 2020-08-21 PROCEDURE — 96361 HYDRATE IV INFUSION ADD-ON: CPT

## 2020-08-21 PROCEDURE — 82803 BLOOD GASES ANY COMBINATION: CPT

## 2020-08-21 PROCEDURE — 63600175 PHARM REV CODE 636 W HCPCS: Performed by: EMERGENCY MEDICINE

## 2020-08-21 PROCEDURE — 93010 ELECTROCARDIOGRAM REPORT: CPT | Mod: ,,, | Performed by: INTERNAL MEDICINE

## 2020-08-21 PROCEDURE — 96375 TX/PRO/DX INJ NEW DRUG ADDON: CPT

## 2020-08-21 PROCEDURE — 99900035 HC TECH TIME PER 15 MIN (STAT)

## 2020-08-21 PROCEDURE — 81000 URINALYSIS NONAUTO W/SCOPE: CPT

## 2020-08-21 PROCEDURE — 96366 THER/PROPH/DIAG IV INF ADDON: CPT

## 2020-08-21 PROCEDURE — 96367 TX/PROPH/DG ADDL SEQ IV INF: CPT

## 2020-08-21 PROCEDURE — 83880 ASSAY OF NATRIURETIC PEPTIDE: CPT

## 2020-08-21 PROCEDURE — 84145 PROCALCITONIN (PCT): CPT

## 2020-08-21 PROCEDURE — 27000221 HC OXYGEN, UP TO 24 HOURS

## 2020-08-21 PROCEDURE — U0002 COVID-19 LAB TEST NON-CDC: HCPCS

## 2020-08-21 PROCEDURE — 25000003 PHARM REV CODE 250: Performed by: EMERGENCY MEDICINE

## 2020-08-21 PROCEDURE — 25000003 PHARM REV CODE 250: Performed by: INTERNAL MEDICINE

## 2020-08-21 PROCEDURE — 25500020 PHARM REV CODE 255: Performed by: EMERGENCY MEDICINE

## 2020-08-21 PROCEDURE — 82728 ASSAY OF FERRITIN: CPT

## 2020-08-21 PROCEDURE — 82550 ASSAY OF CK (CPK): CPT

## 2020-08-21 PROCEDURE — 87040 BLOOD CULTURE FOR BACTERIA: CPT

## 2020-08-21 PROCEDURE — 99291 CRITICAL CARE FIRST HOUR: CPT | Mod: 25

## 2020-08-21 PROCEDURE — 63600175 PHARM REV CODE 636 W HCPCS: Performed by: INTERNAL MEDICINE

## 2020-08-21 PROCEDURE — 80307 DRUG TEST PRSMV CHEM ANLYZR: CPT

## 2020-08-21 PROCEDURE — 84484 ASSAY OF TROPONIN QUANT: CPT

## 2020-08-21 PROCEDURE — 83615 LACTATE (LD) (LDH) ENZYME: CPT

## 2020-08-21 PROCEDURE — 86140 C-REACTIVE PROTEIN: CPT

## 2020-08-21 PROCEDURE — 83605 ASSAY OF LACTIC ACID: CPT

## 2020-08-21 PROCEDURE — 93005 ELECTROCARDIOGRAM TRACING: CPT

## 2020-08-21 PROCEDURE — 25000242 PHARM REV CODE 250 ALT 637 W/ HCPCS: Performed by: EMERGENCY MEDICINE

## 2020-08-21 PROCEDURE — 80053 COMPREHEN METABOLIC PANEL: CPT

## 2020-08-21 PROCEDURE — 94761 N-INVAS EAR/PLS OXIMETRY MLT: CPT

## 2020-08-21 PROCEDURE — 11000001 HC ACUTE MED/SURG PRIVATE ROOM

## 2020-08-21 PROCEDURE — 96365 THER/PROPH/DIAG IV INF INIT: CPT

## 2020-08-21 PROCEDURE — 93010 EKG 12-LEAD: ICD-10-PCS | Mod: ,,, | Performed by: INTERNAL MEDICINE

## 2020-08-21 PROCEDURE — 85045 AUTOMATED RETICULOCYTE COUNT: CPT

## 2020-08-21 PROCEDURE — 85379 FIBRIN DEGRADATION QUANT: CPT

## 2020-08-21 PROCEDURE — 85025 COMPLETE CBC W/AUTO DIFF WBC: CPT

## 2020-08-21 RX ORDER — HYDROMORPHONE HYDROCHLORIDE 2 MG/ML
0.5 INJECTION, SOLUTION INTRAMUSCULAR; INTRAVENOUS; SUBCUTANEOUS EVERY 4 HOURS PRN
Status: DISCONTINUED | OUTPATIENT
Start: 2020-08-21 | End: 2020-08-24 | Stop reason: HOSPADM

## 2020-08-21 RX ORDER — AMOXICILLIN 250 MG
1 CAPSULE ORAL 2 TIMES DAILY
Status: DISCONTINUED | OUTPATIENT
Start: 2020-08-21 | End: 2020-08-24 | Stop reason: HOSPADM

## 2020-08-21 RX ORDER — SODIUM CHLORIDE 0.9 % (FLUSH) 0.9 %
10 SYRINGE (ML) INJECTION
Status: DISCONTINUED | OUTPATIENT
Start: 2020-08-21 | End: 2020-08-24 | Stop reason: HOSPADM

## 2020-08-21 RX ORDER — CHOLECALCIFEROL (VITAMIN D3) 25 MCG
1000 TABLET ORAL DAILY
Status: DISCONTINUED | OUTPATIENT
Start: 2020-08-22 | End: 2020-08-24 | Stop reason: HOSPADM

## 2020-08-21 RX ORDER — MONTELUKAST SODIUM 10 MG/1
10 TABLET ORAL NIGHTLY
Status: DISCONTINUED | OUTPATIENT
Start: 2020-08-21 | End: 2020-08-24 | Stop reason: HOSPADM

## 2020-08-21 RX ORDER — MORPHINE SULFATE 10 MG/ML
2 INJECTION INTRAMUSCULAR; INTRAVENOUS; SUBCUTANEOUS
Status: COMPLETED | OUTPATIENT
Start: 2020-08-21 | End: 2020-08-21

## 2020-08-21 RX ORDER — IPRATROPIUM BROMIDE AND ALBUTEROL SULFATE 2.5; .5 MG/3ML; MG/3ML
3 SOLUTION RESPIRATORY (INHALATION) EVERY 8 HOURS
Status: DISCONTINUED | OUTPATIENT
Start: 2020-08-22 | End: 2020-08-24 | Stop reason: HOSPADM

## 2020-08-21 RX ORDER — IPRATROPIUM BROMIDE AND ALBUTEROL SULFATE 2.5; .5 MG/3ML; MG/3ML
3 SOLUTION RESPIRATORY (INHALATION) EVERY 4 HOURS PRN
Status: DISCONTINUED | OUTPATIENT
Start: 2020-08-21 | End: 2020-08-24 | Stop reason: HOSPADM

## 2020-08-21 RX ORDER — ASCORBIC ACID 500 MG
500 TABLET ORAL 2 TIMES DAILY
Status: DISCONTINUED | OUTPATIENT
Start: 2020-08-21 | End: 2020-08-24 | Stop reason: HOSPADM

## 2020-08-21 RX ORDER — ONDANSETRON 8 MG/1
8 TABLET, ORALLY DISINTEGRATING ORAL EVERY 8 HOURS PRN
Status: DISCONTINUED | OUTPATIENT
Start: 2020-08-21 | End: 2020-08-24 | Stop reason: HOSPADM

## 2020-08-21 RX ORDER — ACETAMINOPHEN 325 MG/1
650 TABLET ORAL EVERY 4 HOURS PRN
Status: DISCONTINUED | OUTPATIENT
Start: 2020-08-21 | End: 2020-08-24 | Stop reason: HOSPADM

## 2020-08-21 RX ORDER — LEVOFLOXACIN 750 MG/1
750 TABLET ORAL DAILY
Status: DISCONTINUED | OUTPATIENT
Start: 2020-08-22 | End: 2020-08-23

## 2020-08-21 RX ORDER — IPRATROPIUM BROMIDE AND ALBUTEROL SULFATE 2.5; .5 MG/3ML; MG/3ML
3 SOLUTION RESPIRATORY (INHALATION)
Status: COMPLETED | OUTPATIENT
Start: 2020-08-21 | End: 2020-08-21

## 2020-08-21 RX ORDER — OXYCODONE HYDROCHLORIDE 5 MG/1
5 TABLET ORAL EVERY 6 HOURS PRN
Status: DISCONTINUED | OUTPATIENT
Start: 2020-08-21 | End: 2020-08-21

## 2020-08-21 RX ORDER — FOLIC ACID 1 MG/1
1 TABLET ORAL DAILY
Status: DISCONTINUED | OUTPATIENT
Start: 2020-08-21 | End: 2020-08-24 | Stop reason: HOSPADM

## 2020-08-21 RX ORDER — SODIUM CHLORIDE AND POTASSIUM CHLORIDE 150; 450 MG/100ML; MG/100ML
INJECTION, SOLUTION INTRAVENOUS CONTINUOUS
Status: DISCONTINUED | OUTPATIENT
Start: 2020-08-21 | End: 2020-08-22

## 2020-08-21 RX ORDER — HYDROMORPHONE HYDROCHLORIDE 2 MG/ML
1 INJECTION, SOLUTION INTRAMUSCULAR; INTRAVENOUS; SUBCUTANEOUS EVERY 4 HOURS PRN
Status: DISCONTINUED | OUTPATIENT
Start: 2020-08-21 | End: 2020-08-22

## 2020-08-21 RX ORDER — AZITHROMYCIN 250 MG/1
250 TABLET, FILM COATED ORAL DAILY
Status: DISCONTINUED | OUTPATIENT
Start: 2020-08-22 | End: 2020-08-21

## 2020-08-21 RX ORDER — IBUPROFEN 200 MG
24 TABLET ORAL
Status: DISCONTINUED | OUTPATIENT
Start: 2020-08-21 | End: 2020-08-24 | Stop reason: HOSPADM

## 2020-08-21 RX ORDER — GLUCAGON 1 MG
1 KIT INJECTION
Status: DISCONTINUED | OUTPATIENT
Start: 2020-08-21 | End: 2020-08-24 | Stop reason: HOSPADM

## 2020-08-21 RX ORDER — ENOXAPARIN SODIUM 100 MG/ML
40 INJECTION SUBCUTANEOUS EVERY 24 HOURS
Status: DISCONTINUED | OUTPATIENT
Start: 2020-08-21 | End: 2020-08-24 | Stop reason: HOSPADM

## 2020-08-21 RX ORDER — OXYCODONE HYDROCHLORIDE 5 MG/1
10 TABLET ORAL EVERY 6 HOURS PRN
Status: DISCONTINUED | OUTPATIENT
Start: 2020-08-21 | End: 2020-08-21

## 2020-08-21 RX ORDER — HYDROXYUREA 500 MG/1
500 CAPSULE ORAL DAILY
Status: DISCONTINUED | OUTPATIENT
Start: 2020-08-22 | End: 2020-08-24 | Stop reason: HOSPADM

## 2020-08-21 RX ORDER — FLUTICASONE FUROATE AND VILANTEROL 100; 25 UG/1; UG/1
1 POWDER RESPIRATORY (INHALATION) DAILY
Status: DISCONTINUED | OUTPATIENT
Start: 2020-08-22 | End: 2020-08-24 | Stop reason: HOSPADM

## 2020-08-21 RX ORDER — IBUPROFEN 200 MG
16 TABLET ORAL
Status: DISCONTINUED | OUTPATIENT
Start: 2020-08-21 | End: 2020-08-24 | Stop reason: HOSPADM

## 2020-08-21 RX ORDER — BENZONATATE 100 MG/1
100 CAPSULE ORAL 3 TIMES DAILY PRN
Status: DISCONTINUED | OUTPATIENT
Start: 2020-08-21 | End: 2020-08-24 | Stop reason: HOSPADM

## 2020-08-21 RX ORDER — KETOROLAC TROMETHAMINE 30 MG/ML
15 INJECTION, SOLUTION INTRAMUSCULAR; INTRAVENOUS
Status: COMPLETED | OUTPATIENT
Start: 2020-08-21 | End: 2020-08-21

## 2020-08-21 RX ORDER — METHYLPREDNISOLONE SOD SUCC 125 MG
125 VIAL (EA) INJECTION
Status: COMPLETED | OUTPATIENT
Start: 2020-08-21 | End: 2020-08-21

## 2020-08-21 RX ADMIN — SODIUM CHLORIDE 500 ML: 0.9 INJECTION, SOLUTION INTRAVENOUS at 02:08

## 2020-08-21 RX ADMIN — METHYLPREDNISOLONE SODIUM SUCCINATE 125 MG: 125 INJECTION, POWDER, FOR SOLUTION INTRAMUSCULAR; INTRAVENOUS at 02:08

## 2020-08-21 RX ADMIN — IOHEXOL 75 ML: 350 INJECTION, SOLUTION INTRAVENOUS at 06:08

## 2020-08-21 RX ADMIN — OXYCODONE HYDROCHLORIDE AND ACETAMINOPHEN 500 MG: 500 TABLET ORAL at 10:08

## 2020-08-21 RX ADMIN — IPRATROPIUM BROMIDE AND ALBUTEROL SULFATE 3 ML: .5; 3 SOLUTION RESPIRATORY (INHALATION) at 11:08

## 2020-08-21 RX ADMIN — KETOROLAC TROMETHAMINE 15 MG: 30 INJECTION, SOLUTION INTRAMUSCULAR at 02:08

## 2020-08-21 RX ADMIN — IPRATROPIUM BROMIDE AND ALBUTEROL SULFATE 3 ML: .5; 3 SOLUTION RESPIRATORY (INHALATION) at 02:08

## 2020-08-21 RX ADMIN — IPRATROPIUM BROMIDE AND ALBUTEROL SULFATE 3 ML: .5; 3 SOLUTION RESPIRATORY (INHALATION) at 03:08

## 2020-08-21 RX ADMIN — CEFTRIAXONE 1 G: 1 INJECTION, SOLUTION INTRAVENOUS at 03:08

## 2020-08-21 RX ADMIN — OXYCODONE HYDROCHLORIDE 10 MG: 5 TABLET ORAL at 10:08

## 2020-08-21 RX ADMIN — AZITHROMYCIN 500 MG: 500 INJECTION, POWDER, LYOPHILIZED, FOR SOLUTION INTRAVENOUS at 04:08

## 2020-08-21 RX ADMIN — ENOXAPARIN SODIUM 40 MG: 40 INJECTION SUBCUTANEOUS at 10:08

## 2020-08-21 RX ADMIN — MORPHINE SULFATE 2 MG: 10 INJECTION, SOLUTION INTRAMUSCULAR; INTRAVENOUS at 07:08

## 2020-08-21 RX ADMIN — DOCUSATE SODIUM 50 MG AND SENNOSIDES 8.6 MG 1 TABLET: 8.6; 5 TABLET, FILM COATED ORAL at 10:08

## 2020-08-21 RX ADMIN — FOLIC ACID 1 MG: 1 TABLET ORAL at 07:08

## 2020-08-21 NOTE — ED TRIAGE NOTES
The patient presents to the ED via personal transportation alone. The patient reports lower bilateral back pain that is worse on the left side. Pain is 8/10, throbbing, per patient. Patient also reports that he has been having a productive cough that started yesterday. Patient denies CP or Sob. Denies headache, fevers, chills, nausea, vomiting, diarrhea. Patient reports hx of sickle cell disease.

## 2020-08-21 NOTE — ED PROVIDER NOTES
"Encounter Date: 8/21/2020    SCRIBE #1 NOTE: I, Jey Vieira, am scribing for, and in the presence of,  Vern Ellis MD. I have scribed the following portions of the note - Other sections scribed: HPI, ROS.       History     Chief Complaint   Patient presents with    Sickle Cell Pain Crisis     lower back pain started this am     Katie Parham is a 30 y.o. male with a PMHx of sickle cell and asthma who presents to the ED with complaints of back pain from sickle cell starting this morning. Pain primarily in lower back, which is described as throbbing.  States he has experienced this pain before.  Also endorses cough and shortness of breath. States abdomen feels tight "like I have to go to the bathroom, but hurts my back when I try to go." Shortness of breath has been "going on for a while but goes away."  Denies any fevers. Patient took three 5mg oxycodone's PTA. Denies using inhaler.  Other PMHx includes admission to hospital for pneumonia and exacerbation of asthma.    The history is provided by the patient. No  was used.     Review of patient's allergies indicates:   Allergen Reactions    Penicillins Anaphylaxis     Past Medical History:   Diagnosis Date    Asthma     Broken thumb     Cigarette smoker     Sickle cell anemia     Sickle cell crisis      Past Surgical History:   Procedure Laterality Date    HAND SURGERY Left 12/21/2017    ORIF    ORIF mandible  01/31/2013    spleenectomy       History reviewed. No pertinent family history.  Social History     Tobacco Use    Smoking status: Current Every Day Smoker     Packs/day: 0.50     Types: Cigarettes    Smokeless tobacco: Never Used    Tobacco comment: encouraged to quit.    Substance Use Topics    Alcohol use: Not Currently     Comment: socially    Drug use: No     Review of Systems   Constitutional: Negative for fever.   Respiratory: Positive for cough and shortness of breath.    Gastrointestinal: Positive for " abdominal pain.   Musculoskeletal: Positive for back pain (Lower).   Psychiatric/Behavioral: Negative for confusion.       Physical Exam     Initial Vitals [08/21/20 1310]   BP Pulse Resp Temp SpO2   124/77 (!) 118 16 98 °F (36.7 °C) (!) 90 %      MAP       --         Physical Exam    Nursing note and vitals reviewed.  Constitutional: He appears well-developed and well-nourished. He is not diaphoretic. No distress.   Somnolent.    HENT:   Head: Normocephalic and atraumatic.   Eyes: Conjunctivae and EOM are normal. Pupils are equal, round, and reactive to light. Scleral icterus is present.   Neck: Normal range of motion. Neck supple.   Cardiovascular: Normal rate, regular rhythm, normal heart sounds and intact distal pulses. Exam reveals no gallop and no friction rub.    No murmur heard.  Pulmonary/Chest: No respiratory distress. He has wheezes. He has no rhonchi. He has no rales.   Bilateral wheezing with prolonged expiratory phase. Hypoxic.    Abdominal: Soft. Bowel sounds are normal. He exhibits no distension. There is no abdominal tenderness. There is no rebound and no guarding.   Musculoskeletal: No tenderness or edema.   Neurological: He is alert and oriented to person, place, and time. He has normal strength. No cranial nerve deficit. GCS score is 15. GCS eye subscore is 4. GCS verbal subscore is 5. GCS motor subscore is 6.   Skin: Skin is warm and dry. No rash noted.   Psychiatric: He has a normal mood and affect. His behavior is normal.         ED Course   Critical Care    Date/Time: 8/21/2020 7:56 PM  Performed by: Vern Ellis MD  Authorized by: Kenna Gudino MD   Direct patient critical care time: 45 minutes  Total critical care time (exclusive of procedural time) : 45 minutes  Critical care was necessary to treat or prevent imminent or life-threatening deterioration of the following conditions: respiratory failure.        Labs Reviewed   CBC W/ AUTO DIFFERENTIAL - Abnormal; Notable for  the following components:       Result Value    WBC 23.16 (*)     RBC 3.02 (*)     Hemoglobin 9.0 (*)     Hematocrit 24.5 (*)     Mean Corpuscular Volume 81 (*)     Mean Corpuscular Hemoglobin Conc 36.7 (*)     RDW 16.1 (*)     Platelets 433 (*)     Mono% 20.0 (*)     Eosinophil% 12.0 (*)     Sickle Cells Occasional (*)     All other components within normal limits   COMPREHENSIVE METABOLIC PANEL - Abnormal; Notable for the following components:    CO2 32 (*)     Total Bilirubin 2.9 (*)     AST 42 (*)     Anion Gap 7 (*)     All other components within normal limits   RETICULOCYTES - Abnormal; Notable for the following components:    Retic 15.2 (*)     All other components within normal limits   C-REACTIVE PROTEIN - Abnormal; Notable for the following components:    CRP 52.2 (*)     All other components within normal limits   FERRITIN - Abnormal; Notable for the following components:    Ferritin 2,292 (*)     All other components within normal limits   LACTATE DEHYDROGENASE - Abnormal; Notable for the following components:     (*)     All other components within normal limits   CK - Abnormal; Notable for the following components:    CPK 1509 (*)     All other components within normal limits   PROCALCITONIN - Abnormal; Notable for the following components:    Procalcitonin 0.68 (*)     All other components within normal limits   D DIMER, QUANTITATIVE - Abnormal; Notable for the following components:    D-Dimer 4.31 (*)     All other components within normal limits   URINALYSIS, REFLEX TO URINE CULTURE - Abnormal; Notable for the following components:    Occult Blood UA 1+ (*)     All other components within normal limits    Narrative:     Specimen Source->Urine   ISTAT PROCEDURE - Abnormal; Notable for the following components:    POC PCO2 48.4 (*)     POC PO2 80 (*)     POC HCO3 29.9 (*)     POC TCO2 31 (*)     All other components within normal limits   CULTURE, BLOOD   CULTURE, BLOOD   CULTURE, RESPIRATORY    SARS-COV-2 RNA AMPLIFICATION, QUAL   LACTIC ACID, PLASMA   TROPONIN I   B-TYPE NATRIURETIC PEPTIDE   URINALYSIS MICROSCOPIC    Narrative:     Specimen Source->Urine   TOXICOLOGY SCREEN, URINE, RANDOM (COMPLIANCE)   LEGIONELLA ANTIGEN, URINE RANDOM     EKG Readings: (Independently Interpreted)   Initial Reading: No STEMI. Rhythm: Sinus Tachycardia. Heart Rate: 109. Ectopy: No Ectopy.   Incomplete right bundle       Imaging Results           CTA Chest Non-Coronary (PE Study) (Final result)  Result time 08/21/20 18:54:14    Final result by Christian Graves MD (08/21/20 18:54:14)                 Impression:      Negative CTA of the chest for pulmonary arterial thromboembolism.    No evidence of aortic aneurysm or dissection.    Patchy airspace disease within the lower lobes and right middle lobe.  Findings are concerning for multifocal pneumonia or pneumonitis.  Features appear atypical for COVID-19.    This report was flagged in Epic as abnormal.      Electronically signed by: Christian Graves  Date:    08/21/2020  Time:    18:54             Narrative:    EXAMINATION:  CTA CHEST NON CORONARY    CLINICAL HISTORY:  PE suspected, intermediate prob, positive D-dimer;    TECHNIQUE:  Low dose axial images, sagittal and coronal reformations were obtained from the thoracic inlet to the lung bases following the IV administration of 100 mL of Omnipaque 350.  Contrast timing was optimized to evaluate the pulmonary arteries.  MIP images were performed.    COMPARISON:  Chest x-ray 08/21/2020    FINDINGS:  The heart pericardium and great vessels enhance normally.  There is no evidence of pulmonary arterial filling defect to suggest pulmonary arterial thromboembolism.    The aorta, 356995 brachiocephalic branches and descending thoracic aorta appear normal.  There is no mediastinal mass or fluid collection.    There is patchy ground-glass airspace opacities within the lower lobes mainly on the right with small areas of  ground-glass opacity in the right middle lobe.  No large consolidation or effusion is evident.    The bony structures are intact.                               X-Ray Chest AP Portable (Final result)  Result time 08/21/20 16:07:37    Final result by Mc Ordaz MD (08/21/20 16:07:37)                 Impression:      No acute chest disease identified.  No detrimental change when compared to prior examination dated 01/15/2020.      Electronically signed by: Mc Ordaz MD  Date:    08/21/2020  Time:    16:07             Narrative:    EXAMINATION:  XR CHEST AP PORTABLE    CLINICAL HISTORY:  Suspected Covid-19 Virus Infection;    TECHNIQUE:  Single frontal view of the chest was performed.    COMPARISON:  01/15/2020.    FINDINGS:  The heart is not enlarged.  Superior mediastinal structures are unremarkable.  Pulmonary vasculature is within normal limits.  The lungs are free of focal consolidations.  There is no evidence for pneumothorax or large pleural effusions.  Bony structures are grossly intact.                               X-Ray Chest AP Portable (Final result)  Result time 08/21/20 13:54:33    Final result by Eugene Ha MD (08/21/20 13:54:33)                 Impression:      1. Hypoventilatory exam is suspected to account for interstitial findings versus minimal edema.  There is suspected left lower lung zone subsegmental atelectasis, differential for this finding would include developing consolidation.  Correlation is advised.      Electronically signed by: Eugene Ha MD  Date:    08/21/2020  Time:    13:54             Narrative:    EXAMINATION:  XR CHEST AP PORTABLE    CLINICAL HISTORY:  Hypoxemia    TECHNIQUE:  Single frontal view of the chest was performed.    COMPARISON:  01/15/2020    FINDINGS:  The cardiomediastinal silhouette is not enlarged.  There is no pleural effusion.  The trachea is midline.  The lungs are symmetrically expanded bilaterally with minimally coarse interstitial  attenuation noting left basilar subsegmental atelectasis.  No large focal consolidation seen.  There is no pneumothorax.  The osseous structures are unremarkable.                                 Medical Decision Making:   Initial Assessment:   30-year-old male presenting with back pain, cough, shortness of breath, hypoxia.  Patient reports pain is similar to prior sickle cell crises.  Notes cough, denies severe shortness of breath.  On exam, patient is in no acute distress, sleepy, somewhat somnolent though not lethargic.  Patient reports taking 15 mg of oxycodone prior to arrival for pain.  He is sleeping comfortably prior to my assessment but arouses easily.  He has significant bilateral wheezing and a history of asthma.  Patient was treated with steroids as well as nebulizers with improvement and wheezing.  Patient still remains hypoxic though easily improves with nasal cannula on 2-3 L to mid to high 90s.  He has significant lab abnormalities including elevated markers of inflammation including ferritin, CRP, leukocytosis.  He has an elevated D-dimer.  CTA does not show evidence of PE though does show evidence of multifocal pneumonia.  Labs largely consistent with sickle cell crisis including increased reticulocyte count, elevated bilirubin.  Mild anemia noted.  Differential includes multifocal pneumonia, acute chest syndrome, COVID pneumonia.  Doubt PE.  Rapid COVID test negative.  Will start on antibiotics for community-acquired pneumonia and get blood cultures.  Discussed case with Hospital Medicine, will admit in an isolation bed and get PCR in the morning for COVID.  Pain appears well controlled at this time, patient sleeping comfortably.            Scribe Attestation:   Scribe #1: I performed the above scribed service and the documentation accurately describes the services I performed. I attest to the accuracy of the note.                          Clinical Impression:       ICD-10-CM ICD-9-CM   1. Sickle  cell pain crisis  D57.00 282.62   2. Hypoxia  R09.02 799.02   3. Suspected Covid-19 Virus Infection  R68.89    4. Multifocal pneumonia  J18.9 486         Disposition:   Disposition: Admitted  Condition: Stable     ED Disposition Condition    Admit            I, Vern Ellis, personally performed the services described in this documentation. All medical record entries made by the scribe were at my direction and in my presence. I have reviewed the chart and agree that the record reflects my personal performance and is accurate and complete.                 Vern Ellis MD  08/21/20 2023

## 2020-08-22 LAB
ALBUMIN SERPL BCP-MCNC: 3.5 G/DL (ref 3.5–5.2)
ALP SERPL-CCNC: 64 U/L (ref 55–135)
ALT SERPL W/O P-5'-P-CCNC: 33 U/L (ref 10–44)
AMPHET+METHAMPHET UR QL: NEGATIVE
ANION GAP SERPL CALC-SCNC: 8 MMOL/L (ref 8–16)
ANISOCYTOSIS BLD QL SMEAR: ABNORMAL
AST SERPL-CCNC: 34 U/L (ref 10–40)
BARBITURATES UR QL SCN>200 NG/ML: NEGATIVE
BASOPHILS # BLD AUTO: 0.01 K/UL (ref 0–0.2)
BASOPHILS NFR BLD: 0.1 % (ref 0–1.9)
BENZODIAZ UR QL SCN>200 NG/ML: NEGATIVE
BILIRUB SERPL-MCNC: 1.4 MG/DL (ref 0.1–1)
BUN SERPL-MCNC: 9 MG/DL (ref 6–20)
BZE UR QL SCN: NORMAL
CALCIUM SERPL-MCNC: 8.3 MG/DL (ref 8.7–10.5)
CANNABINOIDS UR QL SCN: NEGATIVE
CHLORIDE SERPL-SCNC: 103 MMOL/L (ref 95–110)
CO2 SERPL-SCNC: 25 MMOL/L (ref 23–29)
CREAT SERPL-MCNC: 0.8 MG/DL (ref 0.5–1.4)
CREAT UR-MCNC: 122 MG/DL (ref 23–375)
DACRYOCYTES BLD QL SMEAR: ABNORMAL
DIFFERENTIAL METHOD: ABNORMAL
EOSINOPHIL # BLD AUTO: 0 K/UL (ref 0–0.5)
EOSINOPHIL NFR BLD: 0.2 % (ref 0–8)
ERYTHROCYTE [DISTWIDTH] IN BLOOD BY AUTOMATED COUNT: 16.8 % (ref 11.5–14.5)
EST. GFR  (AFRICAN AMERICAN): >60 ML/MIN/1.73 M^2
EST. GFR  (NON AFRICAN AMERICAN): >60 ML/MIN/1.73 M^2
ETHANOL UR-MCNC: <10 MG/DL
GLUCOSE SERPL-MCNC: 152 MG/DL (ref 70–110)
HCT VFR BLD AUTO: 24.5 % (ref 40–54)
HGB BLD-MCNC: 8.9 G/DL (ref 14–18)
HIV1+2 IGG SERPL QL IA.RAPID: NORMAL
HYPOCHROMIA BLD QL SMEAR: ABNORMAL
IMM GRANULOCYTES # BLD AUTO: 0.07 K/UL (ref 0–0.04)
IMM GRANULOCYTES NFR BLD AUTO: 0.5 % (ref 0–0.5)
INFLUENZA A, MOLECULAR: NEGATIVE
INFLUENZA B, MOLECULAR: NEGATIVE
LYMPHOCYTES # BLD AUTO: 2.1 K/UL (ref 1–4.8)
LYMPHOCYTES NFR BLD: 13.3 % (ref 18–48)
MAGNESIUM SERPL-MCNC: 2 MG/DL (ref 1.6–2.6)
MCH RBC QN AUTO: 29.4 PG (ref 27–31)
MCHC RBC AUTO-ENTMCNC: 36.3 G/DL (ref 32–36)
MCV RBC AUTO: 81 FL (ref 82–98)
METHADONE UR QL SCN>300 NG/ML: NEGATIVE
MONOCYTES # BLD AUTO: 1.5 K/UL (ref 0.3–1)
MONOCYTES NFR BLD: 9.8 % (ref 4–15)
NEUTROPHILS # BLD AUTO: 11.8 K/UL (ref 1.8–7.7)
NEUTROPHILS NFR BLD: 76.1 % (ref 38–73)
NRBC BLD-RTO: 1 /100 WBC
OPIATES UR QL SCN: NORMAL
PCP UR QL SCN>25 NG/ML: NEGATIVE
PHOSPHATE SERPL-MCNC: 1.8 MG/DL (ref 2.7–4.5)
PLATELET # BLD AUTO: ABNORMAL K/UL (ref 150–350)
PLATELET BLD QL SMEAR: ABNORMAL
PMV BLD AUTO: ABNORMAL FL (ref 9.2–12.9)
POIKILOCYTOSIS BLD QL SMEAR: ABNORMAL
POLYCHROMASIA BLD QL SMEAR: ABNORMAL
POTASSIUM SERPL-SCNC: 5.3 MMOL/L (ref 3.5–5.1)
PROT SERPL-MCNC: 7.1 G/DL (ref 6–8.4)
RBC # BLD AUTO: 3.03 M/UL (ref 4.6–6.2)
SICKLE CELLS BLD QL SMEAR: ABNORMAL
SODIUM SERPL-SCNC: 136 MMOL/L (ref 136–145)
SPECIMEN SOURCE: NORMAL
STOMATOCYTES BLD QL SMEAR: PRESENT
TARGETS BLD QL SMEAR: ABNORMAL
TOXICOLOGY INFORMATION: NORMAL
WBC # BLD AUTO: 15.46 K/UL (ref 3.9–12.7)

## 2020-08-22 PROCEDURE — 80053 COMPREHEN METABOLIC PANEL: CPT

## 2020-08-22 PROCEDURE — 36415 COLL VENOUS BLD VENIPUNCTURE: CPT

## 2020-08-22 PROCEDURE — 63600175 PHARM REV CODE 636 W HCPCS: Performed by: INTERNAL MEDICINE

## 2020-08-22 PROCEDURE — 25000003 PHARM REV CODE 250: Performed by: INTERNAL MEDICINE

## 2020-08-22 PROCEDURE — 87502 INFLUENZA DNA AMP PROBE: CPT

## 2020-08-22 PROCEDURE — 85025 COMPLETE CBC W/AUTO DIFF WBC: CPT

## 2020-08-22 PROCEDURE — 94761 N-INVAS EAR/PLS OXIMETRY MLT: CPT

## 2020-08-22 PROCEDURE — 83735 ASSAY OF MAGNESIUM: CPT

## 2020-08-22 PROCEDURE — 94640 AIRWAY INHALATION TREATMENT: CPT

## 2020-08-22 PROCEDURE — 84100 ASSAY OF PHOSPHORUS: CPT

## 2020-08-22 PROCEDURE — 11000001 HC ACUTE MED/SURG PRIVATE ROOM

## 2020-08-22 PROCEDURE — 87449 NOS EACH ORGANISM AG IA: CPT

## 2020-08-22 PROCEDURE — 86703 HIV-1/HIV-2 1 RESULT ANTBDY: CPT

## 2020-08-22 PROCEDURE — 25000242 PHARM REV CODE 250 ALT 637 W/ HCPCS: Performed by: INTERNAL MEDICINE

## 2020-08-22 PROCEDURE — U0003 INFECTIOUS AGENT DETECTION BY NUCLEIC ACID (DNA OR RNA); SEVERE ACUTE RESPIRATORY SYNDROME CORONAVIRUS 2 (SARS-COV-2) (CORONAVIRUS DISEASE [COVID-19]), AMPLIFIED PROBE TECHNIQUE, MAKING USE OF HIGH THROUGHPUT TECHNOLOGIES AS DESCRIBED BY CMS-2020-01-R: HCPCS

## 2020-08-22 RX ORDER — HYDROMORPHONE HYDROCHLORIDE 2 MG/ML
1 INJECTION, SOLUTION INTRAMUSCULAR; INTRAVENOUS; SUBCUTANEOUS
Status: DISCONTINUED | OUTPATIENT
Start: 2020-08-22 | End: 2020-08-24 | Stop reason: HOSPADM

## 2020-08-22 RX ORDER — SODIUM,POTASSIUM PHOSPHATES 280-250MG
2 POWDER IN PACKET (EA) ORAL ONCE
Status: COMPLETED | OUTPATIENT
Start: 2020-08-22 | End: 2020-08-22

## 2020-08-22 RX ORDER — SODIUM CHLORIDE 9 MG/ML
INJECTION, SOLUTION INTRAVENOUS CONTINUOUS
Status: DISCONTINUED | OUTPATIENT
Start: 2020-08-22 | End: 2020-08-24 | Stop reason: HOSPADM

## 2020-08-22 RX ADMIN — HYDROXYUREA 500 MG: 500 CAPSULE ORAL at 09:08

## 2020-08-22 RX ADMIN — ENOXAPARIN SODIUM 40 MG: 40 INJECTION SUBCUTANEOUS at 06:08

## 2020-08-22 RX ADMIN — FOLIC ACID 1 MG: 1 TABLET ORAL at 09:08

## 2020-08-22 RX ADMIN — OXYCODONE HYDROCHLORIDE AND ACETAMINOPHEN 500 MG: 500 TABLET ORAL at 09:08

## 2020-08-22 RX ADMIN — THERA TABS 1 TABLET: TAB at 09:08

## 2020-08-22 RX ADMIN — DOCUSATE SODIUM 50 MG AND SENNOSIDES 8.6 MG 1 TABLET: 8.6; 5 TABLET, FILM COATED ORAL at 09:08

## 2020-08-22 RX ADMIN — HYDROMORPHONE HYDROCHLORIDE 1 MG: 2 INJECTION, SOLUTION INTRAMUSCULAR; INTRAVENOUS; SUBCUTANEOUS at 12:08

## 2020-08-22 RX ADMIN — IPRATROPIUM BROMIDE AND ALBUTEROL SULFATE 3 ML: .5; 3 SOLUTION RESPIRATORY (INHALATION) at 03:08

## 2020-08-22 RX ADMIN — HYDROMORPHONE HYDROCHLORIDE 1 MG: 2 INJECTION, SOLUTION INTRAMUSCULAR; INTRAVENOUS; SUBCUTANEOUS at 01:08

## 2020-08-22 RX ADMIN — IPRATROPIUM BROMIDE AND ALBUTEROL SULFATE 3 ML: .5; 3 SOLUTION RESPIRATORY (INHALATION) at 08:08

## 2020-08-22 RX ADMIN — MONTELUKAST 10 MG: 10 TABLET, FILM COATED ORAL at 09:08

## 2020-08-22 RX ADMIN — VITAMIN D, TAB 1000IU (100/BT) 1000 UNITS: 25 TAB at 09:08

## 2020-08-22 RX ADMIN — HYDROMORPHONE HYDROCHLORIDE 1 MG: 2 INJECTION, SOLUTION INTRAMUSCULAR; INTRAVENOUS; SUBCUTANEOUS at 05:08

## 2020-08-22 RX ADMIN — POTASSIUM & SODIUM PHOSPHATES POWDER PACK 280-160-250 MG 2 PACKET: 280-160-250 PACK at 12:08

## 2020-08-22 RX ADMIN — SODIUM CHLORIDE: 0.9 INJECTION, SOLUTION INTRAVENOUS at 10:08

## 2020-08-22 RX ADMIN — LEVOFLOXACIN 750 MG: 750 TABLET, FILM COATED ORAL at 09:08

## 2020-08-22 RX ADMIN — HYDROMORPHONE HYDROCHLORIDE 1 MG: 2 INJECTION, SOLUTION INTRAMUSCULAR; INTRAVENOUS; SUBCUTANEOUS at 09:08

## 2020-08-22 RX ADMIN — HYDROMORPHONE HYDROCHLORIDE 1 MG: 2 INJECTION, SOLUTION INTRAMUSCULAR; INTRAVENOUS; SUBCUTANEOUS at 06:08

## 2020-08-22 RX ADMIN — SODIUM CHLORIDE: 0.9 INJECTION, SOLUTION INTRAVENOUS at 12:08

## 2020-08-22 RX ADMIN — FLUTICASONE FUROATE AND VILANTEROL TRIFENATATE 1 PUFF: 100; 25 POWDER RESPIRATORY (INHALATION) at 08:08

## 2020-08-22 NOTE — HOSPITAL COURSE
Patient was admitted to the hospital on 8/21 after being found on CTA to have multi-focal pneumonia.  Blood cultures negative.  Initial Covid was negative. Repeat negative. ID was consulted on 8/24 and recommended Doxy on discharge. On 8/24, the patient was requesting to go home. ID signed off. No 02 requirements and looks great. Will d/c to home with Doxy for 7 days. Activity as tolerated. Follow up with PCP in one week.

## 2020-08-22 NOTE — ASSESSMENT & PLAN NOTE
CPK is mildly elevated to 1509  Suspect either from recently working out in the gym heavily or possibly SS crisis  Monitor for now and follow qam  IV fluids  Check UDS to rule out amphetamines and cocaine    CPK in am

## 2020-08-22 NOTE — ASSESSMENT & PLAN NOTE
Will proceed with Dilaudid 0.5 and 1mg iv q4 prn mod and severe pain  Hydration to euvolemia with fluids  Continue Hydrea at 500mg po qday (home dose)  Hg good at 9g, but retic up to 15 and LDH up to 681

## 2020-08-22 NOTE — NURSING
Pt c/o Dilaudid 1mg prn works but does not last long,  notified and new ordered given for med to be every 3 hours.

## 2020-08-22 NOTE — PLAN OF CARE
KARIN spoke with patient significant other, Magdalena Deal, to complete discharge needs assessment after he provided verbal permission over the phone. Patient significant other was able to verbalize that she will hep him at home. SW discussed with patient the things he's responsible for to manage his health at home would be by going on his doctor appointments, taking medications as prescribed, and getting prescriptions filled.  Patient prefers morning appointments around 10:00 AM or 11:00 AM.     Kieran Reed MD     Extended Emergency Contact Information  Primary Emergency Contact: SayMagdalena  Address: [address] [place] [address] [address]           [place] [address] [facility]  Home Phone: [phone]  Mobile Phone: [phone]  Relation: Significant other       [id number] - [place], [place] - [address] [facility] [address] [place] [address] [address]  [facility] [address]  Phone: [phone] Fax: [phone]        08/22/20 2524   Discharge Assessment   Assessment Type Discharge Planning Assessment   Confirmed/corrected address and phone number on facesheet? Yes   Assessment information obtained from? Caregiver   Prior to hospitilization cognitive status: Alert/Oriented   Prior to hospitalization functional status: Independent   Current cognitive status: Alert/Oriented   Current Functional Status: Independent   Facility Arrived From: Pt drove himself to the hospital   Lives With significant other   Able to Return to Prior Arrangements yes   Is patient able to care for self after discharge? Yes   Who are your caregiver(s) and their phone number(s)? Magdalena Deal, signficant other   Patient's perception of discharge disposition home or selfcare   Readmission Within the Last 30 Days no previous admission in last 30 days   Patient currently being followed by outpatient case management? No   Patient currently receives any other outside agency services? No   Equipment  Currently Used at Home none   Do you have any problems affording any of your prescribed medications? No   Does the patient have transportation home? Yes   Dialysis Name and Scheduled days Not Applicable.   Does the patient receive services at the Coumadin Clinic? No   Discharge Plan A Home with family   DME Needed Upon Discharge  none   Patient/Family in Agreement with Plan yes

## 2020-08-22 NOTE — HPI
"Mr. Parham is a yo man with a past medical history of SSDz and mild asthma.  He is on chronic Hydrea for his SSDz, and is followed by Dr. Ramirez in Hematology clinic.  He is normally well controlled.  He states that he was in his regular state of health up until yesterday.  In fact, he had been working outside and working out heavily in the gym as well with no issues.  Yesterday he began to have some malaise, cough and was bringing up some green sputum.      By today he was diaphoretic, felt feverish, and began to have his typical SS crisis pains of mostly low back pain and long bone pain in his arms and legs.  He had some old Percocet 10mg at home, so he tried to, "wait it out at home."  Unfortunately, he became lethargic feeling and short of breath.  In addition, his back and bone pains began to escalate, prompting his visit to the ED finally.  He says his pain is better now, at 5/10 after Morphine in the ED.    He did not check his temperature at home, but has felt feverish.  He has some mild abdominal pain, but feels may be from working out.  "

## 2020-08-22 NOTE — PLAN OF CARE
Aox4. Able to ambulate c assistance x1-2. C/o pain 10/10 throughout night, IV pain meds given per orders. IVF c 20 Kcl infusing at 100 ml/hr. O2 on 2L per NC in place. Encouraged to keep in place. Educated on TEDs and SCDs, refused at this time. VSS. No acute distress noted.   Problem: Adult Inpatient Plan of Care  Goal: Plan of Care Review  Outcome: Ongoing, Progressing  Goal: Patient-Specific Goal (Individualization)  Outcome: Ongoing, Progressing  Goal: Absence of Hospital-Acquired Illness or Injury  Outcome: Ongoing, Progressing  Goal: Optimal Comfort and Wellbeing  Outcome: Ongoing, Progressing  Goal: Readiness for Transition of Care  Outcome: Ongoing, Progressing  Goal: Rounds/Family Conference  Outcome: Ongoing, Progressing     Problem: Infection  Goal: Infection Symptom Resolution  Outcome: Ongoing, Progressing     Problem: Fluid Imbalance (Pneumonia)  Goal: Fluid Balance  Outcome: Ongoing, Progressing     Problem: Infection (Pneumonia)  Goal: Resolution of Infection Signs/Symptoms  Outcome: Ongoing, Progressing     Problem: Respiratory Compromise (Pneumonia)  Goal: Effective Oxygenation and Ventilation  Outcome: Ongoing, Progressing     Problem: Fall Injury Risk  Goal: Absence of Fall and Fall-Related Injury  Outcome: Ongoing, Progressing

## 2020-08-22 NOTE — ASSESSMENT & PLAN NOTE
CTA chest at admission revealed:    -Patchy airspace disease within the lower lobes and right middle lobe.     -Findings are concerning for multifocal pneumonia or pneumonitis.     -Features appear atypical for COVID-19.   -Negative for PTE  He has anaphylaxis to PCN   -He did tolerate combo Rocephin/Zithro x 1 in the ED   -From here on out, will proceed with Levaquin 750mg iv x 6 more days  Legionella UAg, sputum cultures  No fever documented thus far  WBC 25K, up from baseline of 12-14  Low suspicion for COVID, but O2 sats of 90% RA and elevated inflammatory markers (likely from SS crisis)   -Keep on COVID precautions until repeat in 24 hours   -CRP 52  Other possibility is acute sickle chest syndrome, though procal is elevated at 0.68  Hematology consult if worsens or if sickle chest seems to become more likely over time    levaquin for now and breathing treatments.

## 2020-08-22 NOTE — H&P
"[facility] Medicine  History & Physical    Patient Name: Katie Parham  MRN: [id number]  Admission Date: 8/21/2020  Attending Physician: Marco A Enrst MD   Primary Care Provider: Kieran Reed MD         Patient information was obtained from patient, past medical records and ER records.     Subjective:     Principal Problem:Multifocal pneumonia    Chief Complaint:   Chief Complaint   Patient presents with    Sickle Cell Pain Crisis     lower back pain started this am        HPI: Mr. Parham is a yo man with a past medical history of SSDz and mild asthma.  He is on chronic Hydrea for his SSDz, and is followed by Dr. Ramirez in Hematology clinic.  He is normally well controlled.  He states that he was in his regular state of health up until yesterday.  In fact, he had been working outside and working out heavily in the gym as well with no issues.  Yesterday he began to have some malaise, cough and was bringing up some green sputum.      By today he was diaphoretic, felt feverish, and began to have his typical SS crisis pains of mostly low back pain and long bone pain in his arms and legs.  He had some old Percocet 10mg at home, so he tried to, "wait it out at home."  Unfortunately, he became lethargic feeling and short of breath.  In addition, his back and bone pains began to escalate, prompting his visit to the ED finally.  He says his pain is better now, at 5/10 after Morphine in the ED.    He did not check his temperature at home, but has felt feverish.  He has some mild abdominal pain, but feels may be from working out.    Past Medical History:   Diagnosis Date    Asthma     Broken thumb     Cigarette smoker     Sickle cell anemia     Sickle cell crisis        Past Surgical History:   Procedure Laterality Date    HAND SURGERY Left 12/21/2017    ORIF    ORIF mandible  01/31/2013    spleenectomy         Review of patient's allergies indicates:   Allergen Reactions    " Penicillins Anaphylaxis       No current facility-administered medications on file prior to encounter.      Current Outpatient Medications on File Prior to Encounter   Medication Sig    FOLIC ACID ORAL Take by mouth.    guaiFENesin (MUCINEX) 600 mg 12 hr tablet Take 600 mg by mouth.    hydroxyurea (HYDREA) 500 mg Cap TK 1 C PO BID    oxyCODONE (ROXICODONE) 5 MG immediate release tablet Take 5 mg by mouth.    albuterol (ACCUNEB) 1.25 mg/3 mL Nebu Take 3 mLs (1.25 mg total) by nebulization every 6 (six) hours as needed. Rescue    albuterol (PROVENTIL/VENTOLIN HFA) 90 mcg/actuation inhaler Inhale 1-2 puffs into the lungs every 6 (six) hours as needed for Wheezing. Rescue    budesonide-formoterol 160-4.5 mcg (SYMBICORT) 160-4.5 mcg/actuation HFAA Inhale 2 puffs into the lungs every 12 (twelve) hours. Controller    calcium-vitamin D3 500 mg(1,250mg) -200 unit per tablet Take 1 tablet by mouth 2 (two) times daily with meals.    clindamycin (CLEOCIN) 150 MG capsule clindamycin HCl 150 mg capsule    ferrous sulfate (FEOSOL) 325 mg (65 mg iron) Tab tablet ferrous sulfate 325 mg (65 mg iron) tablet    ibuprofen (ADVIL,MOTRIN) 600 MG tablet Take 1 tablet (600 mg total) by mouth every 6 (six) hours as needed for Pain. (Patient not taking: Reported on 11/26/2019)    montelukast (SINGULAIR) 10 mg tablet montelukast 10 mg tablet    oxyCODONE-acetaminophen (PERCOCET) 5-325 mg per tablet Take 1 tablet by mouth every 4 (four) hours as needed for Pain.     Family History     None        Tobacco Use    Smoking status: Current Every Day Smoker     Packs/day: 0.50     Types: Cigarettes    Smokeless tobacco: Never Used    Tobacco comment: encouraged to quit.    Substance and Sexual Activity    Alcohol use: Not Currently     Comment: socially    Drug use: No    Sexual activity: Yes     Partners: Female     Birth control/protection: Condom     Review of Systems   Constitutional: Positive for activity change, appetite  change, diaphoresis, fatigue and fever. Negative for chills.   HENT: Negative for congestion.    Eyes: Positive for redness.   Respiratory: Positive for cough, shortness of breath and wheezing.    Cardiovascular: Negative for chest pain.   Gastrointestinal: Positive for abdominal pain. Negative for constipation, diarrhea, nausea and vomiting.   Endocrine: Positive for heat intolerance.   Genitourinary: Negative for dysuria.   Musculoskeletal: Positive for arthralgias, back pain and myalgias.   Skin: Negative for rash.   Allergic/Immunologic: Negative for immunocompromised state.   Neurological: Positive for dizziness. Negative for weakness.   Hematological: Does not bruise/bleed easily.   Psychiatric/Behavioral: Positive for decreased concentration. Negative for confusion. The patient is not nervous/anxious.      Objective:     Vital Signs (Most Recent):  Temp: 98 °F (36.7 °C) (08/21/20 1310)  Pulse: 98 (08/21/20 1500)  Resp: 18 (08/21/20 1903)  BP: 124/77 (08/21/20 1310)  SpO2: 97 % (08/21/20 1500) Vital Signs (24h Range):  Temp:  [98 °F (36.7 °C)] 98 °F (36.7 °C)  Pulse:  [] 98  Resp:  [16-33] 18  SpO2:  [90 %-97 %] 97 %  BP: (124)/(77) 124/77     Weight: 72.6 kg (160 lb)  Body mass index is 25.82 kg/m².    Physical Exam  Vitals signs and nursing note reviewed.   Constitutional:       General: He is not in acute distress.     Appearance: He is well-developed. He is ill-appearing and diaphoretic. He is not toxic-appearing.      Comments: Mr. Parham tells me he is feeling better, but on exam he is diffusely diaphoretic to the point of soaking the sheets and sweat dripping off his face   HENT:      Head: Normocephalic and atraumatic.      Nose: Nose normal.      Mouth/Throat:      Pharynx: No oropharyngeal exudate.   Eyes:      General: No scleral icterus.        Right eye: No discharge.         Left eye: No discharge.      Conjunctiva/sclera: Conjunctivae normal.      Pupils: Pupils are equal, round, and  reactive to light.   Neck:      Musculoskeletal: Normal range of motion and neck supple.      Thyroid: No thyromegaly.      Vascular: No JVD.      Trachea: No tracheal deviation.   Cardiovascular:      Rate and Rhythm: Normal rate and regular rhythm.      Heart sounds: Murmur present. Systolic murmur present with a grade of 1/6. No friction rub. No gallop.    Pulmonary:      Effort: Pulmonary effort is normal. No respiratory distress.      Breath sounds: No stridor. Examination of the right-lower field reveals rales. Examination of the left-lower field reveals rales. Rales present. No decreased breath sounds, wheezing or rhonchi.   Chest:      Chest wall: No tenderness.   Abdominal:      General: Bowel sounds are normal. There is no distension.      Palpations: Abdomen is soft. There is no mass.      Tenderness: There is generalized abdominal tenderness. There is no guarding or rebound.   Genitourinary:     Comments: No rodriguez in place  Musculoskeletal: Normal range of motion.         General: No tenderness.      Comments: Tenderness to back and extremities to palpation   Lymphadenopathy:      Cervical: No cervical adenopathy.      Comments: No peripheral edema   Skin:     General: Skin is warm.      Coloration: Skin is not pale.      Findings: No erythema or rash.      Comments: Multiple tattoos   Neurological:      Mental Status: He is alert and oriented to person, place, and time.      GCS: GCS eye subscore is 4. GCS verbal subscore is 5. GCS motor subscore is 6.      Cranial Nerves: No cranial nerve deficit.      Motor: No abnormal muscle tone.      Coordination: Coordination normal.      Deep Tendon Reflexes: Reflexes normal.   Psychiatric:         Attention and Perception: Attention and perception normal.         Mood and Affect: Mood and affect normal.         Behavior: Behavior normal.         Thought Content: Thought content normal.         Cognition and Memory: Cognition and memory normal.         Judgment:  Judgment normal.           CRANIAL NERVES     CN III, IV, VI   Pupils are equal, round, and reactive to light.       Significant Labs:   Recent Results (from the past 24 hour(s))   CBC auto differential    Collection Time: 08/21/20  1:52 PM   Result Value Ref Range    WBC 23.16 (H) 3.90 - 12.70 K/uL    RBC 3.02 (L) 4.60 - 6.20 M/uL    Hemoglobin 9.0 (L) 14.0 - 18.0 g/dL    Hematocrit 24.5 (L) 40.0 - 54.0 %    Mean Corpuscular Volume 81 (L) 82 - 98 fL    Mean Corpuscular Hemoglobin 29.8 27.0 - 31.0 pg    Mean Corpuscular Hemoglobin Conc 36.7 (H) 32.0 - 36.0 g/dL    RDW 16.1 (H) 11.5 - 14.5 %    Platelets 433 (H) 150 - 350 K/uL    MPV 9.8 9.2 - 12.9 fL    Immature Granulocytes CANCELED 0.0 - 0.5 %    Immature Grans (Abs) CANCELED 0.00 - 0.04 K/uL    nRBC 0 0 /100 WBC    Gran% 40.0 38.0 - 73.0 %    Lymph% 25.0 18.0 - 48.0 %    Mono% 20.0 (H) 4.0 - 15.0 %    Eosinophil% 12.0 (H) 0.0 - 8.0 %    Basophil% 1.0 0.0 - 1.9 %    Bands 2.0 %    Aniso Slight     Poik Moderate     Poly Occasional     Target Cells Occasional     Sickle Cells Occasional (A)     Differential Method Automated    Comprehensive metabolic panel    Collection Time: 08/21/20  1:52 PM   Result Value Ref Range    Sodium 136 136 - 145 mmol/L    Potassium 3.5 3.5 - 5.1 mmol/L    Chloride 97 95 - 110 mmol/L    CO2 32 (H) 23 - 29 mmol/L    Glucose 100 70 - 110 mg/dL    BUN, Bld 8 6 - 20 mg/dL    Creatinine 0.8 0.5 - 1.4 mg/dL    Calcium 8.7 8.7 - 10.5 mg/dL    Total Protein 7.5 6.0 - 8.4 g/dL    Albumin 4.1 3.5 - 5.2 g/dL    Total Bilirubin 2.9 (H) 0.1 - 1.0 mg/dL    Alkaline Phosphatase 76 55 - 135 U/L    AST 42 (H) 10 - 40 U/L    ALT 35 10 - 44 U/L    Anion Gap 7 (L) 8 - 16 mmol/L    eGFR if African American >60 >60 mL/min/1.73 m^2    eGFR if non African American >60 >60 mL/min/1.73 m^2   Reticulocytes    Collection Time: 08/21/20  1:52 PM   Result Value Ref Range    Retic 15.2 (H) 0.4 - 2.0 %   COVID-19 Rapid Screening    Collection Time: 08/21/20  1:52 PM    Result Value Ref Range    SARS-CoV-2 RNA, Amplification, Qual Negative Negative   C-Reactive Protein    Collection Time: 08/21/20  1:52 PM   Result Value Ref Range    CRP 52.2 (H) 0.0 - 8.2 mg/L   Ferritin    Collection Time: 08/21/20  1:52 PM   Result Value Ref Range    Ferritin 2,292 (H) 20.0 - 300.0 ng/mL   Lactate Dehydrogenase    Collection Time: 08/21/20  1:52 PM   Result Value Ref Range     (H) 110 - 260 U/L   CK    Collection Time: 08/21/20  1:52 PM   Result Value Ref Range    CPK 1509 (H) 20 - 200 U/L   Troponin I    Collection Time: 08/21/20  1:52 PM   Result Value Ref Range    Troponin I 0.010 0.000 - 0.026 ng/mL   Brain Natriuretic Peptide    Collection Time: 08/21/20  1:52 PM   Result Value Ref Range    BNP 13 0 - 99 pg/mL   Procalcitonin    Collection Time: 08/21/20  1:52 PM   Result Value Ref Range    Procalcitonin 0.68 (H) <0.25 ng/mL   D dimer, quantitative    Collection Time: 08/21/20  1:52 PM   Result Value Ref Range    D-Dimer 4.31 (H) <0.50 mg/L FEU   ISTAT PROCEDURE    Collection Time: 08/21/20  2:24 PM   Result Value Ref Range    POC PH 7.399 7.35 - 7.45    POC PCO2 48.4 (H) 35 - 45 mmHg    POC PO2 80 (HH) 40 - 60 mmHg    POC HCO3 29.9 (H) 24 - 28 mmol/L    POC BE 4 -2 to 2 mmol/L    POC SATURATED O2 96 95 - 100 %    POC TCO2 31 (H) 24 - 29 mmol/L    Sample VENOUS     Site Other     Allens Test N/A     DelSys Nasal Can     Mode SPONT     Flow 5     Sp02 93    Lactic Acid, Plasma    Collection Time: 08/21/20  2:25 PM   Result Value Ref Range    Lactate (Lactic Acid) 1.2 0.5 - 2.2 mmol/L   Urinalysis, Reflex to Urine Culture Urine, Clean Catch    Collection Time: 08/21/20  2:53 PM    Specimen: Urine   Result Value Ref Range    Specimen UA Urine, Clean Catch     Color, UA Yellow Yellow, Straw, Sheryl    Appearance, UA Clear Clear    pH, UA 6.0 5.0 - 8.0    Specific Gravity, UA 1.010 1.005 - 1.030    Protein, UA Negative Negative    Glucose, UA Negative Negative    Ketones, UA Negative  Negative    Bilirubin (UA) Negative Negative    Occult Blood UA 1+ (A) Negative    Nitrite, UA Negative Negative    Urobilinogen, UA Negative <2.0 EU/dL    Leukocytes, UA Negative Negative   Urinalysis Microscopic    Collection Time: 08/21/20  2:53 PM   Result Value Ref Range    RBC, UA 0 0 - 4 /hpf    WBC, UA 3 0 - 5 /hpf    Bacteria None None-Occ /hpf    Squam Epithel, UA 1 /hpf    Microscopic Comment SEE COMMENT      Significant Imaging:   XR CHEST AP PORTABLE   FINDINGS:  The heart is not enlarged.  Superior mediastinal structures are unremarkable.  Pulmonary vasculature is within normal limits.  The lungs are free of focal consolidations.  There is no evidence for pneumothorax or large pleural effusions.  Bony structures are grossly intact.     Impression:   No acute chest disease identified.  No detrimental change when compared to prior examination dated 01/15/2020.      Electronically signed by: Mc Ordaz MD  Date:                                            08/21/2020  Time:                                           16:07    CTA CHEST NON CORONARY   FINDINGS:  The heart pericardium and great vessels enhance normally.  There is no evidence of pulmonary arterial filling defect to suggest pulmonary arterial thromboembolism.     The aorta, [id number] brachiocephalic branches and descending thoracic aorta appear normal.  There is no mediastinal mass or fluid collection.     There is patchy ground-glass airspace opacities within the lower lobes mainly on the right with small areas of ground-glass opacity in the right middle lobe.  No large consolidation or effusion is evident.     The bony structures are intact.     Impression:   Negative CTA of the chest for pulmonary arterial thromboembolism.   No evidence of aortic aneurysm or dissection.   Patchy airspace disease within the lower lobes and right middle lobe.  Findings are concerning for multifocal pneumonia or pneumonitis.  Features appear atypical for  COVID-19.     Electronically signed by: Christian Graves  Date:                                            08/21/2020  Time:                                           18:54    21-AUG-2020 13:36:02 EKG   Sinus tachycardia  Vent. rate 109 BPM  MA interval 184 ms  QRS duration 98 ms  QT/QTc 336/452 ms  Incomplete right bundle branch block  Borderline Abnormal ECG  When compared with ECG of 15-JANET-2020 10:25,  No significant change was found      Assessment/Plan:     * Multifocal pneumonia  CTA chest at admission revealed:    -Patchy airspace disease within the lower lobes and right middle lobe.     -Findings are concerning for multifocal pneumonia or pneumonitis.     -Features appear atypical for COVID-19.   -Negative for PTE  He has anaphylaxis to PCN   -He did tolerate combo Rocephin/Zithro x 1 in the ED   -From here on out, will proceed with Levaquin 750mg iv x 6 more days  Legionella UAg, sputum cultures  No fever documented thus far  WBC 25K, up from baseline of 12-14  Low suspicion for COVID, but O2 sats of 90% RA and elevated inflammatory markers (likely from SS crisis)   -Keep on COVID precautions until repeat in 24 hours   -CRP 52  Other possibility is acute sickle chest syndrome, though procal is elevated at 0.68  Hematology consult if worsens or if sickle chest seems to become more likely over time        Acute respiratory failure with hypoxia  He was initially requiring 3L to 5L to keep O2 sats at 90%  This has now improved after nebs to O2 sats of 93% on 3L NC  Monitor closely and keep O2 sats >92%      Sickle cell pain crisis  Will proceed with Dilaudid 0.5 and 1mg iv q4 prn mod and severe pain  Hydration to euvolemia with fluids  Continue Hydrea at 500mg po qday (home dose)  Hg good at 9g, but retic up to 15 and LDH up to 681        Mild asthma without complication  Continue duonebs prn  Change home Symbicort to Advair diskus in house, 100-25 BID  He was apparently wheezing on arrival, so also got  Solumedrol 125mg iv x 1   -Will not continue steroids for the time being      Non-traumatic rhabdomyolysis  CPK is mildly elevated to 1509  Suspect either from recently working out in the gym heavily or possibly SS crisis  Monitor for now and follow qam  IV fluids  Check UDS to rule out amphetamines and cocaine        VTE Risk Mitigation (From admission, onward)         Ordered     enoxaparin injection 40 mg  Every 24 hours      08/21/20 1928     IP VTE HIGH RISK PATIENT  Once      08/21/20 1928     Place sequential compression device  Until discontinued      08/21/20 1928     Place ELENA hose  Until discontinued      08/21/20 1928                   DMITRIY Nguyen MD  Department of Hospital Medicine   [facility]

## 2020-08-22 NOTE — ASSESSMENT & PLAN NOTE
CTA chest at admission revealed:    -Patchy airspace disease within the lower lobes and right middle lobe.     -Findings are concerning for multifocal pneumonia or pneumonitis.     -Features appear atypical for COVID-19.   -Negative for PTE  He has anaphylaxis to PCN   -He did tolerate combo Rocephin/Zithro x 1 in the ED   -From here on out, will proceed with Levaquin 750mg iv x 6 more days  Legionella UAg, sputum cultures  No fever documented thus far  WBC 25K, up from baseline of 12-14  Low suspicion for COVID, but O2 sats of 90% RA and elevated inflammatory markers (likely from SS crisis)   -Keep on COVID precautions until repeat in 24 hours   -CRP 52  Other possibility is acute sickle chest syndrome, though procal is elevated at 0.68  Hematology consult if worsens or if sickle chest seems to become more likely over time

## 2020-08-22 NOTE — ASSESSMENT & PLAN NOTE
He was initially requiring 3L to 5L to keep O2 sats at 90%  This has now improved after nebs to O2 sats of 93% on 3L NC  Monitor closely and keep O2 sats >92%

## 2020-08-22 NOTE — ASSESSMENT & PLAN NOTE
Will proceed with Dilaudid 0.5 and 1mg iv q4 prn mod and severe pain  Hydration to euvolemia with fluids  Continue Hydrea at 500mg po qday (home dose)  Hg good at 9g, but retic up to 15 and LDH up to 681    Doubt pain crisis

## 2020-08-22 NOTE — NURSING
Urine not collected. Pt voided per urinal throughout night. Educated several times to inform nurse (me) after void so specimen can be collected. Pt did not call. Morning nurse informed.

## 2020-08-22 NOTE — SUBJECTIVE & OBJECTIVE
Past Medical History:   Diagnosis Date    Asthma     Broken thumb     Cigarette smoker     Sickle cell anemia     Sickle cell crisis        Past Surgical History:   Procedure Laterality Date    HAND SURGERY Left 12/21/2017    ORIF    ORIF mandible  01/31/2013    spleenectomy         Review of patient's allergies indicates:   Allergen Reactions    Penicillins Anaphylaxis       No current facility-administered medications on file prior to encounter.      Current Outpatient Medications on File Prior to Encounter   Medication Sig    FOLIC ACID ORAL Take by mouth.    guaiFENesin (MUCINEX) 600 mg 12 hr tablet Take 600 mg by mouth.    hydroxyurea (HYDREA) 500 mg Cap TK 1 C PO BID    oxyCODONE (ROXICODONE) 5 MG immediate release tablet Take 5 mg by mouth.    albuterol (ACCUNEB) 1.25 mg/3 mL Nebu Take 3 mLs (1.25 mg total) by nebulization every 6 (six) hours as needed. Rescue    albuterol (PROVENTIL/VENTOLIN HFA) 90 mcg/actuation inhaler Inhale 1-2 puffs into the lungs every 6 (six) hours as needed for Wheezing. Rescue    budesonide-formoterol 160-4.5 mcg (SYMBICORT) 160-4.5 mcg/actuation HFAA Inhale 2 puffs into the lungs every 12 (twelve) hours. Controller    calcium-vitamin D3 500 mg(1,250mg) -200 unit per tablet Take 1 tablet by mouth 2 (two) times daily with meals.    clindamycin (CLEOCIN) 150 MG capsule clindamycin HCl 150 mg capsule    ferrous sulfate (FEOSOL) 325 mg (65 mg iron) Tab tablet ferrous sulfate 325 mg (65 mg iron) tablet    ibuprofen (ADVIL,MOTRIN) 600 MG tablet Take 1 tablet (600 mg total) by mouth every 6 (six) hours as needed for Pain. (Patient not taking: Reported on 11/26/2019)    montelukast (SINGULAIR) 10 mg tablet montelukast 10 mg tablet    oxyCODONE-acetaminophen (PERCOCET) 5-325 mg per tablet Take 1 tablet by mouth every 4 (four) hours as needed for Pain.     Family History     None        Tobacco Use    Smoking status: Current Every Day Smoker     Packs/day: 0.50      Types: Cigarettes    Smokeless tobacco: Never Used    Tobacco comment: encouraged to quit.    Substance and Sexual Activity    Alcohol use: Not Currently     Comment: socially    Drug use: No    Sexual activity: Yes     Partners: Female     Birth control/protection: Condom     Review of Systems   Constitutional: Positive for activity change, appetite change, diaphoresis, fatigue and fever. Negative for chills.   HENT: Negative for congestion.    Eyes: Positive for redness.   Respiratory: Positive for cough, shortness of breath and wheezing.    Cardiovascular: Negative for chest pain.   Gastrointestinal: Positive for abdominal pain. Negative for constipation, diarrhea, nausea and vomiting.   Endocrine: Positive for heat intolerance.   Genitourinary: Negative for dysuria.   Musculoskeletal: Positive for arthralgias, back pain and myalgias.   Skin: Negative for rash.   Allergic/Immunologic: Negative for immunocompromised state.   Neurological: Positive for dizziness. Negative for weakness.   Hematological: Does not bruise/bleed easily.   Psychiatric/Behavioral: Positive for decreased concentration. Negative for confusion. The patient is not nervous/anxious.      Objective:     Vital Signs (Most Recent):  Temp: 98 °F (36.7 °C) (08/21/20 1310)  Pulse: 98 (08/21/20 1500)  Resp: 18 (08/21/20 1903)  BP: 124/77 (08/21/20 1310)  SpO2: 97 % (08/21/20 1500) Vital Signs (24h Range):  Temp:  [98 °F (36.7 °C)] 98 °F (36.7 °C)  Pulse:  [] 98  Resp:  [16-33] 18  SpO2:  [90 %-97 %] 97 %  BP: (124)/(77) 124/77     Weight: 72.6 kg (160 lb)  Body mass index is 25.82 kg/m².    Physical Exam  Vitals signs and nursing note reviewed.   Constitutional:       General: He is not in acute distress.     Appearance: He is well-developed. He is ill-appearing and diaphoretic. He is not toxic-appearing.      Comments: Mr. Parham tells me he is feeling better, but on exam he is diffusely diaphoretic to the point of soaking the sheets and  sweat dripping off his face   HENT:      Head: Normocephalic and atraumatic.      Nose: Nose normal.      Mouth/Throat:      Pharynx: No oropharyngeal exudate.   Eyes:      General: No scleral icterus.        Right eye: No discharge.         Left eye: No discharge.      Conjunctiva/sclera: Conjunctivae normal.      Pupils: Pupils are equal, round, and reactive to light.   Neck:      Musculoskeletal: Normal range of motion and neck supple.      Thyroid: No thyromegaly.      Vascular: No JVD.      Trachea: No tracheal deviation.   Cardiovascular:      Rate and Rhythm: Normal rate and regular rhythm.      Heart sounds: Murmur present. Systolic murmur present with a grade of 1/6. No friction rub. No gallop.    Pulmonary:      Effort: Pulmonary effort is normal. No respiratory distress.      Breath sounds: No stridor. Examination of the right-lower field reveals rales. Examination of the left-lower field reveals rales. Rales present. No decreased breath sounds, wheezing or rhonchi.   Chest:      Chest wall: No tenderness.   Abdominal:      General: Bowel sounds are normal. There is no distension.      Palpations: Abdomen is soft. There is no mass.      Tenderness: There is generalized abdominal tenderness. There is no guarding or rebound.   Genitourinary:     Comments: No rodriguez in place  Musculoskeletal: Normal range of motion.         General: No tenderness.      Comments: Tenderness to back and extremities to palpation   Lymphadenopathy:      Cervical: No cervical adenopathy.      Comments: No peripheral edema   Skin:     General: Skin is warm.      Coloration: Skin is not pale.      Findings: No erythema or rash.      Comments: Multiple tattoos   Neurological:      Mental Status: He is alert and oriented to person, place, and time.      GCS: GCS eye subscore is 4. GCS verbal subscore is 5. GCS motor subscore is 6.      Cranial Nerves: No cranial nerve deficit.      Motor: No abnormal muscle tone.      Coordination:  Coordination normal.      Deep Tendon Reflexes: Reflexes normal.   Psychiatric:         Attention and Perception: Attention and perception normal.         Mood and Affect: Mood and affect normal.         Behavior: Behavior normal.         Thought Content: Thought content normal.         Cognition and Memory: Cognition and memory normal.         Judgment: Judgment normal.           CRANIAL NERVES     CN III, IV, VI   Pupils are equal, round, and reactive to light.       Significant Labs:   Recent Results (from the past 24 hour(s))   CBC auto differential    Collection Time: 08/21/20  1:52 PM   Result Value Ref Range    WBC 23.16 (H) 3.90 - 12.70 K/uL    RBC 3.02 (L) 4.60 - 6.20 M/uL    Hemoglobin 9.0 (L) 14.0 - 18.0 g/dL    Hematocrit 24.5 (L) 40.0 - 54.0 %    Mean Corpuscular Volume 81 (L) 82 - 98 fL    Mean Corpuscular Hemoglobin 29.8 27.0 - 31.0 pg    Mean Corpuscular Hemoglobin Conc 36.7 (H) 32.0 - 36.0 g/dL    RDW 16.1 (H) 11.5 - 14.5 %    Platelets 433 (H) 150 - 350 K/uL    MPV 9.8 9.2 - 12.9 fL    Immature Granulocytes CANCELED 0.0 - 0.5 %    Immature Grans (Abs) CANCELED 0.00 - 0.04 K/uL    nRBC 0 0 /100 WBC    Gran% 40.0 38.0 - 73.0 %    Lymph% 25.0 18.0 - 48.0 %    Mono% 20.0 (H) 4.0 - 15.0 %    Eosinophil% 12.0 (H) 0.0 - 8.0 %    Basophil% 1.0 0.0 - 1.9 %    Bands 2.0 %    Aniso Slight     Poik Moderate     Poly Occasional     Target Cells Occasional     Sickle Cells Occasional (A)     Differential Method Automated    Comprehensive metabolic panel    Collection Time: 08/21/20  1:52 PM   Result Value Ref Range    Sodium 136 136 - 145 mmol/L    Potassium 3.5 3.5 - 5.1 mmol/L    Chloride 97 95 - 110 mmol/L    CO2 32 (H) 23 - 29 mmol/L    Glucose 100 70 - 110 mg/dL    BUN, Bld 8 6 - 20 mg/dL    Creatinine 0.8 0.5 - 1.4 mg/dL    Calcium 8.7 8.7 - 10.5 mg/dL    Total Protein 7.5 6.0 - 8.4 g/dL    Albumin 4.1 3.5 - 5.2 g/dL    Total Bilirubin 2.9 (H) 0.1 - 1.0 mg/dL    Alkaline Phosphatase 76 55 - 135 U/L    AST  42 (H) 10 - 40 U/L    ALT 35 10 - 44 U/L    Anion Gap 7 (L) 8 - 16 mmol/L    eGFR if African American >60 >60 mL/min/1.73 m^2    eGFR if non African American >60 >60 mL/min/1.73 m^2   Reticulocytes    Collection Time: 08/21/20  1:52 PM   Result Value Ref Range    Retic 15.2 (H) 0.4 - 2.0 %   COVID-19 Rapid Screening    Collection Time: 08/21/20  1:52 PM   Result Value Ref Range    SARS-CoV-2 RNA, Amplification, Qual Negative Negative   C-Reactive Protein    Collection Time: 08/21/20  1:52 PM   Result Value Ref Range    CRP 52.2 (H) 0.0 - 8.2 mg/L   Ferritin    Collection Time: 08/21/20  1:52 PM   Result Value Ref Range    Ferritin 2,292 (H) 20.0 - 300.0 ng/mL   Lactate Dehydrogenase    Collection Time: 08/21/20  1:52 PM   Result Value Ref Range     (H) 110 - 260 U/L   CK    Collection Time: 08/21/20  1:52 PM   Result Value Ref Range    CPK 1509 (H) 20 - 200 U/L   Troponin I    Collection Time: 08/21/20  1:52 PM   Result Value Ref Range    Troponin I 0.010 0.000 - 0.026 ng/mL   Brain Natriuretic Peptide    Collection Time: 08/21/20  1:52 PM   Result Value Ref Range    BNP 13 0 - 99 pg/mL   Procalcitonin    Collection Time: 08/21/20  1:52 PM   Result Value Ref Range    Procalcitonin 0.68 (H) <0.25 ng/mL   D dimer, quantitative    Collection Time: 08/21/20  1:52 PM   Result Value Ref Range    D-Dimer 4.31 (H) <0.50 mg/L FEU   ISTAT PROCEDURE    Collection Time: 08/21/20  2:24 PM   Result Value Ref Range    POC PH 7.399 7.35 - 7.45    POC PCO2 48.4 (H) 35 - 45 mmHg    POC PO2 80 (HH) 40 - 60 mmHg    POC HCO3 29.9 (H) 24 - 28 mmol/L    POC BE 4 -2 to 2 mmol/L    POC SATURATED O2 96 95 - 100 %    POC TCO2 31 (H) 24 - 29 mmol/L    Sample VENOUS     Site Other     Allens Test N/A     DelSys Nasal Can     Mode SPONT     Flow 5     Sp02 93    Lactic Acid, Plasma    Collection Time: 08/21/20  2:25 PM   Result Value Ref Range    Lactate (Lactic Acid) 1.2 0.5 - 2.2 mmol/L   Urinalysis, Reflex to Urine Culture Urine, Clean  Catch    Collection Time: 08/21/20  2:53 PM    Specimen: Urine   Result Value Ref Range    Specimen UA Urine, Clean Catch     Color, UA Yellow Yellow, Straw, Sheryl    Appearance, UA Clear Clear    pH, UA 6.0 5.0 - 8.0    Specific Gravity, UA 1.010 1.005 - 1.030    Protein, UA Negative Negative    Glucose, UA Negative Negative    Ketones, UA Negative Negative    Bilirubin (UA) Negative Negative    Occult Blood UA 1+ (A) Negative    Nitrite, UA Negative Negative    Urobilinogen, UA Negative <2.0 EU/dL    Leukocytes, UA Negative Negative   Urinalysis Microscopic    Collection Time: 08/21/20  2:53 PM   Result Value Ref Range    RBC, UA 0 0 - 4 /hpf    WBC, UA 3 0 - 5 /hpf    Bacteria None None-Occ /hpf    Squam Epithel, UA 1 /hpf    Microscopic Comment SEE COMMENT      Significant Imaging:   XR CHEST AP PORTABLE   FINDINGS:  The heart is not enlarged.  Superior mediastinal structures are unremarkable.  Pulmonary vasculature is within normal limits.  The lungs are free of focal consolidations.  There is no evidence for pneumothorax or large pleural effusions.  Bony structures are grossly intact.     Impression:   No acute chest disease identified.  No detrimental change when compared to prior examination dated 01/15/2020.      Electronically signed by: Mc Ordaz MD  Date:                                            08/21/2020  Time:                                           16:07    CTA CHEST NON CORONARY   FINDINGS:  The heart pericardium and great vessels enhance normally.  There is no evidence of pulmonary arterial filling defect to suggest pulmonary arterial thromboembolism.     The aorta, 240497 brachiocephalic branches and descending thoracic aorta appear normal.  There is no mediastinal mass or fluid collection.     There is patchy ground-glass airspace opacities within the lower lobes mainly on the right with small areas of ground-glass opacity in the right middle lobe.  No large consolidation or effusion is  evident.     The bony structures are intact.     Impression:   Negative CTA of the chest for pulmonary arterial thromboembolism.   No evidence of aortic aneurysm or dissection.   Patchy airspace disease within the lower lobes and right middle lobe.  Findings are concerning for multifocal pneumonia or pneumonitis.  Features appear atypical for COVID-19.     Electronically signed by: Christian Graves  Date:                                            08/21/2020  Time:                                           18:54    21-AUG-2020 13:36:02 EKG   Sinus tachycardia  Vent. rate 109 BPM  VT interval 184 ms  QRS duration 98 ms  QT/QTc 336/452 ms  Incomplete right bundle branch block  Borderline Abnormal ECG  When compared with ECG of 15-JANET-2020 10:25,  No significant change was found

## 2020-08-22 NOTE — ASSESSMENT & PLAN NOTE
Continue duonebs prn  Change home Symbicort to Advair diskus in house, 100-25 BID  He was apparently wheezing on arrival, so also got Solumedrol 125mg iv x 1   -Will not continue steroids for the time being

## 2020-08-22 NOTE — PLAN OF CARE
Problem: Adult Inpatient Plan of Care  Goal: Plan of Care Review  8/22/2020 1735 by Zully Bone RN  Outcome: Ongoing, Progressing  8/22/2020 1734 by Zully Bone RN  Outcome: Ongoing, Progressing       Pt free from falls and injury throughout shift. Pt AAOx4. Continued medications as ordered. Complaints of pain 7/10  controlled with medications. Urine, and influenza and COVID swabs collected, A/C/D precautions maintained, 2L NC with O2 sat 97%. NS at 100mL/hr to R. Arm. Tele # [id number]. Pt in no distress. Will continue to monitor.

## 2020-08-22 NOTE — ASSESSMENT & PLAN NOTE
CPK is mildly elevated to 1509  Suspect either from recently working out in the gym heavily or possibly SS crisis  Monitor for now and follow qam  IV fluids  Check UDS to rule out amphetamines and cocaine

## 2020-08-22 NOTE — SUBJECTIVE & OBJECTIVE
Interval History: comfortable on NC. States he feels better.     Review of Systems   Constitutional: Negative for activity change.   HENT: Negative for congestion.    Respiratory: Positive for shortness of breath. Negative for chest tightness.    Cardiovascular: Negative for chest pain.   Gastrointestinal: Negative for abdominal pain.   Genitourinary: Negative for difficulty urinating.   Musculoskeletal: Negative for arthralgias.   Psychiatric/Behavioral: Negative for agitation and behavioral problems.     Objective:     Vital Signs (Most Recent):  Temp: 98.4 °F (36.9 °C) (08/22/20 0734)  Pulse: 90 (08/22/20 0837)  Resp: 18 (08/22/20 0934)  BP: (!) 123/59 (08/22/20 0734)  SpO2: 96 % (08/22/20 0837) Vital Signs (24h Range):  Temp:  [97.9 °F (36.6 °C)-98.4 °F (36.9 °C)] 98.4 °F (36.9 °C)  Pulse:  [] 90  Resp:  [14-33] 18  SpO2:  [90 %-97 %] 96 %  BP: (113-127)/(57-77) 123/59     Weight: 73.6 kg (162 lb 4.1 oz)  Body mass index is 26.19 kg/m².    Intake/Output Summary (Last 24 hours) at 8/22/2020 0944  Last data filed at 8/22/2020 0834  Gross per 24 hour   Intake 1040 ml   Output 475 ml   Net 565 ml      Physical Exam  Vitals signs and nursing note reviewed.   Constitutional:       General: He is not in acute distress.     Appearance: Normal appearance. He is normal weight. He is not ill-appearing, toxic-appearing or diaphoretic.   HENT:      Head: Normocephalic and atraumatic.   Neurological:      Mental Status: He is alert and oriented to person, place, and time.   Psychiatric:         Mood and Affect: Mood normal.         Behavior: Behavior normal.         Significant Labs:   BMP:   Recent Labs   Lab 08/22/20  0530   *      K 5.3*      CO2 25   BUN 9   CREATININE 0.8   CALCIUM 8.3*   MG 2.0     CBC:   Recent Labs   Lab 08/21/20  1352 08/22/20  0530   WBC 23.16* 15.46*   HGB 9.0* 8.9*   HCT 24.5* 24.5*   * SEE COMMENT       Significant Imaging:

## 2020-08-22 NOTE — PROGRESS NOTES
"[facility] Hospital Medicine  Progress Note    Patient Name: Katie Parham  MRN: [id number]  Patient Class: IP- Inpatient   Admission Date: 8/21/2020  Length of Stay: 1 days  Attending Physician: Marco A Ernst MD  Primary Care Provider: Kieran Reed MD        Subjective:     Principal Problem:Multifocal pneumonia        HPI:  Mr. Parham is a yo man with a past medical history of SSDz and mild asthma.  He is on chronic Hydrea for his SSDz, and is followed by Dr. Ramirez in Hematology clinic.  He is normally well controlled.  He states that he was in his regular state of health up until yesterday.  In fact, he had been working outside and working out heavily in the gym as well with no issues.  Yesterday he began to have some malaise, cough and was bringing up some green sputum.      By today he was diaphoretic, felt feverish, and began to have his typical SS crisis pains of mostly low back pain and long bone pain in his arms and legs.  He had some old Percocet 10mg at home, so he tried to, "wait it out at home."  Unfortunately, he became lethargic feeling and short of breath.  In addition, his back and bone pains began to escalate, prompting his visit to the ED finally.  He says his pain is better now, at 5/10 after Morphine in the ED.    He did not check his temperature at home, but has felt feverish.  He has some mild abdominal pain, but feels may be from working out.    Overview/Hospital Course:  Patient was admitted to the hospital on 8/21 after being found on CTA to have multi-focal pneumonia.      Interval History: comfortable on NC. States he feels better.     Review of Systems   Constitutional: Negative for activity change.   HENT: Negative for congestion.    Respiratory: Positive for shortness of breath. Negative for chest tightness.    Cardiovascular: Negative for chest pain.   Gastrointestinal: Negative for abdominal pain.   Genitourinary: Negative for difficulty urinating. "   Musculoskeletal: Negative for arthralgias.   Psychiatric/Behavioral: Negative for agitation and behavioral problems.     Objective:     Vital Signs (Most Recent):  Temp: 98.4 °F (36.9 °C) (08/22/20 0734)  Pulse: 90 (08/22/20 0837)  Resp: 18 (08/22/20 0934)  BP: (!) 123/59 (08/22/20 0734)  SpO2: 96 % (08/22/20 0837) Vital Signs (24h Range):  Temp:  [97.9 °F (36.6 °C)-98.4 °F (36.9 °C)] 98.4 °F (36.9 °C)  Pulse:  [] 90  Resp:  [14-33] 18  SpO2:  [90 %-97 %] 96 %  BP: (113-127)/(57-77) 123/59     Weight: 73.6 kg (162 lb 4.1 oz)  Body mass index is 26.19 kg/m².    Intake/Output Summary (Last 24 hours) at 8/22/2020 0944  Last data filed at 8/22/2020 0834  Gross per 24 hour   Intake 1040 ml   Output 475 ml   Net 565 ml      Physical Exam  Vitals signs and nursing note reviewed.   Constitutional:       General: He is not in acute distress.     Appearance: Normal appearance. He is normal weight. He is not ill-appearing, toxic-appearing or diaphoretic.   HENT:      Head: Normocephalic and atraumatic.   Neurological:      Mental Status: He is alert and oriented to person, place, and time.   Psychiatric:         Mood and Affect: Mood normal.         Behavior: Behavior normal.         Significant Labs:   BMP:   Recent Labs   Lab 08/22/20  0530   *      K 5.3*      CO2 25   BUN 9   CREATININE 0.8   CALCIUM 8.3*   MG 2.0     CBC:   Recent Labs   Lab 08/21/20  1352 08/22/20  0530   WBC 23.16* 15.46*   HGB 9.0* 8.9*   HCT 24.5* 24.5*   * SEE COMMENT       Significant Imaging:      Assessment/Plan:      * Multifocal pneumonia  CTA chest at admission revealed:    -Patchy airspace disease within the lower lobes and right middle lobe.     -Findings are concerning for multifocal pneumonia or pneumonitis.     -Features appear atypical for COVID-19.   -Negative for PTE  He has anaphylaxis to PCN   -He did tolerate combo Rocephin/Zithro x 1 in the ED   -From here on out, will proceed with Levaquin 750mg iv  x 6 more days  Legionella UAg, sputum cultures  No fever documented thus far  WBC 25K, up from baseline of 12-14  Low suspicion for COVID, but O2 sats of 90% RA and elevated inflammatory markers (likely from SS crisis)   -Keep on COVID precautions until repeat in 24 hours   -CRP 52  Other possibility is acute sickle chest syndrome, though procal is elevated at 0.68  Hematology consult if worsens or if sickle chest seems to become more likely over time    levaquin for now and breathing treatments.         Non-traumatic rhabdomyolysis  CPK is mildly elevated to 1509  Suspect either from recently working out in the gym heavily or possibly SS crisis  Monitor for now and follow qam  IV fluids  Check UDS to rule out amphetamines and cocaine    CPK in am       Sickle cell pain crisis  Will proceed with Dilaudid 0.5 and 1mg iv q4 prn mod and severe pain  Hydration to euvolemia with fluids  Continue Hydrea at 500mg po qday (home dose)  Hg good at 9g, but retic up to 15 and LDH up to 681    Doubt pain crisis         Mild asthma without complication  Continue duonebs prn  Change home Symbicort to Advair diskus in house, 100-25 BID  He was apparently wheezing on arrival, so also got Solumedrol 125mg iv x 1   -Will not continue steroids for the time being      Tobacco abuse  Smoking cessation education > 3 minutes       Sickle cell anemia  Only occasional sickle cells on slide and H/H stable.       Acute respiratory failure with hypoxia  He was initially requiring 3L to 5L to keep O2 sats at 90%  This has now improved after nebs to O2 sats of 93% on 3L NC  Monitor closely and keep O2 sats >92%        VTE Risk Mitigation (From admission, onward)         Ordered     enoxaparin injection 40 mg  Every 24 hours      08/21/20 1928     IP VTE HIGH RISK PATIENT  Once      08/21/20 1928     Place sequential compression device  Until discontinued      08/21/20 1928     Place ELENA hose  Until discontinued      08/21/20 1928                 Discharge Planning   YENY:      Code Status: Full Code   Is the patient medically ready for discharge?:     Reason for patient still in hospital (select all that apply): Patient unstable           Home in 1-2 days         Marco A Figueroa MD  Department of Hospital Medicine   [facility]

## 2020-08-23 LAB
ALBUMIN SERPL BCP-MCNC: 3.6 G/DL (ref 3.5–5.2)
ALP SERPL-CCNC: 71 U/L (ref 55–135)
ALT SERPL W/O P-5'-P-CCNC: 34 U/L (ref 10–44)
ANION GAP SERPL CALC-SCNC: 8 MMOL/L (ref 8–16)
ANISOCYTOSIS BLD QL SMEAR: ABNORMAL
AST SERPL-CCNC: 23 U/L (ref 10–40)
BASOPHILS # BLD AUTO: 0.08 K/UL (ref 0–0.2)
BASOPHILS NFR BLD: 0.3 % (ref 0–1.9)
BILIRUB SERPL-MCNC: 1.9 MG/DL (ref 0.1–1)
BUN SERPL-MCNC: 6 MG/DL (ref 6–20)
CALCIUM SERPL-MCNC: 8.6 MG/DL (ref 8.7–10.5)
CHLORIDE SERPL-SCNC: 104 MMOL/L (ref 95–110)
CK MB SERPL-MCNC: 2 NG/ML (ref 0.1–6.5)
CK MB SERPL-RTO: 0.7 % (ref 0–5)
CK SERPL-CCNC: 292 U/L (ref 20–200)
CO2 SERPL-SCNC: 30 MMOL/L (ref 23–29)
CREAT SERPL-MCNC: 1 MG/DL (ref 0.5–1.4)
CRP SERPL-MCNC: 29.6 MG/L (ref 0–8.2)
DACRYOCYTES BLD QL SMEAR: ABNORMAL
DIFFERENTIAL METHOD: ABNORMAL
EOSINOPHIL # BLD AUTO: 0.6 K/UL (ref 0–0.5)
EOSINOPHIL NFR BLD: 1.9 % (ref 0–8)
ERYTHROCYTE [DISTWIDTH] IN BLOOD BY AUTOMATED COUNT: 17.8 % (ref 11.5–14.5)
EST. GFR  (AFRICAN AMERICAN): >60 ML/MIN/1.73 M^2
EST. GFR  (NON AFRICAN AMERICAN): >60 ML/MIN/1.73 M^2
GLUCOSE SERPL-MCNC: 105 MG/DL (ref 70–110)
HCT VFR BLD AUTO: 25.2 % (ref 40–54)
HGB BLD-MCNC: 9 G/DL (ref 14–18)
HYPOCHROMIA BLD QL SMEAR: ABNORMAL
IMM GRANULOCYTES # BLD AUTO: 0.17 K/UL (ref 0–0.04)
IMM GRANULOCYTES NFR BLD AUTO: 0.6 % (ref 0–0.5)
LYMPHOCYTES # BLD AUTO: 6.1 K/UL (ref 1–4.8)
LYMPHOCYTES NFR BLD: 20.2 % (ref 18–48)
MAGNESIUM SERPL-MCNC: 1.7 MG/DL (ref 1.6–2.6)
MCH RBC QN AUTO: 29.7 PG (ref 27–31)
MCHC RBC AUTO-ENTMCNC: 35.7 G/DL (ref 32–36)
MCV RBC AUTO: 83 FL (ref 82–98)
MONOCYTES # BLD AUTO: 3.3 K/UL (ref 0.3–1)
MONOCYTES NFR BLD: 10.8 % (ref 4–15)
NEUTROPHILS # BLD AUTO: 20 K/UL (ref 1.8–7.7)
NEUTROPHILS NFR BLD: 66.2 % (ref 38–73)
NRBC BLD-RTO: 1 /100 WBC
PHOSPHATE SERPL-MCNC: 3 MG/DL (ref 2.7–4.5)
PLATELET # BLD AUTO: 530 K/UL (ref 150–350)
PLATELET BLD QL SMEAR: ABNORMAL
PMV BLD AUTO: 10 FL (ref 9.2–12.9)
POIKILOCYTOSIS BLD QL SMEAR: ABNORMAL
POLYCHROMASIA BLD QL SMEAR: ABNORMAL
POTASSIUM SERPL-SCNC: 4 MMOL/L (ref 3.5–5.1)
PROT SERPL-MCNC: 6.8 G/DL (ref 6–8.4)
RBC # BLD AUTO: 3.03 M/UL (ref 4.6–6.2)
SARS-COV-2 RNA RESP QL NAA+PROBE: NOT DETECTED
SICKLE CELLS BLD QL SMEAR: ABNORMAL
SODIUM SERPL-SCNC: 142 MMOL/L (ref 136–145)
STOMATOCYTES BLD QL SMEAR: PRESENT
TARGETS BLD QL SMEAR: ABNORMAL
WBC # BLD AUTO: 30.15 K/UL (ref 3.9–12.7)

## 2020-08-23 PROCEDURE — 25000003 PHARM REV CODE 250: Performed by: INTERNAL MEDICINE

## 2020-08-23 PROCEDURE — 80053 COMPREHEN METABOLIC PANEL: CPT

## 2020-08-23 PROCEDURE — 94640 AIRWAY INHALATION TREATMENT: CPT

## 2020-08-23 PROCEDURE — 84100 ASSAY OF PHOSPHORUS: CPT

## 2020-08-23 PROCEDURE — 36415 COLL VENOUS BLD VENIPUNCTURE: CPT

## 2020-08-23 PROCEDURE — 83735 ASSAY OF MAGNESIUM: CPT

## 2020-08-23 PROCEDURE — 82550 ASSAY OF CK (CPK): CPT

## 2020-08-23 PROCEDURE — 85025 COMPLETE CBC W/AUTO DIFF WBC: CPT

## 2020-08-23 PROCEDURE — 63600175 PHARM REV CODE 636 W HCPCS: Performed by: INTERNAL MEDICINE

## 2020-08-23 PROCEDURE — 94761 N-INVAS EAR/PLS OXIMETRY MLT: CPT

## 2020-08-23 PROCEDURE — 11000001 HC ACUTE MED/SURG PRIVATE ROOM

## 2020-08-23 PROCEDURE — 86140 C-REACTIVE PROTEIN: CPT

## 2020-08-23 PROCEDURE — 82553 CREATINE MB FRACTION: CPT

## 2020-08-23 PROCEDURE — 25000242 PHARM REV CODE 250 ALT 637 W/ HCPCS: Performed by: INTERNAL MEDICINE

## 2020-08-23 RX ORDER — MEROPENEM AND SODIUM CHLORIDE 1 G/50ML
1 INJECTION, SOLUTION INTRAVENOUS
Status: DISCONTINUED | OUTPATIENT
Start: 2020-08-23 | End: 2020-08-24 | Stop reason: HOSPADM

## 2020-08-23 RX ADMIN — MEROPENEM AND SODIUM CHLORIDE 1 G: 1 INJECTION, SOLUTION INTRAVENOUS at 06:08

## 2020-08-23 RX ADMIN — HYDROMORPHONE HYDROCHLORIDE 1 MG: 2 INJECTION, SOLUTION INTRAMUSCULAR; INTRAVENOUS; SUBCUTANEOUS at 06:08

## 2020-08-23 RX ADMIN — DEXTROSE MONOHYDRATE 1500 MG: 50 INJECTION, SOLUTION INTRAVENOUS at 11:08

## 2020-08-23 RX ADMIN — HYDROMORPHONE HYDROCHLORIDE 1 MG: 2 INJECTION, SOLUTION INTRAMUSCULAR; INTRAVENOUS; SUBCUTANEOUS at 11:08

## 2020-08-23 RX ADMIN — SODIUM CHLORIDE: 0.9 INJECTION, SOLUTION INTRAVENOUS at 10:08

## 2020-08-23 RX ADMIN — VITAMIN D, TAB 1000IU (100/BT) 1000 UNITS: 25 TAB at 09:08

## 2020-08-23 RX ADMIN — LEVOFLOXACIN 750 MG: 750 TABLET, FILM COATED ORAL at 09:08

## 2020-08-23 RX ADMIN — HYDROMORPHONE HYDROCHLORIDE 1 MG: 2 INJECTION, SOLUTION INTRAMUSCULAR; INTRAVENOUS; SUBCUTANEOUS at 09:08

## 2020-08-23 RX ADMIN — SODIUM CHLORIDE: 0.9 INJECTION, SOLUTION INTRAVENOUS at 06:08

## 2020-08-23 RX ADMIN — AZITHROMYCIN 500 MG: 500 INJECTION, POWDER, LYOPHILIZED, FOR SOLUTION INTRAVENOUS at 11:08

## 2020-08-23 RX ADMIN — HYDROMORPHONE HYDROCHLORIDE 1 MG: 2 INJECTION, SOLUTION INTRAMUSCULAR; INTRAVENOUS; SUBCUTANEOUS at 03:08

## 2020-08-23 RX ADMIN — DOCUSATE SODIUM 50 MG AND SENNOSIDES 8.6 MG 1 TABLET: 8.6; 5 TABLET, FILM COATED ORAL at 09:08

## 2020-08-23 RX ADMIN — SODIUM CHLORIDE: 0.9 INJECTION, SOLUTION INTRAVENOUS at 11:08

## 2020-08-23 RX ADMIN — VANCOMYCIN HYDROCHLORIDE 2000 MG: 100 INJECTION, POWDER, LYOPHILIZED, FOR SOLUTION INTRAVENOUS at 11:08

## 2020-08-23 RX ADMIN — MONTELUKAST 10 MG: 10 TABLET, FILM COATED ORAL at 09:08

## 2020-08-23 RX ADMIN — OXYCODONE HYDROCHLORIDE AND ACETAMINOPHEN 500 MG: 500 TABLET ORAL at 09:08

## 2020-08-23 RX ADMIN — IPRATROPIUM BROMIDE AND ALBUTEROL SULFATE 3 ML: .5; 3 SOLUTION RESPIRATORY (INHALATION) at 12:08

## 2020-08-23 RX ADMIN — HYDROXYUREA 500 MG: 500 CAPSULE ORAL at 09:08

## 2020-08-23 RX ADMIN — ENOXAPARIN SODIUM 40 MG: 40 INJECTION SUBCUTANEOUS at 05:08

## 2020-08-23 RX ADMIN — FOLIC ACID 1 MG: 1 TABLET ORAL at 09:08

## 2020-08-23 RX ADMIN — FLUTICASONE FUROATE AND VILANTEROL TRIFENATATE 1 PUFF: 100; 25 POWDER RESPIRATORY (INHALATION) at 08:08

## 2020-08-23 RX ADMIN — THERA TABS 1 TABLET: TAB at 09:08

## 2020-08-23 RX ADMIN — HYDROMORPHONE HYDROCHLORIDE 1 MG: 2 INJECTION, SOLUTION INTRAMUSCULAR; INTRAVENOUS; SUBCUTANEOUS at 12:08

## 2020-08-23 RX ADMIN — IPRATROPIUM BROMIDE AND ALBUTEROL SULFATE 3 ML: .5; 3 SOLUTION RESPIRATORY (INHALATION) at 03:08

## 2020-08-23 RX ADMIN — MEROPENEM AND SODIUM CHLORIDE 1 G: 1 INJECTION, SOLUTION INTRAVENOUS at 01:08

## 2020-08-23 RX ADMIN — IPRATROPIUM BROMIDE AND ALBUTEROL SULFATE 3 ML: .5; 3 SOLUTION RESPIRATORY (INHALATION) at 08:08

## 2020-08-23 NOTE — ASSESSMENT & PLAN NOTE
CTA chest at admission revealed:    -Patchy airspace disease within the lower lobes and right middle lobe.     -Findings are concerning for multifocal pneumonia or pneumonitis.     -Features appear atypical for COVID-19.   -Negative for PTE  He has anaphylaxis to PCN   -He did tolerate combo Rocephin/Zithro x 1 in the ED   -From here on out, will proceed with Levaquin 750mg iv x 6 more days  Legionella UAg, sputum cultures  No fever documented thus far  WBC 25K, up from baseline of 12-14  Low suspicion for COVID, but O2 sats of 90% RA and elevated inflammatory markers (likely from SS crisis)   -Keep on COVID precautions until repeat in 24 hours   -CRP 52  Other possibility is acute sickle chest syndrome, though procal is elevated at 0.68  Hematology consult if worsens or if sickle chest seems to become more likely over time    levaquin for now and breathing treatments.     Blood cultures negative. Wbc count to 30K-  Will start on Zosyn/Vanc    ID consult for tomorrow. Patient clinically does feel better.

## 2020-08-23 NOTE — PROGRESS NOTES
Pharmacokinetic Initial Assessment: IV Vancomycin    Assessment/Plan:    Initiate intravenous vancomycin with loading dose of 2000 mg once followed by a maintenance dose of vancomycin 1500mg IV every 12 hours  Desired empiric serum trough concentration is 10 to 20 mcg/mL  Draw vancomycin trough level 30 min prior to fourth dose on 8/24 at approximately 2230  Pharmacy will continue to follow and monitor vancomycin.      Please contact pharmacy at extension [phone] with any questions regarding this assessment.     Thank you for the consult,   Paul Salinas       Patient brief summary:  Katie Parham is a 30 y.o. male initiated on antimicrobial therapy with IV Vancomycin for treatment of suspected  Pneumonia    Drug Allergies:   Review of patient's allergies indicates:   Allergen Reactions    Penicillins Anaphylaxis       Actual Body Weight:   Wt Readings from Last 1 Encounters:   08/22/20 74.8 kg (164 lb 14.5 oz)       Renal Function:   Estimated Creatinine Clearance: 97.5 mL/min (based on SCr of 1 mg/dL).,     Dialysis Method (if applicable):  N/A    CBC (last 72 hours):  Recent Labs   Lab Result Units 08/21/20  1352 08/22/20  0530 08/23/20  0933   WBC K/uL 23.16* 15.46* 30.15*   Hemoglobin g/dL 9.0* 8.9* 9.0*   Hematocrit % 24.5* 24.5* 25.2*   Platelets K/uL 433* SEE COMMENT 530*   Gran% % 40.0 76.1* 66.2   Lymph% % 25.0 13.3* 20.2   Mono% % 20.0* 9.8 10.8   Eosinophil% % 12.0* 0.2 1.9   Basophil% % 1.0 0.1 0.3   Differential Method  Automated Automated Automated       Metabolic Panel (last 72 hours):  Recent Labs   Lab Result Units 08/21/20  1352 08/21/20  1453 08/21/20  1919 08/22/20  0530 08/23/20  0933   Sodium mmol/L 136  --   --  136 142   Potassium mmol/L 3.5  --   --  5.3* 4.0   Chloride mmol/L 97  --   --  103 104   CO2 mmol/L 32*  --   --  25 30*   Glucose mg/dL 100  --   --  152* 105   Glucose, UA   --  Negative  --   --   --    BUN, Bld mg/dL 8  --   --  9 6   Creatinine mg/dL 0.8  --   --  0.8 1.0    Creatinine, Random Ur mg/dL  --   --  122.0  --   --    Albumin g/dL 4.1  --   --  3.5 3.6   Total Bilirubin mg/dL 2.9*  --   --  1.4* 1.9*   Alkaline Phosphatase U/L 76  --   --  64 71   AST U/L 42*  --   --  34 23   ALT U/L 35  --   --  33 34   Magnesium mg/dL  --   --   --  2.0 1.7   Phosphorus mg/dL  --   --   --  1.8* 3.0       Drug levels (last 3 results):  No results for input(s): VANCOMYCINRA, VANCOMYCINPE, VANCOMYCINTR in the last 72 hours.    Microbiologic Results:  Microbiology Results (last 7 days)       Procedure Component Value Units Date/Time    Blood Culture #1 **CANNOT BE ORDERED STAT** [[id number]] Collected: 08/21/20 1930    Order Status: Completed Specimen: Blood from Peripheral, Hand, Left Updated: 08/22/20 2303     Blood Culture, Routine No Growth to date      No Growth to date    Blood culture [[id number]] Collected: 08/21/20 1915    Order Status: Completed Specimen: Blood from Peripheral, Hand, Right Updated: 08/22/20 2303     Blood Culture, Routine No Growth to date      No Growth to date    Influenza A & B by Molecular [[id number]] Collected: 08/22/20 1225    Order Status: Completed Specimen: Nasopharyngeal Swab Updated: 08/22/20 1322     Influenza A, Molecular Negative     Influenza B, Molecular Negative     Flu A & B Source Nasal swab    Culture, Respiratory with Gram Stain [[id number]]     Order Status: No result Specimen: Sputum, Expectorated

## 2020-08-23 NOTE — ASSESSMENT & PLAN NOTE
He was initially requiring 3L to 5L to keep O2 sats at 90%  This has now improved after nebs to O2 sats of 93% on 3L NC  Monitor closely and keep O2 sats >92%    Resolved

## 2020-08-23 NOTE — NURSING
Received report from off going nurse Zully. Patient received lying in bed AAO X 3. Resp even and unlabored on room air. Patient c/o 6/10 pain to his back. Tele box # [id number] in use and is visible on the screen.

## 2020-08-23 NOTE — PROGRESS NOTES
Pharmacist Renal Dose Adjustment Note    Katie Parham is a 30 y.o. male being treated with the medication merrem    Patient Data:    Vital Signs (Most Recent):  Temp: 98 °F (36.7 °C) (08/23/20 0357)  Pulse: 84 (08/23/20 0841)  Resp: 18 (08/23/20 0949)  BP: 131/78 (08/23/20 0357)  SpO2: 95 % (08/23/20 0841) Vital Signs (72h Range):  Temp:  [97.9 °F (36.6 °C)-98.4 °F (36.9 °C)]   Pulse:  []   Resp:  [14-33]   BP: (113-131)/(57-78)   SpO2:  [90 %-98 %]      Recent Labs   Lab 08/21/20  1352 08/22/20  0530   CREATININE 0.8 0.8     Serum creatinine: 0.8 mg/dL 08/22/20 0530  Estimated creatinine clearance: 121.8 mL/min    Medication:Meropenem 2g every 12 changed to 1g every 8 hours    Pharmacist's Name: Paul Salinas  Pharmacist's Extension: [phone]

## 2020-08-23 NOTE — PROGRESS NOTES
"[facility] Hospital Medicine  Progress Note    Patient Name: Katie Parham  MRN: [id number]  Patient Class: IP- Inpatient   Admission Date: 8/21/2020  Length of Stay: 2 days  Attending Physician: Marco A Ernst MD  Primary Care Provider: Kieran Reed MD        Subjective:     Principal Problem:Multifocal pneumonia        HPI:  Mr. Parham is a yo man with a past medical history of SSDz and mild asthma.  He is on chronic Hydrea for his SSDz, and is followed by Dr. Ramirez in Hematology clinic.  He is normally well controlled.  He states that he was in his regular state of health up until yesterday.  In fact, he had been working outside and working out heavily in the gym as well with no issues.  Yesterday he began to have some malaise, cough and was bringing up some green sputum.      By today he was diaphoretic, felt feverish, and began to have his typical SS crisis pains of mostly low back pain and long bone pain in his arms and legs.  He had some old Percocet 10mg at home, so he tried to, "wait it out at home."  Unfortunately, he became lethargic feeling and short of breath.  In addition, his back and bone pains began to escalate, prompting his visit to the ED finally.  He says his pain is better now, at 5/10 after Morphine in the ED.    He did not check his temperature at home, but has felt feverish.  He has some mild abdominal pain, but feels may be from working out.    Overview/Hospital Course:  Patient was admitted to the hospital on 8/21 after being found on CTA to have multi-focal pneumonia.  Blood cultures negative.  Initial Covid was negative. Repeat pending     Interval History: doing better today.     Review of Systems   Constitutional: Negative for activity change.   HENT: Negative for congestion.    Respiratory: Negative for chest tightness and shortness of breath.    Cardiovascular: Negative for chest pain.   Gastrointestinal: Negative for abdominal pain.   Genitourinary: " Negative for difficulty urinating.   Musculoskeletal: Negative for arthralgias.   Psychiatric/Behavioral: Negative for agitation and behavioral problems.     Objective:     Vital Signs (Most Recent):  Temp: 98 °F (36.7 °C) (08/23/20 0357)  Pulse: 84 (08/23/20 0841)  Resp: 18 (08/23/20 0949)  BP: 131/78 (08/23/20 0357)  SpO2: 95 % (08/23/20 0841) Vital Signs (24h Range):  Temp:  [98 °F (36.7 °C)-98.1 °F (36.7 °C)] 98 °F (36.7 °C)  Pulse:  [70-88] 84  Resp:  [17-20] 18  SpO2:  [92 %-98 %] 95 %  BP: (113-131)/(59-78) 131/78     Weight: 74.8 kg (164 lb 14.5 oz)  Body mass index is 26.62 kg/m².    Intake/Output Summary (Last 24 hours) at 8/23/2020 1034  Last data filed at 8/23/2020 0827  Gross per 24 hour   Intake 3203.33 ml   Output 3775 ml   Net -571.67 ml      Physical Exam  Vitals signs and nursing note reviewed.   Constitutional:       General: He is not in acute distress.     Appearance: Normal appearance. He is normal weight. He is not ill-appearing, toxic-appearing or diaphoretic.   HENT:      Head: Normocephalic and atraumatic.   Neurological:      Mental Status: He is alert and oriented to person, place, and time.   Psychiatric:         Mood and Affect: Mood normal.         Behavior: Behavior normal.         Significant Labs:   BMP:   Recent Labs   Lab 08/22/20  0530   *      K 5.3*      CO2 25   BUN 9   CREATININE 0.8   CALCIUM 8.3*   MG 2.0     CBC:   Recent Labs   Lab 08/21/20  1352 08/22/20  0530 08/23/20  0933   WBC 23.16* 15.46* 30.15*   HGB 9.0* 8.9* 9.0*   HCT 24.5* 24.5* 25.2*   * SEE COMMENT 530*       Significant Imaging:       Assessment/Plan:      * Multifocal pneumonia  CTA chest at admission revealed:    -Patchy airspace disease within the lower lobes and right middle lobe.     -Findings are concerning for multifocal pneumonia or pneumonitis.     -Features appear atypical for COVID-19.   -Negative for PTE  He has anaphylaxis to PCN   -He did tolerate combo  Rocephin/Zithro x 1 in the ED   -From here on out, will proceed with Levaquin 750mg iv x 6 more days  Legionella UAg, sputum cultures  No fever documented thus far  WBC 25K, up from baseline of 12-14  Low suspicion for COVID, but O2 sats of 90% RA and elevated inflammatory markers (likely from SS crisis)   -Keep on COVID precautions until repeat in 24 hours   -CRP 52  Other possibility is acute sickle chest syndrome, though procal is elevated at 0.68  Hematology consult if worsens or if sickle chest seems to become more likely over time    levaquin for now and breathing treatments.     Blood cultures negative. Wbc count to 30K-  Will start on Zosyn/Vanc    ID consult for tomorrow. Patient clinically does feel better.           Non-traumatic rhabdomyolysis  CPK is mildly elevated to 1509  Suspect either from recently working out in the gym heavily or possibly SS crisis  Monitor for now and follow qam  IV fluids  Check UDS to rule out amphetamines and cocaine    CPK in am       Sickle cell pain crisis  Will proceed with Dilaudid 0.5 and 1mg iv q4 prn mod and severe pain  Hydration to euvolemia with fluids  Continue Hydrea at 500mg po qday (home dose)  Hg good at 9g, but retic up to 15 and LDH up to 681    Doubt pain crisis         Mild asthma without complication  Continue duonebs prn  Change home Symbicort to Advair diskus in house, 100-25 BID  He was apparently wheezing on arrival, so also got Solumedrol 125mg iv x 1   -Will not continue steroids for the time being      Tobacco abuse  Smoking cessation education > 3 minutes       Sickle cell anemia  Only occasional sickle cells on slide and H/H stable.       Acute respiratory failure with hypoxia  He was initially requiring 3L to 5L to keep O2 sats at 90%  This has now improved after nebs to O2 sats of 93% on 3L NC  Monitor closely and keep O2 sats >92%    Resolved       VTE Risk Mitigation (From admission, onward)         Ordered     enoxaparin injection 40 mg   Every 24 hours      08/21/20 1928     IP VTE HIGH RISK PATIENT  Once      08/21/20 1928     Place sequential compression device  Until discontinued      08/21/20 1928     Place ELENA hose  Until discontinued      08/21/20 1928                Discharge Planning   YENY:      Code Status: Full Code   Is the patient medically ready for discharge?:     Reason for patient still in hospital (select all that apply): Patient unstable  Discharge Plan A: Home with family          Change Abx. ID consult.         Marco A Figueroa MD  Department of Hospital Medicine   [facility]

## 2020-08-23 NOTE — SUBJECTIVE & OBJECTIVE
Interval History: doing better today.     Review of Systems   Constitutional: Negative for activity change.   HENT: Negative for congestion.    Respiratory: Negative for chest tightness and shortness of breath.    Cardiovascular: Negative for chest pain.   Gastrointestinal: Negative for abdominal pain.   Genitourinary: Negative for difficulty urinating.   Musculoskeletal: Negative for arthralgias.   Psychiatric/Behavioral: Negative for agitation and behavioral problems.     Objective:     Vital Signs (Most Recent):  Temp: 98 °F (36.7 °C) (08/23/20 0357)  Pulse: 84 (08/23/20 0841)  Resp: 18 (08/23/20 0949)  BP: 131/78 (08/23/20 0357)  SpO2: 95 % (08/23/20 0841) Vital Signs (24h Range):  Temp:  [98 °F (36.7 °C)-98.1 °F (36.7 °C)] 98 °F (36.7 °C)  Pulse:  [70-88] 84  Resp:  [17-20] 18  SpO2:  [92 %-98 %] 95 %  BP: (113-131)/(59-78) 131/78     Weight: 74.8 kg (164 lb 14.5 oz)  Body mass index is 26.62 kg/m².    Intake/Output Summary (Last 24 hours) at 8/23/2020 1034  Last data filed at 8/23/2020 0827  Gross per 24 hour   Intake 3203.33 ml   Output 3775 ml   Net -571.67 ml      Physical Exam  Vitals signs and nursing note reviewed.   Constitutional:       General: He is not in acute distress.     Appearance: Normal appearance. He is normal weight. He is not ill-appearing, toxic-appearing or diaphoretic.   HENT:      Head: Normocephalic and atraumatic.   Neurological:      Mental Status: He is alert and oriented to person, place, and time.   Psychiatric:         Mood and Affect: Mood normal.         Behavior: Behavior normal.         Significant Labs:   BMP:   Recent Labs   Lab 08/22/20  0530   *      K 5.3*      CO2 25   BUN 9   CREATININE 0.8   CALCIUM 8.3*   MG 2.0     CBC:   Recent Labs   Lab 08/21/20  1352 08/22/20  0530 08/23/20  0933   WBC 23.16* 15.46* 30.15*   HGB 9.0* 8.9* 9.0*   HCT 24.5* 24.5* 25.2*   * SEE COMMENT 530*       Significant Imaging:

## 2020-08-24 VITALS
OXYGEN SATURATION: 99 % | SYSTOLIC BLOOD PRESSURE: 125 MMHG | WEIGHT: 164.88 LBS | RESPIRATION RATE: 20 BRPM | DIASTOLIC BLOOD PRESSURE: 66 MMHG | HEART RATE: 65 BPM | TEMPERATURE: 97 F | BODY MASS INDEX: 26.5 KG/M2 | HEIGHT: 66 IN

## 2020-08-24 PROBLEM — J96.01 ACUTE RESPIRATORY FAILURE WITH HYPOXIA: Status: RESOLVED | Noted: 2019-08-18 | Resolved: 2020-08-24

## 2020-08-24 PROBLEM — M62.82 NON-TRAUMATIC RHABDOMYOLYSIS: Status: RESOLVED | Noted: 2020-08-21 | Resolved: 2020-08-24

## 2020-08-24 PROBLEM — D57.00 SICKLE CELL PAIN CRISIS: Status: RESOLVED | Noted: 2020-08-21 | Resolved: 2020-08-24

## 2020-08-24 LAB
ALBUMIN SERPL BCP-MCNC: 3.3 G/DL (ref 3.5–5.2)
ALP SERPL-CCNC: 68 U/L (ref 55–135)
ALT SERPL W/O P-5'-P-CCNC: 26 U/L (ref 10–44)
ANION GAP SERPL CALC-SCNC: 8 MMOL/L (ref 8–16)
AST SERPL-CCNC: 21 U/L (ref 10–40)
BASOPHILS # BLD AUTO: 0.09 K/UL (ref 0–0.2)
BASOPHILS NFR BLD: 0.5 % (ref 0–1.9)
BILIRUB SERPL-MCNC: 2 MG/DL (ref 0.1–1)
BUN SERPL-MCNC: 5 MG/DL (ref 6–20)
CALCIUM SERPL-MCNC: 8.5 MG/DL (ref 8.7–10.5)
CHLORIDE SERPL-SCNC: 103 MMOL/L (ref 95–110)
CO2 SERPL-SCNC: 29 MMOL/L (ref 23–29)
CREAT SERPL-MCNC: 0.8 MG/DL (ref 0.5–1.4)
DIFFERENTIAL METHOD: ABNORMAL
EOSINOPHIL # BLD AUTO: 2.5 K/UL (ref 0–0.5)
EOSINOPHIL NFR BLD: 12.9 % (ref 0–8)
ERYTHROCYTE [DISTWIDTH] IN BLOOD BY AUTOMATED COUNT: 17.2 % (ref 11.5–14.5)
EST. GFR  (AFRICAN AMERICAN): >60 ML/MIN/1.73 M^2
EST. GFR  (NON AFRICAN AMERICAN): >60 ML/MIN/1.73 M^2
GLUCOSE SERPL-MCNC: 97 MG/DL (ref 70–110)
HCT VFR BLD AUTO: 26.1 % (ref 40–54)
HGB BLD-MCNC: 9.3 G/DL (ref 14–18)
IMM GRANULOCYTES # BLD AUTO: 0.11 K/UL (ref 0–0.04)
IMM GRANULOCYTES NFR BLD AUTO: 0.6 % (ref 0–0.5)
LYMPHOCYTES # BLD AUTO: 7.2 K/UL (ref 1–4.8)
LYMPHOCYTES NFR BLD: 37.7 % (ref 18–48)
MAGNESIUM SERPL-MCNC: 1.9 MG/DL (ref 1.6–2.6)
MCH RBC QN AUTO: 29.8 PG (ref 27–31)
MCHC RBC AUTO-ENTMCNC: 35.6 G/DL (ref 32–36)
MCV RBC AUTO: 84 FL (ref 82–98)
MONOCYTES # BLD AUTO: 2.1 K/UL (ref 0.3–1)
MONOCYTES NFR BLD: 11 % (ref 4–15)
NEUTROPHILS # BLD AUTO: 7.2 K/UL (ref 1.8–7.7)
NEUTROPHILS NFR BLD: 37.3 % (ref 38–73)
NRBC BLD-RTO: 2 /100 WBC
PHOSPHATE SERPL-MCNC: 3.7 MG/DL (ref 2.7–4.5)
PLATELET # BLD AUTO: 554 K/UL (ref 150–350)
PMV BLD AUTO: 10.2 FL (ref 9.2–12.9)
POTASSIUM SERPL-SCNC: 3.5 MMOL/L (ref 3.5–5.1)
PROT SERPL-MCNC: 6.4 G/DL (ref 6–8.4)
RBC # BLD AUTO: 3.12 M/UL (ref 4.6–6.2)
SODIUM SERPL-SCNC: 140 MMOL/L (ref 136–145)
WBC # BLD AUTO: 19.2 K/UL (ref 3.9–12.7)

## 2020-08-24 PROCEDURE — 85025 COMPLETE CBC W/AUTO DIFF WBC: CPT

## 2020-08-24 PROCEDURE — 83735 ASSAY OF MAGNESIUM: CPT

## 2020-08-24 PROCEDURE — 36415 COLL VENOUS BLD VENIPUNCTURE: CPT

## 2020-08-24 PROCEDURE — 80053 COMPREHEN METABOLIC PANEL: CPT

## 2020-08-24 PROCEDURE — 99232 PR SUBSEQUENT HOSPITAL CARE,LEVL II: ICD-10-PCS | Mod: ,,, | Performed by: INTERNAL MEDICINE

## 2020-08-24 PROCEDURE — 84100 ASSAY OF PHOSPHORUS: CPT

## 2020-08-24 PROCEDURE — 25000003 PHARM REV CODE 250: Performed by: INTERNAL MEDICINE

## 2020-08-24 PROCEDURE — 25000242 PHARM REV CODE 250 ALT 637 W/ HCPCS: Performed by: INTERNAL MEDICINE

## 2020-08-24 PROCEDURE — 94640 AIRWAY INHALATION TREATMENT: CPT

## 2020-08-24 PROCEDURE — 99232 SBSQ HOSP IP/OBS MODERATE 35: CPT | Mod: ,,, | Performed by: INTERNAL MEDICINE

## 2020-08-24 PROCEDURE — 63600175 PHARM REV CODE 636 W HCPCS: Performed by: INTERNAL MEDICINE

## 2020-08-24 PROCEDURE — 99900035 HC TECH TIME PER 15 MIN (STAT)

## 2020-08-24 RX ORDER — ALBUTEROL SULFATE 90 UG/1
2 AEROSOL, METERED RESPIRATORY (INHALATION) EVERY 6 HOURS PRN
Qty: 18 G | Refills: 5 | Status: ON HOLD | OUTPATIENT
Start: 2020-08-24 | End: 2021-07-01 | Stop reason: SDUPTHER

## 2020-08-24 RX ORDER — ALBUTEROL SULFATE 1.25 MG/3ML
1.25 SOLUTION RESPIRATORY (INHALATION) EVERY 6 HOURS PRN
Qty: 3 BOX | Refills: 3 | Status: ON HOLD | OUTPATIENT
Start: 2020-08-24 | End: 2021-07-01 | Stop reason: SDUPTHER

## 2020-08-24 RX ORDER — DOXYCYCLINE 100 MG/1
100 CAPSULE ORAL EVERY 12 HOURS
Qty: 14 CAPSULE | Refills: 0 | Status: SHIPPED | OUTPATIENT
Start: 2020-08-24 | End: 2020-08-31

## 2020-08-24 RX ADMIN — MEROPENEM AND SODIUM CHLORIDE 1 G: 1 INJECTION, SOLUTION INTRAVENOUS at 10:08

## 2020-08-24 RX ADMIN — HYDROXYUREA 500 MG: 500 CAPSULE ORAL at 08:08

## 2020-08-24 RX ADMIN — HYDROMORPHONE HYDROCHLORIDE 1 MG: 2 INJECTION, SOLUTION INTRAMUSCULAR; INTRAVENOUS; SUBCUTANEOUS at 03:08

## 2020-08-24 RX ADMIN — HYDROMORPHONE HYDROCHLORIDE 1 MG: 2 INJECTION, SOLUTION INTRAMUSCULAR; INTRAVENOUS; SUBCUTANEOUS at 06:08

## 2020-08-24 RX ADMIN — THERA TABS 1 TABLET: TAB at 08:08

## 2020-08-24 RX ADMIN — MEROPENEM AND SODIUM CHLORIDE 1 G: 1 INJECTION, SOLUTION INTRAVENOUS at 03:08

## 2020-08-24 RX ADMIN — FOLIC ACID 1 MG: 1 TABLET ORAL at 08:08

## 2020-08-24 RX ADMIN — IPRATROPIUM BROMIDE AND ALBUTEROL SULFATE 3 ML: .5; 3 SOLUTION RESPIRATORY (INHALATION) at 12:08

## 2020-08-24 RX ADMIN — IPRATROPIUM BROMIDE AND ALBUTEROL SULFATE 3 ML: .5; 3 SOLUTION RESPIRATORY (INHALATION) at 08:08

## 2020-08-24 RX ADMIN — HYDROMORPHONE HYDROCHLORIDE 1 MG: 2 INJECTION, SOLUTION INTRAMUSCULAR; INTRAVENOUS; SUBCUTANEOUS at 12:08

## 2020-08-24 RX ADMIN — OXYCODONE HYDROCHLORIDE AND ACETAMINOPHEN 500 MG: 500 TABLET ORAL at 08:08

## 2020-08-24 RX ADMIN — DOCUSATE SODIUM 50 MG AND SENNOSIDES 8.6 MG 1 TABLET: 8.6; 5 TABLET, FILM COATED ORAL at 08:08

## 2020-08-24 RX ADMIN — DEXTROSE MONOHYDRATE 1500 MG: 50 INJECTION, SOLUTION INTRAVENOUS at 12:08

## 2020-08-24 RX ADMIN — HYDROMORPHONE HYDROCHLORIDE 1 MG: 2 INJECTION, SOLUTION INTRAMUSCULAR; INTRAVENOUS; SUBCUTANEOUS at 08:08

## 2020-08-24 RX ADMIN — VITAMIN D, TAB 1000IU (100/BT) 1000 UNITS: 25 TAB at 09:08

## 2020-08-24 RX ADMIN — FLUTICASONE FUROATE AND VILANTEROL TRIFENATATE 1 PUFF: 100; 25 POWDER RESPIRATORY (INHALATION) at 08:08

## 2020-08-24 NOTE — SUBJECTIVE & OBJECTIVE
Interval History: No new issues. Feels better       Review of Systems   Constitutional: Negative for activity change.   HENT: Negative for congestion.    Respiratory: Negative for chest tightness and shortness of breath.    Cardiovascular: Negative for chest pain.   Gastrointestinal: Negative for abdominal pain.   Genitourinary: Negative for difficulty urinating.   Musculoskeletal: Negative for arthralgias.   Psychiatric/Behavioral: Negative for agitation and behavioral problems.     Objective:     Vital Signs (Most Recent):  Temp: 97.8 °F (36.6 °C) (08/24/20 0714)  Pulse: 60 (08/24/20 0832)  Resp: 20 (08/24/20 0841)  BP: (!) 109/57 (08/24/20 0714)  SpO2: (!) 94 % (08/24/20 0826) Vital Signs (24h Range):  Temp:  [97.7 °F (36.5 °C)-98.5 °F (36.9 °C)] 97.8 °F (36.6 °C)  Pulse:  [60-89] 60  Resp:  [16-20] 20  SpO2:  [94 %-98 %] 94 %  BP: (103-132)/(57-81) 109/57     Weight: 74.8 kg (164 lb 14.5 oz)  Body mass index is 26.62 kg/m².    Intake/Output Summary (Last 24 hours) at 8/24/2020 1000  Last data filed at 8/23/2020 2306  Gross per 24 hour   Intake 2468.33 ml   Output 1600 ml   Net 868.33 ml      Physical Exam  Vitals signs and nursing note reviewed.   Constitutional:       General: He is not in acute distress.     Appearance: Normal appearance. He is normal weight. He is not ill-appearing, toxic-appearing or diaphoretic.   HENT:      Head: Normocephalic and atraumatic.   Neurological:      Mental Status: He is alert and oriented to person, place, and time.   Psychiatric:         Mood and Affect: Mood normal.         Behavior: Behavior normal.         Significant Labs:   BMP:   Recent Labs   Lab 08/24/20  0525   GLU 97      K 3.5      CO2 29   BUN 5*   CREATININE 0.8   CALCIUM 8.5*   MG 1.9     CBC:   Recent Labs   Lab 08/23/20  0933 08/24/20  0524   WBC 30.15* 19.20*   HGB 9.0* 9.3*   HCT 25.2* 26.1*   * 554*       Significant Imaging:

## 2020-08-24 NOTE — PLAN OF CARE
08/24/20 1455   Final Note   Assessment Type Final Discharge Note   Anticipated Discharge Disposition Home   What phone number can be called within the next 1-3 days to see how you are doing after discharge? [phone]   Hospital Follow Up  Appt(s) scheduled?   (Doctors office closed, Pt will need to schedule appt.)   Right Care Referral Info   Post Acute Recommendation No Care   Post-Acute Status   Post-Acute Authorization Other  (Home)   Discharge Delays None known at this time     SW to pt's room to discuss d/c planning and help at home. Pt explained, he is independent but Magdalena will help at home if needed. SW name and number placed on white board.     Pt d/c'd. SW notified pt's nurse, Janessa, all CM needs have been met.

## 2020-08-24 NOTE — ASSESSMENT & PLAN NOTE
CTA chest at admission revealed:    -Patchy airspace disease within the lower lobes and right middle lobe.     -Findings are concerning for multifocal pneumonia or pneumonitis.     -Features appear atypical for COVID-19.   -Negative for PTE  He has anaphylaxis to PCN   -He did tolerate combo Rocephin/Zithro x 1 in the ED   -From here on out, will proceed with Levaquin 750mg iv x 6 more days  Legionella UAg, sputum cultures  No fever documented thus far  WBC 25K, up from baseline of 12-14  Low suspicion for COVID, but O2 sats of 90% RA and elevated inflammatory markers (likely from SS crisis)   -Keep on COVID precautions until repeat in 24 hours   -CRP 52  Other possibility is acute sickle chest syndrome, though procal is elevated at 0.68  Hematology consult if worsens or if sickle chest seems to become more likely over time    levaquin for now and breathing treatments.     Blood cultures negative. Wbc count to 30K-  Will start on Zosyn/Vanc    ID consult for tomorrow. Patient clinically does feel better.     WBC now trending down after switch to Vanc/Zosyn.  ID consulted for Abx recs for home.  Likely d/c on 8/25.

## 2020-08-24 NOTE — HPI
"Mr. Parham is a 29 yo man with a past medical history of SSDz and mild asthma.  He is on chronic Hydrea for his SSDz, and is followed by Dr. Ramirez in Hematology clinic.  He is normally well controlled.  He states that he was in his regular state of health up until yesterday.  In fact, he had been working outside and working out heavily in the gym as well with no issues.  Yesterday he began to have some malaise, cough and was bringing up some green sputum.       By today he was diaphoretic, felt feverish, and began to have his typical SS crisis pains of mostly low back pain and long bone pain in his arms and legs.  He had some old Percocet 10mg at home, so he tried to, "wait it out at home."  Unfortunately, he became lethargic feeling and short of breath.  In addition, his back and bone pains began to escalate, prompting his visit to the ED finally.  He says his pain is better now, at 5/10 after Morphine in the ED.     Denies cough, fever, or chills. Patient was admitted to the hospital on 8/21 after being found on CTA to have multi-focal pneumonia.  Blood cultures negative.  Initial Covid was negative. Repeat negative. ID was consulted today for "pneumonia."  "

## 2020-08-24 NOTE — SUBJECTIVE & OBJECTIVE
Past Medical History:   Diagnosis Date    Asthma     Broken thumb     Cigarette smoker     Sickle cell anemia     Sickle cell crisis        Past Surgical History:   Procedure Laterality Date    HAND SURGERY Left 12/21/2017    ORIF    ORIF mandible  01/31/2013    spleenectomy         Review of patient's allergies indicates:   Allergen Reactions    Penicillins Anaphylaxis       Medications:  Medications Prior to Admission   Medication Sig    FOLIC ACID ORAL Take by mouth.    guaiFENesin (MUCINEX) 600 mg 12 hr tablet Take 600 mg by mouth.    hydroxyurea (HYDREA) 500 mg Cap TK 1 C PO BID    oxyCODONE (ROXICODONE) 5 MG immediate release tablet Take 5 mg by mouth.    albuterol (ACCUNEB) 1.25 mg/3 mL Nebu Take 3 mLs (1.25 mg total) by nebulization every 6 (six) hours as needed. Rescue    albuterol (PROVENTIL/VENTOLIN HFA) 90 mcg/actuation inhaler Inhale 1-2 puffs into the lungs every 6 (six) hours as needed for Wheezing. Rescue    budesonide-formoterol 160-4.5 mcg (SYMBICORT) 160-4.5 mcg/actuation HFAA Inhale 2 puffs into the lungs every 12 (twelve) hours. Controller    calcium-vitamin D3 500 mg(1,250mg) -200 unit per tablet Take 1 tablet by mouth 2 (two) times daily with meals.    clindamycin (CLEOCIN) 150 MG capsule clindamycin HCl 150 mg capsule    ferrous sulfate (FEOSOL) 325 mg (65 mg iron) Tab tablet ferrous sulfate 325 mg (65 mg iron) tablet    ibuprofen (ADVIL,MOTRIN) 600 MG tablet Take 1 tablet (600 mg total) by mouth every 6 (six) hours as needed for Pain. (Patient not taking: Reported on 11/26/2019)    montelukast (SINGULAIR) 10 mg tablet montelukast 10 mg tablet    oxyCODONE-acetaminophen (PERCOCET) 5-325 mg per tablet Take 1 tablet by mouth every 4 (four) hours as needed for Pain.     Antibiotics (From admission, onward)    Start     Stop Route Frequency Ordered    08/23/20 2330  vancomycin 1.5 g in dextrose 5 % 250 mL IVPB (ready to mix)      -- IV Every 12 hours (non-standard times)  08/23/20 1045    08/23/20 1129  vancomycin - pharmacy to dose  (vancomycin IVPB)      -- IV pharmacy to manage frequency 08/23/20 1029    08/23/20 1115  meropenem-0.9% sodium chloride 1 g/50 mL IVPB      -- IV Every 8 hours (non-standard times) 08/23/20 1043        Antifungals (From admission, onward)    None        Antivirals (From admission, onward)    None           There is no immunization history for the selected administration types on file for this patient.    Family History     None        Social History     Socioeconomic History    Marital status: Single     Spouse name: Not on file    Number of children: Not on file    Years of education: Not on file    Highest education level: Not on file   Occupational History    Not on file   Social Needs    Financial resource strain: Not on file    Food insecurity     Worry: Not on file     Inability: Not on file    Transportation needs     Medical: Not on file     Non-medical: Not on file   Tobacco Use    Smoking status: Current Every Day Smoker     Packs/day: 0.50     Types: Cigarettes    Smokeless tobacco: Never Used    Tobacco comment: encouraged to quit.    Substance and Sexual Activity    Alcohol use: Not Currently     Comment: socially    Drug use: No    Sexual activity: Yes     Partners: Female     Birth control/protection: Condom   Lifestyle    Physical activity     Days per week: Not on file     Minutes per session: Not on file    Stress: Not on file   Relationships    Social connections     Talks on phone: Not on file     Gets together: Not on file     Attends [employer] service: Not on file     Active member of club or organization: Not on file     Attends meetings of clubs or organizations: Not on file     Relationship status: Not on file   Other Topics Concern    Not on file   Social History Narrative    Not on file     Review of Systems   Constitutional: Negative for chills and fever.   Respiratory: Positive for cough and chest  tightness.    All other systems reviewed and are negative.    Objective:     Vital Signs (Most Recent):  Temp: 97.8 °F (36.6 °C) (08/24/20 0714)  Pulse: 60 (08/24/20 0832)  Resp: 20 (08/24/20 0841)  BP: (!) 109/57 (08/24/20 0714)  SpO2: (!) 94 % (08/24/20 0826) Vital Signs (24h Range):  Temp:  [97.7 °F (36.5 °C)-98.5 °F (36.9 °C)] 97.8 °F (36.6 °C)  Pulse:  [60-89] 60  Resp:  [16-20] 20  SpO2:  [94 %-98 %] 94 %  BP: (103-132)/(57-81) 109/57     Weight: 74.8 kg (164 lb 14.5 oz)  Body mass index is 26.62 kg/m².    Estimated Creatinine Clearance: 121.8 mL/min (based on SCr of 0.8 mg/dL).    Physical Exam  Vitals signs and nursing note reviewed.   Constitutional:       General: He is not in acute distress.     Appearance: Normal appearance. He is normal weight.   HENT:      Head: Normocephalic.      Right Ear: External ear normal.      Left Ear: External ear normal.      Nose: Nose normal.   Eyes:      Extraocular Movements: Extraocular movements intact.      Conjunctiva/sclera: Conjunctivae normal.      Pupils: Pupils are equal, round, and reactive to light.   Cardiovascular:      Rate and Rhythm: Normal rate and regular rhythm.      Heart sounds: No murmur.   Pulmonary:      Effort: Pulmonary effort is normal.      Breath sounds: Normal breath sounds. No wheezing or rales.   Abdominal:      General: Abdomen is flat.   Musculoskeletal: Normal range of motion.         General: No swelling or tenderness.   Skin:     General: Skin is warm and dry.      Comments: tattoos   Neurological:      General: No focal deficit present.      Mental Status: He is alert and oriented to person, place, and time. Mental status is at baseline.   Psychiatric:         Mood and Affect: Mood normal.         Behavior: Behavior normal.         Thought Content: Thought content normal.         Judgment: Judgment normal.         Significant Labs:   Blood Culture:   Recent Labs   Lab 08/21/20 1915 08/21/20 1930   LABBLOO No Growth to date  No  Growth to date  No Growth to date No Growth to date  No Growth to date  No Growth to date     CBC:   Recent Labs   Lab 08/23/20  0933 08/24/20  0524   WBC 30.15* 19.20*   HGB 9.0* 9.3*   HCT 25.2* 26.1*   * 554*     Procalcitonin: No results for input(s): PROCAL in the last 48 hours.  Respiratory Culture: No results for input(s): GSRESP, RESPIRATORYC in the last 4320 hours.  Urine Culture: No results for input(s): LABURIN in the last 4320 hours.    Significant Imaging: I have reviewed all pertinent imaging results/findings within the past 24 hours.

## 2020-08-24 NOTE — PROGRESS NOTES
"[facility] Hospital Medicine  Progress Note    Patient Name: Katie Parham  MRN: [id number]  Patient Class: IP- Inpatient   Admission Date: 8/21/2020  Length of Stay: 3 days  Attending Physician: Marco A Ernst MD  Primary Care Provider: Kieran Reed MD        Subjective:     Principal Problem:Multifocal pneumonia        HPI:  Mr. Parham is a yo man with a past medical history of SSDz and mild asthma.  He is on chronic Hydrea for his SSDz, and is followed by Dr. Ramirez in Hematology clinic.  He is normally well controlled.  He states that he was in his regular state of health up until yesterday.  In fact, he had been working outside and working out heavily in the gym as well with no issues.  Yesterday he began to have some malaise, cough and was bringing up some green sputum.      By today he was diaphoretic, felt feverish, and began to have his typical SS crisis pains of mostly low back pain and long bone pain in his arms and legs.  He had some old Percocet 10mg at home, so he tried to, "wait it out at home."  Unfortunately, he became lethargic feeling and short of breath.  In addition, his back and bone pains began to escalate, prompting his visit to the ED finally.  He says his pain is better now, at 5/10 after Morphine in the ED.    He did not check his temperature at home, but has felt feverish.  He has some mild abdominal pain, but feels may be from working out.    Overview/Hospital Course:  Patient was admitted to the hospital on 8/21 after being found on CTA to have multi-focal pneumonia.  Blood cultures negative.  Initial Covid was negative. Repeat negative. ID was consulted on 8/24.     Interval History: No new issues. Feels better       Review of Systems   Constitutional: Negative for activity change.   HENT: Negative for congestion.    Respiratory: Negative for chest tightness and shortness of breath.    Cardiovascular: Negative for chest pain.   Gastrointestinal: Negative " for abdominal pain.   Genitourinary: Negative for difficulty urinating.   Musculoskeletal: Negative for arthralgias.   Psychiatric/Behavioral: Negative for agitation and behavioral problems.     Objective:     Vital Signs (Most Recent):  Temp: 97.8 °F (36.6 °C) (08/24/20 0714)  Pulse: 60 (08/24/20 0832)  Resp: 20 (08/24/20 0841)  BP: (!) 109/57 (08/24/20 0714)  SpO2: (!) 94 % (08/24/20 0826) Vital Signs (24h Range):  Temp:  [97.7 °F (36.5 °C)-98.5 °F (36.9 °C)] 97.8 °F (36.6 °C)  Pulse:  [60-89] 60  Resp:  [16-20] 20  SpO2:  [94 %-98 %] 94 %  BP: (103-132)/(57-81) 109/57     Weight: 74.8 kg (164 lb 14.5 oz)  Body mass index is 26.62 kg/m².    Intake/Output Summary (Last 24 hours) at 8/24/2020 1000  Last data filed at 8/23/2020 2306  Gross per 24 hour   Intake 2468.33 ml   Output 1600 ml   Net 868.33 ml      Physical Exam  Vitals signs and nursing note reviewed.   Constitutional:       General: He is not in acute distress.     Appearance: Normal appearance. He is normal weight. He is not ill-appearing, toxic-appearing or diaphoretic.   HENT:      Head: Normocephalic and atraumatic.   Neurological:      Mental Status: He is alert and oriented to person, place, and time.   Psychiatric:         Mood and Affect: Mood normal.         Behavior: Behavior normal.         Significant Labs:   BMP:   Recent Labs   Lab 08/24/20  0525   GLU 97      K 3.5      CO2 29   BUN 5*   CREATININE 0.8   CALCIUM 8.5*   MG 1.9     CBC:   Recent Labs   Lab 08/23/20  0933 08/24/20  0524   WBC 30.15* 19.20*   HGB 9.0* 9.3*   HCT 25.2* 26.1*   * 554*       Significant Imaging:       Assessment/Plan:      * Multifocal pneumonia  CTA chest at admission revealed:    -Patchy airspace disease within the lower lobes and right middle lobe.     -Findings are concerning for multifocal pneumonia or pneumonitis.     -Features appear atypical for COVID-19.   -Negative for PTE  He has anaphylaxis to PCN   -He did tolerate combo  Rocephin/Zithro x 1 in the ED   -From here on out, will proceed with Levaquin 750mg iv x 6 more days  Legionella UAg, sputum cultures  No fever documented thus far  WBC 25K, up from baseline of 12-14  Low suspicion for COVID, but O2 sats of 90% RA and elevated inflammatory markers (likely from SS crisis)   -Keep on COVID precautions until repeat in 24 hours   -CRP 52  Other possibility is acute sickle chest syndrome, though procal is elevated at 0.68  Hematology consult if worsens or if sickle chest seems to become more likely over time    levaquin for now and breathing treatments.     Blood cultures negative. Wbc count to 30K-  Will start on Zosyn/Vanc    ID consult for tomorrow. Patient clinically does feel better.     WBC now trending down after switch to Vanc/Zosyn.  ID consulted for Abx recs for home.  Likely d/c on 8/25.             Non-traumatic rhabdomyolysis  CPK is mildly elevated to 1509  Suspect either from recently working out in the gym heavily or possibly SS crisis  Monitor for now and follow qam  IV fluids  Check UDS to rule out amphetamines and cocaine    CPK in am       Sickle cell pain crisis  Will proceed with Dilaudid 0.5 and 1mg iv q4 prn mod and severe pain  Hydration to euvolemia with fluids  Continue Hydrea at 500mg po qday (home dose)  Hg good at 9g, but retic up to 15 and LDH up to 681    Doubt pain crisis         Mild asthma without complication  Continue duonebs prn  Change home Symbicort to Advair diskus in house, 100-25 BID  He was apparently wheezing on arrival, so also got Solumedrol 125mg iv x 1   -Will not continue steroids for the time being      Tobacco abuse  Smoking cessation education > 3 minutes       Sickle cell anemia  Only occasional sickle cells on slide and H/H stable.       Acute respiratory failure with hypoxia  He was initially requiring 3L to 5L to keep O2 sats at 90%  This has now improved after nebs to O2 sats of 93% on 3L NC  Monitor closely and keep O2 sats  >92%    Resolved       VTE Risk Mitigation (From admission, onward)         Ordered     enoxaparin injection 40 mg  Every 24 hours      08/21/20 1928     IP VTE HIGH RISK PATIENT  Once      08/21/20 1928     Place sequential compression device  Until discontinued      08/21/20 1928     Place ELENA hose  Until discontinued      08/21/20 1928                Discharge Planning   YENY:      Code Status: Full Code   Is the patient medically ready for discharge?:     Reason for patient still in hospital (select all that apply): Patient unstable  Discharge Plan A: Home with family     Awaiting ID consult. Home likely on 8/25.  Clinically improving.                    Marco A Figueroa MD  Department of Hospital Medicine   [facility]

## 2020-08-24 NOTE — PROGRESS NOTES
WRITTEN HEALTHCARE DISCHARGE INFORMATION     Things that YOU are RESPONSIBLE for to Manage Your Care At Home:    1. Getting your prescriptions filled.  2. Taking you medications as directed. DO NOT MISS ANY DOSES!  3. Going to your follow-up doctor appointments. This is important because it allows the doctor to monitor your progress and to determine if any changes need to be made to your treatment plan.    If you are unable to make your follow up appointments, please call the number listed and reschedule this appointment.     ____________HELP AT HOME____________________    Experiencing any SIGNS or SYMPTOMS: YOU CAN    Schedule a same day appopintment with your Primary Care Doctor or  you can call [facility] On Call Nurse Care Line for 24/7 assistance at [phone]    If you are experience any signs or symptoms that have become severe, Call 911 and come to your nearest Emergency Room.    Thank you for choosing [facility] and allowing us to care for you.   From your care management team: Dariela [place] [facility] [place] [phone]    You should receive a call from [facility] within 48-72 hours to help manage your care after discharge. Please try to make sure that you answer your phone for this important phone call.  Follow-up Information     Kieran Reed MD. Schedule an appointment as soon as possible for a visit in 1 week.    Specialties: Internal Medicine, Pediatrics  Why: Please schedule appts.   Contact information:  [address]  [address]  [facility] [address]  [phone]

## 2020-08-24 NOTE — CONSULTS
"[facility]  Infectious Disease  Consult Note    Patient Name: Katie Parham  MRN: [id number]  Admission Date: 8/21/2020  Hospital Length of Stay: 3 days  Attending Physician: Marco A Ernst MD  Primary Care Provider: Kieran Reed MD     Isolation Status: No active isolations    Patient information was obtained from patient, past medical records and ER records.      Inpatient consult to Infectious Diseases  Consult performed by: Boo Barriga MD  Consult ordered by: Marco A Ernst MD        Assessment/Plan:     * Multifocal pneumonia  - a radiographic diagnosis  - patient is without cough or fever  - ok to change to an appropriate po abx, from an ID standpoint (e.g.,  doxycycline or a quinolone) for CAP      Thank you for your consult. I will sign off. Please contact us if you have any additional questions.    Boo Barriga MD  Infectious Disease  [facility]    Subjective:     Principal Problem: Multifocal pneumonia    HPI: Mr. Parham is a 29 yo man with a past medical history of SSDz and mild asthma.  He is on chronic Hydrea for his SSDz, and is followed by Dr. Ramirez in Hematology clinic.  He is normally well controlled.  He states that he was in his regular state of health up until yesterday.  In fact, he had been working outside and working out heavily in the gym as well with no issues.  Yesterday he began to have some malaise, cough and was bringing up some green sputum.       By today he was diaphoretic, felt feverish, and began to have his typical SS crisis pains of mostly low back pain and long bone pain in his arms and legs.  He had some old Percocet 10mg at home, so he tried to, "wait it out at home."  Unfortunately, he became lethargic feeling and short of breath.  In addition, his back and bone pains began to escalate, prompting his visit to the ED finally.  He says his pain is better now, at 5/10 after Morphine in the ED.     Denies " "cough, fever, or chills. Patient was admitted to the hospital on 8/21 after being found on CTA to have multi-focal pneumonia.  Blood cultures negative.  Initial Covid was negative. Repeat negative. ID was consulted today for "pneumonia."    Past Medical History:   Diagnosis Date    Asthma     Broken thumb     Cigarette smoker     Sickle cell anemia     Sickle cell crisis        Past Surgical History:   Procedure Laterality Date    HAND SURGERY Left 12/21/2017    ORIF    ORIF mandible  01/31/2013    spleenectomy         Review of patient's allergies indicates:   Allergen Reactions    Penicillins Anaphylaxis       Medications:  Medications Prior to Admission   Medication Sig    FOLIC ACID ORAL Take by mouth.    guaiFENesin (MUCINEX) 600 mg 12 hr tablet Take 600 mg by mouth.    hydroxyurea (HYDREA) 500 mg Cap TK 1 C PO BID    oxyCODONE (ROXICODONE) 5 MG immediate release tablet Take 5 mg by mouth.    albuterol (ACCUNEB) 1.25 mg/3 mL Nebu Take 3 mLs (1.25 mg total) by nebulization every 6 (six) hours as needed. Rescue    albuterol (PROVENTIL/VENTOLIN HFA) 90 mcg/actuation inhaler Inhale 1-2 puffs into the lungs every 6 (six) hours as needed for Wheezing. Rescue    budesonide-formoterol 160-4.5 mcg (SYMBICORT) 160-4.5 mcg/actuation HFAA Inhale 2 puffs into the lungs every 12 (twelve) hours. Controller    calcium-vitamin D3 500 mg(1,250mg) -200 unit per tablet Take 1 tablet by mouth 2 (two) times daily with meals.    clindamycin (CLEOCIN) 150 MG capsule clindamycin HCl 150 mg capsule    ferrous sulfate (FEOSOL) 325 mg (65 mg iron) Tab tablet ferrous sulfate 325 mg (65 mg iron) tablet    ibuprofen (ADVIL,MOTRIN) 600 MG tablet Take 1 tablet (600 mg total) by mouth every 6 (six) hours as needed for Pain. (Patient not taking: Reported on 11/26/2019)    montelukast (SINGULAIR) 10 mg tablet montelukast 10 mg tablet    oxyCODONE-acetaminophen (PERCOCET) 5-325 mg per tablet Take 1 tablet by mouth every 4 " (four) hours as needed for Pain.     Antibiotics (From admission, onward)    Start     Stop Route Frequency Ordered    08/23/20 2330  vancomycin 1.5 g in dextrose 5 % 250 mL IVPB (ready to mix)      -- IV Every 12 hours (non-standard times) 08/23/20 1045    08/23/20 1129  vancomycin - pharmacy to dose  (vancomycin IVPB)      -- IV pharmacy to manage frequency 08/23/20 1029    08/23/20 1115  meropenem-0.9% sodium chloride 1 g/50 mL IVPB      -- IV Every 8 hours (non-standard times) 08/23/20 1043        Antifungals (From admission, onward)    None        Antivirals (From admission, onward)    None           There is no immunization history for the selected administration types on file for this patient.    Family History     None        Social History     Socioeconomic History    Marital status: Single     Spouse name: Not on file    Number of children: Not on file    Years of education: Not on file    Highest education level: Not on file   Occupational History    Not on file   Social Needs    Financial resource strain: Not on file    Food insecurity     Worry: Not on file     Inability: Not on file    Transportation needs     Medical: Not on file     Non-medical: Not on file   Tobacco Use    Smoking status: Current Every Day Smoker     Packs/day: 0.50     Types: Cigarettes    Smokeless tobacco: Never Used    Tobacco comment: encouraged to quit.    Substance and Sexual Activity    Alcohol use: Not Currently     Comment: socially    Drug use: No    Sexual activity: Yes     Partners: Female     Birth control/protection: Condom   Lifestyle    Physical activity     Days per week: Not on file     Minutes per session: Not on file    Stress: Not on file   Relationships    Social connections     Talks on phone: Not on file     Gets together: Not on file     Attends Mos[employer] service: Not on file     Active member of club or organization: Not on file     Attends meetings of clubs or organizations: Not on file      Relationship status: Not on file   Other Topics Concern    Not on file   Social History Narrative    Not on file     Review of Systems   Constitutional: Negative for chills and fever.   Respiratory: Positive for cough and chest tightness.    All other systems reviewed and are negative.    Objective:     Vital Signs (Most Recent):  Temp: 97.8 °F (36.6 °C) (08/24/20 0714)  Pulse: 60 (08/24/20 0832)  Resp: 20 (08/24/20 0841)  BP: (!) 109/57 (08/24/20 0714)  SpO2: (!) 94 % (08/24/20 0826) Vital Signs (24h Range):  Temp:  [97.7 °F (36.5 °C)-98.5 °F (36.9 °C)] 97.8 °F (36.6 °C)  Pulse:  [60-89] 60  Resp:  [16-20] 20  SpO2:  [94 %-98 %] 94 %  BP: (103-132)/(57-81) 109/57     Weight: 74.8 kg (164 lb 14.5 oz)  Body mass index is 26.62 kg/m².    Estimated Creatinine Clearance: 121.8 mL/min (based on SCr of 0.8 mg/dL).    Physical Exam  Vitals signs and nursing note reviewed.   Constitutional:       General: He is not in acute distress.     Appearance: Normal appearance. He is normal weight.   HENT:      Head: Normocephalic.      Right Ear: External ear normal.      Left Ear: External ear normal.      Nose: Nose normal.   Eyes:      Extraocular Movements: Extraocular movements intact.      Conjunctiva/sclera: Conjunctivae normal.      Pupils: Pupils are equal, round, and reactive to light.   Cardiovascular:      Rate and Rhythm: Normal rate and regular rhythm.      Heart sounds: No murmur.   Pulmonary:      Effort: Pulmonary effort is normal.      Breath sounds: Normal breath sounds. No wheezing or rales.   Abdominal:      General: Abdomen is flat.   Musculoskeletal: Normal range of motion.         General: No swelling or tenderness.   Skin:     General: Skin is warm and dry.      Comments: tattoos   Neurological:      General: No focal deficit present.      Mental Status: He is alert and oriented to person, place, and time. Mental status is at baseline.   Psychiatric:         Mood and Affect: Mood normal.          Behavior: Behavior normal.         Thought Content: Thought content normal.         Judgment: Judgment normal.         Significant Labs:   Blood Culture:   Recent Labs   Lab 08/21/20  1915 08/21/20  1930   LABBLOO No Growth to date  No Growth to date  No Growth to date No Growth to date  No Growth to date  No Growth to date     CBC:   Recent Labs   Lab 08/23/20  0933 08/24/20  0524   WBC 30.15* 19.20*   HGB 9.0* 9.3*   HCT 25.2* 26.1*   * 554*     Procalcitonin: No results for input(s): PROCAL in the last 48 hours.  Respiratory Culture: No results for input(s): GSRESP, RESPIRATORYC in the last 4320 hours.  Urine Culture: No results for input(s): LABURIN in the last 4320 hours.    Significant Imaging: I have reviewed all pertinent imaging results/findings within the past 24 hours.

## 2020-08-24 NOTE — NURSING
All discharge instructions given and a mask.  Patient verbalizes understanding of all discharge instructions

## 2020-08-24 NOTE — DISCHARGE SUMMARY
"[facility] Hospital Medicine  Discharge Summary       Patient Name: Katie Parham  MRN: [id number]  Admission Date: 8/21/2020  Hospital Length of Stay: 3 days  Discharge Date and Time:  08/24/2020 2:16 PM  Attending Physician: Marco A Ernst MD   Discharging Provider: Marco A Ernst MD  Primary Care Provider: Kieran Reed MD      HPI:   Mr. Parham is a yo man with a past medical history of SSDz and mild asthma.  He is on chronic Hydrea for his SSDz, and is followed by Dr. Ramirez in Hematology clinic.  He is normally well controlled.  He states that he was in his regular state of health up until yesterday.  In fact, he had been working outside and working out heavily in the gym as well with no issues.  Yesterday he began to have some malaise, cough and was bringing up some green sputum.      By today he was diaphoretic, felt feverish, and began to have his typical SS crisis pains of mostly low back pain and long bone pain in his arms and legs.  He had some old Percocet 10mg at home, so he tried to, "wait it out at home."  Unfortunately, he became lethargic feeling and short of breath.  In addition, his back and bone pains began to escalate, prompting his visit to the ED finally.  He says his pain is better now, at 5/10 after Morphine in the ED.    He did not check his temperature at home, but has felt feverish.  He has some mild abdominal pain, but feels may be from working out.    * No surgery found *      Hospital Course:   Patient was admitted to the hospital on 8/21 after being found on CTA to have multi-focal pneumonia.  Blood cultures negative.  Initial Covid was negative. Repeat negative. ID was consulted on 8/24 and recommended Doxy on discharge. On 8/24, the patient was requesting to go home. ID signed off. No 02 requirements and looks great. Will d/c to home with Doxy for 7 days. Activity as tolerated. Follow up with PCP in one week.        Consults:   Consults (From " admission, onward)        Status Ordering Provider     Inpatient consult to Infectious Diseases  Once     Provider:  Rhoda Mendez MD    Completed [facility] LAWS     Pharmacy to dose Vancomycin consult  Once     Provider:  (Not yet assigned)    Acknowledged [facility] LAWS          No new Assessment & Plan notes have been filed under this hospital service since the last note was generated.  Service: Hospital Medicine    Final Active Diagnoses:    Diagnosis Date Noted POA    PRINCIPAL PROBLEM:  Multifocal pneumonia [J18.9] 11/10/2019 Yes    Mild asthma without complication [J45.909] 09/05/2019 Yes     Chronic    Sickle cell anemia [D57.1] 08/18/2019 Yes     Chronic    Tobacco abuse [Z72.0] 08/18/2019 Yes     Chronic      Problems Resolved During this Admission:    Diagnosis Date Noted Date Resolved POA    Sickle cell pain crisis [D57.00] 08/21/2020 08/24/2020 Yes    Non-traumatic rhabdomyolysis [M62.82] 08/21/2020 08/24/2020 Yes    Acute respiratory failure with hypoxia [J96.01] 08/18/2019 08/24/2020 Yes       Discharged Condition: good    Disposition: Home or Self Care    Follow Up:  Follow-up Information     Kirean Reed MD In 1 week.    Specialties: Internal Medicine, Pediatrics  Contact information:  [address]  [address]  [facility] [address]  [phone]                 Patient Instructions:   No discharge procedures on file.    Significant Diagnostic Studies:    Pending Diagnostic Studies:     Procedure Component Value Units Date/Time    Legionella antigen, urine [[id number]] Collected: 08/22/20 1000    Order Status: Sent Lab Status: In process Updated: 08/22/20 1010    Specimen: Urine, Clean Catch          Medications:  Reconciled Home Medications:      Medication List      START taking these medications    doxycycline 100 MG Cap  Commonly known as: VIBRAMYCIN  Take 1 capsule (100 mg total) by mouth every 12 (twelve) hours. for 7 days        CONTINUE taking these medications     * albuterol 1.25 mg/3 mL Nebu  Commonly known as: ACCUNEB  Take 3 mLs (1.25 mg total) by nebulization every 6 (six) hours as needed. Rescue     * albuterol 90 mcg/actuation inhaler  Commonly known as: PROVENTIL/VENTOLIN HFA  Inhale 1-2 puffs into the lungs every 6 (six) hours as needed for Wheezing. Rescue     budesonide-formoterol 160-4.5 mcg 160-4.5 mcg/actuation Hfaa  Commonly known as: SYMBICORT  Inhale 2 puffs into the lungs every 12 (twelve) hours. Controller     calcium-vitamin D3 500 mg(1,250mg) -200 unit per tablet  Commonly known as: OS-JOÃO 500 + D3  Take 1 tablet by mouth 2 (two) times daily with meals.     clindamycin 150 MG capsule  Commonly known as: CLEOCIN  clindamycin HCl 150 mg capsule     ferrous sulfate 325 mg (65 mg iron) Tab tablet  Commonly known as: FEOSOL  ferrous sulfate 325 mg (65 mg iron) tablet     FOLIC ACID ORAL  Take by mouth.     guaiFENesin 600 mg 12 hr tablet  Commonly known as: MUCINEX  Take 600 mg by mouth.     hydroxyurea 500 mg Cap  Commonly known as: HYDREA  TK 1 C PO BID     ibuprofen 600 MG tablet  Commonly known as: ADVIL,MOTRIN  Take 1 tablet (600 mg total) by mouth every 6 (six) hours as needed for Pain.     montelukast 10 mg tablet  Commonly known as: SINGULAIR  montelukast 10 mg tablet     oxyCODONE 5 MG immediate release tablet  Commonly known as: ROXICODONE  Take 5 mg by mouth.     oxyCODONE-acetaminophen 5-325 mg per tablet  Commonly known as: PERCOCET  Take 1 tablet by mouth every 4 (four) hours as needed for Pain.         * This list has 2 medication(s) that are the same as other medications prescribed for you. Read the directions carefully, and ask your doctor or other care provider to review them with you.                Indwelling Lines/Drains at time of discharge:   Lines/Drains/Airways     None                 Time spent on the discharge of patient: < 30  minutes  Patient was seen and examined on the date of discharge and determined to be suitable for  discharge.         Marco A Figueroa MD  Department of Hospital Medicine  [facility]

## 2020-08-24 NOTE — PLAN OF CARE
Problem: Adult Inpatient Plan of Care  Goal: Plan of Care Review  Outcome: Ongoing, Progressing       Pt free from falls and injury throughout shift. Pt AAOx4. Continued medications as ordered. Complaints of pain 5/10  controlled with medications. MD order to d/c A/C/D precautions. NS 100mL/hr infusing to right arm. Pt in no distress. Will continue to monitor.

## 2020-08-24 NOTE — ASSESSMENT & PLAN NOTE
- a radiographic diagnosis  - patient is without cough or fever  - ok to change to an appropriate po abx, from an ID standpoint (e.g.,  doxycycline or a quinolone) for CAP

## 2020-08-25 ENCOUNTER — PATIENT OUTREACH (OUTPATIENT)
Dept: ADMINISTRATIVE | Facility: CLINIC | Age: 30
End: 2020-08-25

## 2020-08-26 LAB
BACTERIA BLD CULT: NORMAL
BACTERIA BLD CULT: NORMAL
L PNEUMO AG UR QL IA: NEGATIVE

## 2020-10-05 ENCOUNTER — HOSPITAL ENCOUNTER (EMERGENCY)
Facility: HOSPITAL | Age: 30
Discharge: HOME | End: 2020-10-05
Attending: EMERGENCY MEDICINE
Payer: MEDICAID

## 2020-10-05 VITALS
DIASTOLIC BLOOD PRESSURE: 53 MMHG | BODY MASS INDEX: 24.34 KG/M2 | TEMPERATURE: 98 F | WEIGHT: 170 LBS | OXYGEN SATURATION: 94 % | SYSTOLIC BLOOD PRESSURE: 112 MMHG | HEIGHT: 70 IN | HEART RATE: 77 BPM | RESPIRATION RATE: 17 BRPM

## 2020-10-05 DIAGNOSIS — D57.1 HB-SS DISEASE WITHOUT CRISIS: ICD-10-CM

## 2020-10-05 DIAGNOSIS — T40.601A OPIATE OVERDOSE, ACCIDENTAL OR UNINTENTIONAL, INITIAL ENCOUNTER: Primary | ICD-10-CM

## 2020-10-05 DIAGNOSIS — D72.829 LEUKOCYTOSIS, UNSPECIFIED TYPE: ICD-10-CM

## 2020-10-05 LAB
ANISOCYTOSIS BLD QL SMEAR: SLIGHT
BASO STIPL BLD QL SMEAR: ABNORMAL
BASOPHILS # BLD AUTO: 0.15 K/UL (ref 0–0.2)
BASOPHILS NFR BLD: 0.8 % (ref 0–1.9)
BUN SERPL-MCNC: 7 MG/DL (ref 6–30)
BURR CELLS BLD QL SMEAR: ABNORMAL
CHLORIDE SERPL-SCNC: 97 MMOL/L (ref 95–110)
CREAT SERPL-MCNC: 0.8 MG/DL (ref 0.5–1.4)
CTP QC/QA: YES
DACRYOCYTES BLD QL SMEAR: ABNORMAL
DIFFERENTIAL METHOD: ABNORMAL
EOSINOPHIL # BLD AUTO: 0.9 K/UL (ref 0–0.5)
EOSINOPHIL NFR BLD: 5 % (ref 0–8)
ERYTHROCYTE [DISTWIDTH] IN BLOOD BY AUTOMATED COUNT: 14.6 % (ref 11.5–14.5)
GIANT PLATELETS BLD QL SMEAR: PRESENT
GLUCOSE SERPL-MCNC: 91 MG/DL (ref 70–110)
HCT VFR BLD AUTO: 29 % (ref 40–54)
HCT VFR BLD CALC: 33 %PCV (ref 36–54)
HGB BLD-MCNC: 10.7 G/DL (ref 14–18)
HYPOCHROMIA BLD QL SMEAR: ABNORMAL
IMM GRANULOCYTES # BLD AUTO: 0.08 K/UL (ref 0–0.04)
IMM GRANULOCYTES NFR BLD AUTO: 0.4 % (ref 0–0.5)
LYMPHOCYTES # BLD AUTO: 3.2 K/UL (ref 1–4.8)
LYMPHOCYTES NFR BLD: 17.4 % (ref 18–48)
MCH RBC QN AUTO: 30.7 PG (ref 27–31)
MCHC RBC AUTO-ENTMCNC: 36.9 G/DL (ref 32–36)
MCV RBC AUTO: 83 FL (ref 82–98)
MONOCYTES # BLD AUTO: 2.4 K/UL (ref 0.3–1)
MONOCYTES NFR BLD: 13 % (ref 4–15)
NEUTROPHILS # BLD AUTO: 11.7 K/UL (ref 1.8–7.7)
NEUTROPHILS NFR BLD: 63.4 % (ref 38–73)
NRBC BLD-RTO: 0 /100 WBC
OVALOCYTES BLD QL SMEAR: ABNORMAL
PLATELET # BLD AUTO: 531 K/UL (ref 150–350)
PLATELET BLD QL SMEAR: ABNORMAL
PMV BLD AUTO: 9.7 FL (ref 9.2–12.9)
POC IONIZED CALCIUM: 1.14 MMOL/L (ref 1.06–1.42)
POC TCO2 (MEASURED): 27 MMOL/L (ref 23–29)
POIKILOCYTOSIS BLD QL SMEAR: SLIGHT
POLYCHROMASIA BLD QL SMEAR: ABNORMAL
POTASSIUM BLD-SCNC: 3.9 MMOL/L (ref 3.5–5.1)
RBC # BLD AUTO: 3.49 M/UL (ref 4.6–6.2)
SAMPLE: ABNORMAL
SARS-COV-2 RDRP RESP QL NAA+PROBE: NEGATIVE
SCHISTOCYTES BLD QL SMEAR: ABNORMAL
SCHISTOCYTES BLD QL SMEAR: PRESENT
SICKLE CELLS BLD QL SMEAR: ABNORMAL
SODIUM BLD-SCNC: 135 MMOL/L (ref 136–145)
TARGETS BLD QL SMEAR: ABNORMAL
WBC # BLD AUTO: 18.48 K/UL (ref 3.9–12.7)

## 2020-10-05 PROCEDURE — 63600175 PHARM REV CODE 636 W HCPCS: Performed by: STUDENT IN AN ORGANIZED HEALTH CARE EDUCATION/TRAINING PROGRAM

## 2020-10-05 PROCEDURE — 80047 BASIC METABLC PNL IONIZED CA: CPT

## 2020-10-05 PROCEDURE — 94770 HC EXHALED C02 TEST: CPT

## 2020-10-05 PROCEDURE — U0002 COVID-19 LAB TEST NON-CDC: HCPCS | Performed by: EMERGENCY MEDICINE

## 2020-10-05 PROCEDURE — 99900035 HC TECH TIME PER 15 MIN (STAT)

## 2020-10-05 PROCEDURE — 85025 COMPLETE CBC W/AUTO DIFF WBC: CPT

## 2020-10-05 PROCEDURE — 99291 CRITICAL CARE FIRST HOUR: CPT | Mod: ,,, | Performed by: EMERGENCY MEDICINE

## 2020-10-05 PROCEDURE — 99284 EMERGENCY DEPT VISIT MOD MDM: CPT | Mod: 25

## 2020-10-05 PROCEDURE — 99291 PR CRITICAL CARE, E/M 30-74 MINUTES: ICD-10-PCS | Mod: ,,, | Performed by: EMERGENCY MEDICINE

## 2020-10-05 PROCEDURE — 96374 THER/PROPH/DIAG INJ IV PUSH: CPT

## 2020-10-05 RX ORDER — ALBUTEROL SULFATE 90 UG/1
2 AEROSOL, METERED RESPIRATORY (INHALATION) EVERY 6 HOURS PRN
Qty: 6.7 G | Refills: 0 | Status: SHIPPED | OUTPATIENT
Start: 2020-10-05 | End: 2020-11-04

## 2020-10-05 RX ORDER — NALOXONE HYDROCHLORIDE 4 MG/.1ML
SPRAY NASAL
Qty: 1 EACH | Refills: 11 | Status: SHIPPED | OUTPATIENT
Start: 2020-10-05 | End: 2021-09-08

## 2020-10-05 RX ORDER — ALBUTEROL SULFATE 1.25 MG/3ML
1.25 SOLUTION RESPIRATORY (INHALATION) EVERY 6 HOURS PRN
Qty: 1 BOX | Refills: 0 | Status: ON HOLD | OUTPATIENT
Start: 2020-10-05 | End: 2021-07-01 | Stop reason: HOSPADM

## 2020-10-05 RX ORDER — NALOXONE HCL 0.4 MG/ML
0.4 VIAL (ML) INJECTION
Status: COMPLETED | OUTPATIENT
Start: 2020-10-05 | End: 2020-10-05

## 2020-10-05 RX ADMIN — NALOXONE HYDROCHLORIDE 0.4 MG: 0.4 INJECTION, SOLUTION INTRAMUSCULAR; INTRAVENOUS; SUBCUTANEOUS at 06:10

## 2020-10-05 NOTE — ED TRIAGE NOTES
"Pt brought in via ems. Pt states he was leaving work when he took 6 of his roxicodone pills because he was having a "little crisis". Pt became unresponsive in his car and ems called. Ems gave 0.5 mg of narcan. Pt became responsive. Pt aao x 4 currently. Pt states he has done this before and did not become unresponsive. Patient denies any pain currently. Pt sating 89% on room air, 2 L oxygen via NC applied to pt.   "

## 2020-10-06 NOTE — ED NOTES
I-STAT Chem-8+ Results:   Value Reference Range   Sodium 135 136-145 mmol/L   Potassium  3.9 3.5-5.1 mmol/L   Chloride 97  mmol/L   Ionized Calcium 1.14 1.06-1.42 mmol/L   CO2 (measured) 27 23-29 mmol/L   Glucose 91  mg/dL   BUN 7 6-30 mg/dL   Creatinine 0.8 0.5-1.4 mg/dL   Hematocrit 33 36-54%

## 2020-10-06 NOTE — ED PROVIDER NOTES
Encounter Date: 10/5/2020       History     Chief Complaint   Patient presents with    Drug Overdose     hx of sickle cell, pt reports took his pain meds and pt was found unresponsive in his car- pt was given 0.5mg narcan     Pt is a 30 year old male with a PMHx of asthma and sickle cell anemia coming in via EMS for opioid overdose. Pt was at work and was having a sickle cell crisis when he took 6 of Roxicodone pills. EMS found him in his car unresponsive. Administered 0.5 of Narcan. Pt responded but has been intermittently somnolent since. Denies that he took pills as a form of suicide. States he was trying to get through the pain to continue working. Not currently experiencing pain from sickle cell crisis. Did not take any other pills/substances. No other complaints right now.      History is limited as patient is somnolent.    Review of patient's allergies indicates:   Allergen Reactions    Penicillins Anaphylaxis     Past Medical History:   Diagnosis Date    Asthma     Broken thumb     Cigarette smoker     Sickle cell anemia     Sickle cell crisis      Past Surgical History:   Procedure Laterality Date    HAND SURGERY Left 12/21/2017    ORIF    ORIF mandible  01/31/2013    spleenectomy       History reviewed. No pertinent family history.  Social History     Tobacco Use    Smoking status: Current Every Day Smoker     Packs/day: 0.50     Types: Cigarettes    Smokeless tobacco: Never Used    Tobacco comment: encouraged to quit.    Substance Use Topics    Alcohol use: Not Currently     Comment: socially    Drug use: No     Review of Systems   Constitutional: Negative for activity change, chills and fever.   HENT: Negative for congestion, ear pain and sore throat.    Eyes: Negative for discharge and itching.   Respiratory: Negative for shortness of breath and stridor.    Cardiovascular: Negative for chest pain and palpitations.   Gastrointestinal: Negative for abdominal pain, nausea and vomiting.    Genitourinary: Negative for difficulty urinating, dysuria, flank pain and frequency.   Skin: Negative for color change and pallor.   Neurological: Negative for dizziness, syncope and headaches.   Psychiatric/Behavioral: Negative for suicidal ideas.        Opioid overdose       Physical Exam     Initial Vitals [10/05/20 1659]   BP Pulse Resp Temp SpO2   (!) 144/78 (!) 120 16 98.3 °F (36.8 °C) 98 %      MAP       --         Physical Exam    Nursing note and vitals reviewed.  Constitutional: He appears well-developed and well-nourished. He is not diaphoretic. He is easily aroused. No distress. Nasal cannula in place.   Somnolent with slowed respirations but arousable to voice. Placed on 2 L of NC after O2 sats dropped into low 80s. Saturations would improve upon patient awakening.   HENT:   Head: Normocephalic and atraumatic.   Right Ear: External ear normal.   Left Ear: External ear normal.   Eyes: Pupils are equal, round, and reactive to light.   2 mm pupils bilaterally. Not pinpoint.   Neck: Neck supple.   Cardiovascular: Regular rhythm, normal heart sounds and intact distal pulses.   tachycardic   Pulmonary/Chest: Breath sounds normal. Bradypnea noted. No respiratory distress. He has no wheezes. He has no rhonchi. He has no rales.   bradypneic   Abdominal: Soft. He exhibits no distension. There is no abdominal tenderness. There is no rebound and no guarding.   Neurological: He is oriented to person, place, and time and easily aroused. GCS eye subscore is 3. GCS verbal subscore is 5. GCS motor subscore is 6.   Somnolent   Skin: Skin is warm. Capillary refill takes less than 2 seconds. No rash noted.   Psychiatric: He has a normal mood and affect.         ED Course   Procedures  Labs Reviewed   CBC W/ AUTO DIFFERENTIAL - Abnormal; Notable for the following components:       Result Value    WBC 18.48 (*)     RBC 3.49 (*)     Hemoglobin 10.7 (*)     Hematocrit 29.0 (*)     Mean Corpuscular Hemoglobin Conc 36.9 (*)      RDW 14.6 (*)     Platelets 531 (*)     Gran # (ANC) 11.7 (*)     Immature Grans (Abs) 0.08 (*)     Mono # 2.4 (*)     Eos # 0.9 (*)     Lymph% 17.4 (*)     Platelet Estimate Increased (*)     Sickle Cells Occasional (*)     All other components within normal limits   ISTAT PROCEDURE - Abnormal; Notable for the following components:    POC Sodium 135 (*)     POC Hematocrit 33 (*)     All other components within normal limits   SARS-COV-2 RDRP GENE   ISTAT CHEM8          Imaging Results    None          Medical Decision Making:   History:   Old Medical Records: I decided to obtain old medical records.  Differential Diagnosis:   Opioid overdose, carbon dioxide narcosis, alcohol intoxication, benzodiazepine overdose, other sedative overdose  Clinical Tests:   Lab Tests: Ordered and Reviewed  ED Management:  Patient is a somnolent 30 year old male with sickle cell anemia coming in via EMS for opioid overdose at around 5 PM today. Took 6 pills of Roxicodone for a sickle cell crisis in order to help him get through his work day pain-free. Given 0.5 of Narcan by EMS. On arrival, patient intermittently somnolent with oxygen saturations in the mid-to-high 80s. Would improve upon awakening. Placed on 2 L NC with improvement as well. Given another 0.4 mg Narcan here in ED. Subsequent vomiting. Became more alert throughout the hour after administration. Able to ambulate and maintain 95-96% O2 on room air. Girlfriend driving pt home. Discharged with Narcan. Given prescription for albuterol as well upon pt request.              Attending Attestation:   Physician Attestation Statement for Resident:  As the supervising MD   Physician Attestation Statement: I have personally seen and examined this patient.   I agree with the above history. -:   As the supervising MD I agree with the above PE.    As the supervising MD I agree with the above treatment, course, plan, and disposition.   -:     Tachycardic. Afebrile. Hypoxic on RA.  Somnolent, but protecting airway. Easily arousable with verbal stimuli. However, when left alone, becomes hypercapneic with worsening hypoxia. Supplemental O2 given and narcan administered. Monitored for several hours in ED. Decision made to place on narcan infusion, but patient refused when stimulated and awakened. Eventually patient became stable, more alert and without need of supplemental O2. Ambulated with normal spO2. Requesting discharge. Stable for d/c. rx for narcan provided. Uncertain why resident provided rx for albuterol.          I have reviewed and agree with the residents interpretation of the following: lab data.  I have reviewed the following: old records at this facility.        Attending Critical Care:   Critical Care Times:   ==============================================================  · Total Critical Care Time - exclusive of procedural time: 45 minutes.  ==============================================================  Critical care was necessary to treat or prevent imminent or life-threatening deterioration of the following conditions: overdose.   The following critical care procedures were done by me (see procedure notes): pulse oximetry and airway management.   Critical care was time spent personally by me on the following activities: obtaining history from patient or relative, examination of patient, review of old charts, ordering lab, x-rays, and/or EKG, development of treatment plan with patient or relative, ordering and performing treatments and interventions, evaluation of patient's response to treatment, discussion with consultants, interpretation of cardiac measurements and re-evaluation of patient's conition.   Critical Care Condition: potentially life-threatening               ED Course as of Oct 05 2345   Mon Oct 05, 2020   2237 Patient ambulating and A/O x 3. 95-96% on room air while ambulating    [AS]      ED Course User Index  [AS] Chandan Alvarez MD            Clinical  Impression:       ICD-10-CM ICD-9-CM   1. Opiate overdose, accidental or unintentional, initial encounter  T40.601A 965.00     E850.2                          ED Disposition Condition    Discharge Stable        ED Prescriptions     Medication Sig Dispense Start Date End Date Auth. Provider    naloxone (NARCAN) 4 mg/actuation Spry 4mg by nasal route as needed for opioid overdose; may repeat every 2-3 minutes in alternating nostrils until medical help arrives. Call 911 1 each 10/5/2020  Chandan Alvarez MD    albuterol (ACCUNEB) 1.25 mg/3 mL Nebu Take 3 mLs (1.25 mg total) by nebulization every 6 (six) hours as needed. Rescue 1 Box 10/5/2020 10/5/2021 Chandan Alvarez MD    albuterol (PROVENTIL/VENTOLIN HFA) 90 mcg/actuation inhaler Inhale 2 puffs into the lungs every 6 (six) hours as needed for Wheezing. Rescue 6.7 g 10/5/2020 11/4/2020 Chandan Alvarez MD        Follow-up Information    None                                      Chandan Alvarez MD  Resident  10/06/20 0126       Alonzo Martinez MD  10/07/20 4837

## 2020-11-03 ENCOUNTER — HOSPITAL ENCOUNTER (EMERGENCY)
Facility: HOSPITAL | Age: 30
Discharge: HOME | End: 2020-11-03
Attending: EMERGENCY MEDICINE
Payer: MEDICAID

## 2020-11-03 VITALS
SYSTOLIC BLOOD PRESSURE: 132 MMHG | WEIGHT: 165 LBS | RESPIRATION RATE: 18 BRPM | DIASTOLIC BLOOD PRESSURE: 69 MMHG | HEIGHT: 66 IN | HEART RATE: 93 BPM | BODY MASS INDEX: 26.52 KG/M2 | OXYGEN SATURATION: 96 % | TEMPERATURE: 99 F

## 2020-11-03 DIAGNOSIS — M79.672 FOOT PAIN, BILATERAL: Primary | ICD-10-CM

## 2020-11-03 DIAGNOSIS — D57.00 SICKLE CELL PAIN CRISIS: ICD-10-CM

## 2020-11-03 DIAGNOSIS — M79.604 LEG PAIN, BILATERAL: ICD-10-CM

## 2020-11-03 DIAGNOSIS — M79.605 LEG PAIN, BILATERAL: ICD-10-CM

## 2020-11-03 DIAGNOSIS — M79.671 FOOT PAIN, BILATERAL: Primary | ICD-10-CM

## 2020-11-03 LAB
ALBUMIN SERPL BCP-MCNC: 4 G/DL (ref 3.5–5.2)
ALP SERPL-CCNC: 68 U/L (ref 55–135)
ALT SERPL W/O P-5'-P-CCNC: 30 U/L (ref 10–44)
AMPHET+METHAMPHET UR QL: NEGATIVE
ANION GAP SERPL CALC-SCNC: 7 MMOL/L (ref 8–16)
APAP SERPL-MCNC: <3 UG/ML (ref 10–20)
AST SERPL-CCNC: 28 U/L (ref 10–40)
BARBITURATES UR QL SCN>200 NG/ML: NEGATIVE
BASOPHILS # BLD AUTO: 0.12 K/UL (ref 0–0.2)
BASOPHILS NFR BLD: 0.6 % (ref 0–1.9)
BENZODIAZ UR QL SCN>200 NG/ML: NEGATIVE
BILIRUB SERPL-MCNC: 1.3 MG/DL (ref 0.1–1)
BILIRUB UR QL STRIP: NEGATIVE
BNP SERPL-MCNC: 22 PG/ML (ref 0–99)
BUN SERPL-MCNC: 4 MG/DL (ref 6–20)
BZE UR QL SCN: NORMAL
CALCIUM SERPL-MCNC: 8.6 MG/DL (ref 8.7–10.5)
CANNABINOIDS UR QL SCN: NEGATIVE
CHLORIDE SERPL-SCNC: 102 MMOL/L (ref 95–110)
CK SERPL-CCNC: 246 U/L (ref 20–200)
CLARITY UR: CLEAR
CO2 SERPL-SCNC: 30 MMOL/L (ref 23–29)
COLOR UR: YELLOW
CREAT SERPL-MCNC: 0.8 MG/DL (ref 0.5–1.4)
CREAT UR-MCNC: 87.5 MG/DL (ref 23–375)
CTP QC/QA: YES
D DIMER PPP IA.FEU-MCNC: 1.16 MG/L FEU
DIFFERENTIAL METHOD: ABNORMAL
EOSINOPHIL # BLD AUTO: 2.7 K/UL (ref 0–0.5)
EOSINOPHIL NFR BLD: 14.1 % (ref 0–8)
ERYTHROCYTE [DISTWIDTH] IN BLOOD BY AUTOMATED COUNT: 14.4 % (ref 11.5–14.5)
EST. GFR  (AFRICAN AMERICAN): >60 ML/MIN/1.73 M^2
EST. GFR  (NON AFRICAN AMERICAN): >60 ML/MIN/1.73 M^2
GLUCOSE SERPL-MCNC: 62 MG/DL (ref 70–110)
GLUCOSE SERPL-MCNC: 86 MG/DL (ref 70–110)
GLUCOSE UR QL STRIP: NEGATIVE
HCT VFR BLD AUTO: 27.8 % (ref 40–54)
HGB BLD-MCNC: 10 G/DL (ref 14–18)
HGB UR QL STRIP: NEGATIVE
IMM GRANULOCYTES # BLD AUTO: 0.12 K/UL (ref 0–0.04)
IMM GRANULOCYTES NFR BLD AUTO: 0.6 % (ref 0–0.5)
KETONES UR QL STRIP: NEGATIVE
LEUKOCYTE ESTERASE UR QL STRIP: NEGATIVE
LIPASE SERPL-CCNC: 10 U/L (ref 4–60)
LYMPHOCYTES # BLD AUTO: 3.4 K/UL (ref 1–4.8)
LYMPHOCYTES NFR BLD: 17.6 % (ref 18–48)
MCH RBC QN AUTO: 30.3 PG (ref 27–31)
MCHC RBC AUTO-ENTMCNC: 36 G/DL (ref 32–36)
MCV RBC AUTO: 84 FL (ref 82–98)
METHADONE UR QL SCN>300 NG/ML: NEGATIVE
MONOCYTES # BLD AUTO: 2.5 K/UL (ref 0.3–1)
MONOCYTES NFR BLD: 13 % (ref 4–15)
NEUTROPHILS # BLD AUTO: 10.4 K/UL (ref 1.8–7.7)
NEUTROPHILS NFR BLD: 54.1 % (ref 38–73)
NITRITE UR QL STRIP: NEGATIVE
NRBC BLD-RTO: 0 /100 WBC
OPIATES UR QL SCN: NEGATIVE
PCP UR QL SCN>25 NG/ML: NEGATIVE
PH UR STRIP: 7 [PH] (ref 5–8)
PLATELET # BLD AUTO: 524 K/UL (ref 150–350)
PMV BLD AUTO: 9.9 FL (ref 9.2–12.9)
POCT GLUCOSE: 81 MG/DL (ref 70–110)
POCT GLUCOSE: 86 MG/DL (ref 70–110)
POTASSIUM SERPL-SCNC: 3.9 MMOL/L (ref 3.5–5.1)
PROT SERPL-MCNC: 7.4 G/DL (ref 6–8.4)
PROT UR QL STRIP: NEGATIVE
RBC # BLD AUTO: 3.3 M/UL (ref 4.6–6.2)
RETICS/RBC NFR AUTO: 7.1 % (ref 0.4–2)
SARS-COV-2 RDRP RESP QL NAA+PROBE: NEGATIVE
SODIUM SERPL-SCNC: 139 MMOL/L (ref 136–145)
SP GR UR STRIP: 1.01 (ref 1–1.03)
TARGETS BLD QL SMEAR: ABNORMAL
TOXICOLOGY INFORMATION: NORMAL
TROPONIN I SERPL DL<=0.01 NG/ML-MCNC: <0.006 NG/ML (ref 0–0.03)
URN SPEC COLLECT METH UR: ABNORMAL
UROBILINOGEN UR STRIP-ACNC: ABNORMAL EU/DL
WBC # BLD AUTO: 19.24 K/UL (ref 3.9–12.7)

## 2020-11-03 PROCEDURE — 81003 URINALYSIS AUTO W/O SCOPE: CPT | Mod: 59

## 2020-11-03 PROCEDURE — 80329 ANALGESICS NON-OPIOID 1 OR 2: CPT

## 2020-11-03 PROCEDURE — 84484 ASSAY OF TROPONIN QUANT: CPT

## 2020-11-03 PROCEDURE — 80307 DRUG TEST PRSMV CHEM ANLYZR: CPT

## 2020-11-03 PROCEDURE — 83880 ASSAY OF NATRIURETIC PEPTIDE: CPT

## 2020-11-03 PROCEDURE — 99285 EMERGENCY DEPT VISIT HI MDM: CPT | Mod: 25

## 2020-11-03 PROCEDURE — 82550 ASSAY OF CK (CPK): CPT

## 2020-11-03 PROCEDURE — 82962 GLUCOSE BLOOD TEST: CPT

## 2020-11-03 PROCEDURE — 85045 AUTOMATED RETICULOCYTE COUNT: CPT

## 2020-11-03 PROCEDURE — U0002 COVID-19 LAB TEST NON-CDC: HCPCS | Performed by: EMERGENCY MEDICINE

## 2020-11-03 PROCEDURE — 96374 THER/PROPH/DIAG INJ IV PUSH: CPT

## 2020-11-03 PROCEDURE — 63600175 PHARM REV CODE 636 W HCPCS: Performed by: EMERGENCY MEDICINE

## 2020-11-03 PROCEDURE — 96361 HYDRATE IV INFUSION ADD-ON: CPT

## 2020-11-03 PROCEDURE — 83690 ASSAY OF LIPASE: CPT

## 2020-11-03 PROCEDURE — 25000003 PHARM REV CODE 250: Performed by: EMERGENCY MEDICINE

## 2020-11-03 PROCEDURE — 25500020 PHARM REV CODE 255: Performed by: EMERGENCY MEDICINE

## 2020-11-03 PROCEDURE — 80053 COMPREHEN METABOLIC PANEL: CPT

## 2020-11-03 PROCEDURE — 85025 COMPLETE CBC W/AUTO DIFF WBC: CPT

## 2020-11-03 PROCEDURE — 85379 FIBRIN DEGRADATION QUANT: CPT

## 2020-11-03 RX ORDER — MORPHINE SULFATE 4 MG/ML
4 INJECTION, SOLUTION INTRAMUSCULAR; INTRAVENOUS
Status: COMPLETED | OUTPATIENT
Start: 2020-11-03 | End: 2020-11-03

## 2020-11-03 RX ORDER — CLINDAMYCIN HYDROCHLORIDE 150 MG/1
150 CAPSULE ORAL
Status: COMPLETED | OUTPATIENT
Start: 2020-11-03 | End: 2020-11-03

## 2020-11-03 RX ORDER — CLINDAMYCIN HYDROCHLORIDE 150 MG/1
150 CAPSULE ORAL EVERY 8 HOURS
Qty: 21 CAPSULE | Refills: 0 | Status: SHIPPED | OUTPATIENT
Start: 2020-11-03 | End: 2020-11-10

## 2020-11-03 RX ORDER — OXYCODONE AND ACETAMINOPHEN 5; 325 MG/1; MG/1
1 TABLET ORAL EVERY 6 HOURS PRN
Qty: 6 TABLET | Refills: 0 | Status: ON HOLD | OUTPATIENT
Start: 2020-11-03 | End: 2021-07-01 | Stop reason: HOSPADM

## 2020-11-03 RX ORDER — OXYCODONE AND ACETAMINOPHEN 5; 325 MG/1; MG/1
1 TABLET ORAL
Status: COMPLETED | OUTPATIENT
Start: 2020-11-03 | End: 2020-11-03

## 2020-11-03 RX ADMIN — SODIUM CHLORIDE 1000 ML: 0.9 INJECTION, SOLUTION INTRAVENOUS at 02:11

## 2020-11-03 RX ADMIN — OXYCODONE HYDROCHLORIDE AND ACETAMINOPHEN 1 TABLET: 5; 325 TABLET ORAL at 09:11

## 2020-11-03 RX ADMIN — IOHEXOL 75 ML: 350 INJECTION, SOLUTION INTRAVENOUS at 05:11

## 2020-11-03 RX ADMIN — MORPHINE SULFATE 4 MG: 4 INJECTION INTRAVENOUS at 02:11

## 2020-11-03 RX ADMIN — CLINDAMYCIN HYDROCHLORIDE 150 MG: 150 CAPSULE ORAL at 10:11

## 2020-11-03 NOTE — ED NOTES
Past Medical History:   Diagnosis Date    Asthma     Broken thumb     Cigarette smoker     Sickle cell anemia     Sickle cell crisis      Pt presenting with co bilateral leg to foot pain  swelling In sickle cell crisis since last evening.  Pt reports taking his Percocet with no relief.

## 2020-11-03 NOTE — ED PROVIDER NOTES
Encounter Date: 11/3/2020       History     Chief Complaint   Patient presents with    Sickle Cell Pain Crisis     started last night     Foot Swelling     qing feet edema last night     Mr Prasad reports feeling he is having a sickle cell crisis similar to several previous crises.  He has a history of sickle cell disease.  Takes medications at home with good compliance.  Takes Percocet for pain as needed, sometimes daily.  He reports pain in both of his legs, from the knees distally.  He reports this is where he usually has pain, as well as his back and arms.  He reports he took pain medicine at home prior to arrival and did work.  Patient was initially seen by me in the restroom with his significant other, where he was giving urine sample.  He reports the pain in his legs is too significant to walk without assistance.    The history is provided by the patient (And significant other.).     Review of patient's allergies indicates:   Allergen Reactions    Penicillins Anaphylaxis     Past Medical History:   Diagnosis Date    Asthma     Broken thumb     Cigarette smoker     Sickle cell anemia     Sickle cell crisis      Past Surgical History:   Procedure Laterality Date    HAND SURGERY Left 12/21/2017    ORIF    ORIF mandible  01/31/2013    spleenectomy       No family history on file.  Social History     Tobacco Use    Smoking status: Current Every Day Smoker     Packs/day: 0.50     Types: Cigarettes    Smokeless tobacco: Never Used    Tobacco comment: encouraged to quit.    Substance Use Topics    Alcohol use: Not Currently     Comment: socially    Drug use: No     Review of Systems   Constitutional:        Diffuse pain.  More in bilateral lower extremities, from the knees down.  Similar to previous sickle cell flares.   Respiratory: Negative.    Cardiovascular: Negative.    All other systems reviewed and are negative.      Physical Exam     Initial Vitals [11/03/20 1135]   BP Pulse Resp Temp SpO2    118/69 84 20 98.2 °F (36.8 °C) 95 %      MAP       --         Physical Exam    Nursing note and vitals reviewed.  Constitutional:   He is awake but seems sleepy at times.  He is lucid and linear.  He is able answer all questions appropriately.     HENT:   Head: Normocephalic and atraumatic.   Eyes: Conjunctivae and EOM are normal.   Neck: Normal range of motion.   Cardiovascular: Normal rate, regular rhythm and normal heart sounds.   Pulmonary/Chest: Breath sounds normal. No respiratory distress.   Abdominal: Soft. He exhibits no distension. There is no abdominal tenderness. There is no rebound.   Musculoskeletal: Normal range of motion.      Comments: No appreciable edema or asymmetry to the bilateral lower extremities.  Good perfusion.  Diffuse tenderness to legs noted.   Neurological: He is alert and oriented to person, place, and time.   Skin: Skin is warm. Capillary refill takes less than 2 seconds.   Psychiatric: He has a normal mood and affect. Thought content normal.         ED Course   Procedures  Labs Reviewed   CBC W/ AUTO DIFFERENTIAL - Abnormal; Notable for the following components:       Result Value    WBC 19.24 (*)     RBC 3.30 (*)     Hemoglobin 10.0 (*)     Hematocrit 27.8 (*)     Platelets 524 (*)     Immature Granulocytes 0.6 (*)     Gran # (ANC) 10.4 (*)     Immature Grans (Abs) 0.12 (*)     Mono # 2.5 (*)     Eos # 2.7 (*)     Lymph % 17.6 (*)     Eosinophil % 14.1 (*)     All other components within normal limits   COMPREHENSIVE METABOLIC PANEL - Abnormal; Notable for the following components:    CO2 30 (*)     Glucose 62 (*)     BUN 4 (*)     Calcium 8.6 (*)     Total Bilirubin 1.3 (*)     Anion Gap 7 (*)     All other components within normal limits   URINALYSIS, REFLEX TO URINE CULTURE - Abnormal; Notable for the following components:    Urobilinogen, UA 2.0-3.0 (*)     All other components within normal limits    Narrative:     Specimen Source->Urine   RETICULOCYTES - Abnormal; Notable  for the following components:    Retic 7.1 (*)     All other components within normal limits   CK - Abnormal; Notable for the following components:     (*)     All other components within normal limits   D DIMER, QUANTITATIVE - Abnormal; Notable for the following components:    D-Dimer 1.16 (*)     All other components within normal limits   ACETAMINOPHEN LEVEL - Abnormal; Notable for the following components:    Acetaminophen (Tylenol), Serum <3.0 (*)     All other components within normal limits   DRUG SCREEN PANEL, URINE EMERGENCY    Narrative:     Specimen Source->Urine   LIPASE   TROPONIN I   B-TYPE NATRIURETIC PEPTIDE   ACETAMINOPHEN LEVEL   SARS-COV-2 RDRP GENE   POCT GLUCOSE   POCT GLUCOSE          Imaging Results          CTA Chest Non-Coronary (PE Study) (Final result)  Result time 11/03/20 18:34:12    Final result by Nixon Cano MD (11/03/20 18:34:12)                 Impression:      No evidence of PE.  No acute intrathoracic abnormalities identified.      Electronically signed by: Nixon Cano MD  Date:    11/03/2020  Time:    18:34             Narrative:    EXAMINATION:  CTA CHEST NON CORONARY    CLINICAL HISTORY:  PE suspected, intermediate prob, positive D-dimer;    TECHNIQUE:  Low dose axial images, sagittal and coronal reformations were obtained from the thoracic inlet to the lung bases following the IV administration of 75 mL of Omnipaque 350.  Contrast timing was optimized to evaluate the pulmonary arteries.  MIP images were performed.    COMPARISON:  CTA chest from 08/21/2020.    FINDINGS:  Structures at the base of the neck are unremarkable.  Aorta is non-aneurysmal.  The heart is normal in size without pericardial effusion.  No intraluminal filling defects within the pulmonary arteries to suggest pulmonary thromboembolism.   There is no evidence of mediastinal, axillary, or hilar lymph node enlargement.  The esophagus is unremarkable along its course.    The trachea and bronchi  are patent.  The lungs are symmetrically expanded.  Mild atelectatic changes are seen within the lower lobes.  No confluent focal consolidation.  No pneumothorax or pleural effusion.  No evidence of pulmonary hemorrhage or infarction.    The visualized abdominal structures are unremarkable.  No acute osseous abnormality identified.  Extrathoracic soft tissues are unremarkable.                               US Lower Extremity Veins Bilateral (Final result)  Result time 11/03/20 16:40:57    Final result by Segundo Abbasi MD (11/03/20 16:40:57)                 Impression:      1. No sonographic evidence of DVT in the bilateral lower extremities.      Electronically signed by: Segundo Abbasi  Date:    11/03/2020  Time:    16:40             Narrative:    EXAMINATION:  US LOWER EXTREMITY VEINS BILATERAL    CLINICAL HISTORY:  Pain in right leg    TECHNIQUE:  Duplex and color flow Doppler and dynamic compression was performed of the bilateral lower extremity veins was performed.    COMPARISON:  None    FINDINGS:  Right lower extremity    Common femoral, superficial femoral, popliteal, upper greater saphenous and deep femoral veins demonstrate normal compressibility, phasic flow and augmentation response without evidence of filling defect. Visualized calf veins are patent.    Left lower extremity    Common femoral, superficial femoral, popliteal, upper greater saphenous and deep femoral veins demonstrate normal compressibility, phasic flow and augmentation response without evidence of filling defect. Visualized calf veins are patent.                               X-Ray Chest AP Portable (Final result)  Result time 11/03/20 13:31:04    Final result by Nigel Avilez MD (11/03/20 13:31:04)                 Impression:      No acute abnormality.  No significant detrimental change from prior exam      Electronically signed by: Nigel Avilez MD  Date:    11/03/2020  Time:    13:31             Narrative:    EXAMINATION:  XR CHEST  AP PORTABLE    CLINICAL HISTORY:  . Hb-SS disease with crisis, unspecified    TECHNIQUE:  Single frontal portable view of the chest was performed.    COMPARISON:  08/21/2020    FINDINGS:  Support devices: None    The lungs are clear, with normal appearance of pulmonary vasculature and no pleural effusion or pneumothorax.    The cardiac silhouette is normal in size. The hilar and mediastinal contours are unremarkable.    Bones are intact.                                 Medical Decision Making:   Clinical Tests:   Lab Tests: Ordered and Reviewed  ED Management:  Checked on patient.  CBC just return.  Patient is sleeping, awakens to verbal stimulus.  Still appears to not feel well.  Did take some time for labs to be drawn secondary to hard stick.  Peripheral IV was able to be placed with labs drawn through ultrasound-guided IV.  Glucose noted.  In 60s.  Patient given juice and crackers by me.  Is tolerating well.  States he took for Percocet 5 at home prior to coming in today.  He is lethargic.    Mr Parham is sleeping, awakens to loud verbal and light physical stimulus.  His significant other states he is sleepy like this all time. Will go on monitor to CT.  Negative for DVT.  D-dimer elevated.  Will order CT to rule out PE.  He reports he feels a little better.    Patient has been signed out at change of shift to Dr. Graff for further evaluation.  I have made patient aware.  He is more awake and alert and is asking for food.  He does report some persistent pain in his legs but states he feels a little bit better. Josey Cesar MD, 11/03/2020 6:53 PM        I, Dr. Graff, assumed patient care at shift change.  Patient with baseline workup so far, CT a unremarkable for acute PE.  Patient reassessed still somewhat drowsy, will continue observation for anticipated improvement in mentation.  After additional ED observation, patient's mentation improving, pain returning, assessed bilateral lower legs, some mild  tenderness over left and right posterior ankles, no overlying skin changes noted.  Patient reporting this has happened previously and he was admitted and placed on antibiotics which ended up improving his symptoms significantly.  Given continued pain, mild swelling will prescribe clindamycin given known allergies, advised close follow-up with his hematologist.  Patient id strongly advised additionally on use of pain medication going forward, as he has had recent ED presentation for an overdose, has Narcan at home, discussed as well with family member at bedside proper use, not filling any additional pain medication prescriptions until tomorrow, and follow-up with his hematologist.  Discussed strict ED return precautions with patient, all questions answered, patient discharged home improved and stable.                             Clinical Impression:       ICD-10-CM ICD-9-CM   1. Foot pain, bilateral  M79.671 729.5    M79.672    2. Sickle cell pain crisis  D57.00 282.62   3. Leg pain, bilateral  M79.604 729.5    M79.605                           ED Disposition Condition    Discharge Stable        ED Prescriptions     Medication Sig Dispense Start Date End Date Auth. Provider    clindamycin (CLEOCIN) 150 MG capsule Take 1 capsule (150 mg total) by mouth every 8 (eight) hours. for 7 days 21 capsule 11/3/2020 11/10/2020 Sreekanth Graff MD    oxyCODONE-acetaminophen (PERCOCET) 5-325 mg per tablet Take 1 tablet by mouth every 6 (six) hours as needed for Pain. 6 tablet 11/3/2020  Sreekanth Graff MD        Follow-up Information     Follow up With Specialties Details Why Contact Info    [facility] Go to  If symptoms worsen [address]  [facility] [address] [phone]    Stevekwungregory Reed MD Internal Medicine, Pediatrics Go in 1 week As needed [address]  [address]  [facility] [address]  [phone]                                         Sreekanth  KAELYN Graff MD  11/04/20 9101

## 2021-05-22 ENCOUNTER — HOSPITAL ENCOUNTER (EMERGENCY)
Facility: HOSPITAL | Age: 31
Discharge: HOME | End: 2021-05-23
Attending: EMERGENCY MEDICINE
Payer: MEDICAID

## 2021-05-22 DIAGNOSIS — R31.9 HEMATURIA, UNSPECIFIED TYPE: ICD-10-CM

## 2021-05-22 DIAGNOSIS — R30.0 DYSURIA: Primary | ICD-10-CM

## 2021-05-22 DIAGNOSIS — N40.0 ENLARGED PROSTATE: ICD-10-CM

## 2021-05-22 LAB
BACTERIA #/AREA URNS HPF: ABNORMAL /HPF
BASOPHILS # BLD AUTO: 0.11 K/UL (ref 0–0.2)
BASOPHILS NFR BLD: 0.5 % (ref 0–1.9)
BILIRUB UR QL STRIP: NEGATIVE
CLARITY UR: ABNORMAL
COLOR UR: YELLOW
DIFFERENTIAL METHOD: ABNORMAL
EOSINOPHIL # BLD AUTO: 0.6 K/UL (ref 0–0.5)
EOSINOPHIL NFR BLD: 2.6 % (ref 0–8)
ERYTHROCYTE [DISTWIDTH] IN BLOOD BY AUTOMATED COUNT: 14.9 % (ref 11.5–14.5)
GLUCOSE UR QL STRIP: NEGATIVE
HCT VFR BLD AUTO: 33.1 % (ref 40–54)
HGB BLD-MCNC: 11.9 G/DL (ref 14–18)
HGB UR QL STRIP: ABNORMAL
HYALINE CASTS #/AREA URNS LPF: 0 /LPF
IMM GRANULOCYTES # BLD AUTO: 0.11 K/UL (ref 0–0.04)
IMM GRANULOCYTES NFR BLD AUTO: 0.5 % (ref 0–0.5)
KETONES UR QL STRIP: NEGATIVE
LEUKOCYTE ESTERASE UR QL STRIP: ABNORMAL
LYMPHOCYTES # BLD AUTO: 4 K/UL (ref 1–4.8)
LYMPHOCYTES NFR BLD: 18.1 % (ref 18–48)
MCH RBC QN AUTO: 30.9 PG (ref 27–31)
MCHC RBC AUTO-ENTMCNC: 36 G/DL (ref 32–36)
MCV RBC AUTO: 86 FL (ref 82–98)
MICROSCOPIC COMMENT: ABNORMAL
MONOCYTES # BLD AUTO: 2.1 K/UL (ref 0.3–1)
MONOCYTES NFR BLD: 9.7 % (ref 4–15)
NEUTROPHILS # BLD AUTO: 15 K/UL (ref 1.8–7.7)
NEUTROPHILS NFR BLD: 68.6 % (ref 38–73)
NITRITE UR QL STRIP: NEGATIVE
NRBC BLD-RTO: 0 /100 WBC
PH UR STRIP: 7 [PH] (ref 5–8)
PLATELET # BLD AUTO: 309 K/UL (ref 150–450)
PMV BLD AUTO: 10.1 FL (ref 9.2–12.9)
PROT UR QL STRIP: ABNORMAL
RBC # BLD AUTO: 3.85 M/UL (ref 4.6–6.2)
RBC #/AREA URNS HPF: >100 /HPF (ref 0–4)
RETICS/RBC NFR AUTO: 7.1 % (ref 0.4–2)
SP GR UR STRIP: 1.01 (ref 1–1.03)
URN SPEC COLLECT METH UR: ABNORMAL
UROBILINOGEN UR STRIP-ACNC: NEGATIVE EU/DL
WBC # BLD AUTO: 21.85 K/UL (ref 3.9–12.7)
WBC #/AREA URNS HPF: >100 /HPF (ref 0–5)
WBC CLUMPS URNS QL MICRO: ABNORMAL

## 2021-05-22 PROCEDURE — 85045 AUTOMATED RETICULOCYTE COUNT: CPT | Performed by: PHYSICIAN ASSISTANT

## 2021-05-22 PROCEDURE — 96361 HYDRATE IV INFUSION ADD-ON: CPT

## 2021-05-22 PROCEDURE — 81000 URINALYSIS NONAUTO W/SCOPE: CPT | Performed by: PHYSICIAN ASSISTANT

## 2021-05-22 PROCEDURE — 87086 URINE CULTURE/COLONY COUNT: CPT | Performed by: PHYSICIAN ASSISTANT

## 2021-05-22 PROCEDURE — 87186 SC STD MICRODIL/AGAR DIL: CPT | Performed by: PHYSICIAN ASSISTANT

## 2021-05-22 PROCEDURE — 99285 EMERGENCY DEPT VISIT HI MDM: CPT | Mod: 25

## 2021-05-22 PROCEDURE — 87077 CULTURE AEROBIC IDENTIFY: CPT | Performed by: PHYSICIAN ASSISTANT

## 2021-05-22 PROCEDURE — 87088 URINE BACTERIA CULTURE: CPT | Performed by: PHYSICIAN ASSISTANT

## 2021-05-22 PROCEDURE — 63600175 PHARM REV CODE 636 W HCPCS: Performed by: PHYSICIAN ASSISTANT

## 2021-05-22 PROCEDURE — 85025 COMPLETE CBC W/AUTO DIFF WBC: CPT | Performed by: PHYSICIAN ASSISTANT

## 2021-05-22 RX ORDER — HYDROMORPHONE HYDROCHLORIDE 2 MG/ML
1 INJECTION, SOLUTION INTRAMUSCULAR; INTRAVENOUS; SUBCUTANEOUS
Status: COMPLETED | OUTPATIENT
Start: 2021-05-22 | End: 2021-05-23

## 2021-05-22 RX ADMIN — SODIUM CHLORIDE, SODIUM LACTATE, POTASSIUM CHLORIDE, AND CALCIUM CHLORIDE 1000 ML: .6; .31; .03; .02 INJECTION, SOLUTION INTRAVENOUS at 11:05

## 2021-05-23 VITALS
WEIGHT: 170 LBS | HEART RATE: 64 BPM | BODY MASS INDEX: 27.32 KG/M2 | HEIGHT: 66 IN | SYSTOLIC BLOOD PRESSURE: 129 MMHG | DIASTOLIC BLOOD PRESSURE: 85 MMHG | TEMPERATURE: 99 F | OXYGEN SATURATION: 100 % | RESPIRATION RATE: 18 BRPM

## 2021-05-23 LAB
ALBUMIN SERPL BCP-MCNC: 3.9 G/DL (ref 3.5–5.2)
ALP SERPL-CCNC: 88 U/L (ref 55–135)
ALT SERPL W/O P-5'-P-CCNC: 25 U/L (ref 10–44)
ANION GAP SERPL CALC-SCNC: 12 MMOL/L (ref 8–16)
AST SERPL-CCNC: 28 U/L (ref 10–40)
BILIRUB SERPL-MCNC: 1.9 MG/DL (ref 0.1–1)
BUN SERPL-MCNC: 6 MG/DL (ref 6–20)
CALCIUM SERPL-MCNC: 9.3 MG/DL (ref 8.7–10.5)
CHLORIDE SERPL-SCNC: 102 MMOL/L (ref 95–110)
CO2 SERPL-SCNC: 24 MMOL/L (ref 23–29)
CREAT SERPL-MCNC: 0.8 MG/DL (ref 0.5–1.4)
ERYTHROCYTE [DISTWIDTH] IN BLOOD BY AUTOMATED COUNT: 14.5 % (ref 11.5–14.5)
EST. GFR  (AFRICAN AMERICAN): >60 ML/MIN/1.73 M^2
EST. GFR  (NON AFRICAN AMERICAN): >60 ML/MIN/1.73 M^2
GLUCOSE SERPL-MCNC: 78 MG/DL (ref 70–110)
HCT VFR BLD AUTO: 30.3 % (ref 40–54)
HGB BLD-MCNC: 10.9 G/DL (ref 14–18)
MCH RBC QN AUTO: 30.7 PG (ref 27–31)
MCHC RBC AUTO-ENTMCNC: 36 G/DL (ref 32–36)
MCV RBC AUTO: 85 FL (ref 82–98)
PLATELET # BLD AUTO: 483 K/UL (ref 150–450)
PMV BLD AUTO: 9.8 FL (ref 9.2–12.9)
POTASSIUM SERPL-SCNC: 4.7 MMOL/L (ref 3.5–5.1)
PROT SERPL-MCNC: 7.9 G/DL (ref 6–8.4)
RBC # BLD AUTO: 3.55 M/UL (ref 4.6–6.2)
SODIUM SERPL-SCNC: 138 MMOL/L (ref 136–145)
WBC # BLD AUTO: 26.54 K/UL (ref 3.9–12.7)

## 2021-05-23 PROCEDURE — 63700000 PHARM REV CODE 250 ALT 637 W/O HCPCS: Performed by: PHYSICIAN ASSISTANT

## 2021-05-23 PROCEDURE — 96365 THER/PROPH/DIAG IV INF INIT: CPT

## 2021-05-23 PROCEDURE — 96366 THER/PROPH/DIAG IV INF ADDON: CPT

## 2021-05-23 PROCEDURE — 87491 CHLMYD TRACH DNA AMP PROBE: CPT | Performed by: PHYSICIAN ASSISTANT

## 2021-05-23 PROCEDURE — 87591 N.GONORRHOEAE DNA AMP PROB: CPT | Performed by: PHYSICIAN ASSISTANT

## 2021-05-23 PROCEDURE — 96375 TX/PRO/DX INJ NEW DRUG ADDON: CPT

## 2021-05-23 PROCEDURE — 63600175 PHARM REV CODE 636 W HCPCS: Performed by: PHYSICIAN ASSISTANT

## 2021-05-23 PROCEDURE — 85027 COMPLETE CBC AUTOMATED: CPT | Performed by: EMERGENCY MEDICINE

## 2021-05-23 PROCEDURE — 25000003 PHARM REV CODE 250: Performed by: PHYSICIAN ASSISTANT

## 2021-05-23 PROCEDURE — 80053 COMPREHEN METABOLIC PANEL: CPT | Performed by: EMERGENCY MEDICINE

## 2021-05-23 RX ORDER — AZITHROMYCIN 250 MG/1
2000 TABLET, FILM COATED ORAL
Status: COMPLETED | OUTPATIENT
Start: 2021-05-23 | End: 2021-05-23

## 2021-05-23 RX ORDER — CIPROFLOXACIN 500 MG/1
500 TABLET ORAL 2 TIMES DAILY
Qty: 14 TABLET | Refills: 0 | Status: SHIPPED | OUTPATIENT
Start: 2021-05-23 | End: 2021-05-30

## 2021-05-23 RX ORDER — DOXYCYCLINE 100 MG/1
100 CAPSULE ORAL 2 TIMES DAILY
Qty: 14 CAPSULE | Refills: 0 | Status: SHIPPED | OUTPATIENT
Start: 2021-05-23 | End: 2021-05-30

## 2021-05-23 RX ORDER — ONDANSETRON 4 MG/1
4 TABLET, ORALLY DISINTEGRATING ORAL EVERY 6 HOURS PRN
Qty: 30 TABLET | Refills: 0 | Status: SHIPPED | OUTPATIENT
Start: 2021-05-23 | End: 2021-09-08

## 2021-05-23 RX ORDER — ONDANSETRON 4 MG/1
4 TABLET, ORALLY DISINTEGRATING ORAL
Status: COMPLETED | OUTPATIENT
Start: 2021-05-23 | End: 2021-05-23

## 2021-05-23 RX ADMIN — HYDROMORPHONE HYDROCHLORIDE 1 MG: 2 INJECTION INTRAMUSCULAR; INTRAVENOUS; SUBCUTANEOUS at 12:05

## 2021-05-23 RX ADMIN — ONDANSETRON 4 MG: 4 TABLET, ORALLY DISINTEGRATING ORAL at 01:05

## 2021-05-23 RX ADMIN — AZITHROMYCIN 2000 MG: 250 TABLET, FILM COATED ORAL at 01:05

## 2021-05-23 RX ADMIN — GENTAMICIN SULFATE 345.6 MG: 40 INJECTION, SOLUTION INTRAMUSCULAR; INTRAVENOUS at 12:05

## 2021-05-24 LAB — BACTERIA UR CULT: ABNORMAL

## 2021-05-26 LAB
C TRACH DNA SPEC QL NAA+PROBE: NOT DETECTED
N GONORRHOEA DNA SPEC QL NAA+PROBE: NOT DETECTED

## 2021-06-29 ENCOUNTER — HOSPITAL ENCOUNTER (INPATIENT)
Facility: HOSPITAL | Age: 31
LOS: 2 days | Discharge: HOME | DRG: 811 | End: 2021-07-01
Attending: EMERGENCY MEDICINE | Admitting: HOSPITALIST
Payer: MEDICAID

## 2021-06-29 DIAGNOSIS — D57.01 HB-SS DISEASE WITH ACUTE CHEST SYNDROME: Primary | ICD-10-CM

## 2021-06-29 DIAGNOSIS — D57.01 ACUTE CHEST SYNDROME: ICD-10-CM

## 2021-06-29 DIAGNOSIS — R07.9 CHEST PAIN: ICD-10-CM

## 2021-06-29 DIAGNOSIS — R09.02 HYPOXIA: ICD-10-CM

## 2021-06-29 DIAGNOSIS — D57.219 HEMOGLOBIN S-C DISEASE, WITH UNSPECIFIED CRISIS: ICD-10-CM

## 2021-06-29 DIAGNOSIS — R07.9 ACUTE CHEST PAIN: ICD-10-CM

## 2021-06-29 PROBLEM — J96.01 ACUTE HYPOXEMIC RESPIRATORY FAILURE: Status: ACTIVE | Noted: 2021-06-29

## 2021-06-29 PROBLEM — D57.00 SICKLE CELL PAIN CRISIS: Status: ACTIVE | Noted: 2021-06-29

## 2021-06-29 LAB
ALBUMIN SERPL BCP-MCNC: 3.8 G/DL (ref 3.5–5.2)
ALP SERPL-CCNC: 93 U/L (ref 55–135)
ALT SERPL W/O P-5'-P-CCNC: 19 U/L (ref 10–44)
ANION GAP SERPL CALC-SCNC: 9 MMOL/L (ref 8–16)
ANISOCYTOSIS BLD QL SMEAR: ABNORMAL
AST SERPL-CCNC: 21 U/L (ref 10–40)
BASOPHILS # BLD AUTO: 0.09 K/UL (ref 0–0.2)
BASOPHILS NFR BLD: 0.5 % (ref 0–1.9)
BILIRUB SERPL-MCNC: 2 MG/DL (ref 0.1–1)
BILIRUB UR QL STRIP: NEGATIVE
BUN SERPL-MCNC: 4 MG/DL (ref 6–20)
CALCIUM SERPL-MCNC: 8.9 MG/DL (ref 8.7–10.5)
CHLORIDE SERPL-SCNC: 106 MMOL/L (ref 95–110)
CK SERPL-CCNC: 257 U/L (ref 20–200)
CLARITY UR: CLEAR
CO2 SERPL-SCNC: 26 MMOL/L (ref 23–29)
COLOR UR: YELLOW
CREAT SERPL-MCNC: 0.8 MG/DL (ref 0.5–1.4)
CTP QC/QA: YES
D DIMER PPP IA.FEU-MCNC: 1.19 MG/L FEU
DACRYOCYTES BLD QL SMEAR: ABNORMAL
DIFFERENTIAL METHOD: ABNORMAL
EOSINOPHIL # BLD AUTO: 2.2 K/UL (ref 0–0.5)
EOSINOPHIL NFR BLD: 12.1 % (ref 0–8)
ERYTHROCYTE [DISTWIDTH] IN BLOOD BY AUTOMATED COUNT: 18.9 % (ref 11.5–14.5)
EST. GFR  (AFRICAN AMERICAN): >60 ML/MIN/1.73 M^2
EST. GFR  (NON AFRICAN AMERICAN): >60 ML/MIN/1.73 M^2
GLUCOSE SERPL-MCNC: 94 MG/DL (ref 70–110)
GLUCOSE UR QL STRIP: NEGATIVE
HCT VFR BLD AUTO: 27 % (ref 40–54)
HGB BLD-MCNC: 10 G/DL (ref 14–18)
HGB UR QL STRIP: NEGATIVE
HYPOCHROMIA BLD QL SMEAR: ABNORMAL
IMM GRANULOCYTES # BLD AUTO: 0.13 K/UL (ref 0–0.04)
IMM GRANULOCYTES NFR BLD AUTO: 0.7 % (ref 0–0.5)
KETONES UR QL STRIP: NEGATIVE
LACTATE SERPL-SCNC: 1.1 MMOL/L (ref 0.5–2.2)
LEUKOCYTE ESTERASE UR QL STRIP: NEGATIVE
LYMPHOCYTES # BLD AUTO: 2.4 K/UL (ref 1–4.8)
LYMPHOCYTES NFR BLD: 12.8 % (ref 18–48)
MCH RBC QN AUTO: 31.8 PG (ref 27–31)
MCHC RBC AUTO-ENTMCNC: 37 G/DL (ref 32–36)
MCV RBC AUTO: 86 FL (ref 82–98)
MONOCYTES # BLD AUTO: 2.3 K/UL (ref 0.3–1)
MONOCYTES NFR BLD: 12.5 % (ref 4–15)
NEUTROPHILS # BLD AUTO: 11.3 K/UL (ref 1.8–7.7)
NEUTROPHILS NFR BLD: 61.4 % (ref 38–73)
NITRITE UR QL STRIP: NEGATIVE
NRBC BLD-RTO: 3 /100 WBC
PH UR STRIP: 7 [PH] (ref 5–8)
PLATELET # BLD AUTO: 473 K/UL (ref 150–450)
PMV BLD AUTO: 9.7 FL (ref 9.2–12.9)
POLYCHROMASIA BLD QL SMEAR: ABNORMAL
POTASSIUM SERPL-SCNC: 4.1 MMOL/L (ref 3.5–5.1)
PROCALCITONIN SERPL IA-MCNC: 0.18 NG/ML
PROT SERPL-MCNC: 7.5 G/DL (ref 6–8.4)
PROT UR QL STRIP: NEGATIVE
RBC # BLD AUTO: 3.14 M/UL (ref 4.6–6.2)
RETICS/RBC NFR AUTO: 13.9 % (ref 0.4–2)
SARS-COV-2 RDRP RESP QL NAA+PROBE: NEGATIVE
SODIUM SERPL-SCNC: 141 MMOL/L (ref 136–145)
SP GR UR STRIP: 1.01 (ref 1–1.03)
TARGETS BLD QL SMEAR: ABNORMAL
TROPONIN I SERPL DL<=0.01 NG/ML-MCNC: <0.006 NG/ML (ref 0–0.03)
URN SPEC COLLECT METH UR: NORMAL
UROBILINOGEN UR STRIP-ACNC: NEGATIVE EU/DL
WBC # BLD AUTO: 18.42 K/UL (ref 3.9–12.7)

## 2021-06-29 PROCEDURE — 84484 ASSAY OF TROPONIN QUANT: CPT | Performed by: HOSPITALIST

## 2021-06-29 PROCEDURE — 94640 AIRWAY INHALATION TREATMENT: CPT

## 2021-06-29 PROCEDURE — 94761 N-INVAS EAR/PLS OXIMETRY MLT: CPT

## 2021-06-29 PROCEDURE — 84295 ASSAY OF SERUM SODIUM: CPT

## 2021-06-29 PROCEDURE — 82550 ASSAY OF CK (CPK): CPT | Performed by: HOSPITALIST

## 2021-06-29 PROCEDURE — 99222 1ST HOSP IP/OBS MODERATE 55: CPT | Mod: ,,, | Performed by: INTERNAL MEDICINE

## 2021-06-29 PROCEDURE — 99900035 HC TECH TIME PER 15 MIN (STAT)

## 2021-06-29 PROCEDURE — 84145 PROCALCITONIN (PCT): CPT | Performed by: HOSPITALIST

## 2021-06-29 PROCEDURE — 85379 FIBRIN DEGRADATION QUANT: CPT | Performed by: HOSPITALIST

## 2021-06-29 PROCEDURE — 93010 EKG 12-LEAD: ICD-10-PCS | Mod: ,,, | Performed by: INTERNAL MEDICINE

## 2021-06-29 PROCEDURE — 83605 ASSAY OF LACTIC ACID: CPT | Performed by: HOSPITALIST

## 2021-06-29 PROCEDURE — 84132 ASSAY OF SERUM POTASSIUM: CPT

## 2021-06-29 PROCEDURE — 99285 EMERGENCY DEPT VISIT HI MDM: CPT | Mod: 25

## 2021-06-29 PROCEDURE — 83020 HEMOGLOBIN ELECTROPHORESIS: CPT | Performed by: HOSPITALIST

## 2021-06-29 PROCEDURE — 85014 HEMATOCRIT: CPT

## 2021-06-29 PROCEDURE — 63600175 PHARM REV CODE 636 W HCPCS: Performed by: HOSPITALIST

## 2021-06-29 PROCEDURE — 25000242 PHARM REV CODE 250 ALT 637 W/ HCPCS: Performed by: EMERGENCY MEDICINE

## 2021-06-29 PROCEDURE — 93010 ELECTROCARDIOGRAM REPORT: CPT | Mod: ,,, | Performed by: INTERNAL MEDICINE

## 2021-06-29 PROCEDURE — 83020 HEMOGLOBIN ELECTROPHORESIS: CPT | Mod: 91 | Performed by: HOSPITALIST

## 2021-06-29 PROCEDURE — 82565 ASSAY OF CREATININE: CPT

## 2021-06-29 PROCEDURE — 25000003 PHARM REV CODE 250: Performed by: EMERGENCY MEDICINE

## 2021-06-29 PROCEDURE — 21400001 HC TELEMETRY ROOM

## 2021-06-29 PROCEDURE — 63600175 PHARM REV CODE 636 W HCPCS: Performed by: EMERGENCY MEDICINE

## 2021-06-29 PROCEDURE — 82330 ASSAY OF CALCIUM: CPT

## 2021-06-29 PROCEDURE — 25500020 PHARM REV CODE 255: Performed by: HOSPITALIST

## 2021-06-29 PROCEDURE — 27000221 HC OXYGEN, UP TO 24 HOURS

## 2021-06-29 PROCEDURE — 81003 URINALYSIS AUTO W/O SCOPE: CPT | Performed by: PHYSICIAN ASSISTANT

## 2021-06-29 PROCEDURE — 96365 THER/PROPH/DIAG IV INF INIT: CPT

## 2021-06-29 PROCEDURE — 85025 COMPLETE CBC W/AUTO DIFF WBC: CPT | Performed by: PHYSICIAN ASSISTANT

## 2021-06-29 PROCEDURE — 25000003 PHARM REV CODE 250: Performed by: HOSPITALIST

## 2021-06-29 PROCEDURE — 96375 TX/PRO/DX INJ NEW DRUG ADDON: CPT

## 2021-06-29 PROCEDURE — U0002 COVID-19 LAB TEST NON-CDC: HCPCS | Performed by: EMERGENCY MEDICINE

## 2021-06-29 PROCEDURE — 96376 TX/PRO/DX INJ SAME DRUG ADON: CPT

## 2021-06-29 PROCEDURE — 96361 HYDRATE IV INFUSION ADD-ON: CPT

## 2021-06-29 PROCEDURE — 80053 COMPREHEN METABOLIC PANEL: CPT | Performed by: PHYSICIAN ASSISTANT

## 2021-06-29 PROCEDURE — 25000003 PHARM REV CODE 250: Performed by: PHYSICIAN ASSISTANT

## 2021-06-29 PROCEDURE — 93005 ELECTROCARDIOGRAM TRACING: CPT

## 2021-06-29 PROCEDURE — 85045 AUTOMATED RETICULOCYTE COUNT: CPT | Performed by: PHYSICIAN ASSISTANT

## 2021-06-29 PROCEDURE — 99222 PR INITIAL HOSPITAL CARE,LEVL II: ICD-10-PCS | Mod: ,,, | Performed by: INTERNAL MEDICINE

## 2021-06-29 RX ORDER — DEXAMETHASONE SODIUM PHOSPHATE 4 MG/ML
6 INJECTION, SOLUTION INTRA-ARTICULAR; INTRALESIONAL; INTRAMUSCULAR; INTRAVENOUS; SOFT TISSUE
Status: COMPLETED | OUTPATIENT
Start: 2021-06-29 | End: 2021-06-29

## 2021-06-29 RX ORDER — NICOTINE 7MG/24HR
1 PATCH, TRANSDERMAL 24 HOURS TRANSDERMAL DAILY
Status: DISCONTINUED | OUTPATIENT
Start: 2021-06-30 | End: 2021-07-01 | Stop reason: HOSPADM

## 2021-06-29 RX ORDER — ALBUTEROL SULFATE 2.5 MG/.5ML
2.5 SOLUTION RESPIRATORY (INHALATION) EVERY 4 HOURS PRN
Status: DISCONTINUED | OUTPATIENT
Start: 2021-06-29 | End: 2021-07-01 | Stop reason: HOSPADM

## 2021-06-29 RX ORDER — BENZONATATE 100 MG/1
100 CAPSULE ORAL 3 TIMES DAILY PRN
Status: DISCONTINUED | OUTPATIENT
Start: 2021-06-29 | End: 2021-07-01 | Stop reason: HOSPADM

## 2021-06-29 RX ORDER — GLUCAGON 1 MG
1 KIT INJECTION
Status: DISCONTINUED | OUTPATIENT
Start: 2021-06-29 | End: 2021-07-01 | Stop reason: HOSPADM

## 2021-06-29 RX ORDER — IBUPROFEN 200 MG
24 TABLET ORAL
Status: DISCONTINUED | OUTPATIENT
Start: 2021-06-29 | End: 2021-07-01 | Stop reason: HOSPADM

## 2021-06-29 RX ORDER — HYDROMORPHONE HYDROCHLORIDE 2 MG/ML
1 INJECTION, SOLUTION INTRAMUSCULAR; INTRAVENOUS; SUBCUTANEOUS
Status: COMPLETED | OUTPATIENT
Start: 2021-06-29 | End: 2021-06-29

## 2021-06-29 RX ORDER — SODIUM CHLORIDE 0.9 % (FLUSH) 0.9 %
10 SYRINGE (ML) INJECTION
Status: DISCONTINUED | OUTPATIENT
Start: 2021-06-29 | End: 2021-07-01 | Stop reason: HOSPADM

## 2021-06-29 RX ORDER — HYDROXYUREA 500 MG/1
500 CAPSULE ORAL 2 TIMES DAILY
Status: DISCONTINUED | OUTPATIENT
Start: 2021-06-29 | End: 2021-07-01 | Stop reason: HOSPADM

## 2021-06-29 RX ORDER — ONDANSETRON 2 MG/ML
4 INJECTION INTRAMUSCULAR; INTRAVENOUS EVERY 8 HOURS PRN
Status: DISCONTINUED | OUTPATIENT
Start: 2021-06-29 | End: 2021-07-01 | Stop reason: HOSPADM

## 2021-06-29 RX ORDER — GUAIFENESIN 600 MG/1
1200 TABLET, EXTENDED RELEASE ORAL 2 TIMES DAILY
Status: DISCONTINUED | OUTPATIENT
Start: 2021-06-29 | End: 2021-07-01 | Stop reason: HOSPADM

## 2021-06-29 RX ORDER — ACETAMINOPHEN 325 MG/1
650 TABLET ORAL EVERY 4 HOURS PRN
Status: DISCONTINUED | OUTPATIENT
Start: 2021-06-29 | End: 2021-07-01 | Stop reason: HOSPADM

## 2021-06-29 RX ORDER — IBUPROFEN 400 MG/1
400 TABLET ORAL
Status: DISCONTINUED | OUTPATIENT
Start: 2021-06-29 | End: 2021-07-01 | Stop reason: HOSPADM

## 2021-06-29 RX ORDER — ENOXAPARIN SODIUM 100 MG/ML
40 INJECTION SUBCUTANEOUS EVERY 24 HOURS
Status: DISCONTINUED | OUTPATIENT
Start: 2021-06-29 | End: 2021-07-01 | Stop reason: HOSPADM

## 2021-06-29 RX ORDER — LEVOFLOXACIN 5 MG/ML
750 INJECTION, SOLUTION INTRAVENOUS
Status: DISCONTINUED | OUTPATIENT
Start: 2021-06-30 | End: 2021-07-01 | Stop reason: HOSPADM

## 2021-06-29 RX ORDER — FLUTICASONE FUROATE AND VILANTEROL 100; 25 UG/1; UG/1
1 POWDER RESPIRATORY (INHALATION) DAILY
Refills: 11 | Status: DISCONTINUED | OUTPATIENT
Start: 2021-06-30 | End: 2021-07-01 | Stop reason: HOSPADM

## 2021-06-29 RX ORDER — IPRATROPIUM BROMIDE AND ALBUTEROL SULFATE 2.5; .5 MG/3ML; MG/3ML
3 SOLUTION RESPIRATORY (INHALATION)
Status: ACTIVE | OUTPATIENT
Start: 2021-06-29 | End: 2021-06-29

## 2021-06-29 RX ORDER — SODIUM CHLORIDE 9 MG/ML
INJECTION, SOLUTION INTRAVENOUS CONTINUOUS
Status: DISCONTINUED | OUTPATIENT
Start: 2021-06-29 | End: 2021-07-01 | Stop reason: HOSPADM

## 2021-06-29 RX ORDER — FLUTICASONE PROPIONATE 50 MCG
2 SPRAY, SUSPENSION (ML) NASAL DAILY
Status: DISCONTINUED | OUTPATIENT
Start: 2021-06-30 | End: 2021-07-01 | Stop reason: HOSPADM

## 2021-06-29 RX ORDER — HYDROMORPHONE HYDROCHLORIDE 2 MG/ML
1 INJECTION, SOLUTION INTRAMUSCULAR; INTRAVENOUS; SUBCUTANEOUS EVERY 4 HOURS PRN
Status: DISCONTINUED | OUTPATIENT
Start: 2021-06-29 | End: 2021-06-29

## 2021-06-29 RX ORDER — HYDROMORPHONE HYDROCHLORIDE 2 MG/ML
2 INJECTION, SOLUTION INTRAMUSCULAR; INTRAVENOUS; SUBCUTANEOUS EVERY 4 HOURS PRN
Status: DISCONTINUED | OUTPATIENT
Start: 2021-06-29 | End: 2021-06-30

## 2021-06-29 RX ORDER — TALC
6 POWDER (GRAM) TOPICAL NIGHTLY PRN
Status: DISCONTINUED | OUTPATIENT
Start: 2021-06-29 | End: 2021-07-01 | Stop reason: HOSPADM

## 2021-06-29 RX ORDER — DIPHENHYDRAMINE HYDROCHLORIDE 50 MG/ML
12.5 INJECTION INTRAMUSCULAR; INTRAVENOUS
Status: COMPLETED | OUTPATIENT
Start: 2021-06-29 | End: 2021-06-29

## 2021-06-29 RX ORDER — ONDANSETRON 8 MG/1
8 TABLET, ORALLY DISINTEGRATING ORAL EVERY 8 HOURS PRN
Status: DISCONTINUED | OUTPATIENT
Start: 2021-06-29 | End: 2021-07-01 | Stop reason: HOSPADM

## 2021-06-29 RX ORDER — LEVOFLOXACIN 5 MG/ML
750 INJECTION, SOLUTION INTRAVENOUS
Status: COMPLETED | OUTPATIENT
Start: 2021-06-29 | End: 2021-06-29

## 2021-06-29 RX ORDER — IBUPROFEN 200 MG
16 TABLET ORAL
Status: DISCONTINUED | OUTPATIENT
Start: 2021-06-29 | End: 2021-07-01 | Stop reason: HOSPADM

## 2021-06-29 RX ORDER — MONTELUKAST SODIUM 10 MG/1
10 TABLET ORAL DAILY
Status: DISCONTINUED | OUTPATIENT
Start: 2021-06-30 | End: 2021-07-01 | Stop reason: HOSPADM

## 2021-06-29 RX ORDER — OXYCODONE AND ACETAMINOPHEN 10; 325 MG/1; MG/1
1 TABLET ORAL EVERY 4 HOURS PRN
Status: DISCONTINUED | OUTPATIENT
Start: 2021-06-29 | End: 2021-06-30

## 2021-06-29 RX ORDER — ACETAMINOPHEN 325 MG/1
650 TABLET ORAL EVERY 8 HOURS PRN
Status: DISCONTINUED | OUTPATIENT
Start: 2021-06-29 | End: 2021-07-01 | Stop reason: HOSPADM

## 2021-06-29 RX ORDER — FOLIC ACID 1 MG/1
1 TABLET ORAL DAILY
Status: DISCONTINUED | OUTPATIENT
Start: 2021-06-30 | End: 2021-07-01 | Stop reason: HOSPADM

## 2021-06-29 RX ORDER — IPRATROPIUM BROMIDE AND ALBUTEROL SULFATE 2.5; .5 MG/3ML; MG/3ML
3 SOLUTION RESPIRATORY (INHALATION)
Status: COMPLETED | OUTPATIENT
Start: 2021-06-29 | End: 2021-06-29

## 2021-06-29 RX ORDER — IPRATROPIUM BROMIDE AND ALBUTEROL SULFATE 2.5; .5 MG/3ML; MG/3ML
9 SOLUTION RESPIRATORY (INHALATION) CONTINUOUS
Status: ACTIVE | OUTPATIENT
Start: 2021-06-29 | End: 2021-06-29

## 2021-06-29 RX ADMIN — IPRATROPIUM BROMIDE AND ALBUTEROL SULFATE 3 ML: .5; 3 SOLUTION RESPIRATORY (INHALATION) at 01:06

## 2021-06-29 RX ADMIN — OXYCODONE AND ACETAMINOPHEN 1 TABLET: 325; 10 TABLET ORAL at 07:06

## 2021-06-29 RX ADMIN — HYDROXYUREA 500 MG: 500 CAPSULE ORAL at 10:06

## 2021-06-29 RX ADMIN — DIPHENHYDRAMINE HYDROCHLORIDE 12.5 MG: 50 INJECTION INTRAMUSCULAR; INTRAVENOUS at 01:06

## 2021-06-29 RX ADMIN — ENOXAPARIN SODIUM 40 MG: 40 INJECTION SUBCUTANEOUS at 05:06

## 2021-06-29 RX ADMIN — IOHEXOL 75 ML: 350 INJECTION, SOLUTION INTRAVENOUS at 05:06

## 2021-06-29 RX ADMIN — SODIUM CHLORIDE: 0.9 INJECTION, SOLUTION INTRAVENOUS at 10:06

## 2021-06-29 RX ADMIN — HYDROMORPHONE HYDROCHLORIDE 1 MG: 2 INJECTION INTRAMUSCULAR; INTRAVENOUS; SUBCUTANEOUS at 02:06

## 2021-06-29 RX ADMIN — GUAIFENESIN 1200 MG: 600 TABLET, EXTENDED RELEASE ORAL at 10:06

## 2021-06-29 RX ADMIN — LEVOFLOXACIN 750 MG: 750 INJECTION, SOLUTION INTRAVENOUS at 03:06

## 2021-06-29 RX ADMIN — SODIUM CHLORIDE 1000 ML: 0.9 INJECTION, SOLUTION INTRAVENOUS at 01:06

## 2021-06-29 RX ADMIN — AZITHROMYCIN MONOHYDRATE 500 MG: 500 INJECTION, POWDER, LYOPHILIZED, FOR SOLUTION INTRAVENOUS at 02:06

## 2021-06-29 RX ADMIN — OXYCODONE AND ACETAMINOPHEN 1 TABLET: 325; 10 TABLET ORAL at 11:06

## 2021-06-29 RX ADMIN — HYDROMORPHONE HYDROCHLORIDE 1 MG: 2 INJECTION INTRAMUSCULAR; INTRAVENOUS; SUBCUTANEOUS at 01:06

## 2021-06-29 RX ADMIN — DEXAMETHASONE SODIUM PHOSPHATE 6 MG: 4 INJECTION INTRA-ARTICULAR; INTRALESIONAL; INTRAMUSCULAR; INTRAVENOUS; SOFT TISSUE at 01:06

## 2021-06-29 RX ADMIN — HYDROMORPHONE HYDROCHLORIDE 2 MG: 2 INJECTION INTRAMUSCULAR; INTRAVENOUS; SUBCUTANEOUS at 08:06

## 2021-06-29 RX ADMIN — SODIUM CHLORIDE: 0.9 INJECTION, SOLUTION INTRAVENOUS at 05:06

## 2021-06-29 RX ADMIN — HYDROMORPHONE HYDROCHLORIDE 1 MG: 2 INJECTION INTRAMUSCULAR; INTRAVENOUS; SUBCUTANEOUS at 05:06

## 2021-06-30 LAB
ALBUMIN SERPL BCP-MCNC: 3.6 G/DL (ref 3.5–5.2)
ALP SERPL-CCNC: 87 U/L (ref 55–135)
ALT SERPL W/O P-5'-P-CCNC: 17 U/L (ref 10–44)
ANION GAP SERPL CALC-SCNC: 4 MMOL/L (ref 8–16)
AST SERPL-CCNC: 17 U/L (ref 10–40)
BASOPHILS # BLD AUTO: 0.05 K/UL (ref 0–0.2)
BASOPHILS NFR BLD: 0.5 % (ref 0–1.9)
BILIRUB SERPL-MCNC: 1.2 MG/DL (ref 0.1–1)
BUN SERPL-MCNC: 4 MG/DL (ref 6–20)
CALCIUM SERPL-MCNC: 9 MG/DL (ref 8.7–10.5)
CHLORIDE SERPL-SCNC: 109 MMOL/L (ref 95–110)
CO2 SERPL-SCNC: 23 MMOL/L (ref 23–29)
CREAT SERPL-MCNC: 0.7 MG/DL (ref 0.5–1.4)
DIFFERENTIAL METHOD: ABNORMAL
EOSINOPHIL # BLD AUTO: 0 K/UL (ref 0–0.5)
EOSINOPHIL NFR BLD: 0.1 % (ref 0–8)
ERYTHROCYTE [DISTWIDTH] IN BLOOD BY AUTOMATED COUNT: 18.6 % (ref 11.5–14.5)
EST. GFR  (AFRICAN AMERICAN): >60 ML/MIN/1.73 M^2
EST. GFR  (NON AFRICAN AMERICAN): >60 ML/MIN/1.73 M^2
GLUCOSE SERPL-MCNC: 107 MG/DL (ref 70–110)
HCT VFR BLD AUTO: 27 % (ref 40–54)
HGB BLD-MCNC: 9.7 G/DL (ref 14–18)
IMM GRANULOCYTES # BLD AUTO: 0.05 K/UL (ref 0–0.04)
IMM GRANULOCYTES NFR BLD AUTO: 0.5 % (ref 0–0.5)
LYMPHOCYTES # BLD AUTO: 1.4 K/UL (ref 1–4.8)
LYMPHOCYTES NFR BLD: 12.8 % (ref 18–48)
MAGNESIUM SERPL-MCNC: 2.1 MG/DL (ref 1.6–2.6)
MCH RBC QN AUTO: 32 PG (ref 27–31)
MCHC RBC AUTO-ENTMCNC: 35.9 G/DL (ref 32–36)
MCV RBC AUTO: 89 FL (ref 82–98)
MONOCYTES # BLD AUTO: 2.3 K/UL (ref 0.3–1)
MONOCYTES NFR BLD: 21 % (ref 4–15)
NEUTROPHILS # BLD AUTO: 7 K/UL (ref 1.8–7.7)
NEUTROPHILS NFR BLD: 65.1 % (ref 38–73)
NRBC BLD-RTO: 5 /100 WBC
PLATELET # BLD AUTO: ABNORMAL K/UL (ref 150–450)
PLATELET BLD QL SMEAR: ABNORMAL
PMV BLD AUTO: ABNORMAL FL (ref 9.2–12.9)
POTASSIUM SERPL-SCNC: 4 MMOL/L (ref 3.5–5.1)
PROT SERPL-MCNC: 7.2 G/DL (ref 6–8.4)
RBC # BLD AUTO: 3.03 M/UL (ref 4.6–6.2)
SODIUM SERPL-SCNC: 136 MMOL/L (ref 136–145)
WBC # BLD AUTO: 10.74 K/UL (ref 3.9–12.7)

## 2021-06-30 PROCEDURE — 99233 SBSQ HOSP IP/OBS HIGH 50: CPT | Mod: ,,, | Performed by: INTERNAL MEDICINE

## 2021-06-30 PROCEDURE — 85025 COMPLETE CBC W/AUTO DIFF WBC: CPT | Performed by: HOSPITALIST

## 2021-06-30 PROCEDURE — 83735 ASSAY OF MAGNESIUM: CPT | Performed by: HOSPITALIST

## 2021-06-30 PROCEDURE — 94761 N-INVAS EAR/PLS OXIMETRY MLT: CPT

## 2021-06-30 PROCEDURE — 94640 AIRWAY INHALATION TREATMENT: CPT

## 2021-06-30 PROCEDURE — 25000003 PHARM REV CODE 250: Performed by: HOSPITALIST

## 2021-06-30 PROCEDURE — 27000221 HC OXYGEN, UP TO 24 HOURS

## 2021-06-30 PROCEDURE — 36415 COLL VENOUS BLD VENIPUNCTURE: CPT | Performed by: HOSPITALIST

## 2021-06-30 PROCEDURE — 80053 COMPREHEN METABOLIC PANEL: CPT | Performed by: HOSPITALIST

## 2021-06-30 PROCEDURE — 63600175 PHARM REV CODE 636 W HCPCS: Performed by: HOSPITALIST

## 2021-06-30 PROCEDURE — 99233 PR SUBSEQUENT HOSPITAL CARE,LEVL III: ICD-10-PCS | Mod: ,,, | Performed by: INTERNAL MEDICINE

## 2021-06-30 PROCEDURE — 99900035 HC TECH TIME PER 15 MIN (STAT)

## 2021-06-30 PROCEDURE — 21400001 HC TELEMETRY ROOM

## 2021-06-30 PROCEDURE — 25000242 PHARM REV CODE 250 ALT 637 W/ HCPCS: Performed by: HOSPITALIST

## 2021-06-30 RX ORDER — OXYCODONE AND ACETAMINOPHEN 10; 325 MG/1; MG/1
1 TABLET ORAL EVERY 4 HOURS PRN
Status: DISCONTINUED | OUTPATIENT
Start: 2021-06-30 | End: 2021-07-01 | Stop reason: HOSPADM

## 2021-06-30 RX ORDER — HYDROMORPHONE HYDROCHLORIDE 2 MG/ML
2 INJECTION, SOLUTION INTRAMUSCULAR; INTRAVENOUS; SUBCUTANEOUS EVERY 8 HOURS PRN
Status: DISCONTINUED | OUTPATIENT
Start: 2021-06-30 | End: 2021-07-01 | Stop reason: HOSPADM

## 2021-06-30 RX ADMIN — GUAIFENESIN 1200 MG: 600 TABLET, EXTENDED RELEASE ORAL at 09:06

## 2021-06-30 RX ADMIN — HYDROMORPHONE HYDROCHLORIDE 2 MG: 2 INJECTION INTRAMUSCULAR; INTRAVENOUS; SUBCUTANEOUS at 01:06

## 2021-06-30 RX ADMIN — HYDROMORPHONE HYDROCHLORIDE 2 MG: 2 INJECTION INTRAMUSCULAR; INTRAVENOUS; SUBCUTANEOUS at 05:06

## 2021-06-30 RX ADMIN — BENZONATATE 100 MG: 100 CAPSULE ORAL at 08:06

## 2021-06-30 RX ADMIN — FLUTICASONE PROPIONATE 100 MCG: 50 SPRAY, METERED NASAL at 08:06

## 2021-06-30 RX ADMIN — MONTELUKAST 10 MG: 10 TABLET, FILM COATED ORAL at 08:06

## 2021-06-30 RX ADMIN — ALBUTEROL SULFATE 2.5 MG: 2.5 SOLUTION RESPIRATORY (INHALATION) at 11:06

## 2021-06-30 RX ADMIN — FLUTICASONE FUROATE AND VILANTEROL TRIFENATATE 1 PUFF: 100; 25 POWDER RESPIRATORY (INHALATION) at 08:06

## 2021-06-30 RX ADMIN — HYDROXYUREA 500 MG: 500 CAPSULE ORAL at 08:06

## 2021-06-30 RX ADMIN — HYDROMORPHONE HYDROCHLORIDE 2 MG: 2 INJECTION INTRAMUSCULAR; INTRAVENOUS; SUBCUTANEOUS at 09:06

## 2021-06-30 RX ADMIN — OXYCODONE AND ACETAMINOPHEN 1 TABLET: 325; 10 TABLET ORAL at 08:06

## 2021-06-30 RX ADMIN — GUAIFENESIN 1200 MG: 600 TABLET, EXTENDED RELEASE ORAL at 08:06

## 2021-06-30 RX ADMIN — HYDROXYUREA 500 MG: 500 CAPSULE ORAL at 09:06

## 2021-06-30 RX ADMIN — ALBUTEROL SULFATE 2.5 MG: 2.5 SOLUTION RESPIRATORY (INHALATION) at 09:06

## 2021-06-30 RX ADMIN — ALBUTEROL SULFATE 2.5 MG: 2.5 SOLUTION RESPIRATORY (INHALATION) at 06:06

## 2021-06-30 RX ADMIN — OXYCODONE AND ACETAMINOPHEN 1 TABLET: 325; 10 TABLET ORAL at 03:06

## 2021-06-30 RX ADMIN — IBUPROFEN 400 MG: 400 TABLET, FILM COATED ORAL at 09:06

## 2021-06-30 RX ADMIN — LEVOFLOXACIN 750 MG: 750 INJECTION, SOLUTION INTRAVENOUS at 04:06

## 2021-06-30 RX ADMIN — HYDROMORPHONE HYDROCHLORIDE 2 MG: 2 INJECTION INTRAMUSCULAR; INTRAVENOUS; SUBCUTANEOUS at 06:06

## 2021-06-30 RX ADMIN — SODIUM CHLORIDE: 0.9 INJECTION, SOLUTION INTRAVENOUS at 04:06

## 2021-06-30 RX ADMIN — BENZONATATE 100 MG: 100 CAPSULE ORAL at 09:06

## 2021-06-30 RX ADMIN — FOLIC ACID 1 MG: 1 TABLET ORAL at 08:06

## 2021-06-30 RX ADMIN — ENOXAPARIN SODIUM 40 MG: 40 INJECTION SUBCUTANEOUS at 04:06

## 2021-06-30 RX ADMIN — SODIUM CHLORIDE: 0.9 INJECTION, SOLUTION INTRAVENOUS at 03:06

## 2021-06-30 RX ADMIN — ALBUTEROL SULFATE 2.5 MG: 2.5 SOLUTION RESPIRATORY (INHALATION) at 03:06

## 2021-07-01 VITALS
TEMPERATURE: 99 F | SYSTOLIC BLOOD PRESSURE: 124 MMHG | RESPIRATION RATE: 18 BRPM | HEART RATE: 95 BPM | HEIGHT: 66 IN | BODY MASS INDEX: 27.7 KG/M2 | OXYGEN SATURATION: 94 % | DIASTOLIC BLOOD PRESSURE: 74 MMHG | WEIGHT: 172.38 LBS

## 2021-07-01 LAB
ALBUMIN SERPL BCP-MCNC: 3.2 G/DL (ref 3.5–5.2)
ALP SERPL-CCNC: 75 U/L (ref 55–135)
ALT SERPL W/O P-5'-P-CCNC: 17 U/L (ref 10–44)
ANION GAP SERPL CALC-SCNC: 9 MMOL/L (ref 8–16)
ANISOCYTOSIS BLD QL SMEAR: ABNORMAL
AST SERPL-CCNC: 17 U/L (ref 10–40)
BASOPHILS # BLD AUTO: 0.1 K/UL (ref 0–0.2)
BASOPHILS NFR BLD: 1.2 % (ref 0–1.9)
BILIRUB SERPL-MCNC: 0.9 MG/DL (ref 0.1–1)
BUN SERPL-MCNC: 3 MG/DL (ref 6–20)
CALCIUM SERPL-MCNC: 8.3 MG/DL (ref 8.7–10.5)
CHLORIDE SERPL-SCNC: 108 MMOL/L (ref 95–110)
CO2 SERPL-SCNC: 21 MMOL/L (ref 23–29)
CREAT SERPL-MCNC: 0.8 MG/DL (ref 0.5–1.4)
DACRYOCYTES BLD QL SMEAR: ABNORMAL
DIFFERENTIAL METHOD: ABNORMAL
EOSINOPHIL # BLD AUTO: 0.2 K/UL (ref 0–0.5)
EOSINOPHIL NFR BLD: 2.4 % (ref 0–8)
ERYTHROCYTE [DISTWIDTH] IN BLOOD BY AUTOMATED COUNT: 17.9 % (ref 11.5–14.5)
EST. GFR  (AFRICAN AMERICAN): >60 ML/MIN/1.73 M^2
EST. GFR  (NON AFRICAN AMERICAN): >60 ML/MIN/1.73 M^2
GLUCOSE SERPL-MCNC: 100 MG/DL (ref 70–110)
HCT VFR BLD AUTO: 26.7 % (ref 40–54)
HGB BLD-MCNC: 9.5 G/DL (ref 14–18)
HYPOCHROMIA BLD QL SMEAR: ABNORMAL
IMM GRANULOCYTES # BLD AUTO: 0.1 K/UL (ref 0–0.04)
IMM GRANULOCYTES NFR BLD AUTO: 1.2 % (ref 0–0.5)
LYMPHOCYTES # BLD AUTO: 2.4 K/UL (ref 1–4.8)
LYMPHOCYTES NFR BLD: 30.4 % (ref 18–48)
MAGNESIUM SERPL-MCNC: 1.8 MG/DL (ref 1.6–2.6)
MCH RBC QN AUTO: 31.6 PG (ref 27–31)
MCHC RBC AUTO-ENTMCNC: 35.6 G/DL (ref 32–36)
MCV RBC AUTO: 89 FL (ref 82–98)
MONOCYTES # BLD AUTO: 2.3 K/UL (ref 0.3–1)
MONOCYTES NFR BLD: 29.1 % (ref 4–15)
NEUTROPHILS # BLD AUTO: 2.9 K/UL (ref 1.8–7.7)
NEUTROPHILS NFR BLD: 35.7 % (ref 38–73)
NRBC BLD-RTO: 7 /100 WBC
OVALOCYTES BLD QL SMEAR: ABNORMAL
PLATELET # BLD AUTO: 374 K/UL (ref 150–450)
PLATELET BLD QL SMEAR: ABNORMAL
PMV BLD AUTO: 11.2 FL (ref 9.2–12.9)
POIKILOCYTOSIS BLD QL SMEAR: ABNORMAL
POLYCHROMASIA BLD QL SMEAR: ABNORMAL
POTASSIUM SERPL-SCNC: 3.9 MMOL/L (ref 3.5–5.1)
PROT SERPL-MCNC: 6.3 G/DL (ref 6–8.4)
RBC # BLD AUTO: 3.01 M/UL (ref 4.6–6.2)
SICKLE CELLS BLD QL SMEAR: ABNORMAL
SODIUM SERPL-SCNC: 138 MMOL/L (ref 136–145)
SPHEROCYTES BLD QL SMEAR: ABNORMAL
TARGETS BLD QL SMEAR: ABNORMAL
WBC # BLD AUTO: 8.03 K/UL (ref 3.9–12.7)

## 2021-07-01 PROCEDURE — 83735 ASSAY OF MAGNESIUM: CPT | Performed by: HOSPITALIST

## 2021-07-01 PROCEDURE — 63600175 PHARM REV CODE 636 W HCPCS: Performed by: HOSPITALIST

## 2021-07-01 PROCEDURE — 36415 COLL VENOUS BLD VENIPUNCTURE: CPT | Performed by: HOSPITALIST

## 2021-07-01 PROCEDURE — 25000003 PHARM REV CODE 250: Performed by: HOSPITALIST

## 2021-07-01 PROCEDURE — 99233 SBSQ HOSP IP/OBS HIGH 50: CPT | Mod: ,,, | Performed by: INTERNAL MEDICINE

## 2021-07-01 PROCEDURE — 85025 COMPLETE CBC W/AUTO DIFF WBC: CPT | Performed by: HOSPITALIST

## 2021-07-01 PROCEDURE — 94640 AIRWAY INHALATION TREATMENT: CPT

## 2021-07-01 PROCEDURE — 99233 PR SUBSEQUENT HOSPITAL CARE,LEVL III: ICD-10-PCS | Mod: ,,, | Performed by: INTERNAL MEDICINE

## 2021-07-01 PROCEDURE — 80053 COMPREHEN METABOLIC PANEL: CPT | Performed by: HOSPITALIST

## 2021-07-01 PROCEDURE — 94761 N-INVAS EAR/PLS OXIMETRY MLT: CPT

## 2021-07-01 RX ORDER — BUDESONIDE AND FORMOTEROL FUMARATE DIHYDRATE 160; 4.5 UG/1; UG/1
2 AEROSOL RESPIRATORY (INHALATION) EVERY 12 HOURS
Qty: 10.2 G | Refills: 1 | Status: SHIPPED | OUTPATIENT
Start: 2021-07-01 | End: 2023-04-11

## 2021-07-01 RX ORDER — BENZONATATE 100 MG/1
100 CAPSULE ORAL 3 TIMES DAILY PRN
Qty: 30 CAPSULE | Refills: 1 | Status: SHIPPED | OUTPATIENT
Start: 2021-07-01 | End: 2021-07-11

## 2021-07-01 RX ORDER — NICOTINE 7MG/24HR
1 PATCH, TRANSDERMAL 24 HOURS TRANSDERMAL DAILY
Qty: 28 PATCH | Refills: 1 | Status: SHIPPED | OUTPATIENT
Start: 2021-07-02 | End: 2021-09-08

## 2021-07-01 RX ORDER — OXYCODONE HCL 10 MG/1
10 TABLET, FILM COATED, EXTENDED RELEASE ORAL EVERY 12 HOURS
Status: DISCONTINUED | OUTPATIENT
Start: 2021-07-01 | End: 2021-07-01 | Stop reason: HOSPADM

## 2021-07-01 RX ORDER — ALBUTEROL SULFATE 1.25 MG/3ML
1.25 SOLUTION RESPIRATORY (INHALATION) EVERY 6 HOURS PRN
Qty: 225 ML | Refills: 1 | Status: SHIPPED | OUTPATIENT
Start: 2021-07-01 | End: 2022-07-01

## 2021-07-01 RX ORDER — GUAIFENESIN 600 MG/1
600 TABLET, EXTENDED RELEASE ORAL 2 TIMES DAILY
Qty: 30 TABLET | Refills: 0 | Status: SHIPPED | OUTPATIENT
Start: 2021-07-01 | End: 2023-04-11

## 2021-07-01 RX ORDER — FOLIC ACID 1 MG/1
1 TABLET ORAL DAILY
Qty: 30 TABLET | Refills: 1 | Status: SHIPPED | OUTPATIENT
Start: 2021-07-01 | End: 2023-04-11

## 2021-07-01 RX ORDER — ALBUTEROL SULFATE 90 UG/1
2 AEROSOL, METERED RESPIRATORY (INHALATION) EVERY 6 HOURS PRN
Qty: 18 G | Refills: 1 | Status: SHIPPED | OUTPATIENT
Start: 2021-07-01 | End: 2023-04-11

## 2021-07-01 RX ORDER — MONTELUKAST SODIUM 10 MG/1
10 TABLET ORAL DAILY
Qty: 30 TABLET | Refills: 1 | Status: SHIPPED | OUTPATIENT
Start: 2021-07-01 | End: 2021-09-08

## 2021-07-01 RX ORDER — LEVOFLOXACIN 750 MG/1
750 TABLET ORAL DAILY
Qty: 5 TABLET | Refills: 0 | OUTPATIENT
Start: 2021-07-01 | End: 2021-09-07

## 2021-07-01 RX ADMIN — FOLIC ACID 1 MG: 1 TABLET ORAL at 09:07

## 2021-07-01 RX ADMIN — HYDROXYUREA 500 MG: 500 CAPSULE ORAL at 09:07

## 2021-07-01 RX ADMIN — MONTELUKAST 10 MG: 10 TABLET, FILM COATED ORAL at 09:07

## 2021-07-01 RX ADMIN — FLUTICASONE FUROATE AND VILANTEROL TRIFENATATE 1 PUFF: 100; 25 POWDER RESPIRATORY (INHALATION) at 08:07

## 2021-07-01 RX ADMIN — HYDROMORPHONE HYDROCHLORIDE 2 MG: 2 INJECTION INTRAMUSCULAR; INTRAVENOUS; SUBCUTANEOUS at 03:07

## 2021-07-01 RX ADMIN — HYDROMORPHONE HYDROCHLORIDE 2 MG: 2 INJECTION INTRAMUSCULAR; INTRAVENOUS; SUBCUTANEOUS at 12:07

## 2021-07-01 RX ADMIN — OXYCODONE AND ACETAMINOPHEN 1 TABLET: 325; 10 TABLET ORAL at 10:07

## 2021-07-01 RX ADMIN — GUAIFENESIN 1200 MG: 600 TABLET, EXTENDED RELEASE ORAL at 09:07

## 2021-07-01 RX ADMIN — SODIUM CHLORIDE: 0.9 INJECTION, SOLUTION INTRAVENOUS at 03:07

## 2021-07-01 RX ADMIN — FLUTICASONE PROPIONATE 100 MCG: 50 SPRAY, METERED NASAL at 10:07

## 2021-07-06 LAB
HGB FRACT BLD ELPH-IMP: NORMAL
HGB S MFR BLD ELPH: 50.4 %

## 2021-07-07 LAB — HGB FRACT BLD ELPH PH6.0-IMP: NORMAL

## 2021-07-09 ENCOUNTER — TELEPHONE (OUTPATIENT)
Dept: UROLOGY | Facility: CLINIC | Age: 31
End: 2021-07-09

## 2021-08-12 ENCOUNTER — HOSPITAL ENCOUNTER (EMERGENCY)
Facility: HOSPITAL | Age: 31
Discharge: HOME | End: 2021-08-12
Attending: EMERGENCY MEDICINE
Payer: MEDICAID

## 2021-08-12 VITALS
OXYGEN SATURATION: 98 % | SYSTOLIC BLOOD PRESSURE: 155 MMHG | BODY MASS INDEX: 28.93 KG/M2 | TEMPERATURE: 100 F | RESPIRATION RATE: 18 BRPM | WEIGHT: 180 LBS | DIASTOLIC BLOOD PRESSURE: 76 MMHG | HEART RATE: 87 BPM | HEIGHT: 66 IN

## 2021-08-12 DIAGNOSIS — R60.0 BILATERAL LOWER EXTREMITY EDEMA: ICD-10-CM

## 2021-08-12 LAB
ALBUMIN SERPL BCP-MCNC: 4.1 G/DL (ref 3.5–5.2)
ALP SERPL-CCNC: 94 U/L (ref 55–135)
ALT SERPL W/O P-5'-P-CCNC: 26 U/L (ref 10–44)
ANION GAP SERPL CALC-SCNC: 13 MMOL/L (ref 8–16)
ANISOCYTOSIS BLD QL SMEAR: SLIGHT
AST SERPL-CCNC: 20 U/L (ref 10–40)
BASOPHILS # BLD AUTO: 0.09 K/UL (ref 0–0.2)
BASOPHILS NFR BLD: 0.4 % (ref 0–1.9)
BILIRUB SERPL-MCNC: 0.8 MG/DL (ref 0.1–1)
BILIRUB UR QL STRIP: NEGATIVE
BUN SERPL-MCNC: 4 MG/DL (ref 6–20)
CALCIUM SERPL-MCNC: 9.2 MG/DL (ref 8.7–10.5)
CHLORIDE SERPL-SCNC: 99 MMOL/L (ref 95–110)
CLARITY UR: CLEAR
CO2 SERPL-SCNC: 26 MMOL/L (ref 23–29)
COLOR UR: YELLOW
CREAT SERPL-MCNC: 0.8 MG/DL (ref 0.5–1.4)
DIFFERENTIAL METHOD: ABNORMAL
EOSINOPHIL # BLD AUTO: 1.4 K/UL (ref 0–0.5)
EOSINOPHIL NFR BLD: 6.1 % (ref 0–8)
ERYTHROCYTE [DISTWIDTH] IN BLOOD BY AUTOMATED COUNT: 15.5 % (ref 11.5–14.5)
EST. GFR  (AFRICAN AMERICAN): >60 ML/MIN/1.73 M^2
EST. GFR  (NON AFRICAN AMERICAN): >60 ML/MIN/1.73 M^2
GLUCOSE SERPL-MCNC: 94 MG/DL (ref 70–110)
GLUCOSE UR QL STRIP: NEGATIVE
HCT VFR BLD AUTO: 29.3 % (ref 40–54)
HGB BLD-MCNC: 10.6 G/DL (ref 14–18)
HGB UR QL STRIP: NEGATIVE
IMM GRANULOCYTES # BLD AUTO: 0.12 K/UL (ref 0–0.04)
IMM GRANULOCYTES NFR BLD AUTO: 0.5 % (ref 0–0.5)
KETONES UR QL STRIP: NEGATIVE
LACTATE SERPL-SCNC: 1 MMOL/L (ref 0.5–2.2)
LEUKOCYTE ESTERASE UR QL STRIP: NEGATIVE
LYMPHOCYTES # BLD AUTO: 3.1 K/UL (ref 1–4.8)
LYMPHOCYTES NFR BLD: 13.7 % (ref 18–48)
MCH RBC QN AUTO: 30.6 PG (ref 27–31)
MCHC RBC AUTO-ENTMCNC: 36.2 G/DL (ref 32–36)
MCV RBC AUTO: 85 FL (ref 82–98)
MONOCYTES # BLD AUTO: 2.2 K/UL (ref 0.3–1)
MONOCYTES NFR BLD: 10 % (ref 4–15)
NEUTROPHILS # BLD AUTO: 15.4 K/UL (ref 1.8–7.7)
NEUTROPHILS NFR BLD: 69.3 % (ref 38–73)
NITRITE UR QL STRIP: NEGATIVE
NRBC BLD-RTO: 0 /100 WBC
PH UR STRIP: 6 [PH] (ref 5–8)
PLATELET # BLD AUTO: 555 K/UL (ref 150–450)
PLATELET BLD QL SMEAR: ABNORMAL
PMV BLD AUTO: 9.6 FL (ref 9.2–12.9)
POLYCHROMASIA BLD QL SMEAR: ABNORMAL
POTASSIUM SERPL-SCNC: 3.6 MMOL/L (ref 3.5–5.1)
PROT SERPL-MCNC: 8.1 G/DL (ref 6–8.4)
PROT UR QL STRIP: NEGATIVE
RBC # BLD AUTO: 3.46 M/UL (ref 4.6–6.2)
RETICS/RBC NFR AUTO: 5.7 % (ref 0.4–2)
SODIUM SERPL-SCNC: 138 MMOL/L (ref 136–145)
SP GR UR STRIP: 1.01 (ref 1–1.03)
STOMATOCYTES BLD QL SMEAR: PRESENT
TARGETS BLD QL SMEAR: ABNORMAL
URN SPEC COLLECT METH UR: NORMAL
UROBILINOGEN UR STRIP-ACNC: NEGATIVE EU/DL
WBC # BLD AUTO: 22.28 K/UL (ref 3.9–12.7)

## 2021-08-12 PROCEDURE — 80053 COMPREHEN METABOLIC PANEL: CPT | Performed by: PHYSICIAN ASSISTANT

## 2021-08-12 PROCEDURE — 81003 URINALYSIS AUTO W/O SCOPE: CPT | Performed by: PHYSICIAN ASSISTANT

## 2021-08-12 PROCEDURE — 96360 HYDRATION IV INFUSION INIT: CPT

## 2021-08-12 PROCEDURE — 85025 COMPLETE CBC W/AUTO DIFF WBC: CPT | Performed by: PHYSICIAN ASSISTANT

## 2021-08-12 PROCEDURE — 96361 HYDRATE IV INFUSION ADD-ON: CPT

## 2021-08-12 PROCEDURE — 63600175 PHARM REV CODE 636 W HCPCS: Performed by: EMERGENCY MEDICINE

## 2021-08-12 PROCEDURE — 85045 AUTOMATED RETICULOCYTE COUNT: CPT | Performed by: PHYSICIAN ASSISTANT

## 2021-08-12 PROCEDURE — 83605 ASSAY OF LACTIC ACID: CPT | Performed by: EMERGENCY MEDICINE

## 2021-08-12 PROCEDURE — 99284 EMERGENCY DEPT VISIT MOD MDM: CPT | Mod: 25

## 2021-08-12 RX ADMIN — SODIUM CHLORIDE, SODIUM LACTATE, POTASSIUM CHLORIDE, AND CALCIUM CHLORIDE 1000 ML: .6; .31; .03; .02 INJECTION, SOLUTION INTRAVENOUS at 03:08

## 2021-08-27 ENCOUNTER — OFFICE VISIT (OUTPATIENT)
Dept: UROLOGY | Facility: CLINIC | Age: 31
End: 2021-08-27
Payer: MEDICAID

## 2021-08-27 VITALS — BODY MASS INDEX: 28.97 KG/M2 | WEIGHT: 180.25 LBS | HEIGHT: 66 IN

## 2021-08-27 DIAGNOSIS — N52.9 ERECTILE DYSFUNCTION, UNSPECIFIED ERECTILE DYSFUNCTION TYPE: ICD-10-CM

## 2021-08-27 DIAGNOSIS — R31.9 HEMATURIA OF UNKNOWN CAUSE: Primary | ICD-10-CM

## 2021-08-27 DIAGNOSIS — R39.9 LOWER URINARY TRACT SYMPTOMS: ICD-10-CM

## 2021-08-27 DIAGNOSIS — Z72.0 TOBACCO USE: ICD-10-CM

## 2021-08-27 PROCEDURE — 99204 OFFICE O/P NEW MOD 45 MIN: CPT | Mod: S$PBB,,, | Performed by: STUDENT IN AN ORGANIZED HEALTH CARE EDUCATION/TRAINING PROGRAM

## 2021-08-27 PROCEDURE — 99204 PR OFFICE/OUTPT VISIT, NEW, LEVL IV, 45-59 MIN: ICD-10-PCS | Mod: S$PBB,,, | Performed by: STUDENT IN AN ORGANIZED HEALTH CARE EDUCATION/TRAINING PROGRAM

## 2021-08-27 PROCEDURE — 99999 PR PBB SHADOW E&M-EST. PATIENT-LVL IV: CPT | Mod: PBBFAC,,, | Performed by: STUDENT IN AN ORGANIZED HEALTH CARE EDUCATION/TRAINING PROGRAM

## 2021-08-27 PROCEDURE — 99214 OFFICE O/P EST MOD 30 MIN: CPT | Mod: PBBFAC | Performed by: STUDENT IN AN ORGANIZED HEALTH CARE EDUCATION/TRAINING PROGRAM

## 2021-08-27 PROCEDURE — 99999 PR PBB SHADOW E&M-EST. PATIENT-LVL IV: ICD-10-PCS | Mod: PBBFAC,,, | Performed by: STUDENT IN AN ORGANIZED HEALTH CARE EDUCATION/TRAINING PROGRAM

## 2021-09-08 ENCOUNTER — HOSPITAL ENCOUNTER (INPATIENT)
Facility: HOSPITAL | Age: 31
LOS: 5 days | Discharge: HOME | DRG: 193 | End: 2021-09-13
Attending: EMERGENCY MEDICINE | Admitting: INTERNAL MEDICINE
Payer: MEDICAID

## 2021-09-08 DIAGNOSIS — J18.9 PNEUMONIA OF LEFT LUNG DUE TO INFECTIOUS ORGANISM, UNSPECIFIED PART OF LUNG: Primary | ICD-10-CM

## 2021-09-08 DIAGNOSIS — D57.01 ACUTE CHEST SYNDROME: ICD-10-CM

## 2021-09-08 DIAGNOSIS — R07.89 CHEST WALL PAIN: ICD-10-CM

## 2021-09-08 DIAGNOSIS — D57.00 SICKLE CELL PAIN CRISIS: ICD-10-CM

## 2021-09-08 DIAGNOSIS — R07.9 CHEST PAIN: ICD-10-CM

## 2021-09-08 PROBLEM — J96.01 ACUTE HYPOXEMIC RESPIRATORY FAILURE: Status: RESOLVED | Noted: 2021-06-29 | Resolved: 2021-09-08

## 2021-09-08 LAB
ABO + RH BLD: NORMAL
ALBUMIN SERPL BCP-MCNC: 3.4 G/DL (ref 3.5–5.2)
ALP SERPL-CCNC: 88 U/L (ref 55–135)
ALT SERPL W/O P-5'-P-CCNC: 39 U/L (ref 10–44)
ANION GAP SERPL CALC-SCNC: 11 MMOL/L (ref 8–16)
AST SERPL-CCNC: 41 U/L (ref 10–40)
BASOPHILS # BLD AUTO: 0.08 K/UL (ref 0–0.2)
BASOPHILS NFR BLD: 0.3 % (ref 0–1.9)
BILIRUB SERPL-MCNC: 1 MG/DL (ref 0.1–1)
BILIRUB UR QL STRIP: NEGATIVE
BLD GP AB SCN CELLS X3 SERPL QL: NORMAL
BNP SERPL-MCNC: <10 PG/ML (ref 0–99)
BUN SERPL-MCNC: 6 MG/DL (ref 6–20)
CALCIUM SERPL-MCNC: 8.7 MG/DL (ref 8.7–10.5)
CHLORIDE SERPL-SCNC: 97 MMOL/L (ref 95–110)
CLARITY UR: CLEAR
CO2 SERPL-SCNC: 25 MMOL/L (ref 23–29)
COLOR UR: YELLOW
CREAT SERPL-MCNC: 0.9 MG/DL (ref 0.5–1.4)
CTP QC/QA: YES
DIFFERENTIAL METHOD: ABNORMAL
EOSINOPHIL # BLD AUTO: 0.2 K/UL (ref 0–0.5)
EOSINOPHIL NFR BLD: 0.6 % (ref 0–8)
ERYTHROCYTE [DISTWIDTH] IN BLOOD BY AUTOMATED COUNT: 16.6 % (ref 11.5–14.5)
EST. GFR  (AFRICAN AMERICAN): >60 ML/MIN/1.73 M^2
EST. GFR  (NON AFRICAN AMERICAN): >60 ML/MIN/1.73 M^2
GIANT PLATELETS BLD QL SMEAR: PRESENT
GLUCOSE SERPL-MCNC: 94 MG/DL (ref 70–110)
GLUCOSE UR QL STRIP: NEGATIVE
HCT VFR BLD AUTO: 28.4 % (ref 40–54)
HGB BLD-MCNC: 10.3 G/DL (ref 14–18)
HGB UR QL STRIP: NEGATIVE
IMM GRANULOCYTES # BLD AUTO: 0.15 K/UL (ref 0–0.04)
IMM GRANULOCYTES NFR BLD AUTO: 0.6 % (ref 0–0.5)
KETONES UR QL STRIP: NEGATIVE
LACTATE SERPL-SCNC: 1.4 MMOL/L (ref 0.5–2.2)
LDH SERPL L TO P-CCNC: 387 U/L (ref 110–260)
LEUKOCYTE ESTERASE UR QL STRIP: NEGATIVE
LYMPHOCYTES # BLD AUTO: 3.1 K/UL (ref 1–4.8)
LYMPHOCYTES NFR BLD: 12.3 % (ref 18–48)
MCH RBC QN AUTO: 29.2 PG (ref 27–31)
MCHC RBC AUTO-ENTMCNC: 36.3 G/DL (ref 32–36)
MCV RBC AUTO: 81 FL (ref 82–98)
MONOCYTES # BLD AUTO: 3.4 K/UL (ref 0.3–1)
MONOCYTES NFR BLD: 13.7 % (ref 4–15)
NEUTROPHILS # BLD AUTO: 18 K/UL (ref 1.8–7.7)
NEUTROPHILS NFR BLD: 72.5 % (ref 38–73)
NITRITE UR QL STRIP: NEGATIVE
NRBC BLD-RTO: 0 /100 WBC
PH UR STRIP: 6 [PH] (ref 5–8)
PLATELET # BLD AUTO: 505 K/UL (ref 150–450)
PLATELET BLD QL SMEAR: ABNORMAL
PMV BLD AUTO: 10.3 FL (ref 9.2–12.9)
POLYCHROMASIA BLD QL SMEAR: ABNORMAL
POTASSIUM SERPL-SCNC: 3.6 MMOL/L (ref 3.5–5.1)
PROCALCITONIN SERPL IA-MCNC: 0.19 NG/ML
PROT SERPL-MCNC: 8.4 G/DL (ref 6–8.4)
PROT UR QL STRIP: NEGATIVE
RBC # BLD AUTO: 3.53 M/UL (ref 4.6–6.2)
RETICS/RBC NFR AUTO: 3.3 % (ref 0.4–2)
SARS-COV-2 RDRP RESP QL NAA+PROBE: NEGATIVE
SCHISTOCYTES BLD QL SMEAR: ABNORMAL
SODIUM SERPL-SCNC: 133 MMOL/L (ref 136–145)
SP GR UR STRIP: 1.01 (ref 1–1.03)
STOMATOCYTES BLD QL SMEAR: PRESENT
TARGETS BLD QL SMEAR: ABNORMAL
TROPONIN I SERPL DL<=0.01 NG/ML-MCNC: <0.006 NG/ML (ref 0–0.03)
URN SPEC COLLECT METH UR: NORMAL
UROBILINOGEN UR STRIP-ACNC: NEGATIVE EU/DL
WBC # BLD AUTO: 24.83 K/UL (ref 3.9–12.7)

## 2021-09-08 PROCEDURE — 25000003 PHARM REV CODE 250: Performed by: STUDENT IN AN ORGANIZED HEALTH CARE EDUCATION/TRAINING PROGRAM

## 2021-09-08 PROCEDURE — 87040 BLOOD CULTURE FOR BACTERIA: CPT | Performed by: EMERGENCY MEDICINE

## 2021-09-08 PROCEDURE — 81003 URINALYSIS AUTO W/O SCOPE: CPT | Performed by: EMERGENCY MEDICINE

## 2021-09-08 PROCEDURE — 80053 COMPREHEN METABOLIC PANEL: CPT | Performed by: EMERGENCY MEDICINE

## 2021-09-08 PROCEDURE — 86900 BLOOD TYPING SEROLOGIC ABO: CPT | Performed by: EMERGENCY MEDICINE

## 2021-09-08 PROCEDURE — 85025 COMPLETE CBC W/AUTO DIFF WBC: CPT | Performed by: EMERGENCY MEDICINE

## 2021-09-08 PROCEDURE — 83615 LACTATE (LD) (LDH) ENZYME: CPT | Performed by: INTERNAL MEDICINE

## 2021-09-08 PROCEDURE — 63600175 PHARM REV CODE 636 W HCPCS: Performed by: HOSPITALIST

## 2021-09-08 PROCEDURE — 93005 ELECTROCARDIOGRAM TRACING: CPT

## 2021-09-08 PROCEDURE — 84145 PROCALCITONIN (PCT): CPT | Performed by: EMERGENCY MEDICINE

## 2021-09-08 PROCEDURE — 99223 1ST HOSP IP/OBS HIGH 75: CPT | Mod: ,,, | Performed by: NURSE PRACTITIONER

## 2021-09-08 PROCEDURE — U0002 COVID-19 LAB TEST NON-CDC: HCPCS | Performed by: EMERGENCY MEDICINE

## 2021-09-08 PROCEDURE — 94761 N-INVAS EAR/PLS OXIMETRY MLT: CPT

## 2021-09-08 PROCEDURE — 83880 ASSAY OF NATRIURETIC PEPTIDE: CPT | Performed by: EMERGENCY MEDICINE

## 2021-09-08 PROCEDURE — 25000003 PHARM REV CODE 250: Performed by: EMERGENCY MEDICINE

## 2021-09-08 PROCEDURE — 83020 HEMOGLOBIN ELECTROPHORESIS: CPT | Mod: 91 | Performed by: INTERNAL MEDICINE

## 2021-09-08 PROCEDURE — 94640 AIRWAY INHALATION TREATMENT: CPT

## 2021-09-08 PROCEDURE — 25000003 PHARM REV CODE 250: Performed by: HOSPITALIST

## 2021-09-08 PROCEDURE — 99285 EMERGENCY DEPT VISIT HI MDM: CPT | Mod: 25

## 2021-09-08 PROCEDURE — 25500020 PHARM REV CODE 255: Performed by: EMERGENCY MEDICINE

## 2021-09-08 PROCEDURE — 85045 AUTOMATED RETICULOCYTE COUNT: CPT | Performed by: EMERGENCY MEDICINE

## 2021-09-08 PROCEDURE — 25000242 PHARM REV CODE 250 ALT 637 W/ HCPCS: Performed by: EMERGENCY MEDICINE

## 2021-09-08 PROCEDURE — 96375 TX/PRO/DX INJ NEW DRUG ADDON: CPT

## 2021-09-08 PROCEDURE — 83020 HEMOGLOBIN ELECTROPHORESIS: CPT | Performed by: INTERNAL MEDICINE

## 2021-09-08 PROCEDURE — 21400001 HC TELEMETRY ROOM

## 2021-09-08 PROCEDURE — 96365 THER/PROPH/DIAG IV INF INIT: CPT

## 2021-09-08 PROCEDURE — 83605 ASSAY OF LACTIC ACID: CPT | Performed by: EMERGENCY MEDICINE

## 2021-09-08 PROCEDURE — 63600175 PHARM REV CODE 636 W HCPCS: Performed by: EMERGENCY MEDICINE

## 2021-09-08 PROCEDURE — 84484 ASSAY OF TROPONIN QUANT: CPT | Performed by: EMERGENCY MEDICINE

## 2021-09-08 PROCEDURE — 93010 ELECTROCARDIOGRAM REPORT: CPT | Mod: ,,, | Performed by: INTERNAL MEDICINE

## 2021-09-08 PROCEDURE — 99223 PR INITIAL HOSPITAL CARE,LEVL III: ICD-10-PCS | Mod: ,,, | Performed by: NURSE PRACTITIONER

## 2021-09-08 PROCEDURE — 93010 EKG 12-LEAD: ICD-10-PCS | Mod: ,,, | Performed by: INTERNAL MEDICINE

## 2021-09-08 RX ORDER — LEVOFLOXACIN 5 MG/ML
750 INJECTION, SOLUTION INTRAVENOUS ONCE
Status: COMPLETED | OUTPATIENT
Start: 2021-09-08 | End: 2021-09-08

## 2021-09-08 RX ORDER — HYDROXYUREA 500 MG/1
500 CAPSULE ORAL 2 TIMES DAILY
Status: DISCONTINUED | OUTPATIENT
Start: 2021-09-08 | End: 2021-09-13 | Stop reason: HOSPADM

## 2021-09-08 RX ORDER — ACETAMINOPHEN 325 MG/1
650 TABLET ORAL EVERY 6 HOURS PRN
Status: DISCONTINUED | OUTPATIENT
Start: 2021-09-08 | End: 2021-09-09

## 2021-09-08 RX ORDER — IPRATROPIUM BROMIDE AND ALBUTEROL SULFATE 2.5; .5 MG/3ML; MG/3ML
3 SOLUTION RESPIRATORY (INHALATION) EVERY 4 HOURS PRN
Status: DISCONTINUED | OUTPATIENT
Start: 2021-09-08 | End: 2021-09-13 | Stop reason: HOSPADM

## 2021-09-08 RX ORDER — ACETAMINOPHEN 500 MG
1000 TABLET ORAL
Status: COMPLETED | OUTPATIENT
Start: 2021-09-08 | End: 2021-09-08

## 2021-09-08 RX ORDER — KETOROLAC TROMETHAMINE 30 MG/ML
30 INJECTION, SOLUTION INTRAMUSCULAR; INTRAVENOUS
Status: COMPLETED | OUTPATIENT
Start: 2021-09-08 | End: 2021-09-08

## 2021-09-08 RX ORDER — SODIUM CHLORIDE 0.9 % (FLUSH) 0.9 %
10 SYRINGE (ML) INJECTION
Status: DISCONTINUED | OUTPATIENT
Start: 2021-09-08 | End: 2021-09-13 | Stop reason: HOSPADM

## 2021-09-08 RX ORDER — IPRATROPIUM BROMIDE AND ALBUTEROL SULFATE 2.5; .5 MG/3ML; MG/3ML
3 SOLUTION RESPIRATORY (INHALATION)
Status: COMPLETED | OUTPATIENT
Start: 2021-09-08 | End: 2021-09-08

## 2021-09-08 RX ORDER — FLUTICASONE FUROATE AND VILANTEROL 100; 25 UG/1; UG/1
1 POWDER RESPIRATORY (INHALATION) DAILY
Refills: 1 | Status: DISCONTINUED | OUTPATIENT
Start: 2021-09-09 | End: 2021-09-13 | Stop reason: HOSPADM

## 2021-09-08 RX ORDER — ONDANSETRON 2 MG/ML
4 INJECTION INTRAMUSCULAR; INTRAVENOUS EVERY 6 HOURS PRN
Status: DISCONTINUED | OUTPATIENT
Start: 2021-09-08 | End: 2021-09-13 | Stop reason: HOSPADM

## 2021-09-08 RX ORDER — MONTELUKAST SODIUM 10 MG/1
10 TABLET ORAL DAILY
Status: DISCONTINUED | OUTPATIENT
Start: 2021-09-09 | End: 2021-09-13 | Stop reason: HOSPADM

## 2021-09-08 RX ORDER — ENOXAPARIN SODIUM 100 MG/ML
40 INJECTION SUBCUTANEOUS EVERY 24 HOURS
Status: DISCONTINUED | OUTPATIENT
Start: 2021-09-08 | End: 2021-09-13 | Stop reason: HOSPADM

## 2021-09-08 RX ORDER — ONDANSETRON 2 MG/ML
4 INJECTION INTRAMUSCULAR; INTRAVENOUS
Status: COMPLETED | OUTPATIENT
Start: 2021-09-08 | End: 2021-09-08

## 2021-09-08 RX ORDER — TALC
6 POWDER (GRAM) TOPICAL NIGHTLY PRN
Status: DISCONTINUED | OUTPATIENT
Start: 2021-09-08 | End: 2021-09-13 | Stop reason: HOSPADM

## 2021-09-08 RX ORDER — HYDROXYUREA 500 MG/1
500 CAPSULE ORAL 2 TIMES DAILY
Status: DISCONTINUED | OUTPATIENT
Start: 2021-09-08 | End: 2021-09-08 | Stop reason: DRUGHIGH

## 2021-09-08 RX ORDER — FOLIC ACID 1 MG/1
1 TABLET ORAL DAILY
Status: DISCONTINUED | OUTPATIENT
Start: 2021-09-09 | End: 2021-09-13 | Stop reason: HOSPADM

## 2021-09-08 RX ORDER — LEVOFLOXACIN 5 MG/ML
750 INJECTION, SOLUTION INTRAVENOUS
Status: DISCONTINUED | OUTPATIENT
Start: 2021-09-09 | End: 2021-09-09

## 2021-09-08 RX ORDER — MORPHINE SULFATE 4 MG/ML
6 INJECTION, SOLUTION INTRAMUSCULAR; INTRAVENOUS
Status: COMPLETED | OUTPATIENT
Start: 2021-09-08 | End: 2021-09-08

## 2021-09-08 RX ADMIN — LEVOFLOXACIN 750 MG: 750 INJECTION, SOLUTION INTRAVENOUS at 05:09

## 2021-09-08 RX ADMIN — ACETAMINOPHEN 650 MG: 325 TABLET ORAL at 11:09

## 2021-09-08 RX ADMIN — ACETAMINOPHEN 1000 MG: 500 TABLET, FILM COATED ORAL at 05:09

## 2021-09-08 RX ADMIN — ONDANSETRON 4 MG: 2 INJECTION INTRAMUSCULAR; INTRAVENOUS at 04:09

## 2021-09-08 RX ADMIN — ENOXAPARIN SODIUM 40 MG: 40 INJECTION SUBCUTANEOUS at 09:09

## 2021-09-08 RX ADMIN — MORPHINE SULFATE 6 MG: 4 INJECTION INTRAVENOUS at 04:09

## 2021-09-08 RX ADMIN — AZITHROMYCIN MONOHYDRATE 500 MG: 500 INJECTION, POWDER, LYOPHILIZED, FOR SOLUTION INTRAVENOUS at 08:09

## 2021-09-08 RX ADMIN — SODIUM CHLORIDE 2500 ML: 0.9 INJECTION, SOLUTION INTRAVENOUS at 05:09

## 2021-09-08 RX ADMIN — HYDROXYUREA 500 MG: 500 CAPSULE ORAL at 11:09

## 2021-09-08 RX ADMIN — VANCOMYCIN HYDROCHLORIDE 1750 MG: 500 INJECTION, POWDER, LYOPHILIZED, FOR SOLUTION INTRAVENOUS at 09:09

## 2021-09-08 RX ADMIN — IPRATROPIUM BROMIDE AND ALBUTEROL SULFATE 3 ML: .5; 3 SOLUTION RESPIRATORY (INHALATION) at 05:09

## 2021-09-08 RX ADMIN — KETOROLAC TROMETHAMINE 30 MG: 30 INJECTION, SOLUTION INTRAMUSCULAR; INTRAVENOUS at 04:09

## 2021-09-08 RX ADMIN — MELATONIN TAB 3 MG 6 MG: 3 TAB at 11:09

## 2021-09-08 RX ADMIN — IOHEXOL 75 ML: 350 INJECTION, SOLUTION INTRAVENOUS at 06:09

## 2021-09-09 PROBLEM — D57.01 SICKLE CELL CRISIS ACUTE CHEST SYNDROME: Status: ACTIVE | Noted: 2021-09-09

## 2021-09-09 LAB
INR PPP: 1.1 (ref 0.8–1.2)
MAGNESIUM SERPL-MCNC: 1.9 MG/DL (ref 1.6–2.6)
PHOSPHATE SERPL-MCNC: 2.4 MG/DL (ref 2.7–4.5)
PROTHROMBIN TIME: 12.1 SEC (ref 9–12.5)

## 2021-09-09 PROCEDURE — 25000003 PHARM REV CODE 250: Performed by: STUDENT IN AN ORGANIZED HEALTH CARE EDUCATION/TRAINING PROGRAM

## 2021-09-09 PROCEDURE — 63600175 PHARM REV CODE 636 W HCPCS: Performed by: STUDENT IN AN ORGANIZED HEALTH CARE EDUCATION/TRAINING PROGRAM

## 2021-09-09 PROCEDURE — 99223 PR INITIAL HOSPITAL CARE,LEVL III: ICD-10-PCS | Mod: ,,, | Performed by: INTERNAL MEDICINE

## 2021-09-09 PROCEDURE — 99223 1ST HOSP IP/OBS HIGH 75: CPT | Mod: ,,, | Performed by: INTERNAL MEDICINE

## 2021-09-09 PROCEDURE — 83735 ASSAY OF MAGNESIUM: CPT | Performed by: STUDENT IN AN ORGANIZED HEALTH CARE EDUCATION/TRAINING PROGRAM

## 2021-09-09 PROCEDURE — 99222 PR INITIAL HOSPITAL CARE,LEVL II: ICD-10-PCS | Mod: ,,, | Performed by: INTERNAL MEDICINE

## 2021-09-09 PROCEDURE — 36415 COLL VENOUS BLD VENIPUNCTURE: CPT | Performed by: STUDENT IN AN ORGANIZED HEALTH CARE EDUCATION/TRAINING PROGRAM

## 2021-09-09 PROCEDURE — 63600175 PHARM REV CODE 636 W HCPCS: Performed by: NURSE PRACTITIONER

## 2021-09-09 PROCEDURE — 25000242 PHARM REV CODE 250 ALT 637 W/ HCPCS: Performed by: INTERNAL MEDICINE

## 2021-09-09 PROCEDURE — 99222 1ST HOSP IP/OBS MODERATE 55: CPT | Mod: ,,, | Performed by: INTERNAL MEDICINE

## 2021-09-09 PROCEDURE — 63600175 PHARM REV CODE 636 W HCPCS: Performed by: HOSPITALIST

## 2021-09-09 PROCEDURE — 94640 AIRWAY INHALATION TREATMENT: CPT

## 2021-09-09 PROCEDURE — S5010 5% DEXTROSE AND 0.45% SALINE: HCPCS | Performed by: STUDENT IN AN ORGANIZED HEALTH CARE EDUCATION/TRAINING PROGRAM

## 2021-09-09 PROCEDURE — 87205 SMEAR GRAM STAIN: CPT | Performed by: STUDENT IN AN ORGANIZED HEALTH CARE EDUCATION/TRAINING PROGRAM

## 2021-09-09 PROCEDURE — 21400001 HC TELEMETRY ROOM

## 2021-09-09 PROCEDURE — 25000242 PHARM REV CODE 250 ALT 637 W/ HCPCS: Performed by: HOSPITALIST

## 2021-09-09 PROCEDURE — 85610 PROTHROMBIN TIME: CPT | Performed by: STUDENT IN AN ORGANIZED HEALTH CARE EDUCATION/TRAINING PROGRAM

## 2021-09-09 PROCEDURE — 87798 DETECT AGENT NOS DNA AMP: CPT | Performed by: INTERNAL MEDICINE

## 2021-09-09 PROCEDURE — 87070 CULTURE OTHR SPECIMN AEROBIC: CPT | Performed by: STUDENT IN AN ORGANIZED HEALTH CARE EDUCATION/TRAINING PROGRAM

## 2021-09-09 PROCEDURE — 63600175 PHARM REV CODE 636 W HCPCS: Performed by: INTERNAL MEDICINE

## 2021-09-09 PROCEDURE — 84100 ASSAY OF PHOSPHORUS: CPT | Performed by: STUDENT IN AN ORGANIZED HEALTH CARE EDUCATION/TRAINING PROGRAM

## 2021-09-09 PROCEDURE — 25000003 PHARM REV CODE 250: Performed by: HOSPITALIST

## 2021-09-09 RX ORDER — SODIUM CHLORIDE 9 MG/ML
INJECTION, SOLUTION INTRAVENOUS
Status: DISCONTINUED | OUTPATIENT
Start: 2021-09-09 | End: 2021-09-13 | Stop reason: HOSPADM

## 2021-09-09 RX ORDER — DEXTROSE MONOHYDRATE AND SODIUM CHLORIDE 5; .45 G/100ML; G/100ML
INJECTION, SOLUTION INTRAVENOUS CONTINUOUS
Status: DISCONTINUED | OUTPATIENT
Start: 2021-09-09 | End: 2021-09-13 | Stop reason: HOSPADM

## 2021-09-09 RX ORDER — HYDROMORPHONE HYDROCHLORIDE 2 MG/ML
1 INJECTION, SOLUTION INTRAMUSCULAR; INTRAVENOUS; SUBCUTANEOUS
Status: DISCONTINUED | OUTPATIENT
Start: 2021-09-09 | End: 2021-09-11

## 2021-09-09 RX ORDER — KETOROLAC TROMETHAMINE 30 MG/ML
15 INJECTION, SOLUTION INTRAMUSCULAR; INTRAVENOUS ONCE
Status: COMPLETED | OUTPATIENT
Start: 2021-09-09 | End: 2021-09-09

## 2021-09-09 RX ORDER — DEXTROSE MONOHYDRATE AND SODIUM CHLORIDE 5; .45 G/100ML; G/100ML
INJECTION, SOLUTION INTRAVENOUS CONTINUOUS
Status: DISCONTINUED | OUTPATIENT
Start: 2021-09-09 | End: 2021-09-09

## 2021-09-09 RX ORDER — ACETAMINOPHEN 500 MG
1000 TABLET ORAL EVERY 8 HOURS
Status: DISPENSED | OUTPATIENT
Start: 2021-09-09 | End: 2021-09-12

## 2021-09-09 RX ORDER — SODIUM CHLORIDE FOR INHALATION 3 %
4 VIAL, NEBULIZER (ML) INHALATION ONCE
Status: COMPLETED | OUTPATIENT
Start: 2021-09-09 | End: 2021-09-09

## 2021-09-09 RX ORDER — HYDROMORPHONE HYDROCHLORIDE 2 MG/ML
1 INJECTION, SOLUTION INTRAMUSCULAR; INTRAVENOUS; SUBCUTANEOUS ONCE
Status: DISCONTINUED | OUTPATIENT
Start: 2021-09-09 | End: 2021-09-09

## 2021-09-09 RX ORDER — OXYCODONE HYDROCHLORIDE 5 MG/1
5 TABLET ORAL EVERY 6 HOURS PRN
Status: DISCONTINUED | OUTPATIENT
Start: 2021-09-09 | End: 2021-09-13 | Stop reason: HOSPADM

## 2021-09-09 RX ORDER — HYDROMORPHONE HYDROCHLORIDE 2 MG/ML
2 INJECTION, SOLUTION INTRAMUSCULAR; INTRAVENOUS; SUBCUTANEOUS EVERY 4 HOURS PRN
Status: DISCONTINUED | OUTPATIENT
Start: 2021-09-09 | End: 2021-09-09

## 2021-09-09 RX ADMIN — FOLIC ACID 1 MG: 1 TABLET ORAL at 08:09

## 2021-09-09 RX ADMIN — SODIUM PHOSPHATE, MONOBASIC, MONOHYDRATE 20.01 MMOL: 276; 142 INJECTION, SOLUTION INTRAVENOUS at 02:09

## 2021-09-09 RX ADMIN — MONTELUKAST 10 MG: 10 TABLET, FILM COATED ORAL at 08:09

## 2021-09-09 RX ADMIN — CEFTRIAXONE 2 G: 2 INJECTION, SOLUTION INTRAVENOUS at 05:09

## 2021-09-09 RX ADMIN — HYDROMORPHONE HYDROCHLORIDE 1 MG: 2 INJECTION INTRAMUSCULAR; INTRAVENOUS; SUBCUTANEOUS at 09:09

## 2021-09-09 RX ADMIN — ACETAMINOPHEN 1000 MG: 500 TABLET, FILM COATED ORAL at 09:09

## 2021-09-09 RX ADMIN — HYDROMORPHONE HYDROCHLORIDE 2 MG: 2 INJECTION INTRAMUSCULAR; INTRAVENOUS; SUBCUTANEOUS at 11:09

## 2021-09-09 RX ADMIN — ENOXAPARIN SODIUM 40 MG: 40 INJECTION SUBCUTANEOUS at 05:09

## 2021-09-09 RX ADMIN — VANCOMYCIN HYDROCHLORIDE 1250 MG: 1.25 INJECTION, POWDER, LYOPHILIZED, FOR SOLUTION INTRAVENOUS at 09:09

## 2021-09-09 RX ADMIN — KETOROLAC TROMETHAMINE 15 MG: 30 INJECTION, SOLUTION INTRAMUSCULAR at 12:09

## 2021-09-09 RX ADMIN — SODIUM CHLORIDE 30 MG/ML INHALATION SOLUTION 4 ML: 30 SOLUTION INHALANT at 05:09

## 2021-09-09 RX ADMIN — DEXTROSE AND SODIUM CHLORIDE: 5; .45 INJECTION, SOLUTION INTRAVENOUS at 09:09

## 2021-09-09 RX ADMIN — ACETAMINOPHEN 1000 MG: 500 TABLET, FILM COATED ORAL at 08:09

## 2021-09-09 RX ADMIN — HYDROXYUREA 500 MG: 500 CAPSULE ORAL at 09:09

## 2021-09-09 RX ADMIN — HYDROXYUREA 500 MG: 500 CAPSULE ORAL at 08:09

## 2021-09-09 RX ADMIN — FLUTICASONE FUROATE AND VILANTEROL TRIFENATATE 1 PUFF: 100; 25 POWDER RESPIRATORY (INHALATION) at 08:09

## 2021-09-10 PROBLEM — D57.211: Status: ACTIVE | Noted: 2021-09-10

## 2021-09-10 LAB
ADENOVIRUS: NOT DETECTED
ALBUMIN SERPL BCP-MCNC: 2.8 G/DL (ref 3.5–5.2)
ALP SERPL-CCNC: 89 U/L (ref 55–135)
ALT SERPL W/O P-5'-P-CCNC: 35 U/L (ref 10–44)
ANION GAP SERPL CALC-SCNC: 7 MMOL/L (ref 8–16)
AST SERPL-CCNC: 27 U/L (ref 10–40)
BASOPHILS # BLD AUTO: 0.1 K/UL (ref 0–0.2)
BASOPHILS NFR BLD: 0.5 % (ref 0–1.9)
BILIRUB SERPL-MCNC: 0.8 MG/DL (ref 0.1–1)
BORDETELLA PARAPERTUSSIS (IS1001): NOT DETECTED
BORDETELLA PERTUSSIS (PTXP): NOT DETECTED
BUN SERPL-MCNC: 7 MG/DL (ref 6–20)
CALCIUM SERPL-MCNC: 9.3 MG/DL (ref 8.7–10.5)
CHLAMYDIA PNEUMONIAE: NOT DETECTED
CHLORIDE SERPL-SCNC: 103 MMOL/L (ref 95–110)
CO2 SERPL-SCNC: 28 MMOL/L (ref 23–29)
CORONAVIRUS 229E, COMMON COLD VIRUS: NOT DETECTED
CORONAVIRUS HKU1, COMMON COLD VIRUS: NOT DETECTED
CORONAVIRUS NL63, COMMON COLD VIRUS: NOT DETECTED
CORONAVIRUS OC43, COMMON COLD VIRUS: NOT DETECTED
CREAT SERPL-MCNC: 0.8 MG/DL (ref 0.5–1.4)
DIFFERENTIAL METHOD: ABNORMAL
EOSINOPHIL # BLD AUTO: 0.8 K/UL (ref 0–0.5)
EOSINOPHIL NFR BLD: 4.2 % (ref 0–8)
ERYTHROCYTE [DISTWIDTH] IN BLOOD BY AUTOMATED COUNT: 17.2 % (ref 11.5–14.5)
EST. GFR  (AFRICAN AMERICAN): >60 ML/MIN/1.73 M^2
EST. GFR  (NON AFRICAN AMERICAN): >60 ML/MIN/1.73 M^2
FLUBV RNA NPH QL NAA+NON-PROBE: NOT DETECTED
GLUCOSE SERPL-MCNC: 93 MG/DL (ref 70–110)
HCT VFR BLD AUTO: 26.5 % (ref 40–54)
HGB BLD-MCNC: 9.3 G/DL (ref 14–18)
HPIV1 RNA NPH QL NAA+NON-PROBE: NOT DETECTED
HPIV2 RNA NPH QL NAA+NON-PROBE: NOT DETECTED
HPIV3 RNA NPH QL NAA+NON-PROBE: NOT DETECTED
HPIV4 RNA NPH QL NAA+NON-PROBE: NOT DETECTED
HUMAN METAPNEUMOVIRUS: NOT DETECTED
IMM GRANULOCYTES # BLD AUTO: 0.1 K/UL (ref 0–0.04)
IMM GRANULOCYTES NFR BLD AUTO: 0.5 % (ref 0–0.5)
INFLUENZA A (SUBTYPES H1,H1-2009,H3): NOT DETECTED
LYMPHOCYTES # BLD AUTO: 3.8 K/UL (ref 1–4.8)
LYMPHOCYTES NFR BLD: 19 % (ref 18–48)
MAGNESIUM SERPL-MCNC: 1.9 MG/DL (ref 1.6–2.6)
MCH RBC QN AUTO: 28.3 PG (ref 27–31)
MCHC RBC AUTO-ENTMCNC: 35.1 G/DL (ref 32–36)
MCV RBC AUTO: 81 FL (ref 82–98)
MONOCYTES # BLD AUTO: 2.9 K/UL (ref 0.3–1)
MONOCYTES NFR BLD: 14.4 % (ref 4–15)
MYCOPLASMA PNEUMONIAE: NOT DETECTED
NEUTROPHILS # BLD AUTO: 12.1 K/UL (ref 1.8–7.7)
NEUTROPHILS NFR BLD: 61.4 % (ref 38–73)
NRBC BLD-RTO: 0 /100 WBC
PHOSPHATE SERPL-MCNC: 3.1 MG/DL (ref 2.7–4.5)
PLATELET # BLD AUTO: 524 K/UL (ref 150–450)
PMV BLD AUTO: 9.7 FL (ref 9.2–12.9)
POTASSIUM SERPL-SCNC: 3.7 MMOL/L (ref 3.5–5.1)
PROT SERPL-MCNC: 7.4 G/DL (ref 6–8.4)
RBC # BLD AUTO: 3.29 M/UL (ref 4.6–6.2)
RESPIRATORY INFECTION PANEL SOURCE: NORMAL
RETICS/RBC NFR AUTO: 2.6 % (ref 0.4–2)
RSV RNA NPH QL NAA+NON-PROBE: NOT DETECTED
RV+EV RNA NPH QL NAA+NON-PROBE: NOT DETECTED
SODIUM SERPL-SCNC: 138 MMOL/L (ref 136–145)
VANCOMYCIN TROUGH SERPL-MCNC: 4.7 UG/ML (ref 10–22)
WBC # BLD AUTO: 19.78 K/UL (ref 3.9–12.7)

## 2021-09-10 PROCEDURE — 21400001 HC TELEMETRY ROOM

## 2021-09-10 PROCEDURE — 83735 ASSAY OF MAGNESIUM: CPT | Performed by: STUDENT IN AN ORGANIZED HEALTH CARE EDUCATION/TRAINING PROGRAM

## 2021-09-10 PROCEDURE — 80202 ASSAY OF VANCOMYCIN: CPT | Performed by: STUDENT IN AN ORGANIZED HEALTH CARE EDUCATION/TRAINING PROGRAM

## 2021-09-10 PROCEDURE — 63600175 PHARM REV CODE 636 W HCPCS: Performed by: INTERNAL MEDICINE

## 2021-09-10 PROCEDURE — 94760 N-INVAS EAR/PLS OXIMETRY 1: CPT

## 2021-09-10 PROCEDURE — 25000003 PHARM REV CODE 250: Performed by: INTERNAL MEDICINE

## 2021-09-10 PROCEDURE — 36415 COLL VENOUS BLD VENIPUNCTURE: CPT | Performed by: STUDENT IN AN ORGANIZED HEALTH CARE EDUCATION/TRAINING PROGRAM

## 2021-09-10 PROCEDURE — 80053 COMPREHEN METABOLIC PANEL: CPT | Performed by: STUDENT IN AN ORGANIZED HEALTH CARE EDUCATION/TRAINING PROGRAM

## 2021-09-10 PROCEDURE — 63600175 PHARM REV CODE 636 W HCPCS: Performed by: HOSPITALIST

## 2021-09-10 PROCEDURE — 99233 PR SUBSEQUENT HOSPITAL CARE,LEVL III: ICD-10-PCS | Mod: ,,, | Performed by: INTERNAL MEDICINE

## 2021-09-10 PROCEDURE — 85025 COMPLETE CBC W/AUTO DIFF WBC: CPT | Performed by: STUDENT IN AN ORGANIZED HEALTH CARE EDUCATION/TRAINING PROGRAM

## 2021-09-10 PROCEDURE — 25000003 PHARM REV CODE 250: Performed by: STUDENT IN AN ORGANIZED HEALTH CARE EDUCATION/TRAINING PROGRAM

## 2021-09-10 PROCEDURE — 25000003 PHARM REV CODE 250: Performed by: HOSPITALIST

## 2021-09-10 PROCEDURE — S5010 5% DEXTROSE AND 0.45% SALINE: HCPCS | Performed by: STUDENT IN AN ORGANIZED HEALTH CARE EDUCATION/TRAINING PROGRAM

## 2021-09-10 PROCEDURE — 94640 AIRWAY INHALATION TREATMENT: CPT

## 2021-09-10 PROCEDURE — 85045 AUTOMATED RETICULOCYTE COUNT: CPT | Performed by: STUDENT IN AN ORGANIZED HEALTH CARE EDUCATION/TRAINING PROGRAM

## 2021-09-10 PROCEDURE — 84100 ASSAY OF PHOSPHORUS: CPT | Performed by: STUDENT IN AN ORGANIZED HEALTH CARE EDUCATION/TRAINING PROGRAM

## 2021-09-10 PROCEDURE — 99233 SBSQ HOSP IP/OBS HIGH 50: CPT | Mod: ,,, | Performed by: INTERNAL MEDICINE

## 2021-09-10 PROCEDURE — 63600175 PHARM REV CODE 636 W HCPCS: Performed by: STUDENT IN AN ORGANIZED HEALTH CARE EDUCATION/TRAINING PROGRAM

## 2021-09-10 RX ADMIN — FOLIC ACID 1 MG: 1 TABLET ORAL at 08:09

## 2021-09-10 RX ADMIN — DEXTROSE AND SODIUM CHLORIDE: 5; .45 INJECTION, SOLUTION INTRAVENOUS at 05:09

## 2021-09-10 RX ADMIN — VANCOMYCIN HYDROCHLORIDE 1250 MG: 1.25 INJECTION, POWDER, LYOPHILIZED, FOR SOLUTION INTRAVENOUS at 10:09

## 2021-09-10 RX ADMIN — ENOXAPARIN SODIUM 40 MG: 40 INJECTION SUBCUTANEOUS at 04:09

## 2021-09-10 RX ADMIN — HYDROMORPHONE HYDROCHLORIDE 1 MG: 2 INJECTION INTRAMUSCULAR; INTRAVENOUS; SUBCUTANEOUS at 11:09

## 2021-09-10 RX ADMIN — ACETAMINOPHEN 1000 MG: 500 TABLET, FILM COATED ORAL at 01:09

## 2021-09-10 RX ADMIN — HYDROMORPHONE HYDROCHLORIDE 1 MG: 2 INJECTION INTRAMUSCULAR; INTRAVENOUS; SUBCUTANEOUS at 05:09

## 2021-09-10 RX ADMIN — HYDROXYUREA 500 MG: 500 CAPSULE ORAL at 08:09

## 2021-09-10 RX ADMIN — MONTELUKAST 10 MG: 10 TABLET, FILM COATED ORAL at 08:09

## 2021-09-10 RX ADMIN — FLUTICASONE FUROATE AND VILANTEROL TRIFENATATE 1 PUFF: 100; 25 POWDER RESPIRATORY (INHALATION) at 08:09

## 2021-09-10 RX ADMIN — OXYCODONE 5 MG: 5 TABLET ORAL at 06:09

## 2021-09-10 RX ADMIN — ACETAMINOPHEN 1000 MG: 500 TABLET, FILM COATED ORAL at 09:09

## 2021-09-10 RX ADMIN — VANCOMYCIN HYDROCHLORIDE 1750 MG: 500 INJECTION, POWDER, LYOPHILIZED, FOR SOLUTION INTRAVENOUS at 09:09

## 2021-09-10 RX ADMIN — ACETAMINOPHEN 1000 MG: 500 TABLET, FILM COATED ORAL at 05:09

## 2021-09-10 RX ADMIN — CEFTRIAXONE 2 G: 2 INJECTION, SOLUTION INTRAVENOUS at 04:09

## 2021-09-10 RX ADMIN — HYDROXYUREA 500 MG: 500 CAPSULE ORAL at 09:09

## 2021-09-11 LAB
ALBUMIN SERPL BCP-MCNC: 2.6 G/DL (ref 3.5–5.2)
ALP SERPL-CCNC: 94 U/L (ref 55–135)
ALT SERPL W/O P-5'-P-CCNC: 36 U/L (ref 10–44)
ANION GAP SERPL CALC-SCNC: 8 MMOL/L (ref 8–16)
AST SERPL-CCNC: 19 U/L (ref 10–40)
BACTERIA SPEC AEROBE CULT: NORMAL
BASOPHILS # BLD AUTO: 0.11 K/UL (ref 0–0.2)
BASOPHILS NFR BLD: 0.5 % (ref 0–1.9)
BILIRUB SERPL-MCNC: 0.5 MG/DL (ref 0.1–1)
BUN SERPL-MCNC: 4 MG/DL (ref 6–20)
CALCIUM SERPL-MCNC: 9.1 MG/DL (ref 8.7–10.5)
CHLORIDE SERPL-SCNC: 100 MMOL/L (ref 95–110)
CO2 SERPL-SCNC: 28 MMOL/L (ref 23–29)
CREAT SERPL-MCNC: 0.7 MG/DL (ref 0.5–1.4)
DIFFERENTIAL METHOD: ABNORMAL
EOSINOPHIL # BLD AUTO: 2.2 K/UL (ref 0–0.5)
EOSINOPHIL NFR BLD: 10.2 % (ref 0–8)
ERYTHROCYTE [DISTWIDTH] IN BLOOD BY AUTOMATED COUNT: 17.5 % (ref 11.5–14.5)
EST. GFR  (AFRICAN AMERICAN): >60 ML/MIN/1.73 M^2
EST. GFR  (NON AFRICAN AMERICAN): >60 ML/MIN/1.73 M^2
GLUCOSE SERPL-MCNC: 102 MG/DL (ref 70–110)
GRAM STN SPEC: NORMAL
HCT VFR BLD AUTO: 27.1 % (ref 40–54)
HGB BLD-MCNC: 9.5 G/DL (ref 14–18)
IMM GRANULOCYTES # BLD AUTO: 0.18 K/UL (ref 0–0.04)
IMM GRANULOCYTES NFR BLD AUTO: 0.9 % (ref 0–0.5)
LYMPHOCYTES # BLD AUTO: 5.1 K/UL (ref 1–4.8)
LYMPHOCYTES NFR BLD: 24.4 % (ref 18–48)
MAGNESIUM SERPL-MCNC: 1.7 MG/DL (ref 1.6–2.6)
MCH RBC QN AUTO: 28.2 PG (ref 27–31)
MCHC RBC AUTO-ENTMCNC: 35.1 G/DL (ref 32–36)
MCV RBC AUTO: 80 FL (ref 82–98)
MONOCYTES # BLD AUTO: 1.9 K/UL (ref 0.3–1)
MONOCYTES NFR BLD: 9 % (ref 4–15)
NEUTROPHILS # BLD AUTO: 11.6 K/UL (ref 1.8–7.7)
NEUTROPHILS NFR BLD: 55 % (ref 38–73)
NRBC BLD-RTO: 0 /100 WBC
PLATELET # BLD AUTO: 608 K/UL (ref 150–450)
PMV BLD AUTO: 10.3 FL (ref 9.2–12.9)
POTASSIUM SERPL-SCNC: 3.3 MMOL/L (ref 3.5–5.1)
PROT SERPL-MCNC: 7.3 G/DL (ref 6–8.4)
RBC # BLD AUTO: 3.37 M/UL (ref 4.6–6.2)
RETICS/RBC NFR AUTO: 1.9 % (ref 0.4–2)
SODIUM SERPL-SCNC: 136 MMOL/L (ref 136–145)
WBC # BLD AUTO: 21.04 K/UL (ref 3.9–12.7)

## 2021-09-11 PROCEDURE — 80053 COMPREHEN METABOLIC PANEL: CPT | Performed by: STUDENT IN AN ORGANIZED HEALTH CARE EDUCATION/TRAINING PROGRAM

## 2021-09-11 PROCEDURE — 21400001 HC TELEMETRY ROOM

## 2021-09-11 PROCEDURE — 63600175 PHARM REV CODE 636 W HCPCS: Performed by: STUDENT IN AN ORGANIZED HEALTH CARE EDUCATION/TRAINING PROGRAM

## 2021-09-11 PROCEDURE — 94640 AIRWAY INHALATION TREATMENT: CPT

## 2021-09-11 PROCEDURE — 83735 ASSAY OF MAGNESIUM: CPT | Performed by: STUDENT IN AN ORGANIZED HEALTH CARE EDUCATION/TRAINING PROGRAM

## 2021-09-11 PROCEDURE — 85045 AUTOMATED RETICULOCYTE COUNT: CPT | Performed by: STUDENT IN AN ORGANIZED HEALTH CARE EDUCATION/TRAINING PROGRAM

## 2021-09-11 PROCEDURE — 94761 N-INVAS EAR/PLS OXIMETRY MLT: CPT

## 2021-09-11 PROCEDURE — 36415 COLL VENOUS BLD VENIPUNCTURE: CPT | Performed by: STUDENT IN AN ORGANIZED HEALTH CARE EDUCATION/TRAINING PROGRAM

## 2021-09-11 PROCEDURE — 85025 COMPLETE CBC W/AUTO DIFF WBC: CPT | Performed by: STUDENT IN AN ORGANIZED HEALTH CARE EDUCATION/TRAINING PROGRAM

## 2021-09-11 PROCEDURE — 63600175 PHARM REV CODE 636 W HCPCS: Performed by: HOSPITALIST

## 2021-09-11 PROCEDURE — 94760 N-INVAS EAR/PLS OXIMETRY 1: CPT

## 2021-09-11 PROCEDURE — S5010 5% DEXTROSE AND 0.45% SALINE: HCPCS | Performed by: STUDENT IN AN ORGANIZED HEALTH CARE EDUCATION/TRAINING PROGRAM

## 2021-09-11 PROCEDURE — 25000003 PHARM REV CODE 250: Performed by: STUDENT IN AN ORGANIZED HEALTH CARE EDUCATION/TRAINING PROGRAM

## 2021-09-11 PROCEDURE — 25000003 PHARM REV CODE 250: Performed by: HOSPITALIST

## 2021-09-11 RX ORDER — AMOXICILLIN 250 MG
1 CAPSULE ORAL DAILY
Status: DISCONTINUED | OUTPATIENT
Start: 2021-09-11 | End: 2021-09-13 | Stop reason: HOSPADM

## 2021-09-11 RX ORDER — POTASSIUM CHLORIDE 20 MEQ/1
40 TABLET, EXTENDED RELEASE ORAL ONCE
Status: COMPLETED | OUTPATIENT
Start: 2021-09-11 | End: 2021-09-11

## 2021-09-11 RX ORDER — HYDROMORPHONE HYDROCHLORIDE 2 MG/ML
0.5 INJECTION, SOLUTION INTRAMUSCULAR; INTRAVENOUS; SUBCUTANEOUS
Status: DISCONTINUED | OUTPATIENT
Start: 2021-09-11 | End: 2021-09-12

## 2021-09-11 RX ORDER — MAGNESIUM SULFATE HEPTAHYDRATE 40 MG/ML
2 INJECTION, SOLUTION INTRAVENOUS ONCE
Status: COMPLETED | OUTPATIENT
Start: 2021-09-11 | End: 2021-09-11

## 2021-09-11 RX ORDER — LEVOFLOXACIN 750 MG/1
750 TABLET ORAL DAILY
Status: DISCONTINUED | OUTPATIENT
Start: 2021-09-11 | End: 2021-09-13 | Stop reason: HOSPADM

## 2021-09-11 RX ORDER — POLYETHYLENE GLYCOL 3350 17 G/17G
17 POWDER, FOR SOLUTION ORAL DAILY
Status: DISCONTINUED | OUTPATIENT
Start: 2021-09-11 | End: 2021-09-13 | Stop reason: HOSPADM

## 2021-09-11 RX ADMIN — HYDROXYUREA 500 MG: 500 CAPSULE ORAL at 09:09

## 2021-09-11 RX ADMIN — ACETAMINOPHEN 1000 MG: 500 TABLET, FILM COATED ORAL at 01:09

## 2021-09-11 RX ADMIN — MAGNESIUM SULFATE 2 G: 2 INJECTION INTRAVENOUS at 08:09

## 2021-09-11 RX ADMIN — FLUTICASONE FUROATE AND VILANTEROL TRIFENATATE 1 PUFF: 100; 25 POWDER RESPIRATORY (INHALATION) at 08:09

## 2021-09-11 RX ADMIN — DEXTROSE AND SODIUM CHLORIDE: 5; .45 INJECTION, SOLUTION INTRAVENOUS at 01:09

## 2021-09-11 RX ADMIN — HYDROXYUREA 500 MG: 500 CAPSULE ORAL at 08:09

## 2021-09-11 RX ADMIN — LEVOFLOXACIN 750 MG: 750 TABLET, FILM COATED ORAL at 08:09

## 2021-09-11 RX ADMIN — POLYETHYLENE GLYCOL 3350 17 G: 17 POWDER, FOR SOLUTION ORAL at 08:09

## 2021-09-11 RX ADMIN — MONTELUKAST 10 MG: 10 TABLET, FILM COATED ORAL at 08:09

## 2021-09-11 RX ADMIN — VANCOMYCIN HYDROCHLORIDE 1750 MG: 500 INJECTION, POWDER, LYOPHILIZED, FOR SOLUTION INTRAVENOUS at 01:09

## 2021-09-11 RX ADMIN — DOCUSATE SODIUM AND SENNOSIDES 1 TABLET: 8.6; 5 TABLET, FILM COATED ORAL at 08:09

## 2021-09-11 RX ADMIN — FOLIC ACID 1 MG: 1 TABLET ORAL at 08:09

## 2021-09-11 RX ADMIN — DEXTROSE AND SODIUM CHLORIDE: 5; .45 INJECTION, SOLUTION INTRAVENOUS at 08:09

## 2021-09-11 RX ADMIN — ACETAMINOPHEN 1000 MG: 500 TABLET, FILM COATED ORAL at 09:09

## 2021-09-11 RX ADMIN — ENOXAPARIN SODIUM 40 MG: 40 INJECTION SUBCUTANEOUS at 05:09

## 2021-09-11 RX ADMIN — POTASSIUM CHLORIDE 40 MEQ: 1500 TABLET, EXTENDED RELEASE ORAL at 08:09

## 2021-09-12 PROBLEM — R52 PAIN: Status: ACTIVE | Noted: 2021-09-12

## 2021-09-12 LAB
ALBUMIN SERPL BCP-MCNC: 2.6 G/DL (ref 3.5–5.2)
ALP SERPL-CCNC: 95 U/L (ref 55–135)
ALT SERPL W/O P-5'-P-CCNC: 41 U/L (ref 10–44)
ANION GAP SERPL CALC-SCNC: 7 MMOL/L (ref 8–16)
AST SERPL-CCNC: 20 U/L (ref 10–40)
BACTERIA BLD CULT: NORMAL
BACTERIA BLD CULT: NORMAL
BASOPHILS # BLD AUTO: 0.13 K/UL (ref 0–0.2)
BASOPHILS NFR BLD: 0.9 % (ref 0–1.9)
BILIRUB SERPL-MCNC: 0.4 MG/DL (ref 0.1–1)
BUN SERPL-MCNC: 3 MG/DL (ref 6–20)
CALCIUM SERPL-MCNC: 9.4 MG/DL (ref 8.7–10.5)
CHLORIDE SERPL-SCNC: 104 MMOL/L (ref 95–110)
CO2 SERPL-SCNC: 25 MMOL/L (ref 23–29)
CREAT SERPL-MCNC: 0.6 MG/DL (ref 0.5–1.4)
DIFFERENTIAL METHOD: ABNORMAL
EOSINOPHIL # BLD AUTO: 1.9 K/UL (ref 0–0.5)
EOSINOPHIL NFR BLD: 12.7 % (ref 0–8)
ERYTHROCYTE [DISTWIDTH] IN BLOOD BY AUTOMATED COUNT: 17.7 % (ref 11.5–14.5)
EST. GFR  (AFRICAN AMERICAN): >60 ML/MIN/1.73 M^2
EST. GFR  (NON AFRICAN AMERICAN): >60 ML/MIN/1.73 M^2
GLUCOSE SERPL-MCNC: 81 MG/DL (ref 70–110)
HCT VFR BLD AUTO: 28.4 % (ref 40–54)
HGB BLD-MCNC: 9.9 G/DL (ref 14–18)
IMM GRANULOCYTES # BLD AUTO: 0.06 K/UL (ref 0–0.04)
IMM GRANULOCYTES NFR BLD AUTO: 0.4 % (ref 0–0.5)
LYMPHOCYTES # BLD AUTO: 4.1 K/UL (ref 1–4.8)
LYMPHOCYTES NFR BLD: 28.1 % (ref 18–48)
MAGNESIUM SERPL-MCNC: 1.9 MG/DL (ref 1.6–2.6)
MCH RBC QN AUTO: 28 PG (ref 27–31)
MCHC RBC AUTO-ENTMCNC: 34.9 G/DL (ref 32–36)
MCV RBC AUTO: 80 FL (ref 82–98)
MONOCYTES # BLD AUTO: 1.7 K/UL (ref 0.3–1)
MONOCYTES NFR BLD: 11.9 % (ref 4–15)
NEUTROPHILS # BLD AUTO: 6.8 K/UL (ref 1.8–7.7)
NEUTROPHILS NFR BLD: 46 % (ref 38–73)
NRBC BLD-RTO: 0 /100 WBC
PLATELET # BLD AUTO: 589 K/UL (ref 150–450)
PMV BLD AUTO: 10.1 FL (ref 9.2–12.9)
POTASSIUM SERPL-SCNC: 3.9 MMOL/L (ref 3.5–5.1)
PROT SERPL-MCNC: 7.4 G/DL (ref 6–8.4)
RBC # BLD AUTO: 3.54 M/UL (ref 4.6–6.2)
RETICS/RBC NFR AUTO: 1.9 % (ref 0.4–2)
SODIUM SERPL-SCNC: 136 MMOL/L (ref 136–145)
WBC # BLD AUTO: 14.67 K/UL (ref 3.9–12.7)

## 2021-09-12 PROCEDURE — 25000003 PHARM REV CODE 250: Performed by: STUDENT IN AN ORGANIZED HEALTH CARE EDUCATION/TRAINING PROGRAM

## 2021-09-12 PROCEDURE — 83735 ASSAY OF MAGNESIUM: CPT | Performed by: STUDENT IN AN ORGANIZED HEALTH CARE EDUCATION/TRAINING PROGRAM

## 2021-09-12 PROCEDURE — 85045 AUTOMATED RETICULOCYTE COUNT: CPT | Performed by: STUDENT IN AN ORGANIZED HEALTH CARE EDUCATION/TRAINING PROGRAM

## 2021-09-12 PROCEDURE — 36415 COLL VENOUS BLD VENIPUNCTURE: CPT | Performed by: STUDENT IN AN ORGANIZED HEALTH CARE EDUCATION/TRAINING PROGRAM

## 2021-09-12 PROCEDURE — 63600175 PHARM REV CODE 636 W HCPCS: Performed by: STUDENT IN AN ORGANIZED HEALTH CARE EDUCATION/TRAINING PROGRAM

## 2021-09-12 PROCEDURE — 80053 COMPREHEN METABOLIC PANEL: CPT | Performed by: STUDENT IN AN ORGANIZED HEALTH CARE EDUCATION/TRAINING PROGRAM

## 2021-09-12 PROCEDURE — 85025 COMPLETE CBC W/AUTO DIFF WBC: CPT | Performed by: STUDENT IN AN ORGANIZED HEALTH CARE EDUCATION/TRAINING PROGRAM

## 2021-09-12 PROCEDURE — 63600175 PHARM REV CODE 636 W HCPCS: Performed by: HOSPITALIST

## 2021-09-12 PROCEDURE — 21400001 HC TELEMETRY ROOM

## 2021-09-12 PROCEDURE — 94640 AIRWAY INHALATION TREATMENT: CPT

## 2021-09-12 PROCEDURE — 25000003 PHARM REV CODE 250: Performed by: HOSPITALIST

## 2021-09-12 PROCEDURE — 94760 N-INVAS EAR/PLS OXIMETRY 1: CPT

## 2021-09-12 RX ORDER — HYDROMORPHONE HYDROCHLORIDE 2 MG/ML
0.2 INJECTION, SOLUTION INTRAMUSCULAR; INTRAVENOUS; SUBCUTANEOUS
Status: DISCONTINUED | OUTPATIENT
Start: 2021-09-12 | End: 2021-09-13 | Stop reason: HOSPADM

## 2021-09-12 RX ADMIN — HYDROMORPHONE HYDROCHLORIDE 0.2 MG: 2 INJECTION INTRAMUSCULAR; INTRAVENOUS; SUBCUTANEOUS at 11:09

## 2021-09-12 RX ADMIN — HYDROXYUREA 500 MG: 500 CAPSULE ORAL at 09:09

## 2021-09-12 RX ADMIN — LEVOFLOXACIN 750 MG: 750 TABLET, FILM COATED ORAL at 09:09

## 2021-09-12 RX ADMIN — ENOXAPARIN SODIUM 40 MG: 40 INJECTION SUBCUTANEOUS at 04:09

## 2021-09-12 RX ADMIN — MONTELUKAST 10 MG: 10 TABLET, FILM COATED ORAL at 09:09

## 2021-09-12 RX ADMIN — FOLIC ACID 1 MG: 1 TABLET ORAL at 09:09

## 2021-09-12 RX ADMIN — POLYETHYLENE GLYCOL 3350 17 G: 17 POWDER, FOR SOLUTION ORAL at 09:09

## 2021-09-12 RX ADMIN — DOCUSATE SODIUM AND SENNOSIDES 1 TABLET: 8.6; 5 TABLET, FILM COATED ORAL at 09:09

## 2021-09-12 RX ADMIN — FLUTICASONE FUROATE AND VILANTEROL TRIFENATATE 1 PUFF: 100; 25 POWDER RESPIRATORY (INHALATION) at 08:09

## 2021-09-13 VITALS
TEMPERATURE: 98 F | SYSTOLIC BLOOD PRESSURE: 121 MMHG | OXYGEN SATURATION: 99 % | DIASTOLIC BLOOD PRESSURE: 100 MMHG | WEIGHT: 170 LBS | BODY MASS INDEX: 27.32 KG/M2 | HEIGHT: 66 IN | RESPIRATION RATE: 18 BRPM | HEART RATE: 92 BPM

## 2021-09-13 LAB
ALBUMIN SERPL BCP-MCNC: 2.7 G/DL (ref 3.5–5.2)
ALP SERPL-CCNC: 101 U/L (ref 55–135)
ALT SERPL W/O P-5'-P-CCNC: 42 U/L (ref 10–44)
ANION GAP SERPL CALC-SCNC: 10 MMOL/L (ref 8–16)
AST SERPL-CCNC: 16 U/L (ref 10–40)
BASOPHILS # BLD AUTO: 0.17 K/UL (ref 0–0.2)
BASOPHILS NFR BLD: 1.2 % (ref 0–1.9)
BILIRUB SERPL-MCNC: 0.4 MG/DL (ref 0.1–1)
BUN SERPL-MCNC: 4 MG/DL (ref 6–20)
CALCIUM SERPL-MCNC: 9.8 MG/DL (ref 8.7–10.5)
CHLORIDE SERPL-SCNC: 102 MMOL/L (ref 95–110)
CO2 SERPL-SCNC: 25 MMOL/L (ref 23–29)
CREAT SERPL-MCNC: 0.7 MG/DL (ref 0.5–1.4)
DIFFERENTIAL METHOD: ABNORMAL
EOSINOPHIL # BLD AUTO: 1.1 K/UL (ref 0–0.5)
EOSINOPHIL NFR BLD: 7.9 % (ref 0–8)
ERYTHROCYTE [DISTWIDTH] IN BLOOD BY AUTOMATED COUNT: 17.7 % (ref 11.5–14.5)
EST. GFR  (AFRICAN AMERICAN): >60 ML/MIN/1.73 M^2
EST. GFR  (NON AFRICAN AMERICAN): >60 ML/MIN/1.73 M^2
GLUCOSE SERPL-MCNC: 84 MG/DL (ref 70–110)
HCT VFR BLD AUTO: 28.8 % (ref 40–54)
HGB BLD-MCNC: 10.5 G/DL (ref 14–18)
IMM GRANULOCYTES # BLD AUTO: 0.05 K/UL (ref 0–0.04)
IMM GRANULOCYTES NFR BLD AUTO: 0.4 % (ref 0–0.5)
LYMPHOCYTES # BLD AUTO: 4.5 K/UL (ref 1–4.8)
LYMPHOCYTES NFR BLD: 32 % (ref 18–48)
MAGNESIUM SERPL-MCNC: 1.9 MG/DL (ref 1.6–2.6)
MCH RBC QN AUTO: 28.5 PG (ref 27–31)
MCHC RBC AUTO-ENTMCNC: 36.5 G/DL (ref 32–36)
MCV RBC AUTO: 78 FL (ref 82–98)
MONOCYTES # BLD AUTO: 1.2 K/UL (ref 0.3–1)
MONOCYTES NFR BLD: 8.4 % (ref 4–15)
NEUTROPHILS # BLD AUTO: 7.1 K/UL (ref 1.8–7.7)
NEUTROPHILS NFR BLD: 50.1 % (ref 38–73)
NRBC BLD-RTO: 0 /100 WBC
PLATELET # BLD AUTO: 759 K/UL (ref 150–450)
PMV BLD AUTO: 9.7 FL (ref 9.2–12.9)
POTASSIUM SERPL-SCNC: 3.8 MMOL/L (ref 3.5–5.1)
PROT SERPL-MCNC: 7.9 G/DL (ref 6–8.4)
RBC # BLD AUTO: 3.68 M/UL (ref 4.6–6.2)
RETICS/RBC NFR AUTO: 2.1 % (ref 0.4–2)
SODIUM SERPL-SCNC: 137 MMOL/L (ref 136–145)
WBC # BLD AUTO: 14.12 K/UL (ref 3.9–12.7)

## 2021-09-13 PROCEDURE — 83735 ASSAY OF MAGNESIUM: CPT | Performed by: STUDENT IN AN ORGANIZED HEALTH CARE EDUCATION/TRAINING PROGRAM

## 2021-09-13 PROCEDURE — 25000003 PHARM REV CODE 250: Performed by: HOSPITALIST

## 2021-09-13 PROCEDURE — 85025 COMPLETE CBC W/AUTO DIFF WBC: CPT | Performed by: STUDENT IN AN ORGANIZED HEALTH CARE EDUCATION/TRAINING PROGRAM

## 2021-09-13 PROCEDURE — 80053 COMPREHEN METABOLIC PANEL: CPT | Performed by: STUDENT IN AN ORGANIZED HEALTH CARE EDUCATION/TRAINING PROGRAM

## 2021-09-13 PROCEDURE — 85045 AUTOMATED RETICULOCYTE COUNT: CPT | Performed by: STUDENT IN AN ORGANIZED HEALTH CARE EDUCATION/TRAINING PROGRAM

## 2021-09-13 PROCEDURE — 36415 COLL VENOUS BLD VENIPUNCTURE: CPT | Performed by: STUDENT IN AN ORGANIZED HEALTH CARE EDUCATION/TRAINING PROGRAM

## 2021-09-13 PROCEDURE — 94640 AIRWAY INHALATION TREATMENT: CPT

## 2021-09-13 PROCEDURE — S5010 5% DEXTROSE AND 0.45% SALINE: HCPCS | Performed by: STUDENT IN AN ORGANIZED HEALTH CARE EDUCATION/TRAINING PROGRAM

## 2021-09-13 PROCEDURE — 25000003 PHARM REV CODE 250: Performed by: STUDENT IN AN ORGANIZED HEALTH CARE EDUCATION/TRAINING PROGRAM

## 2021-09-13 RX ORDER — HYDROCODONE BITARTRATE AND ACETAMINOPHEN 10; 325 MG/1; MG/1
1 TABLET ORAL EVERY 6 HOURS PRN
Qty: 20 TABLET | Refills: 0 | Status: SHIPPED | OUTPATIENT
Start: 2021-09-13 | End: 2023-04-11

## 2021-09-13 RX ORDER — LEVOFLOXACIN 750 MG/1
750 TABLET ORAL DAILY
Qty: 3 TABLET | Refills: 0 | Status: ON HOLD | OUTPATIENT
Start: 2021-09-13 | End: 2022-01-27 | Stop reason: HOSPADM

## 2021-09-13 RX ADMIN — HYDROXYUREA 500 MG: 500 CAPSULE ORAL at 09:09

## 2021-09-13 RX ADMIN — FLUTICASONE FUROATE AND VILANTEROL TRIFENATATE 1 PUFF: 100; 25 POWDER RESPIRATORY (INHALATION) at 07:09

## 2021-09-13 RX ADMIN — DEXTROSE AND SODIUM CHLORIDE: 5; .45 INJECTION, SOLUTION INTRAVENOUS at 01:09

## 2021-09-13 RX ADMIN — MONTELUKAST 10 MG: 10 TABLET, FILM COATED ORAL at 09:09

## 2021-09-13 RX ADMIN — LEVOFLOXACIN 750 MG: 750 TABLET, FILM COATED ORAL at 09:09

## 2021-09-13 RX ADMIN — FOLIC ACID 1 MG: 1 TABLET ORAL at 09:09

## 2021-09-14 ENCOUNTER — PATIENT OUTREACH (OUTPATIENT)
Dept: ADMINISTRATIVE | Facility: CLINIC | Age: 31
End: 2021-09-14

## 2021-09-22 ENCOUNTER — TELEPHONE (OUTPATIENT)
Dept: UROLOGY | Facility: CLINIC | Age: 31
End: 2021-09-22

## 2021-09-30 ENCOUNTER — TELEPHONE (OUTPATIENT)
Dept: SMOKING CESSATION | Facility: CLINIC | Age: 31
End: 2021-09-30

## 2021-10-04 LAB
HGB A2 MFR BLD HPLC: 2.7 % (ref 2.2–3.2)
HGB FRACT BLD ELPH-IMP: NORMAL
HGB S MFR BLD ELPH: 50 %

## 2022-01-24 NOTE — PLAN OF CARE
11/13/19 1231   Final Note   Assessment Type Final Discharge Note   Anticipated Discharge Disposition Home   What phone number can be called within the next 1-3 days to see how you are doing after discharge? [phone]   Hospital Follow Up  Appt(s) scheduled? Yes   Discharge plans and expectations educations in teach back method with documentation complete? Yes   Right Care Referral Info   Post Acute Recommendation No Care      No

## 2022-01-26 ENCOUNTER — HOSPITAL ENCOUNTER (OUTPATIENT)
Facility: HOSPITAL | Age: 32
Discharge: HOME | End: 2022-01-27
Attending: EMERGENCY MEDICINE | Admitting: EMERGENCY MEDICINE
Payer: MEDICAID

## 2022-01-26 DIAGNOSIS — D57.00 SICKLE CELL PAIN CRISIS: Primary | ICD-10-CM

## 2022-01-26 DIAGNOSIS — R52 PAIN: ICD-10-CM

## 2022-01-26 DIAGNOSIS — U07.1 COVID-19: ICD-10-CM

## 2022-01-26 LAB
ALBUMIN SERPL BCP-MCNC: 4.2 G/DL (ref 3.5–5.2)
ALP SERPL-CCNC: 67 U/L (ref 55–135)
ALT SERPL W/O P-5'-P-CCNC: 29 U/L (ref 10–44)
ANION GAP SERPL CALC-SCNC: 6 MMOL/L (ref 8–16)
ANISOCYTOSIS BLD QL SMEAR: ABNORMAL
AST SERPL-CCNC: 18 U/L (ref 10–40)
BASOPHILS # BLD AUTO: 0.11 K/UL (ref 0–0.2)
BASOPHILS NFR BLD: 1.1 % (ref 0–1.9)
BILIRUB SERPL-MCNC: 1.8 MG/DL (ref 0.1–1)
BILIRUB UR QL STRIP: NEGATIVE
BNP SERPL-MCNC: <10 PG/ML (ref 0–99)
BUN SERPL-MCNC: 4 MG/DL (ref 6–20)
CALCIUM SERPL-MCNC: 9 MG/DL (ref 8.7–10.5)
CHLORIDE SERPL-SCNC: 108 MMOL/L (ref 95–110)
CK SERPL-CCNC: 160 U/L (ref 20–200)
CLARITY UR: CLEAR
CO2 SERPL-SCNC: 26 MMOL/L (ref 23–29)
COLOR UR: YELLOW
CREAT SERPL-MCNC: 0.8 MG/DL (ref 0.5–1.4)
CRP SERPL-MCNC: 1.2 MG/L (ref 0–8.2)
CTP QC/QA: YES
CTP QC/QA: YES
D DIMER PPP IA.FEU-MCNC: 2.33 MG/L FEU
DIFFERENTIAL METHOD: ABNORMAL
EOSINOPHIL # BLD AUTO: 0.4 K/UL (ref 0–0.5)
EOSINOPHIL NFR BLD: 3.5 % (ref 0–8)
ERYTHROCYTE [DISTWIDTH] IN BLOOD BY AUTOMATED COUNT: 16.5 % (ref 11.5–14.5)
EST. GFR  (AFRICAN AMERICAN): >60 ML/MIN/1.73 M^2
EST. GFR  (NON AFRICAN AMERICAN): >60 ML/MIN/1.73 M^2
FERRITIN SERPL-MCNC: 985 NG/ML (ref 20–300)
GLUCOSE SERPL-MCNC: 92 MG/DL (ref 70–110)
GLUCOSE UR QL STRIP: NEGATIVE
HCT VFR BLD AUTO: 31.1 % (ref 40–54)
HGB BLD-MCNC: 11.5 G/DL (ref 14–18)
HGB UR QL STRIP: NEGATIVE
IMM GRANULOCYTES # BLD AUTO: 0.06 K/UL (ref 0–0.04)
IMM GRANULOCYTES NFR BLD AUTO: 0.6 % (ref 0–0.5)
KETONES UR QL STRIP: NEGATIVE
LACTATE SERPL-SCNC: 0.9 MMOL/L (ref 0.5–2.2)
LDH SERPL L TO P-CCNC: 366 U/L (ref 110–260)
LEUKOCYTE ESTERASE UR QL STRIP: NEGATIVE
LYMPHOCYTES # BLD AUTO: 4.4 K/UL (ref 1–4.8)
LYMPHOCYTES NFR BLD: 43.6 % (ref 18–48)
MCH RBC QN AUTO: 31.3 PG (ref 27–31)
MCHC RBC AUTO-ENTMCNC: 37 G/DL (ref 32–36)
MCV RBC AUTO: 85 FL (ref 82–98)
MONOCYTES # BLD AUTO: 1.8 K/UL (ref 0.3–1)
MONOCYTES NFR BLD: 18.2 % (ref 4–15)
NEUTROPHILS # BLD AUTO: 3.3 K/UL (ref 1.8–7.7)
NEUTROPHILS NFR BLD: 33 % (ref 38–73)
NITRITE UR QL STRIP: NEGATIVE
NRBC BLD-RTO: 1 /100 WBC
PH UR STRIP: 8 [PH] (ref 5–8)
PLATELET # BLD AUTO: 519 K/UL (ref 150–450)
PMV BLD AUTO: 10.4 FL (ref 9.2–12.9)
POC MOLECULAR INFLUENZA A AGN: NEGATIVE
POC MOLECULAR INFLUENZA B AGN: NEGATIVE
POTASSIUM SERPL-SCNC: 3.9 MMOL/L (ref 3.5–5.1)
PROCALCITONIN SERPL IA-MCNC: 0.06 NG/ML
PROT SERPL-MCNC: 7.6 G/DL (ref 6–8.4)
PROT UR QL STRIP: NEGATIVE
RBC # BLD AUTO: 3.67 M/UL (ref 4.6–6.2)
RETICS/RBC NFR AUTO: 5.4 % (ref 0.4–2)
SARS-COV-2 RDRP RESP QL NAA+PROBE: POSITIVE
SODIUM SERPL-SCNC: 140 MMOL/L (ref 136–145)
SP GR UR STRIP: 1.01 (ref 1–1.03)
TARGETS BLD QL SMEAR: ABNORMAL
TROPONIN I SERPL DL<=0.01 NG/ML-MCNC: <0.006 NG/ML (ref 0–0.03)
URN SPEC COLLECT METH UR: NORMAL
UROBILINOGEN UR STRIP-ACNC: NEGATIVE EU/DL
WBC # BLD AUTO: 10.04 K/UL (ref 3.9–12.7)

## 2022-01-26 PROCEDURE — 83615 LACTATE (LD) (LDH) ENZYME: CPT | Performed by: EMERGENCY MEDICINE

## 2022-01-26 PROCEDURE — 93005 ELECTROCARDIOGRAM TRACING: CPT

## 2022-01-26 PROCEDURE — 99285 EMERGENCY DEPT VISIT HI MDM: CPT | Mod: 25

## 2022-01-26 PROCEDURE — 85045 AUTOMATED RETICULOCYTE COUNT: CPT | Performed by: NURSE PRACTITIONER

## 2022-01-26 PROCEDURE — 87040 BLOOD CULTURE FOR BACTERIA: CPT | Performed by: EMERGENCY MEDICINE

## 2022-01-26 PROCEDURE — U0002 COVID-19 LAB TEST NON-CDC: HCPCS | Performed by: EMERGENCY MEDICINE

## 2022-01-26 PROCEDURE — 84484 ASSAY OF TROPONIN QUANT: CPT | Performed by: EMERGENCY MEDICINE

## 2022-01-26 PROCEDURE — G0378 HOSPITAL OBSERVATION PER HR: HCPCS

## 2022-01-26 PROCEDURE — 25500020 PHARM REV CODE 255: Performed by: EMERGENCY MEDICINE

## 2022-01-26 PROCEDURE — 85025 COMPLETE CBC W/AUTO DIFF WBC: CPT | Performed by: NURSE PRACTITIONER

## 2022-01-26 PROCEDURE — 63600175 PHARM REV CODE 636 W HCPCS: Performed by: EMERGENCY MEDICINE

## 2022-01-26 PROCEDURE — 84145 PROCALCITONIN (PCT): CPT | Performed by: EMERGENCY MEDICINE

## 2022-01-26 PROCEDURE — 87502 INFLUENZA DNA AMP PROBE: CPT

## 2022-01-26 PROCEDURE — 93010 ELECTROCARDIOGRAM REPORT: CPT | Mod: ,,, | Performed by: INTERNAL MEDICINE

## 2022-01-26 PROCEDURE — 81003 URINALYSIS AUTO W/O SCOPE: CPT | Performed by: NURSE PRACTITIONER

## 2022-01-26 PROCEDURE — 82550 ASSAY OF CK (CPK): CPT | Performed by: EMERGENCY MEDICINE

## 2022-01-26 PROCEDURE — 85379 FIBRIN DEGRADATION QUANT: CPT | Performed by: EMERGENCY MEDICINE

## 2022-01-26 PROCEDURE — 83880 ASSAY OF NATRIURETIC PEPTIDE: CPT | Performed by: EMERGENCY MEDICINE

## 2022-01-26 PROCEDURE — 83605 ASSAY OF LACTIC ACID: CPT | Performed by: EMERGENCY MEDICINE

## 2022-01-26 PROCEDURE — 86140 C-REACTIVE PROTEIN: CPT | Performed by: EMERGENCY MEDICINE

## 2022-01-26 PROCEDURE — 96361 HYDRATE IV INFUSION ADD-ON: CPT

## 2022-01-26 PROCEDURE — 82728 ASSAY OF FERRITIN: CPT | Performed by: EMERGENCY MEDICINE

## 2022-01-26 PROCEDURE — 96376 TX/PRO/DX INJ SAME DRUG ADON: CPT | Mod: 59

## 2022-01-26 PROCEDURE — 80053 COMPREHEN METABOLIC PANEL: CPT | Performed by: NURSE PRACTITIONER

## 2022-01-26 PROCEDURE — 63600175 PHARM REV CODE 636 W HCPCS: Performed by: NURSE PRACTITIONER

## 2022-01-26 PROCEDURE — 93010 EKG 12-LEAD: ICD-10-PCS | Mod: ,,, | Performed by: INTERNAL MEDICINE

## 2022-01-26 PROCEDURE — 25000003 PHARM REV CODE 250: Performed by: EMERGENCY MEDICINE

## 2022-01-26 PROCEDURE — 96374 THER/PROPH/DIAG INJ IV PUSH: CPT | Mod: 59

## 2022-01-26 PROCEDURE — 96375 TX/PRO/DX INJ NEW DRUG ADDON: CPT | Mod: 59

## 2022-01-26 RX ORDER — IBUPROFEN 600 MG/1
600 TABLET ORAL
Status: DISCONTINUED | OUTPATIENT
Start: 2022-01-26 | End: 2022-01-26

## 2022-01-26 RX ORDER — IBUPROFEN 600 MG/1
600 TABLET ORAL
Status: COMPLETED | OUTPATIENT
Start: 2022-01-26 | End: 2022-01-26

## 2022-01-26 RX ORDER — SODIUM CHLORIDE 9 MG/ML
INJECTION, SOLUTION INTRAVENOUS
Status: COMPLETED | OUTPATIENT
Start: 2022-01-26 | End: 2022-01-27

## 2022-01-26 RX ORDER — ACETAMINOPHEN 500 MG
1000 TABLET ORAL
Status: COMPLETED | OUTPATIENT
Start: 2022-01-26 | End: 2022-01-26

## 2022-01-26 RX ORDER — HYDROMORPHONE HYDROCHLORIDE 2 MG/ML
1 INJECTION, SOLUTION INTRAMUSCULAR; INTRAVENOUS; SUBCUTANEOUS
Status: COMPLETED | OUTPATIENT
Start: 2022-01-26 | End: 2022-01-26

## 2022-01-26 RX ORDER — ONDANSETRON 2 MG/ML
4 INJECTION INTRAMUSCULAR; INTRAVENOUS
Status: COMPLETED | OUTPATIENT
Start: 2022-01-26 | End: 2022-01-26

## 2022-01-26 RX ORDER — KETOROLAC TROMETHAMINE 30 MG/ML
15 INJECTION, SOLUTION INTRAMUSCULAR; INTRAVENOUS
Status: COMPLETED | OUTPATIENT
Start: 2022-01-26 | End: 2022-01-26

## 2022-01-26 RX ADMIN — KETOROLAC TROMETHAMINE 15 MG: 30 INJECTION, SOLUTION INTRAMUSCULAR at 05:01

## 2022-01-26 RX ADMIN — SODIUM CHLORIDE 1000 ML: 0.9 INJECTION, SOLUTION INTRAVENOUS at 04:01

## 2022-01-26 RX ADMIN — SODIUM CHLORIDE 1000 ML: 0.9 INJECTION, SOLUTION INTRAVENOUS at 02:01

## 2022-01-26 RX ADMIN — ACETAMINOPHEN 1000 MG: 500 TABLET ORAL at 05:01

## 2022-01-26 RX ADMIN — IBUPROFEN 600 MG: 600 TABLET, FILM COATED ORAL at 08:01

## 2022-01-26 RX ADMIN — HYDROMORPHONE HYDROCHLORIDE 1 MG: 2 INJECTION, SOLUTION INTRAMUSCULAR; INTRAVENOUS; SUBCUTANEOUS at 06:01

## 2022-01-26 RX ADMIN — SODIUM CHLORIDE: 0.9 INJECTION, SOLUTION INTRAVENOUS at 08:01

## 2022-01-26 RX ADMIN — HYDROMORPHONE HYDROCHLORIDE 1 MG: 2 INJECTION, SOLUTION INTRAMUSCULAR; INTRAVENOUS; SUBCUTANEOUS at 02:01

## 2022-01-26 RX ADMIN — ONDANSETRON 4 MG: 2 INJECTION INTRAMUSCULAR; INTRAVENOUS at 02:01

## 2022-01-26 RX ADMIN — HYDROMORPHONE HYDROCHLORIDE 1 MG: 2 INJECTION, SOLUTION INTRAMUSCULAR; INTRAVENOUS; SUBCUTANEOUS at 08:01

## 2022-01-26 RX ADMIN — IOHEXOL 75 ML: 350 INJECTION, SOLUTION INTRAVENOUS at 05:01

## 2022-01-26 NOTE — ED PROVIDER NOTES
Encounter Date: 1/26/2022    SCRIBE #1 NOTE: I, Abril Benedict, am scribing for, and in the presence of,  Josey Cesar MD. I have scribed the following portions of the note - Other sections scribed: HPI, ROS, PE.       History     Chief Complaint   Patient presents with    Sickle Cell Pain Crisis     Presents to the ED via EMS from [facility] with c/o sickle cell pain after testign COVID+ today. Security at bedside.     Mr Parham is a 31 y.o. male, with a PMHx of Asthma, Sickle cell anemia, who presents to the ED with fatigue. Patient has been in police custody and shelter for the last 15 days. Patient reports he started feeling generalized fatigue and malaise 1 day ago, along with generalized back pain, myalgias, weakness, and chest pain not consistent with prior acute chest syndrome. Further endorses nausea, 5-10 episodes of vomiting, non-bloody diarrhea, since 1 day ago. Patient tested positive for COVID-19 today. Endorses medication compliance with hydroxyurea and folic acid. NO attempted Tx with OTC pain or nausea medications. No other exacerbating or alleviating factors. No other associated symptoms.     The history is provided by the patient.     Review of patient's allergies indicates:   Allergen Reactions    Penicillins Anaphylaxis     Past Medical History:   Diagnosis Date    Asthma     Broken thumb 2017    Left    Cigarette smoker     Hemoglobin S-C disease     Sickle cell anemia      Past Surgical History:   Procedure Laterality Date    HAND SURGERY Left 12/21/2017    ORIF thumb    ORIF mandible  01/31/2013    spleenectomy       Family History   Family history unknown: Yes     Social History     Tobacco Use    Smoking status: Current Every Day Smoker     Packs/day: 0.25     Types: Cigarettes    Smokeless tobacco: Never Used    Tobacco comment: encouraged to quit.    Substance Use Topics    Alcohol use: Yes     Comment: socially    Drug use: No     Review of Systems   Constitutional: Positive for  fatigue.   HENT: Positive for congestion.    Respiratory: Positive for cough. Negative for shortness of breath.    Cardiovascular: Positive for chest pain.   Gastrointestinal: Positive for diarrhea, nausea and vomiting.   Musculoskeletal: Positive for back pain and myalgias.   Neurological: Positive for weakness.   All other systems reviewed and are negative.      Physical Exam     Initial Vitals [01/26/22 1247]   BP Pulse Resp Temp SpO2   113/67 82 18 98.5 °F (36.9 °C) 98 %      MAP       --         Physical Exam    Nursing note and vitals reviewed.  Constitutional: He is not diaphoretic. No distress.   Appears to not feel well. Polite, calm.    HENT:   Head: Normocephalic and atraumatic.   Eyes: EOM are normal. Pupils are equal, round, and reactive to light.   Cardiovascular: Normal rate, regular rhythm and normal heart sounds.   Pulmonary/Chest: Breath sounds normal. No respiratory distress. He has no wheezes.   Abdominal: Abdomen is soft. He exhibits no distension. There is abdominal tenderness (mild epigastric).   Musculoskeletal:         General: No tenderness or edema. Normal range of motion.      Comments: Good perfusion.     Neurological: He is alert and oriented to person, place, and time. GCS score is 15. GCS eye subscore is 4. GCS verbal subscore is 5. GCS motor subscore is 6.   Fully lucid and linear.    Skin: Skin is warm and dry.   Psychiatric: He has a normal mood and affect. His behavior is normal. Thought content normal.         ED Course   Procedures  Labs Reviewed   CBC W/ AUTO DIFFERENTIAL - Abnormal; Notable for the following components:       Result Value    RBC 3.67 (*)     Hemoglobin 11.5 (*)     Hematocrit 31.1 (*)     MCH 31.3 (*)     MCHC 37.0 (*)     RDW 16.5 (*)     Platelets 519 (*)     Immature Granulocytes 0.6 (*)     Immature Grans (Abs) 0.06 (*)     Mono # 1.8 (*)     nRBC 1 (*)     Gran % 33.0 (*)     Mono % 18.2 (*)     All other components within normal limits   COMPREHENSIVE  METABOLIC PANEL - Abnormal; Notable for the following components:    BUN 4 (*)     Total Bilirubin 1.8 (*)     Anion Gap 6 (*)     All other components within normal limits   RETICULOCYTES - Abnormal; Notable for the following components:    Retic 5.4 (*)     All other components within normal limits   FERRITIN - Abnormal; Notable for the following components:    Ferritin 985 (*)     All other components within normal limits   LACTATE DEHYDROGENASE - Abnormal; Notable for the following components:     (*)     All other components within normal limits   D DIMER, QUANTITATIVE - Abnormal; Notable for the following components:    D-Dimer 2.33 (*)     All other components within normal limits   SARS-COV-2 RDRP GENE - Abnormal; Notable for the following components:    POC Rapid COVID Positive (*)     All other components within normal limits   URINALYSIS, REFLEX TO URINE CULTURE    Narrative:     Specimen Source->Urine   C-REACTIVE PROTEIN   CK   LACTIC ACID, PLASMA   TROPONIN I   PROCALCITONIN   B-TYPE NATRIURETIC PEPTIDE   POCT INFLUENZA A/B MOLECULAR        ECG Results          EKG 12-lead (Final result)  Result time 01/26/22 18:20:12    Final result by Interface, Lab In [facility] (01/26/22 18:20:12)                 Narrative:    Test Reason : U07.1,    Vent. Rate : 076 BPM     Atrial Rate : 076 BPM     P-R Int : 180 ms          QRS Dur : 100 ms      QT Int : 374 ms       P-R-T Axes : 086 061 084 degrees     QTc Int : 420 ms    Normal sinus rhythm with sinus arrhythmia  Voltage criteria for left ventricular hypertrophy  Abnormal ECG  When compared with ECG of 08-SEP-2021 16:07,  Significant changes have occurred  Confirmed by Martin Sanders MD ([id number]) on 1/26/2022 6:20:09 PM    Referred By: [email or web address]   SELF           Confirmed By:Martin Sanders MD                            Imaging Results          CTA Chest Non-Coronary (PE Study) (Final result)  Result time 01/26/22 17:31:35    Final result by Nixon Cano MD  (01/26/22 17:31:35)                 Impression:      No evidence of PE.  No acute intrathoracic abnormalities identified.      Electronically signed by: Nixon Cano MD  Date:    01/26/2022  Time:    17:31             Narrative:    EXAMINATION:  CTA CHEST NON CORONARY    CLINICAL HISTORY:  Pulmonary embolism (PE) suspected, positive D-dimer;    TECHNIQUE:  Low dose axial images, sagittal and coronal reformations were obtained from the thoracic inlet to the lung bases following the IV administration of 75 mL of Omnipaque 350.  Contrast timing was optimized to evaluate the pulmonary arteries.  MIP images were performed.    COMPARISON:  CTA chest from September 2021.    FINDINGS:  Structures at the base of the neck are unremarkable.  Aorta is non-aneurysmal.  The heart is normal in size without pericardial effusion.  No intraluminal filling defects within the pulmonary arteries to suggest pulmonary thromboembolism.   There is no evidence of mediastinal, axillary, or hilar lymph node enlargement.  The esophagus is unremarkable along its course.    The trachea and bronchi are patent.  The lungs are symmetrically expanded.  Minimal atelectatic change or scarring is seen in the left lower lobe and right middle lobe.  No focal consolidation.  No pleural effusion.  No evidence of pulmonary hemorrhage or infarction.    The visualized abdominal structures show no acute abnormalities.  No acute osseous abnormality identified.  Extrathoracic soft tissues are unremarkable.                               X-Ray Chest AP Portable (Final result)  Result time 01/26/22 15:09:06    Final result by Lewis Mann MD (01/26/22 15:09:06)                 Impression:      No acute abnormality.      Electronically signed by: Lewis Mann  Date:    01/26/2022  Time:    15:09             Narrative:    EXAMINATION:  XR CHEST AP PORTABLE    CLINICAL HISTORY:  COVID-19;    TECHNIQUE:  Single frontal view of the chest was  performed.    COMPARISON:  09/08/2021    FINDINGS:  The lungs are clear, with normal appearance of pulmonary vasculature and no pleural effusion or pneumothorax.    The cardiac silhouette is normal in size. The hilar and mediastinal contours are unremarkable.    Bones are intact.                                 Medications   dextrose 5 % and 0.45 % NaCl infusion ( Intravenous New Bag 1/27/22 0106)   sodium chloride 0.9% flush 10 mL (has no administration in time range)   glucose chewable tablet 16 g (has no administration in time range)   glucose chewable tablet 24 g (has no administration in time range)   glucagon (human recombinant) injection 1 mg (has no administration in time range)   ascorbic acid (vitamin C) tablet 500 mg (500 mg Oral Given 1/27/22 0934)   multivitamin tablet (1 tablet Oral Given 1/27/22 0935)   HYDROcodone-acetaminophen 5-325 mg per tablet 1 tablet (has no administration in time range)   HYDROcodone-acetaminophen  mg per tablet 1 tablet (has no administration in time range)   loperamide capsule 2 mg (has no administration in time range)   ondansetron injection 4 mg (has no administration in time range)   dextromethorphan-guaiFENesin  mg/5 ml liquid 10 mL (has no administration in time range)   acetaminophen tablet 650 mg (has no administration in time range)   melatonin tablet 6 mg (has no administration in time range)   morphine injection 2 mg (2 mg Intravenous Given 1/27/22 1409)   folic acid tablet 1 mg (1 mg Oral Given 1/27/22 0935)   hydroxyurea capsule 500 mg (500 mg Oral Given 1/27/22 0935)   fluticasone furoate-vilanteroL 100-25 mcg/dose diskus inhaler 1 puff (1 puff Inhalation Given 1/27/22 0736)   dextrose 10% bolus 125 mL (has no administration in time range)   dextrose 10% bolus 250 mL (has no administration in time range)   albuterol inhaler 2 puff (2 puffs Inhalation Given 1/27/22 0734)   enoxaparin injection 40 mg (has no administration in time range)    HYDROmorphone (PF) injection 1 mg (1 mg Intravenous Given 1/26/22 1432)   sodium chloride 0.9% bolus 1,000 mL (0 mLs Intravenous Stopped 1/26/22 1621)   ondansetron injection 4 mg (4 mg Intravenous Given 1/26/22 1432)   sodium chloride 0.9% bolus 1,000 mL (0 mLs Intravenous Stopped 1/26/22 1727)   acetaminophen tablet 1,000 mg (1,000 mg Oral Given 1/26/22 1724)   ketorolac injection 15 mg (15 mg Intravenous Given 1/26/22 1725)   iohexoL (OMNIPAQUE 350) injection 75 mL (75 mLs Intravenous Given 1/26/22 1716)   HYDROmorphone (PF) injection 1 mg (1 mg Intravenous Given 1/26/22 1814)   HYDROmorphone (PF) injection 1 mg (1 mg Intravenous Given 1/26/22 2002)   ibuprofen tablet 600 mg (600 mg Oral Given 1/26/22 2003)   0.9%  NaCl infusion ( Intravenous Stopped 1/27/22 0022)   HYDROmorphone (PF) injection 1 mg (1 mg Intravenous Given 1/27/22 0135)     Medical Decision Making:   History:   Old Medical Records: I decided to obtain old medical records.  Independently Interpreted Test(s):   I have ordered and independently interpreted EKG Reading(s) - see prior notes  Clinical Tests:   Lab Tests: Ordered and Reviewed  Radiological Study: Ordered and Reviewed  Medical Tests: Ordered and Reviewed  ED Management:  Prior records reviewed.   Mr. Parham has been stable during his time in the ER. He has been given tylenol, nsaids, dilaudid, zofran.   NS bolus x2. Continues to feel bad despite intervention. Vitals noted. Labs noted. Elevated retic count from most recent baseline.   Will place pt in observation for further hydration and symptomatic care for sickle cell crisis with covid. Not hypoxic. . Discussed w Jey GARDUNO.             Scribe Attestation:   Scribe #1: I performed the above scribed service and the documentation accurately describes the services I performed. I attest to the accuracy of the note.                 Clinical Impression:   Final diagnoses:  [U07.1] COVID-19  [D57.00] Sickle cell pain crisis  (Primary)          ED Disposition Condition    Observation               Josey Cesar MD  01/27/22 0437

## 2022-01-26 NOTE — ED TRIAGE NOTES
"Pt reports to the ED BIB EMS with C/O sickle cell pain crisis, N/V, and SOB that started yesterday. Pt was diagnosed with Covid-19 at [facility]. Pt reports "my back and chest hurts." Pt also reports diarrhea. Pt AAOx4, labored breathing at rest, NSR, normotensive. ED workup in progress. NAD noted.   "

## 2022-01-27 VITALS
OXYGEN SATURATION: 98 % | DIASTOLIC BLOOD PRESSURE: 53 MMHG | RESPIRATION RATE: 18 BRPM | BODY MASS INDEX: 22.9 KG/M2 | SYSTOLIC BLOOD PRESSURE: 100 MMHG | HEIGHT: 70 IN | HEART RATE: 77 BPM | WEIGHT: 160 LBS | TEMPERATURE: 98 F

## 2022-01-27 DIAGNOSIS — U07.1 COVID-19 VIRUS DETECTED: ICD-10-CM

## 2022-01-27 LAB
ALBUMIN SERPL BCP-MCNC: 3.4 G/DL (ref 3.5–5.2)
ALP SERPL-CCNC: 55 U/L (ref 55–135)
ALT SERPL W/O P-5'-P-CCNC: 22 U/L (ref 10–44)
ANION GAP SERPL CALC-SCNC: 6 MMOL/L (ref 8–16)
APTT BLDCRRT: 22.8 SEC (ref 21–32)
AST SERPL-CCNC: 15 U/L (ref 10–40)
BASOPHILS # BLD AUTO: 0.11 K/UL (ref 0–0.2)
BASOPHILS NFR BLD: 1.1 % (ref 0–1.9)
BILIRUB SERPL-MCNC: 1.1 MG/DL (ref 0.1–1)
BUN SERPL-MCNC: 3 MG/DL (ref 6–20)
CALCIUM SERPL-MCNC: 8.1 MG/DL (ref 8.7–10.5)
CHLORIDE SERPL-SCNC: 110 MMOL/L (ref 95–110)
CO2 SERPL-SCNC: 24 MMOL/L (ref 23–29)
CREAT SERPL-MCNC: 0.8 MG/DL (ref 0.5–1.4)
DIFFERENTIAL METHOD: ABNORMAL
EOSINOPHIL # BLD AUTO: 1.1 K/UL (ref 0–0.5)
EOSINOPHIL NFR BLD: 10.9 % (ref 0–8)
ERYTHROCYTE [DISTWIDTH] IN BLOOD BY AUTOMATED COUNT: 16.6 % (ref 11.5–14.5)
ERYTHROCYTE [SEDIMENTATION RATE] IN BLOOD BY WESTERGREN METHOD: 0 MM/HR (ref 0–10)
EST. GFR  (AFRICAN AMERICAN): >60 ML/MIN/1.73 M^2
EST. GFR  (NON AFRICAN AMERICAN): >60 ML/MIN/1.73 M^2
GLUCOSE SERPL-MCNC: 91 MG/DL (ref 70–110)
HCT VFR BLD AUTO: 27.3 % (ref 40–54)
HGB BLD-MCNC: 9.8 G/DL (ref 14–18)
IMM GRANULOCYTES # BLD AUTO: 0.04 K/UL (ref 0–0.04)
IMM GRANULOCYTES NFR BLD AUTO: 0.4 % (ref 0–0.5)
INR PPP: 1.1 (ref 0.8–1.2)
LYMPHOCYTES # BLD AUTO: 5.9 K/UL (ref 1–4.8)
LYMPHOCYTES NFR BLD: 56.9 % (ref 18–48)
MCH RBC QN AUTO: 30.9 PG (ref 27–31)
MCHC RBC AUTO-ENTMCNC: 35.9 G/DL (ref 32–36)
MCV RBC AUTO: 86 FL (ref 82–98)
MONOCYTES # BLD AUTO: 1.3 K/UL (ref 0.3–1)
MONOCYTES NFR BLD: 13 % (ref 4–15)
NEUTROPHILS # BLD AUTO: 1.8 K/UL (ref 1.8–7.7)
NEUTROPHILS NFR BLD: 17.7 % (ref 38–73)
NRBC BLD-RTO: 2 /100 WBC
PLATELET # BLD AUTO: 420 K/UL (ref 150–450)
PMV BLD AUTO: 10 FL (ref 9.2–12.9)
POTASSIUM SERPL-SCNC: 4.6 MMOL/L (ref 3.5–5.1)
PROT SERPL-MCNC: 6 G/DL (ref 6–8.4)
PROTHROMBIN TIME: 11.6 SEC (ref 9–12.5)
RBC # BLD AUTO: 3.17 M/UL (ref 4.6–6.2)
SODIUM SERPL-SCNC: 140 MMOL/L (ref 136–145)
WBC # BLD AUTO: 10.28 K/UL (ref 3.9–12.7)

## 2022-01-27 PROCEDURE — 96376 TX/PRO/DX INJ SAME DRUG ADON: CPT

## 2022-01-27 PROCEDURE — S5010 5% DEXTROSE AND 0.45% SALINE: HCPCS | Performed by: NURSE PRACTITIONER

## 2022-01-27 PROCEDURE — 63600175 PHARM REV CODE 636 W HCPCS: Performed by: EMERGENCY MEDICINE

## 2022-01-27 PROCEDURE — 36415 COLL VENOUS BLD VENIPUNCTURE: CPT | Performed by: NURSE PRACTITIONER

## 2022-01-27 PROCEDURE — 94640 AIRWAY INHALATION TREATMENT: CPT

## 2022-01-27 PROCEDURE — 85730 THROMBOPLASTIN TIME PARTIAL: CPT | Performed by: NURSE PRACTITIONER

## 2022-01-27 PROCEDURE — G0378 HOSPITAL OBSERVATION PER HR: HCPCS

## 2022-01-27 PROCEDURE — 96375 TX/PRO/DX INJ NEW DRUG ADDON: CPT

## 2022-01-27 PROCEDURE — 25000003 PHARM REV CODE 250: Performed by: NURSE PRACTITIONER

## 2022-01-27 PROCEDURE — 25000242 PHARM REV CODE 250 ALT 637 W/ HCPCS: Performed by: NURSE PRACTITIONER

## 2022-01-27 PROCEDURE — 85652 RBC SED RATE AUTOMATED: CPT | Performed by: NURSE PRACTITIONER

## 2022-01-27 PROCEDURE — 85025 COMPLETE CBC W/AUTO DIFF WBC: CPT | Performed by: NURSE PRACTITIONER

## 2022-01-27 PROCEDURE — 85610 PROTHROMBIN TIME: CPT | Performed by: NURSE PRACTITIONER

## 2022-01-27 PROCEDURE — 80053 COMPREHEN METABOLIC PANEL: CPT | Performed by: NURSE PRACTITIONER

## 2022-01-27 PROCEDURE — 63600175 PHARM REV CODE 636 W HCPCS: Performed by: NURSE PRACTITIONER

## 2022-01-27 PROCEDURE — 25000242 PHARM REV CODE 250 ALT 637 W/ HCPCS: Performed by: STUDENT IN AN ORGANIZED HEALTH CARE EDUCATION/TRAINING PROGRAM

## 2022-01-27 RX ORDER — MORPHINE SULFATE 4 MG/ML
2 INJECTION, SOLUTION INTRAMUSCULAR; INTRAVENOUS EVERY 4 HOURS PRN
Status: DISCONTINUED | OUTPATIENT
Start: 2022-01-27 | End: 2022-01-27 | Stop reason: HOSPADM

## 2022-01-27 RX ORDER — ASCORBIC ACID 500 MG
500 TABLET ORAL 2 TIMES DAILY
Status: DISCONTINUED | OUTPATIENT
Start: 2022-01-27 | End: 2022-01-27 | Stop reason: HOSPADM

## 2022-01-27 RX ORDER — HYDROMORPHONE HYDROCHLORIDE 2 MG/ML
1 INJECTION, SOLUTION INTRAMUSCULAR; INTRAVENOUS; SUBCUTANEOUS
Status: COMPLETED | OUTPATIENT
Start: 2022-01-27 | End: 2022-01-27

## 2022-01-27 RX ORDER — ASCORBIC ACID 500 MG
500 TABLET ORAL 2 TIMES DAILY
Qty: 60 TABLET | Refills: 0
Start: 2022-01-27 | End: 2023-04-11

## 2022-01-27 RX ORDER — ENOXAPARIN SODIUM 100 MG/ML
40 INJECTION SUBCUTANEOUS EVERY 24 HOURS
Status: DISCONTINUED | OUTPATIENT
Start: 2022-01-27 | End: 2022-01-27 | Stop reason: HOSPADM

## 2022-01-27 RX ORDER — IBUPROFEN 200 MG
24 TABLET ORAL
Status: DISCONTINUED | OUTPATIENT
Start: 2022-01-27 | End: 2022-01-27 | Stop reason: HOSPADM

## 2022-01-27 RX ORDER — LOPERAMIDE HYDROCHLORIDE 2 MG/1
2 CAPSULE ORAL EVERY 6 HOURS PRN
Status: DISCONTINUED | OUTPATIENT
Start: 2022-01-27 | End: 2022-01-27 | Stop reason: HOSPADM

## 2022-01-27 RX ORDER — ALBUTEROL SULFATE 90 UG/1
2 AEROSOL, METERED RESPIRATORY (INHALATION) EVERY 6 HOURS
Status: DISCONTINUED | OUTPATIENT
Start: 2022-01-27 | End: 2022-01-27

## 2022-01-27 RX ORDER — IBUPROFEN 200 MG
16 TABLET ORAL
Status: DISCONTINUED | OUTPATIENT
Start: 2022-01-27 | End: 2022-01-27 | Stop reason: HOSPADM

## 2022-01-27 RX ORDER — FOLIC ACID 1 MG/1
1 TABLET ORAL DAILY
Status: DISCONTINUED | OUTPATIENT
Start: 2022-01-27 | End: 2022-01-27 | Stop reason: HOSPADM

## 2022-01-27 RX ORDER — SODIUM CHLORIDE 0.9 % (FLUSH) 0.9 %
10 SYRINGE (ML) INJECTION
Status: DISCONTINUED | OUTPATIENT
Start: 2022-01-27 | End: 2022-01-27 | Stop reason: HOSPADM

## 2022-01-27 RX ORDER — HYDROXYUREA 500 MG/1
500 CAPSULE ORAL 2 TIMES DAILY
Status: DISCONTINUED | OUTPATIENT
Start: 2022-01-27 | End: 2022-01-27 | Stop reason: HOSPADM

## 2022-01-27 RX ORDER — ONDANSETRON 2 MG/ML
4 INJECTION INTRAMUSCULAR; INTRAVENOUS EVERY 8 HOURS PRN
Status: DISCONTINUED | OUTPATIENT
Start: 2022-01-27 | End: 2022-01-27 | Stop reason: HOSPADM

## 2022-01-27 RX ORDER — HYDROCODONE BITARTRATE AND ACETAMINOPHEN 5; 325 MG/1; MG/1
1 TABLET ORAL EVERY 4 HOURS PRN
Status: DISCONTINUED | OUTPATIENT
Start: 2022-01-27 | End: 2022-01-27 | Stop reason: HOSPADM

## 2022-01-27 RX ORDER — GLUCAGON 1 MG
1 KIT INJECTION
Status: DISCONTINUED | OUTPATIENT
Start: 2022-01-27 | End: 2022-01-27 | Stop reason: HOSPADM

## 2022-01-27 RX ORDER — FLUTICASONE FUROATE AND VILANTEROL 100; 25 UG/1; UG/1
1 POWDER RESPIRATORY (INHALATION) DAILY
Status: DISCONTINUED | OUTPATIENT
Start: 2022-01-27 | End: 2022-01-27 | Stop reason: HOSPADM

## 2022-01-27 RX ORDER — ACETAMINOPHEN 325 MG/1
650 TABLET ORAL EVERY 4 HOURS PRN
Status: DISCONTINUED | OUTPATIENT
Start: 2022-01-27 | End: 2022-01-27 | Stop reason: HOSPADM

## 2022-01-27 RX ORDER — TALC
6 POWDER (GRAM) TOPICAL NIGHTLY PRN
Status: DISCONTINUED | OUTPATIENT
Start: 2022-01-27 | End: 2022-01-27 | Stop reason: HOSPADM

## 2022-01-27 RX ORDER — GUAIFENESIN/DEXTROMETHORPHAN 100-10MG/5
10 SYRUP ORAL EVERY 4 HOURS PRN
Status: DISCONTINUED | OUTPATIENT
Start: 2022-01-27 | End: 2022-01-27 | Stop reason: HOSPADM

## 2022-01-27 RX ORDER — DEXTROSE MONOHYDRATE AND SODIUM CHLORIDE 5; .45 G/100ML; G/100ML
INJECTION, SOLUTION INTRAVENOUS CONTINUOUS
Status: DISCONTINUED | OUTPATIENT
Start: 2022-01-27 | End: 2022-01-27 | Stop reason: HOSPADM

## 2022-01-27 RX ORDER — HYDROCODONE BITARTRATE AND ACETAMINOPHEN 10; 325 MG/1; MG/1
1 TABLET ORAL EVERY 4 HOURS PRN
Status: DISCONTINUED | OUTPATIENT
Start: 2022-01-27 | End: 2022-01-27 | Stop reason: HOSPADM

## 2022-01-27 RX ORDER — FLUTICASONE FUROATE AND VILANTEROL 100; 25 UG/1; UG/1
1 POWDER RESPIRATORY (INHALATION) DAILY
Refills: 1 | Status: DISCONTINUED | OUTPATIENT
Start: 2022-01-27 | End: 2022-01-27

## 2022-01-27 RX ORDER — ALBUTEROL SULFATE 90 UG/1
2 AEROSOL, METERED RESPIRATORY (INHALATION) 2 TIMES DAILY
Status: DISCONTINUED | OUTPATIENT
Start: 2022-01-27 | End: 2022-01-27 | Stop reason: HOSPADM

## 2022-01-27 RX ADMIN — MORPHINE SULFATE 2 MG: 4 INJECTION, SOLUTION INTRAMUSCULAR; INTRAVENOUS at 02:01

## 2022-01-27 RX ADMIN — THERA TABS 1 TABLET: TAB at 09:01

## 2022-01-27 RX ADMIN — FLUTICASONE FUROATE AND VILANTEROL TRIFENATATE 1 PUFF: 100; 25 POWDER RESPIRATORY (INHALATION) at 07:01

## 2022-01-27 RX ADMIN — MORPHINE SULFATE 2 MG: 4 INJECTION, SOLUTION INTRAMUSCULAR; INTRAVENOUS at 03:01

## 2022-01-27 RX ADMIN — DEXTROSE AND SODIUM CHLORIDE: 5; .45 INJECTION, SOLUTION INTRAVENOUS at 01:01

## 2022-01-27 RX ADMIN — FOLIC ACID 1 MG: 1 TABLET ORAL at 09:01

## 2022-01-27 RX ADMIN — HYDROXYUREA 500 MG: 500 CAPSULE ORAL at 01:01

## 2022-01-27 RX ADMIN — HYDROMORPHONE HYDROCHLORIDE 1 MG: 2 INJECTION INTRAMUSCULAR; INTRAVENOUS; SUBCUTANEOUS at 01:01

## 2022-01-27 RX ADMIN — OXYCODONE HYDROCHLORIDE AND ACETAMINOPHEN 500 MG: 500 TABLET ORAL at 09:01

## 2022-01-27 RX ADMIN — HYDROXYUREA 500 MG: 500 CAPSULE ORAL at 09:01

## 2022-01-27 RX ADMIN — MORPHINE SULFATE 2 MG: 4 INJECTION, SOLUTION INTRAMUSCULAR; INTRAVENOUS at 09:01

## 2022-01-27 RX ADMIN — ALBUTEROL SULFATE 2 PUFF: 90 AEROSOL, METERED RESPIRATORY (INHALATION) at 07:01

## 2022-01-27 NOTE — ASSESSMENT & PLAN NOTE
Presenting with chest tightness and back pain. Hgb 11 on admit with retic of 5.4 and T. Bili of 1.8    - Gentle IV fluid given COVID positive  - Pain control  - Continue home folic acid and hydroxyurea.  - Repeat labs in AM

## 2022-01-27 NOTE — ASSESSMENT & PLAN NOTE
No wheezing on admit.  - Resume home Symbicort  - Albuterol inhaler prn  - Reports quit smoking x 2 months

## 2022-01-27 NOTE — ASSESSMENT & PLAN NOTE
COVID-19 positive on admit. Reports test positive today in [facility]. Mild symptoms with chill, N/V/D, and dry cough. Afebrile. No hypoxia. CXR and CTA chest normal.     COVID risk factor of 2 (Male and medication - hydroxyurea)  - Continue supportive care  - Consider start Remdesivir if not improved

## 2022-01-27 NOTE — HOSPITAL COURSE
Placed in observation for acute on chronic sickle cell pain. Patient also found to have COVID 19. Patient have been in alf for 20 days and not receiving his home PRN narcotics. CXR clear and patient breathing comfortable on RA. VS stable and labs consistent with previous labs. IVF overnight. I discussed this with the deputy at bedside that patient should continue his home medications, including home hydrocodone PRN, while in alf.

## 2022-01-27 NOTE — PLAN OF CARE
Problem: Adult Inpatient Plan of Care  Goal: Plan of Care Review  Outcome: Met  Goal: Patient-Specific Goal (Individualized)  Outcome: Met  Goal: Absence of Hospital-Acquired Illness or Injury  Outcome: Met  Goal: Optimal Comfort and Wellbeing  Outcome: Met  Goal: Readiness for Transition of Care  Outcome: Met     Problem: Fluid Imbalance (Pneumonia)  Goal: Fluid Balance  Outcome: Met     Problem: Infection (Pneumonia)  Goal: Resolution of Infection Signs and Symptoms  Outcome: Met     Problem: Respiratory Compromise (Pneumonia)  Goal: Effective Oxygenation and Ventilation  Outcome: Met

## 2022-01-27 NOTE — PLAN OF CARE
01/27/22 1210   Discharge Planning   Assessment Type Discharge Planning Brief Assessment   Resource/Environmental Concerns none   Support Systems Spouse/significant other   Equipment Currently Used at Home none   Current Living Arrangements home/apartment/condo   Patient/Family Anticipates Transition to home with family   Patient/Family Anticipated Services at Transition none   DME Needed Upon Discharge  none   Discharge Plan A Home with family  (with follow up)     [id number] [place] [address] [facility] [place] [address]  [place] [address]  Phone: [phone] Fax: [phone]

## 2022-01-27 NOTE — SUBJECTIVE & OBJECTIVE
Past Medical History:   Diagnosis Date    Asthma     Broken thumb 2017    Left    Cigarette smoker     Hemoglobin S-C disease     Sickle cell anemia        Past Surgical History:   Procedure Laterality Date    HAND SURGERY Left 12/21/2017    ORIF thumb    ORIF mandible  01/31/2013    spleenectomy         Review of patient's allergies indicates:   Allergen Reactions    Penicillins Anaphylaxis       No current facility-administered medications on file prior to encounter.     Current Outpatient Medications on File Prior to Encounter   Medication Sig    albuterol (ACCUNEB) 1.25 mg/3 mL Nebu Inhale contents of 1 vial (1.25 mg total) by nebulization every 6 (six) hours as needed (wheezing). Rescue    albuterol (PROVENTIL HFA) 90 mcg/actuation inhaler Inhale 2 puffs into the lungs every 6 (six) hours as needed for Wheezing. Rescue    budesonide-formoterol 160-4.5 mcg (SYMBICORT) 160-4.5 mcg/actuation HFAA Inhale 2 puffs into the lungs every 12 (twelve) hours. Controller    calcium-vitamin D3 500 mg(1,250mg) -200 unit per tablet Take 1 tablet by mouth 2 (two) times daily with meals.    folic acid (FOLVITE) 1 MG tablet Take 1 tablet (1 mg total) by mouth once daily.    guaiFENesin (MUCINEX) 600 mg 12 hr tablet Take 1 tablet (600 mg total) by mouth 2 (two) times daily.    HYDROcodone-acetaminophen (NORCO)  mg per tablet Take 1 tablet by mouth every 6 (six) hours as needed for Pain.    hydroxyurea (HYDREA) 500 mg Cap TK 1 C PO BID    levoFLOXacin (LEVAQUIN) 750 MG tablet Take 1 tablet (750 mg total) by mouth once daily.    oxyCODONE-acetaminophen (PERCOCET) 5-325 mg per tablet Take 1 tablet by mouth every 4 (four) hours as needed for Pain. (Patient not taking: Reported on 9/15/2021)     Family History     Family history is unknown by patient.        Tobacco Use    Smoking status: Current Every Day Smoker     Packs/day: 0.25     Types: Cigarettes    Smokeless tobacco: Never Used    Tobacco comment:  encouraged to quit.    Substance and Sexual Activity    Alcohol use: Yes     Comment: socially    Drug use: No    Sexual activity: Yes     Partners: Female     Birth control/protection: Condom     Review of Systems   Constitutional: Positive for chills and fatigue. Negative for diaphoresis and fever.   HENT: Negative for congestion and sore throat.    Eyes: Negative for visual disturbance.   Respiratory: Positive for cough and chest tightness. Negative for shortness of breath and wheezing.    Cardiovascular: Negative for chest pain, palpitations and leg swelling.   Gastrointestinal: Positive for diarrhea, nausea and vomiting. Negative for abdominal pain.   Genitourinary: Negative for dysuria, flank pain and hematuria.   Musculoskeletal: Positive for back pain and myalgias. Negative for arthralgias, gait problem, neck pain and neck stiffness.   Skin: Negative for rash.   Neurological: Negative for dizziness, weakness, light-headedness, numbness and headaches.   Hematological: Does not bruise/bleed easily.   Psychiatric/Behavioral: Negative for confusion.     Objective:     Vital Signs (Most Recent):  Temp: 97.7 °F (36.5 °C) (01/26/22 2102)  Pulse: 66 (01/26/22 2308)  Resp: 18 (01/26/22 2308)  BP: (!) 105/55 (01/26/22 2308)  SpO2: 98 % (01/26/22 2308) Vital Signs (24h Range):  Temp:  [97.7 °F (36.5 °C)-98.5 °F (36.9 °C)] 97.7 °F (36.5 °C)  Pulse:  [62-82] 66  Resp:  [16-18] 18  SpO2:  [96 %-100 %] 98 %  BP: (105-120)/(55-67) 105/55     Weight: 72.6 kg (160 lb)  Body mass index is 22.96 kg/m².    Physical Exam  Constitutional:       General: He is not in acute distress.     Appearance: He is not toxic-appearing or diaphoretic.   HENT:      Head: Normocephalic and atraumatic.      Nose: No congestion or rhinorrhea.      Mouth/Throat:      Mouth: Mucous membranes are moist.   Eyes:      Conjunctiva/sclera: Conjunctivae normal.      Pupils: Pupils are equal, round, and reactive to light.   Cardiovascular:      Rate and  Rhythm: Normal rate and regular rhythm.      Heart sounds: No murmur heard.      Pulmonary:      Effort: No respiratory distress.      Breath sounds: No wheezing, rhonchi or rales.   Chest:      Chest wall: No tenderness.   Abdominal:      General: Bowel sounds are normal.      Palpations: Abdomen is soft.      Tenderness: There is no abdominal tenderness. There is no right CVA tenderness, left CVA tenderness, guarding or rebound.   Musculoskeletal:         General: Normal range of motion.      Cervical back: Normal range of motion and neck supple.      Right lower leg: No edema.      Left lower leg: No edema.   Skin:     General: Skin is warm and dry.   Neurological:      General: No focal deficit present.      Mental Status: He is alert and oriented to person, place, and time.   Psychiatric:         Mood and Affect: Mood normal.         Thought Content: Thought content normal.         Judgment: Judgment normal.           CRANIAL NERVES     CN III, IV, VI   Pupils are equal, round, and reactive to light.       Significant Labs:     Recent Results (from the past 24 hour(s))   CBC Auto Differential    Collection Time: 01/26/22  1:37 PM   Result Value Ref Range    WBC 10.04 3.90 - 12.70 K/uL    RBC 3.67 (L) 4.60 - 6.20 M/uL    Hemoglobin 11.5 (L) 14.0 - 18.0 g/dL    Hematocrit 31.1 (L) 40.0 - 54.0 %    MCV 85 82 - 98 fL    MCH 31.3 (H) 27.0 - 31.0 pg    MCHC 37.0 (H) 32.0 - 36.0 g/dL    RDW 16.5 (H) 11.5 - 14.5 %    Platelets 519 (H) 150 - 450 K/uL    MPV 10.4 9.2 - 12.9 fL    Immature Granulocytes 0.6 (H) 0.0 - 0.5 %    Gran # (ANC) 3.3 1.8 - 7.7 K/uL    Immature Grans (Abs) 0.06 (H) 0.00 - 0.04 K/uL    Lymph # 4.4 1.0 - 4.8 K/uL    Mono # 1.8 (H) 0.3 - 1.0 K/uL    Eos # 0.4 0.0 - 0.5 K/uL    Baso # 0.11 0.00 - 0.20 K/uL    nRBC 1 (A) 0 /100 WBC    Gran % 33.0 (L) 38.0 - 73.0 %    Lymph % 43.6 18.0 - 48.0 %    Mono % 18.2 (H) 4.0 - 15.0 %    Eosinophil % 3.5 0.0 - 8.0 %    Basophil % 1.1 0.0 - 1.9 %    Aniso Moderate      Target Cells Marked     Differential Method Automated    Comprehensive Metabolic Panel    Collection Time: 01/26/22  1:37 PM   Result Value Ref Range    Sodium 140 136 - 145 mmol/L    Potassium 3.9 3.5 - 5.1 mmol/L    Chloride 108 95 - 110 mmol/L    CO2 26 23 - 29 mmol/L    Glucose 92 70 - 110 mg/dL    BUN 4 (L) 6 - 20 mg/dL    Creatinine 0.8 0.5 - 1.4 mg/dL    Calcium 9.0 8.7 - 10.5 mg/dL    Total Protein 7.6 6.0 - 8.4 g/dL    Albumin 4.2 3.5 - 5.2 g/dL    Total Bilirubin 1.8 (H) 0.1 - 1.0 mg/dL    Alkaline Phosphatase 67 55 - 135 U/L    AST 18 10 - 40 U/L    ALT 29 10 - 44 U/L    Anion Gap 6 (L) 8 - 16 mmol/L    eGFR if African American >60 >60 mL/min/1.73 m^2    eGFR if non African American >60 >60 mL/min/1.73 m^2   Reticulocytes    Collection Time: 01/26/22  1:37 PM   Result Value Ref Range    Retic 5.4 (H) 0.4 - 2.0 %   C-Reactive Protein    Collection Time: 01/26/22  2:33 PM   Result Value Ref Range    CRP 1.2 0.0 - 8.2 mg/L   Ferritin    Collection Time: 01/26/22  2:33 PM   Result Value Ref Range    Ferritin 985 (H) 20.0 - 300.0 ng/mL   Lactate Dehydrogenase    Collection Time: 01/26/22  2:33 PM   Result Value Ref Range     (H) 110 - 260 U/L   CK    Collection Time: 01/26/22  2:33 PM   Result Value Ref Range     20 - 200 U/L   Lactic Acid, Plasma    Collection Time: 01/26/22  2:33 PM   Result Value Ref Range    Lactate (Lactic Acid) 0.9 0.5 - 2.2 mmol/L   Troponin I    Collection Time: 01/26/22  2:33 PM   Result Value Ref Range    Troponin I <0.006 0.000 - 0.026 ng/mL   Blood culture x two cultures. Draw prior to antibiotics.    Collection Time: 01/26/22  2:33 PM    Specimen: Peripheral, Upper Arm, Right; Blood   Result Value Ref Range    Blood Culture, Routine No Growth to date    Procalcitonin    Collection Time: 01/26/22  2:33 PM   Result Value Ref Range    Procalcitonin 0.06 <0.25 ng/mL   Brain Natriuretic Peptide    Collection Time: 01/26/22  2:33 PM   Result Value Ref Range    BNP <10 0  - 99 pg/mL   D dimer, quantitative    Collection Time: 01/26/22  2:33 PM   Result Value Ref Range    D-Dimer 2.33 (H) <0.50 mg/L FEU   Blood culture x two cultures. Draw prior to antibiotics.    Collection Time: 01/26/22  2:34 PM    Specimen: Peripheral, Hand, Left; Blood   Result Value Ref Range    Blood Culture, Routine No Growth to date    Urinalysis, Reflex to Urine Culture Urine, Clean Catch    Collection Time: 01/26/22  2:40 PM    Specimen: Urine   Result Value Ref Range    Specimen UA Urine, Clean Catch     Color, UA Yellow Yellow, Straw, Sheryl    Appearance, UA Clear Clear    pH, UA 8.0 5.0 - 8.0    Specific Gravity, UA 1.010 1.005 - 1.030    Protein, UA Negative Negative    Glucose, UA Negative Negative    Ketones, UA Negative Negative    Bilirubin (UA) Negative Negative    Occult Blood UA Negative Negative    Nitrite, UA Negative Negative    Urobilinogen, UA Negative <2.0 EU/dL    Leukocytes, UA Negative Negative   POCT COVID-19 Rapid Screening    Collection Time: 01/26/22  3:12 PM   Result Value Ref Range    POC Rapid COVID Positive (A) Negative     Acceptable Yes    POCT Influenza A/B Molecular    Collection Time: 01/26/22  3:41 PM   Result Value Ref Range    POC Molecular Influenza A Ag Negative Negative, Not Reported    POC Molecular Influenza B Ag Negative Negative, Not Reported     Acceptable Yes      '    Significant Imaging:     Imaging Results          CTA Chest Non-Coronary (PE Study) (Final result)  Result time 01/26/22 17:31:35    Final result by Nixon Cano MD (01/26/22 17:31:35)                 Impression:      No evidence of PE.  No acute intrathoracic abnormalities identified.      Electronically signed by: Nixon Cano MD  Date:    01/26/2022  Time:    17:31             Narrative:    EXAMINATION:  CTA CHEST NON CORONARY    CLINICAL HISTORY:  Pulmonary embolism (PE) suspected, positive D-dimer;    TECHNIQUE:  Low dose axial images, sagittal and coronal  reformations were obtained from the thoracic inlet to the lung bases following the IV administration of 75 mL of Omnipaque 350.  Contrast timing was optimized to evaluate the pulmonary arteries.  MIP images were performed.    COMPARISON:  CTA chest from September 2021.    FINDINGS:  Structures at the base of the neck are unremarkable.  Aorta is non-aneurysmal.  The heart is normal in size without pericardial effusion.  No intraluminal filling defects within the pulmonary arteries to suggest pulmonary thromboembolism.   There is no evidence of mediastinal, axillary, or hilar lymph node enlargement.  The esophagus is unremarkable along its course.    The trachea and bronchi are patent.  The lungs are symmetrically expanded.  Minimal atelectatic change or scarring is seen in the left lower lobe and right middle lobe.  No focal consolidation.  No pleural effusion.  No evidence of pulmonary hemorrhage or infarction.    The visualized abdominal structures show no acute abnormalities.  No acute osseous abnormality identified.  Extrathoracic soft tissues are unremarkable.                               X-Ray Chest AP Portable (Final result)  Result time 01/26/22 15:09:06    Final result by Lewis Mann MD (01/26/22 15:09:06)                 Impression:      No acute abnormality.      Electronically signed by: Lewis Mann  Date:    01/26/2022  Time:    15:09             Narrative:    EXAMINATION:  XR CHEST AP PORTABLE    CLINICAL HISTORY:  COVID-19;    TECHNIQUE:  Single frontal view of the chest was performed.    COMPARISON:  09/08/2021    FINDINGS:  The lungs are clear, with normal appearance of pulmonary vasculature and no pleural effusion or pneumothorax.    The cardiac silhouette is normal in size. The hilar and mediastinal contours are unremarkable.    Bones are intact.

## 2022-01-27 NOTE — H&P
[facility]  History & Physical    Patient Name: Katie Parham  MRN: [id number]  Patient Class: OP- Observation  Admission Date: 1/26/2022  Attending Physician: Melo Ramos MD   Primary Care Provider: Kieran Reed MD         Patient information was obtained from patient and ER records.     Subjective:     Principal Problem:Sickle cell pain crisis    Chief Complaint:   Chief Complaint   Patient presents with    Sickle Cell Pain Crisis     Presents to the ED via EMS from [facility] with c/o sickle cell pain after testign COVID+ today. Security at bedside.        HPI: Katie Parham is 31 y.o male with sickle cell disease ,  Asthma, and testing COVID+ today who presents to the hospital from [facility] complaining of sickle cell pain and associated with dry cough, chills, fatigue, nausea, vomiting, and diarrhea for one day. Patient reports his sickle cell disease flare-up with chest tightness and back pain which are similar with previous episodes, but he also endorses chills, generalized fatigue associated with N/V/D which he contributes to COVID symptoms. Reports not vaccinated for COVID.    In the ED, COVID positive. Labs with Hgb 11.5, retic 5.4, T qing 1.8. Elevated inflammatory markers ferritin, LD. Normal CRP, lactate, and procalcitonin. CXR normal and CTA chest no PE. No hypoxia      Past Medical History:   Diagnosis Date    Asthma     Broken thumb 2017    Left    Cigarette smoker     Hemoglobin S-C disease     Sickle cell anemia        Past Surgical History:   Procedure Laterality Date    HAND SURGERY Left 12/21/2017    ORIF thumb    ORIF mandible  01/31/2013    spleenectomy         Review of patient's allergies indicates:   Allergen Reactions    Penicillins Anaphylaxis       No current facility-administered medications on file prior to encounter.     Current Outpatient Medications on File Prior to Encounter   Medication Sig    albuterol (ACCUNEB) 1.25 mg/3 mL Nebu Inhale  contents of 1 vial (1.25 mg total) by nebulization every 6 (six) hours as needed (wheezing). Rescue    albuterol (PROVENTIL HFA) 90 mcg/actuation inhaler Inhale 2 puffs into the lungs every 6 (six) hours as needed for Wheezing. Rescue    budesonide-formoterol 160-4.5 mcg (SYMBICORT) 160-4.5 mcg/actuation HFAA Inhale 2 puffs into the lungs every 12 (twelve) hours. Controller    calcium-vitamin D3 500 mg(1,250mg) -200 unit per tablet Take 1 tablet by mouth 2 (two) times daily with meals.    folic acid (FOLVITE) 1 MG tablet Take 1 tablet (1 mg total) by mouth once daily.    guaiFENesin (MUCINEX) 600 mg 12 hr tablet Take 1 tablet (600 mg total) by mouth 2 (two) times daily.    HYDROcodone-acetaminophen (NORCO)  mg per tablet Take 1 tablet by mouth every 6 (six) hours as needed for Pain.    hydroxyurea (HYDREA) 500 mg Cap TK 1 C PO BID    levoFLOXacin (LEVAQUIN) 750 MG tablet Take 1 tablet (750 mg total) by mouth once daily.    oxyCODONE-acetaminophen (PERCOCET) 5-325 mg per tablet Take 1 tablet by mouth every 4 (four) hours as needed for Pain. (Patient not taking: Reported on 9/15/2021)     Family History     Family history is unknown by patient.        Tobacco Use    Smoking status: Current Every Day Smoker     Packs/day: 0.25     Types: Cigarettes    Smokeless tobacco: Never Used    Tobacco comment: encouraged to quit.    Substance and Sexual Activity    Alcohol use: Yes     Comment: socially    Drug use: No    Sexual activity: Yes     Partners: Female     Birth control/protection: Condom     Review of Systems   Constitutional: Positive for chills and fatigue. Negative for diaphoresis and fever.   HENT: Negative for congestion and sore throat.    Eyes: Negative for visual disturbance.   Respiratory: Positive for cough and chest tightness. Negative for shortness of breath and wheezing.    Cardiovascular: Negative for chest pain, palpitations and leg swelling.   Gastrointestinal: Positive for  diarrhea, nausea and vomiting. Negative for abdominal pain.   Genitourinary: Negative for dysuria, flank pain and hematuria.   Musculoskeletal: Positive for back pain and myalgias. Negative for arthralgias, gait problem, neck pain and neck stiffness.   Skin: Negative for rash.   Neurological: Negative for dizziness, weakness, light-headedness, numbness and headaches.   Hematological: Does not bruise/bleed easily.   Psychiatric/Behavioral: Negative for confusion.     Objective:     Vital Signs (Most Recent):  Temp: 97.7 °F (36.5 °C) (01/26/22 2102)  Pulse: 66 (01/26/22 2308)  Resp: 18 (01/26/22 2308)  BP: (!) 105/55 (01/26/22 2308)  SpO2: 98 % (01/26/22 2308) Vital Signs (24h Range):  Temp:  [97.7 °F (36.5 °C)-98.5 °F (36.9 °C)] 97.7 °F (36.5 °C)  Pulse:  [62-82] 66  Resp:  [16-18] 18  SpO2:  [96 %-100 %] 98 %  BP: (105-120)/(55-67) 105/55     Weight: 72.6 kg (160 lb)  Body mass index is 22.96 kg/m².    Physical Exam  Constitutional:       General: He is not in acute distress.     Appearance: He is not toxic-appearing or diaphoretic.   HENT:      Head: Normocephalic and atraumatic.      Nose: No congestion or rhinorrhea.      Mouth/Throat:      Mouth: Mucous membranes are moist.   Eyes:      Conjunctiva/sclera: Conjunctivae normal.      Pupils: Pupils are equal, round, and reactive to light.   Cardiovascular:      Rate and Rhythm: Normal rate and regular rhythm.      Heart sounds: No murmur heard.      Pulmonary:      Effort: No respiratory distress.      Breath sounds: No wheezing, rhonchi or rales.   Chest:      Chest wall: No tenderness.   Abdominal:      General: Bowel sounds are normal.      Palpations: Abdomen is soft.      Tenderness: There is no abdominal tenderness. There is no right CVA tenderness, left CVA tenderness, guarding or rebound.   Musculoskeletal:         General: Normal range of motion.      Cervical back: Normal range of motion and neck supple.      Right lower leg: No edema.      Left lower  leg: No edema.   Skin:     General: Skin is warm and dry.   Neurological:      General: No focal deficit present.      Mental Status: He is alert and oriented to person, place, and time.   Psychiatric:         Mood and Affect: Mood normal.         Thought Content: Thought content normal.         Judgment: Judgment normal.           CRANIAL NERVES     CN III, IV, VI   Pupils are equal, round, and reactive to light.       Significant Labs:     Recent Results (from the past 24 hour(s))   CBC Auto Differential    Collection Time: 01/26/22  1:37 PM   Result Value Ref Range    WBC 10.04 3.90 - 12.70 K/uL    RBC 3.67 (L) 4.60 - 6.20 M/uL    Hemoglobin 11.5 (L) 14.0 - 18.0 g/dL    Hematocrit 31.1 (L) 40.0 - 54.0 %    MCV 85 82 - 98 fL    MCH 31.3 (H) 27.0 - 31.0 pg    MCHC 37.0 (H) 32.0 - 36.0 g/dL    RDW 16.5 (H) 11.5 - 14.5 %    Platelets 519 (H) 150 - 450 K/uL    MPV 10.4 9.2 - 12.9 fL    Immature Granulocytes 0.6 (H) 0.0 - 0.5 %    Gran # (ANC) 3.3 1.8 - 7.7 K/uL    Immature Grans (Abs) 0.06 (H) 0.00 - 0.04 K/uL    Lymph # 4.4 1.0 - 4.8 K/uL    Mono # 1.8 (H) 0.3 - 1.0 K/uL    Eos # 0.4 0.0 - 0.5 K/uL    Baso # 0.11 0.00 - 0.20 K/uL    nRBC 1 (A) 0 /100 WBC    Gran % 33.0 (L) 38.0 - 73.0 %    Lymph % 43.6 18.0 - 48.0 %    Mono % 18.2 (H) 4.0 - 15.0 %    Eosinophil % 3.5 0.0 - 8.0 %    Basophil % 1.1 0.0 - 1.9 %    Aniso Moderate     Target Cells Marked     Differential Method Automated    Comprehensive Metabolic Panel    Collection Time: 01/26/22  1:37 PM   Result Value Ref Range    Sodium 140 136 - 145 mmol/L    Potassium 3.9 3.5 - 5.1 mmol/L    Chloride 108 95 - 110 mmol/L    CO2 26 23 - 29 mmol/L    Glucose 92 70 - 110 mg/dL    BUN 4 (L) 6 - 20 mg/dL    Creatinine 0.8 0.5 - 1.4 mg/dL    Calcium 9.0 8.7 - 10.5 mg/dL    Total Protein 7.6 6.0 - 8.4 g/dL    Albumin 4.2 3.5 - 5.2 g/dL    Total Bilirubin 1.8 (H) 0.1 - 1.0 mg/dL    Alkaline Phosphatase 67 55 - 135 U/L    AST 18 10 - 40 U/L    ALT 29 10 - 44 U/L    Anion Gap  6 (L) 8 - 16 mmol/L    eGFR if African American >60 >60 mL/min/1.73 m^2    eGFR if non African American >60 >60 mL/min/1.73 m^2   Reticulocytes    Collection Time: 01/26/22  1:37 PM   Result Value Ref Range    Retic 5.4 (H) 0.4 - 2.0 %   C-Reactive Protein    Collection Time: 01/26/22  2:33 PM   Result Value Ref Range    CRP 1.2 0.0 - 8.2 mg/L   Ferritin    Collection Time: 01/26/22  2:33 PM   Result Value Ref Range    Ferritin 985 (H) 20.0 - 300.0 ng/mL   Lactate Dehydrogenase    Collection Time: 01/26/22  2:33 PM   Result Value Ref Range     (H) 110 - 260 U/L   CK    Collection Time: 01/26/22  2:33 PM   Result Value Ref Range     20 - 200 U/L   Lactic Acid, Plasma    Collection Time: 01/26/22  2:33 PM   Result Value Ref Range    Lactate (Lactic Acid) 0.9 0.5 - 2.2 mmol/L   Troponin I    Collection Time: 01/26/22  2:33 PM   Result Value Ref Range    Troponin I <0.006 0.000 - 0.026 ng/mL   Blood culture x two cultures. Draw prior to antibiotics.    Collection Time: 01/26/22  2:33 PM    Specimen: Peripheral, Upper Arm, Right; Blood   Result Value Ref Range    Blood Culture, Routine No Growth to date    Procalcitonin    Collection Time: 01/26/22  2:33 PM   Result Value Ref Range    Procalcitonin 0.06 <0.25 ng/mL   Brain Natriuretic Peptide    Collection Time: 01/26/22  2:33 PM   Result Value Ref Range    BNP <10 0 - 99 pg/mL   D dimer, quantitative    Collection Time: 01/26/22  2:33 PM   Result Value Ref Range    D-Dimer 2.33 (H) <0.50 mg/L FEU   Blood culture x two cultures. Draw prior to antibiotics.    Collection Time: 01/26/22  2:34 PM    Specimen: Peripheral, Hand, Left; Blood   Result Value Ref Range    Blood Culture, Routine No Growth to date    Urinalysis, Reflex to Urine Culture Urine, Clean Catch    Collection Time: 01/26/22  2:40 PM    Specimen: Urine   Result Value Ref Range    Specimen UA Urine, Clean Catch     Color, UA Yellow Yellow, Straw, Sheryl    Appearance, UA Clear Clear    pH, UA 8.0  5.0 - 8.0    Specific Gravity, UA 1.010 1.005 - 1.030    Protein, UA Negative Negative    Glucose, UA Negative Negative    Ketones, UA Negative Negative    Bilirubin (UA) Negative Negative    Occult Blood UA Negative Negative    Nitrite, UA Negative Negative    Urobilinogen, UA Negative <2.0 EU/dL    Leukocytes, UA Negative Negative   POCT COVID-19 Rapid Screening    Collection Time: 01/26/22  3:12 PM   Result Value Ref Range    POC Rapid COVID Positive (A) Negative     Acceptable Yes    POCT Influenza A/B Molecular    Collection Time: 01/26/22  3:41 PM   Result Value Ref Range    POC Molecular Influenza A Ag Negative Negative, Not Reported    POC Molecular Influenza B Ag Negative Negative, Not Reported     Acceptable Yes      '    Significant Imaging:     Imaging Results          CTA Chest Non-Coronary (PE Study) (Final result)  Result time 01/26/22 17:31:35    Final result by Nixon Cano MD (01/26/22 17:31:35)                 Impression:      No evidence of PE.  No acute intrathoracic abnormalities identified.      Electronically signed by: Nixon Cano MD  Date:    01/26/2022  Time:    17:31             Narrative:    EXAMINATION:  CTA CHEST NON CORONARY    CLINICAL HISTORY:  Pulmonary embolism (PE) suspected, positive D-dimer;    TECHNIQUE:  Low dose axial images, sagittal and coronal reformations were obtained from the thoracic inlet to the lung bases following the IV administration of 75 mL of Omnipaque 350.  Contrast timing was optimized to evaluate the pulmonary arteries.  MIP images were performed.    COMPARISON:  CTA chest from September 2021.    FINDINGS:  Structures at the base of the neck are unremarkable.  Aorta is non-aneurysmal.  The heart is normal in size without pericardial effusion.  No intraluminal filling defects within the pulmonary arteries to suggest pulmonary thromboembolism.   There is no evidence of mediastinal, axillary, or hilar lymph node  enlargement.  The esophagus is unremarkable along its course.    The trachea and bronchi are patent.  The lungs are symmetrically expanded.  Minimal atelectatic change or scarring is seen in the left lower lobe and right middle lobe.  No focal consolidation.  No pleural effusion.  No evidence of pulmonary hemorrhage or infarction.    The visualized abdominal structures show no acute abnormalities.  No acute osseous abnormality identified.  Extrathoracic soft tissues are unremarkable.                               X-Ray Chest AP Portable (Final result)  Result time 01/26/22 15:09:06    Final result by Lewis Mann MD (01/26/22 15:09:06)                 Impression:      No acute abnormality.      Electronically signed by: Lewis Mann  Date:    01/26/2022  Time:    15:09             Narrative:    EXAMINATION:  XR CHEST AP PORTABLE    CLINICAL HISTORY:  COVID-19;    TECHNIQUE:  Single frontal view of the chest was performed.    COMPARISON:  09/08/2021    FINDINGS:  The lungs are clear, with normal appearance of pulmonary vasculature and no pleural effusion or pneumothorax.    The cardiac silhouette is normal in size. The hilar and mediastinal contours are unremarkable.    Bones are intact.                              Assessment/Plan:     * Sickle cell pain crisis  Presenting with chest tightness and back pain. Hgb 11 on admit with retic of 5.4 and T. Bili of 1.8    - Gentle IV fluid given COVID positive  - Pain control  - Continue home folic acid and hydroxyurea.  - Repeat labs in AM        COVID-19 virus infection  COVID-19 positive on admit. Reports test positive today in skilled nursing. Mild symptoms with chill, N/V/D, and dry cough. Afebrile. No hypoxia. CXR and CTA chest normal.     COVID risk factor of 2 (Male and medication - hydroxyurea)  - Continue supportive care  - Consider start Remdesivir if not improved      Mild asthma without complication  No wheezing on admit.  - Resume home Symbicort  - Albuterol inhaler  prn  - Reports quit smoking x 2 months      Sickle cell anemia  Hgb 11.5 on admit. At baseline.  - See Sickle cell pain crisis above  - Monitor with labs        VTE Risk Mitigation (From admission, onward)         Ordered     enoxaparin injection 40 mg  Daily         01/27/22 0117     Place ELENA hose  Until discontinued         01/27/22 0117               As clarification, on  01/26/2022 patient should be admitted for hospital observation services under my care in collaboration with Melo Ramos MD   Signed by RUBIO Conner NP  Department of Hospital Medicine   [facility] [place] [facility]

## 2022-01-27 NOTE — PLAN OF CARE
01/27/22 1212   Final Note   Assessment Type Final Discharge Note   Anticipated Discharge Disposition Home   Hospital Resources/Appts/Education Provided Appointments scheduled and added to AVS;Provided education on problems/symptoms using teachback   Post-Acute Status   Post-Acute Authorization Other   Other Status No Post-Acute Service Needs   Pts nurse Mike notified that the pt can d/c from CM standpoint

## 2022-01-27 NOTE — NURSING
Discharge instructions complete. Educated pt on upcoming appointments, medications and when to seek help. Pt displayed understanding. Pt discharged in the care of [facility].

## 2022-01-27 NOTE — NURSING
Received bedside handoff report for offgoing nurse. Pt lying in bed, NAD noted. Fall/safety precautions maintained. Comnmunication board updated. Will continue to monitor

## 2022-01-27 NOTE — HPI
Katie Parham is 31 y.o male with sickle cell disease ,  Asthma, and testing COVID+ today who presents to the hospital from [facility] complaining of sickle cell pain and associated with dry cough, chills, fatigue, nausea, vomiting, and diarrhea for one day. Patient reports his sickle cell disease flare-up with chest tightness and back pain which are similar with previous episodes, but he also endorses chills, generalized fatigue associated with N/V/D which he contributes to COVID symptoms. Reports not vaccinated for COVID.    In the ED, COVID positive. Labs with Hgb 11.5, retic 5.4, T qing 1.8. Elevated inflammatory markers ferritin, LD. Normal CRP, lactate, and procalcitonin. CXR normal and CTA chest no PE. No hypoxia

## 2022-01-27 NOTE — PROGRESS NOTES
Pt is calling for the results of his cervical MRI   Pt can be reached at [phone] WRITTEN HEALTHCARE DISCHARGE INFORMATION      Things that YOU are RESPONSIBLE for to Manage Your Care At Home:     1. Getting your prescriptions filled.  2. Taking you medications as directed. DO NOT MISS ANY DOSES!  3. Going to your follow-up doctor appointments. This is important because it allows the doctor to monitor your progress and to determine if any changes need to be made to your treatment plan.     If you are unable to make your follow up appointments, please call the number listed and reschedule this appointment.      ____________HELP AT HOME____________________     Experiencing any SIGNS or SYMPTOMS: YOU CAN     Schedule a same day appopintment with your Primary Care Doctor or  you can call [facility] On Call Nurse Care Line for 24/7 assistance at [phone]     If you are experience any signs or symptoms that have become severe, Call 911 and come to your nearest Emergency Room.     Thank you for choosing [facility] and allowing us to care for you.   From your care management team:      You should receive a call from [facility] Discharge Department within 48-72 hours to help manage your care after discharge. Please try to make sure that you answer your phone for this important phone call.     Follow-up Information     Kieran Reed MD. Go on 1/28/2022.    Specialties: Internal Medicine, Pediatrics  Why: Primary Care follow up, for virtual follow up with your doctor at 12:20 PM.   Contact information:  [address]  [address]  [facility] [address]  [phone]

## 2022-01-27 NOTE — DISCHARGE SUMMARY
[facility] Medicine  Discharge Summary      Patient Name: Katie Parham  MRN: [id number]  Patient Class: OP- Observation  Admission Date: 1/26/2022  Hospital Length of Stay: 0 days  Discharge Date and Time:  01/27/2022 12:15 PM  Attending Physician: Melo Ramos MD   Discharging Provider: Betzaida Salinas NP  Primary Care Provider: Kieran Reed MD      HPI:   Katie Parham is 31 y.o male with sickle cell disease ,  Asthma, and testing COVID+ today who presents to the hospital from [facility] complaining of sickle cell pain and associated with dry cough, chills, fatigue, nausea, vomiting, and diarrhea for one day. Patient reports his sickle cell disease flare-up with chest tightness and back pain which are similar with previous episodes, but he also endorses chills, generalized fatigue associated with N/V/D which he contributes to COVID symptoms. Reports not vaccinated for COVID.    In the ED, COVID positive. Labs with Hgb 11.5, retic 5.4, T qing 1.8. Elevated inflammatory markers ferritin, LD. Normal CRP, lactate, and procalcitonin. CXR normal and CTA chest no PE. No hypoxia      * No surgery found *      Hospital Course:   Placed in observation for acute on chronic sickle cell pain. Patient also found to have COVID 19. [facility] been in prison for 20 days and not receiving his home PRN narcotics. CXR clear and patient breathing comfortable on RA. VS stable and labs consistent with previous labs. IVF overnight. I discussed this with the deputy at bedside that patient should continue his home medications, including home hydrocodone PRN, while in prison.         Goals of Care Treatment Preferences:  Code Status: Full Code      Consults:     No new Assessment & Plan notes have been filed under this hospital service since the last note was generated.  Service: Hospital Medicine    Final Active Diagnoses:    Diagnosis Date Noted POA    PRINCIPAL PROBLEM:  Sickle cell pain crisis [D57.00]  06/29/2021 Yes    COVID-19 virus infection [U07.1] 01/27/2022 Unknown    Mild asthma without complication [J45.909] 09/05/2019 Yes     Chronic    Sickle cell anemia [D57.1] 08/18/2019 Yes     Chronic      Problems Resolved During this Admission:       Discharged Condition: good    Disposition: Home or Self Care    Follow Up:   Follow-up Information     Kieran Reed MD. Go on 1/28/2022.    Specialties: Internal Medicine, Pediatrics  Why: Primary Care follow up, for virtual follow up with your doctor at 12:20 PM.   Contact information:  [address]  [address]  [facility] [address]  [phone]                       Patient Instructions:      Diet Adult Regular     Notify your health care provider if you experience any of the following:  temperature >100.4     Notify your health care provider if you experience any of the following:  difficulty breathing or increased cough     Notify your health care provider if you experience any of the following:  increased confusion or weakness     Activity as tolerated       Significant Diagnostic Studies: Labs: All labs within the past 24 hours have been reviewed    Pending Diagnostic Studies:     None         Medications:  Reconciled Home Medications:      Medication List      START taking these medications    ascorbic acid (vitamin C) 500 MG tablet  Commonly known as: VITAMIN C  Take 1 tablet (500 mg total) by mouth 2 (two) times daily.     multivitamin Tab  Take 1 tablet by mouth once daily.  Start taking on: January 28, 2022        CONTINUE taking these medications    * albuterol 1.25 mg/3 mL Nebu  Commonly known as: ACCUNEB  Inhale contents of 1 vial (1.25 mg total) by nebulization every 6 (six) hours as needed (wheezing). Rescue     * albuterol 90 mcg/actuation inhaler  Commonly known as: PROVENTIL HFA  Inhale 2 puffs into the lungs every 6 (six) hours as needed for Wheezing. Rescue     calcium-vitamin D3 500 mg-5 mcg (200 unit) per tablet  Commonly known as:  OS-JOÃO 500 + D3  Take 1 tablet by mouth 2 (two) times daily with meals.     folic acid 1 MG tablet  Commonly known as: FOLVITE  Take 1 tablet (1 mg total) by mouth once daily.     guaiFENesin 600 mg 12 hr tablet  Commonly known as: MUCINEX  Take 1 tablet (600 mg total) by mouth 2 (two) times daily.     HYDROcodone-acetaminophen  mg per tablet  Commonly known as: NORCO  Take 1 tablet by mouth every 6 (six) hours as needed for Pain.     hydroxyurea 500 mg Cap  Commonly known as: HYDREA  TK 1 C PO BID     SYMBICORT 160-4.5 mcg/actuation Hfaa  Generic drug: budesonide-formoterol 160-4.5 mcg  Inhale 2 puffs into the lungs every 12 (twelve) hours. Controller         * This list has 2 medication(s) that are the same as other medications prescribed for you. Read the directions carefully, and ask your doctor or other care provider to review them with you.            STOP taking these medications    levoFLOXacin 750 MG tablet  Commonly known as: LEVAQUIN     oxyCODONE-acetaminophen 5-325 mg per tablet  Commonly known as: PERCOCET            Indwelling Lines/Drains at time of discharge:   Lines/Drains/Airways     None                 Time spent on the discharge of patient: 30 minutes         Betzaida Salinas NP  Department of Hospital Medicine  [facility]

## 2022-01-27 NOTE — ED NOTES
Received report from RAPHAEL Rai. Patient is resting in bed, in no distress. Will continue to monitor.

## 2022-01-30 LAB
BACTERIA BLD CULT: NORMAL
BACTERIA BLD CULT: NORMAL

## 2022-02-04 ENCOUNTER — HOSPITAL ENCOUNTER (INPATIENT)
Facility: HOSPITAL | Age: 32
LOS: 4 days | Discharge: HOME | DRG: 811 | End: 2022-02-08
Attending: EMERGENCY MEDICINE | Admitting: STUDENT IN AN ORGANIZED HEALTH CARE EDUCATION/TRAINING PROGRAM
Payer: MEDICAID

## 2022-02-04 DIAGNOSIS — M54.89 LEFT PARASPINAL BACK PAIN: ICD-10-CM

## 2022-02-04 DIAGNOSIS — D57.00 SICKLE CELL CRISIS: Primary | ICD-10-CM

## 2022-02-04 DIAGNOSIS — M79.10 MYALGIA: ICD-10-CM

## 2022-02-04 DIAGNOSIS — U07.1 COVID-19: ICD-10-CM

## 2022-02-04 LAB
ALBUMIN SERPL BCP-MCNC: 4.1 G/DL (ref 3.5–5.2)
ALP SERPL-CCNC: 71 U/L (ref 55–135)
ALT SERPL W/O P-5'-P-CCNC: 22 U/L (ref 10–44)
ANION GAP SERPL CALC-SCNC: 8 MMOL/L (ref 8–16)
AST SERPL-CCNC: 16 U/L (ref 10–40)
BASOPHILS # BLD AUTO: 0.15 K/UL (ref 0–0.2)
BASOPHILS NFR BLD: 1.1 % (ref 0–1.9)
BILIRUB SERPL-MCNC: 0.8 MG/DL (ref 0.1–1)
BUN SERPL-MCNC: 7 MG/DL (ref 6–20)
CALCIUM SERPL-MCNC: 9.1 MG/DL (ref 8.7–10.5)
CHLORIDE SERPL-SCNC: 106 MMOL/L (ref 95–110)
CO2 SERPL-SCNC: 25 MMOL/L (ref 23–29)
CREAT SERPL-MCNC: 0.8 MG/DL (ref 0.5–1.4)
DIFFERENTIAL METHOD: ABNORMAL
EOSINOPHIL # BLD AUTO: 0.5 K/UL (ref 0–0.5)
EOSINOPHIL NFR BLD: 3.9 % (ref 0–8)
ERYTHROCYTE [DISTWIDTH] IN BLOOD BY AUTOMATED COUNT: 17.6 % (ref 11.5–14.5)
EST. GFR  (AFRICAN AMERICAN): >60 ML/MIN/1.73 M^2
EST. GFR  (NON AFRICAN AMERICAN): >60 ML/MIN/1.73 M^2
GLUCOSE SERPL-MCNC: 81 MG/DL (ref 70–110)
HCT VFR BLD AUTO: 31.7 % (ref 40–54)
HGB BLD-MCNC: 11.4 G/DL (ref 14–18)
IMM GRANULOCYTES # BLD AUTO: 0.04 K/UL (ref 0–0.04)
IMM GRANULOCYTES NFR BLD AUTO: 0.3 % (ref 0–0.5)
LYMPHOCYTES # BLD AUTO: 7.3 K/UL (ref 1–4.8)
LYMPHOCYTES NFR BLD: 54.2 % (ref 18–48)
MCH RBC QN AUTO: 31.8 PG (ref 27–31)
MCHC RBC AUTO-ENTMCNC: 36 G/DL (ref 32–36)
MCV RBC AUTO: 89 FL (ref 82–98)
MONOCYTES # BLD AUTO: 1.2 K/UL (ref 0.3–1)
MONOCYTES NFR BLD: 9 % (ref 4–15)
NEUTROPHILS # BLD AUTO: 4.2 K/UL (ref 1.8–7.7)
NEUTROPHILS NFR BLD: 31.5 % (ref 38–73)
NRBC BLD-RTO: 1 /100 WBC
PLATELET # BLD AUTO: 667 K/UL (ref 150–450)
PMV BLD AUTO: 9.4 FL (ref 9.2–12.9)
POTASSIUM SERPL-SCNC: 4.3 MMOL/L (ref 3.5–5.1)
PROT SERPL-MCNC: 7.7 G/DL (ref 6–8.4)
RBC # BLD AUTO: 3.58 M/UL (ref 4.6–6.2)
RETICS/RBC NFR AUTO: 5 % (ref 0.4–2)
SODIUM SERPL-SCNC: 139 MMOL/L (ref 136–145)
WBC # BLD AUTO: 13.42 K/UL (ref 3.9–12.7)

## 2022-02-04 PROCEDURE — 21400001 HC TELEMETRY ROOM

## 2022-02-04 PROCEDURE — 80053 COMPREHEN METABOLIC PANEL: CPT | Performed by: EMERGENCY MEDICINE

## 2022-02-04 PROCEDURE — 96376 TX/PRO/DX INJ SAME DRUG ADON: CPT

## 2022-02-04 PROCEDURE — 96372 THER/PROPH/DIAG INJ SC/IM: CPT | Performed by: NURSE PRACTITIONER

## 2022-02-04 PROCEDURE — S5010 5% DEXTROSE AND 0.45% SALINE: HCPCS | Performed by: NURSE PRACTITIONER

## 2022-02-04 PROCEDURE — 93005 ELECTROCARDIOGRAM TRACING: CPT

## 2022-02-04 PROCEDURE — G0378 HOSPITAL OBSERVATION PER HR: HCPCS

## 2022-02-04 PROCEDURE — 93010 ELECTROCARDIOGRAM REPORT: CPT | Mod: ,,, | Performed by: INTERNAL MEDICINE

## 2022-02-04 PROCEDURE — 85045 AUTOMATED RETICULOCYTE COUNT: CPT | Performed by: EMERGENCY MEDICINE

## 2022-02-04 PROCEDURE — 25000003 PHARM REV CODE 250: Performed by: NURSE PRACTITIONER

## 2022-02-04 PROCEDURE — 25000003 PHARM REV CODE 250: Performed by: EMERGENCY MEDICINE

## 2022-02-04 PROCEDURE — 99285 EMERGENCY DEPT VISIT HI MDM: CPT | Mod: 25

## 2022-02-04 PROCEDURE — 85025 COMPLETE CBC W/AUTO DIFF WBC: CPT | Performed by: EMERGENCY MEDICINE

## 2022-02-04 PROCEDURE — 96375 TX/PRO/DX INJ NEW DRUG ADDON: CPT

## 2022-02-04 PROCEDURE — 63600175 PHARM REV CODE 636 W HCPCS: Performed by: NURSE PRACTITIONER

## 2022-02-04 PROCEDURE — 96374 THER/PROPH/DIAG INJ IV PUSH: CPT

## 2022-02-04 PROCEDURE — 96361 HYDRATE IV INFUSION ADD-ON: CPT

## 2022-02-04 PROCEDURE — 63600175 PHARM REV CODE 636 W HCPCS: Performed by: EMERGENCY MEDICINE

## 2022-02-04 PROCEDURE — 93010 EKG 12-LEAD: ICD-10-PCS | Mod: ,,, | Performed by: INTERNAL MEDICINE

## 2022-02-04 RX ORDER — DEXTROSE MONOHYDRATE AND SODIUM CHLORIDE 5; .45 G/100ML; G/100ML
INJECTION, SOLUTION INTRAVENOUS CONTINUOUS
Status: DISCONTINUED | OUTPATIENT
Start: 2022-02-04 | End: 2022-02-05

## 2022-02-04 RX ORDER — HYDROMORPHONE HYDROCHLORIDE 2 MG/ML
0.5 INJECTION, SOLUTION INTRAMUSCULAR; INTRAVENOUS; SUBCUTANEOUS
Status: COMPLETED | OUTPATIENT
Start: 2022-02-04 | End: 2022-02-04

## 2022-02-04 RX ORDER — DIPHENHYDRAMINE HCL 25 MG
25 CAPSULE ORAL ONCE
Status: COMPLETED | OUTPATIENT
Start: 2022-02-05 | End: 2022-02-04

## 2022-02-04 RX ORDER — ENOXAPARIN SODIUM 100 MG/ML
40 INJECTION SUBCUTANEOUS EVERY 24 HOURS
Status: DISCONTINUED | OUTPATIENT
Start: 2022-02-04 | End: 2022-02-08 | Stop reason: HOSPADM

## 2022-02-04 RX ORDER — SODIUM CHLORIDE 0.9 % (FLUSH) 0.9 %
10 SYRINGE (ML) INJECTION
Status: DISCONTINUED | OUTPATIENT
Start: 2022-02-04 | End: 2022-02-08 | Stop reason: HOSPADM

## 2022-02-04 RX ORDER — AMOXICILLIN 250 MG
1 CAPSULE ORAL 2 TIMES DAILY
Status: DISCONTINUED | OUTPATIENT
Start: 2022-02-04 | End: 2022-02-08 | Stop reason: HOSPADM

## 2022-02-04 RX ORDER — ONDANSETRON 2 MG/ML
4 INJECTION INTRAMUSCULAR; INTRAVENOUS EVERY 6 HOURS PRN
Status: DISCONTINUED | OUTPATIENT
Start: 2022-02-04 | End: 2022-02-08 | Stop reason: HOSPADM

## 2022-02-04 RX ORDER — ALBUTEROL SULFATE 90 UG/1
2 AEROSOL, METERED RESPIRATORY (INHALATION) EVERY 4 HOURS PRN
Status: DISCONTINUED | OUTPATIENT
Start: 2022-02-04 | End: 2022-02-08 | Stop reason: HOSPADM

## 2022-02-04 RX ORDER — ASCORBIC ACID 500 MG
500 TABLET ORAL 2 TIMES DAILY
Status: DISCONTINUED | OUTPATIENT
Start: 2022-02-04 | End: 2022-02-08 | Stop reason: HOSPADM

## 2022-02-04 RX ORDER — ONDANSETRON 2 MG/ML
4 INJECTION INTRAMUSCULAR; INTRAVENOUS
Status: COMPLETED | OUTPATIENT
Start: 2022-02-04 | End: 2022-02-04

## 2022-02-04 RX ORDER — HYDROMORPHONE HYDROCHLORIDE 2 MG/ML
0.5 INJECTION, SOLUTION INTRAMUSCULAR; INTRAVENOUS; SUBCUTANEOUS
Status: DISCONTINUED | OUTPATIENT
Start: 2022-02-04 | End: 2022-02-05

## 2022-02-04 RX ORDER — DIPHENHYDRAMINE HCL 25 MG
25 CAPSULE ORAL
Status: COMPLETED | OUTPATIENT
Start: 2022-02-04 | End: 2022-02-04

## 2022-02-04 RX ORDER — HYDROXYUREA 500 MG/1
500 CAPSULE ORAL DAILY
Status: DISCONTINUED | OUTPATIENT
Start: 2022-02-05 | End: 2022-02-08 | Stop reason: HOSPADM

## 2022-02-04 RX ORDER — ZINC SULFATE 50(220)MG
220 CAPSULE ORAL DAILY
Status: DISCONTINUED | OUTPATIENT
Start: 2022-02-05 | End: 2022-02-08 | Stop reason: HOSPADM

## 2022-02-04 RX ORDER — CHOLECALCIFEROL (VITAMIN D3) 25 MCG
1000 TABLET ORAL DAILY
Status: DISCONTINUED | OUTPATIENT
Start: 2022-02-05 | End: 2022-02-08 | Stop reason: HOSPADM

## 2022-02-04 RX ORDER — FOLIC ACID 1 MG/1
1 TABLET ORAL DAILY
Status: DISCONTINUED | OUTPATIENT
Start: 2022-02-05 | End: 2022-02-08 | Stop reason: HOSPADM

## 2022-02-04 RX ADMIN — SODIUM CHLORIDE, SODIUM LACTATE, POTASSIUM CHLORIDE, AND CALCIUM CHLORIDE 1000 ML: .6; .31; .03; .02 INJECTION, SOLUTION INTRAVENOUS at 06:02

## 2022-02-04 RX ADMIN — DEXTROSE AND SODIUM CHLORIDE: 5; .45 INJECTION, SOLUTION INTRAVENOUS at 11:02

## 2022-02-04 RX ADMIN — HYDROMORPHONE HYDROCHLORIDE 0.5 MG: 2 INJECTION INTRAMUSCULAR; INTRAVENOUS; SUBCUTANEOUS at 10:02

## 2022-02-04 RX ADMIN — ONDANSETRON 4 MG: 2 INJECTION INTRAMUSCULAR; INTRAVENOUS at 03:02

## 2022-02-04 RX ADMIN — DIPHENHYDRAMINE HYDROCHLORIDE 25 MG: 25 CAPSULE ORAL at 08:02

## 2022-02-04 RX ADMIN — SODIUM CHLORIDE, SODIUM LACTATE, POTASSIUM CHLORIDE, AND CALCIUM CHLORIDE 1000 ML: .6; .31; .03; .02 INJECTION, SOLUTION INTRAVENOUS at 04:02

## 2022-02-04 RX ADMIN — HYDROMORPHONE HYDROCHLORIDE 0.5 MG: 2 INJECTION INTRAMUSCULAR; INTRAVENOUS; SUBCUTANEOUS at 08:02

## 2022-02-04 RX ADMIN — HYDROMORPHONE HYDROCHLORIDE 0.5 MG: 2 INJECTION, SOLUTION INTRAMUSCULAR; INTRAVENOUS; SUBCUTANEOUS at 06:02

## 2022-02-04 RX ADMIN — HYDROMORPHONE HYDROCHLORIDE 0.5 MG: 2 INJECTION INTRAMUSCULAR; INTRAVENOUS; SUBCUTANEOUS at 04:02

## 2022-02-04 RX ADMIN — DIPHENHYDRAMINE HYDROCHLORIDE 25 MG: 25 CAPSULE ORAL at 11:02

## 2022-02-04 RX ADMIN — SENNOSIDES AND DOCUSATE SODIUM 1 TABLET: 50; 8.6 TABLET ORAL at 11:02

## 2022-02-04 RX ADMIN — OXYCODONE HYDROCHLORIDE AND ACETAMINOPHEN 500 MG: 500 TABLET ORAL at 11:02

## 2022-02-04 RX ADMIN — ENOXAPARIN SODIUM 40 MG: 40 INJECTION SUBCUTANEOUS at 11:02

## 2022-02-04 NOTE — ED PROVIDER NOTES
Encounter Date: 2/4/2022    SCRIBE #1 NOTE: I, Abril Benedict, am scribing for, and in the presence of, Lyn Wallace MD.       History     Chief Complaint   Patient presents with    Generalized Body Aches     Pt arrives via EMS, pt is in [facility], pt test positive for Covid 1-26-22.  Pt present today c/o gen weakness/body aches.  Pt does have a Sickle Cell history.  No other concerns, pt a/o x3 to person, place and time, resp easy, skin warm and dry.       Katie Parham is a 31 y.o. male, with a PMHx of Asthma, Hemoglobin S-C disease, who presents to the ED with back pain secondary to sickle cell pain crisis. Patient has been incarcerated for the last month, and tested positive for COVID-19 on 1/26/22. He comes in today for persistent 9/10 aching back pain located at the left paraspinal area secondary to sickle cell pain crisis. He states his pain is similar to his previous sickle cell pain flare ups; notes he usually experiences flare ups once a month or once every few months. He states due to him being incarcerated he has only been able to take Tylenol every 12 hours for pain; has been unable to take oxycodone for relief for the last 30 days. He also endorses a cough since his COVID-19 infection which he states has been improving. Otherwise endorses medication compliance with Hydroxyurea/folic acid. He states he has been unable to have his symbicort or albuterol medications lately in detention. No other exacerbating or alleviating factors. Denies fever, numbness, weakness, chest pain, shortness of breath, dysuria, hematuria, frequency, urinary retention, urinary or bowel incontinence, or other associated symptoms. Of note, he f/u's with Hem/Onc at the [facility] [place]. He states he quit cigarette smoking 1 month ago, but denies ETOH or illicit drug use. Patient has a drug allergy to Penicillin.     The history is provided by the patient.     Review of patient's allergies indicates:   Allergen Reactions     Penicillins Anaphylaxis     Past Medical History:   Diagnosis Date    Asthma     Broken thumb 2017    Left    Cigarette smoker     Hemoglobin S-C disease     Sickle cell anemia      Past Surgical History:   Procedure Laterality Date    HAND SURGERY Left 12/21/2017    ORIF thumb    ORIF mandible  01/31/2013    spleenectomy       Family History   Family history unknown: Yes     Social History     Tobacco Use    Smoking status: Current Every Day Smoker     Packs/day: 0.25     Types: Cigarettes    Smokeless tobacco: Never Used    Tobacco comment: encouraged to quit.    Substance Use Topics    Alcohol use: Yes     Comment: socially    Drug use: No     Review of Systems   Constitutional: Negative for chills, diaphoresis and fever.   HENT: Negative for drooling, sore throat and voice change.    Eyes: Negative for photophobia and visual disturbance.   Respiratory: Positive for cough. Negative for shortness of breath and wheezing.    Cardiovascular: Negative for chest pain and leg swelling.   Gastrointestinal: Negative for abdominal pain, blood in stool, constipation, diarrhea, nausea and vomiting.   Genitourinary: Negative for dysuria, frequency, hematuria and urgency.        Negative for urinary retention.   Musculoskeletal: Positive for back pain (left). Negative for myalgias, neck pain and neck stiffness.   Skin: Negative for rash and wound.   Neurological: Negative for syncope, weakness, light-headedness, numbness and headaches.        Negative for urinary or bowel incontinence.    Hematological: Does not bruise/bleed easily.   Psychiatric/Behavioral: Negative for agitation, confusion and suicidal ideas.   All other systems reviewed and are negative.      Physical Exam     Initial Vitals [02/04/22 1306]   BP Pulse Resp Temp SpO2   107/63 93 14 98.3 °F (36.8 °C) 97 %      MAP       --         Physical Exam    Nursing note and vitals reviewed.  Constitutional: He appears well-developed and well-nourished. He  is not diaphoretic. He appears distressed.   Appears uncomfortable    HENT:   Head: Normocephalic and atraumatic.   Right Ear: External ear normal.   Left Ear: External ear normal.   Mouth/Throat: Oropharynx is clear and moist. No oropharyngeal exudate.   Eyes: Conjunctivae and EOM are normal. Pupils are equal, round, and reactive to light. Right eye exhibits no discharge. Left eye exhibits no discharge.   Neck: Neck supple. No JVD present.   Normal range of motion.  Cardiovascular: Normal rate, regular rhythm, normal heart sounds and intact distal pulses. Exam reveals no gallop and no friction rub.    No murmur heard.  Pulmonary/Chest: Breath sounds normal. No respiratory distress. He has no wheezes. He has no rhonchi. He has no rales.   SpO2 of 98% on room air.    Abdominal: Abdomen is soft. Bowel sounds are normal. He exhibits no distension. There is no abdominal tenderness.   No CVA tenderness.  There is no rebound and no guarding.   Musculoskeletal:         General: No tenderness or edema. Normal range of motion.      Cervical back: Normal range of motion and neck supple.      Comments: No midline tenderness. There is mild tenderness to the paraspinal left back.      Lymphadenopathy:     He has no cervical adenopathy.   Neurological: He is alert and oriented to person, place, and time. He has normal strength. No cranial nerve deficit or sensory deficit. GCS score is 15. GCS eye subscore is 4. GCS verbal subscore is 5. GCS motor subscore is 6.   Moves all extremities and carries on conversation. CN- II: PERRL; III/IV/VI: EOMI w/out evidence of nystagmus; V: no deficits appreciated to light touch bilateral face; VII: no facial weakness, no facial asymmetry. Eyebrow raise symmetric. Smile symmetric; IX/X: palate midline, and raises symmetrically; XI: shoulder shrug 5/5 bilaterally; XII: tongue is midline w/out asymmetry. Strength 5/5 to bilateral upper and lower extremities, sensation intact to light touch   Skin:  Skin is warm and dry.   Psychiatric: He has a normal mood and affect. Thought content normal.         ED Course   Procedures  Labs Reviewed   CBC W/ AUTO DIFFERENTIAL - Abnormal; Notable for the following components:       Result Value    WBC 13.42 (*)     RBC 3.58 (*)     Hemoglobin 11.4 (*)     Hematocrit 31.7 (*)     MCH 31.8 (*)     RDW 17.6 (*)     Platelets 667 (*)     Lymph # 7.3 (*)     Mono # 1.2 (*)     nRBC 1 (*)     Gran % 31.5 (*)     Lymph % 54.2 (*)     All other components within normal limits   RETICULOCYTES - Abnormal; Notable for the following components:    Retic 5.0 (*)     All other components within normal limits   COMPREHENSIVE METABOLIC PANEL   SARS-COV-2 RDRP GENE          Imaging Results          X-Ray Chest AP Portable (Final result)  Result time 02/04/22 15:01:40    Final result by Gaurav Kline MD (02/04/22 15:01:40)                 Impression:      No acute cardiopulmonary disease and no significant interval change      Electronically signed by: Gaurav Kline MD  Date:    02/04/2022  Time:    15:01             Narrative:    EXAMINATION:  XR CHEST AP PORTABLE    CLINICAL HISTORY:  sickle cell, covid;    TECHNIQUE:  Single frontal view of the chest was performed.    COMPARISON:  01/26/2022    FINDINGS:  Heart size and pulmonary vascularity are within normal limits.  Lungs are satisfactorily expanded.  A few small discoid opacities are seen at the left base, compatible with minimal scarring or atelectasis.  No airspace consolidation or pleural fluid.  No pneumothorax.  Skeletal structures appear intact.                                 Medications   sodium chloride 0.9% flush 10 mL (has no administration in time range)   senna-docusate 8.6-50 mg per tablet 1 tablet (1 tablet Oral Given 2/4/22 2309)   HYDROmorphone (PF) injection 0.5 mg (0.5 mg Intravenous Given 2/4/22 2213)   ascorbic acid (vitamin C) tablet 500 mg (500 mg Oral Given 2/4/22 2309)   multivitamin tablet (has no administration in  time range)   enoxaparin injection 40 mg (40 mg Subcutaneous Given 2/4/22 2309)   ondansetron injection 4 mg (has no administration in time range)   dextrose 5 % and 0.45 % NaCl infusion ( Intravenous New Bag 2/4/22 2310)   dextrose 10% bolus 250 mL (has no administration in time range)   albuterol inhaler 2 puff (has no administration in time range)   folic acid tablet 1 mg (has no administration in time range)   hydroxyurea capsule 500 mg (has no administration in time range)   vitamin D 1000 units tablet 1,000 Units (has no administration in time range)   zinc sulfate capsule 220 mg (has no administration in time range)   lactated ringers bolus 1,000 mL (0 mLs Intravenous Stopped 2/4/22 1700)   HYDROmorphone (PF) injection 0.5 mg (0.5 mg Intravenous Given 2/4/22 1600)   ondansetron injection 4 mg (4 mg Intravenous Given 2/4/22 1558)   lactated ringers bolus 1,000 mL (0 mLs Intravenous Stopped 2/4/22 2203)   HYDROmorphone (PF) injection 0.5 mg (0.5 mg Intravenous Given 2/4/22 1830)   HYDROmorphone (PF) injection 0.5 mg (0.5 mg Intravenous Given 2/4/22 2025)   diphenhydrAMINE capsule 25 mg (25 mg Oral Given 2/4/22 2049)   diphenhydrAMINE capsule 25 mg (25 mg Oral Given 2/4/22 2331)     Medical Decision Making:   History:   Old Medical Records: I decided to obtain old medical records.  Old Records Summarized: other records.       <> Summary of Records: Patient was just seen in this ED on 1/26/22 for fatigue, sickle cell pain, multiple complaints secondary to COVID-19 infection. He was given Tylenol, NSAIDS, Dilaudid, Zofran, and fluids without improvement, and placed in observation status with  for sickle cell crisis with COVID-19.   Clinical Tests:   Lab Tests: Ordered and Reviewed  Radiological Study: Ordered and Reviewed  MDM  Patient presents for evaluation of sickle cell pain crisis, consistent with patient's prior crises. No evidence for acute chest syndrome, severe anemia or red cell aplasia at this  time.    Labs reveal anemia, appropriate retics.  CXR - No acute concerning findings     I do not suspect major complications. Patient was given dilaudid, zofran and IV hydration. Pain still persistent after 3 rounds and patient states no access to home oxycodone in [facility].     Plan is place in observation for pain control. Discussed with observation unit who agrees with plan.           Scribe Attestation:   Scribe #1: I performed the above scribed service and the documentation accurately describes the services I performed. I attest to the accuracy of the note.                 Clinical Impression:   Final diagnoses:  [D57.00] Sickle cell crisis (Primary)  [M54.89] Left paraspinal back pain  [M79.10] Myalgia          ED Disposition Condition    Observation        I, Lyn Wallace, personally performed the services described in this documentation. All medical record entries made by the scribe were at my direction and in my presence. I have reviewed the chart and agree that the record reflects my personal performance and is accurate and complete.          Lyn Wallace MD  02/05/22 0057

## 2022-02-04 NOTE — ED TRIAGE NOTES
Patient present's to ED with complaints of generalized weakness and back pain x two days. Patient was recently diagnosed with COVID-19 on 01/26/2022. Patient denies chest pain or shortness of breath.History of sickle cell anemia and asthma.  AOx4, ambulatory, RA.

## 2022-02-05 LAB
ALBUMIN SERPL BCP-MCNC: 3.8 G/DL (ref 3.5–5.2)
ALP SERPL-CCNC: 67 U/L (ref 55–135)
ALT SERPL W/O P-5'-P-CCNC: 19 U/L (ref 10–44)
ANION GAP SERPL CALC-SCNC: 8 MMOL/L (ref 8–16)
ANISOCYTOSIS BLD QL SMEAR: ABNORMAL
APTT BLDCRRT: 23.6 SEC (ref 21–32)
AST SERPL-CCNC: 12 U/L (ref 10–40)
BASOPHILS # BLD AUTO: 0.1 K/UL (ref 0–0.2)
BASOPHILS NFR BLD: 0.7 % (ref 0–1.9)
BILIRUB SERPL-MCNC: 0.9 MG/DL (ref 0.1–1)
BILIRUB UR QL STRIP: NEGATIVE
BUN SERPL-MCNC: 4 MG/DL (ref 6–20)
CALCIUM SERPL-MCNC: 8.9 MG/DL (ref 8.7–10.5)
CHLORIDE SERPL-SCNC: 105 MMOL/L (ref 95–110)
CK SERPL-CCNC: 77 U/L (ref 20–200)
CLARITY UR: CLEAR
CO2 SERPL-SCNC: 26 MMOL/L (ref 23–29)
COLOR UR: YELLOW
CREAT SERPL-MCNC: 0.8 MG/DL (ref 0.5–1.4)
D DIMER PPP IA.FEU-MCNC: 0.87 MG/L FEU
DIFFERENTIAL METHOD: ABNORMAL
EOSINOPHIL # BLD AUTO: 0.5 K/UL (ref 0–0.5)
EOSINOPHIL NFR BLD: 3.7 % (ref 0–8)
ERYTHROCYTE [DISTWIDTH] IN BLOOD BY AUTOMATED COUNT: 17.8 % (ref 11.5–14.5)
ERYTHROCYTE [SEDIMENTATION RATE] IN BLOOD BY WESTERGREN METHOD: 2 MM/HR (ref 0–10)
EST. GFR  (AFRICAN AMERICAN): >60 ML/MIN/1.73 M^2
EST. GFR  (NON AFRICAN AMERICAN): >60 ML/MIN/1.73 M^2
FERRITIN SERPL-MCNC: 372 NG/ML (ref 20–300)
GLUCOSE SERPL-MCNC: 98 MG/DL (ref 70–110)
GLUCOSE UR QL STRIP: NEGATIVE
HCT VFR BLD AUTO: 29.8 % (ref 40–54)
HGB BLD-MCNC: 10.5 G/DL (ref 14–18)
HGB UR QL STRIP: NEGATIVE
HYPOCHROMIA BLD QL SMEAR: ABNORMAL
IMM GRANULOCYTES # BLD AUTO: 0.04 K/UL (ref 0–0.04)
IMM GRANULOCYTES NFR BLD AUTO: 0.3 % (ref 0–0.5)
INR PPP: 1.1 (ref 0.8–1.2)
KETONES UR QL STRIP: NEGATIVE
LDH SERPL L TO P-CCNC: 314 U/L (ref 110–260)
LEUKOCYTE ESTERASE UR QL STRIP: NEGATIVE
LYMPHOCYTES # BLD AUTO: 6.7 K/UL (ref 1–4.8)
LYMPHOCYTES NFR BLD: 46.4 % (ref 18–48)
MCH RBC QN AUTO: 31.8 PG (ref 27–31)
MCHC RBC AUTO-ENTMCNC: 35.2 G/DL (ref 32–36)
MCV RBC AUTO: 90 FL (ref 82–98)
MONOCYTES # BLD AUTO: 1.5 K/UL (ref 0.3–1)
MONOCYTES NFR BLD: 10.5 % (ref 4–15)
NEUTROPHILS # BLD AUTO: 5.5 K/UL (ref 1.8–7.7)
NEUTROPHILS NFR BLD: 38.4 % (ref 38–73)
NITRITE UR QL STRIP: NEGATIVE
NRBC BLD-RTO: 2 /100 WBC
OVALOCYTES BLD QL SMEAR: ABNORMAL
PH UR STRIP: 6 [PH] (ref 5–8)
PHOSPHATE SERPL-MCNC: 3.5 MG/DL (ref 2.7–4.5)
PLATELET # BLD AUTO: 421 K/UL (ref 150–450)
PLATELET BLD QL SMEAR: ABNORMAL
PMV BLD AUTO: 11.1 FL (ref 9.2–12.9)
POIKILOCYTOSIS BLD QL SMEAR: ABNORMAL
POTASSIUM SERPL-SCNC: 3.9 MMOL/L (ref 3.5–5.1)
PROT SERPL-MCNC: 6.9 G/DL (ref 6–8.4)
PROT UR QL STRIP: NEGATIVE
PROTHROMBIN TIME: 11.4 SEC (ref 9–12.5)
RBC # BLD AUTO: 3.3 M/UL (ref 4.6–6.2)
SICKLE CELLS BLD QL SMEAR: ABNORMAL
SODIUM SERPL-SCNC: 139 MMOL/L (ref 136–145)
SP GR UR STRIP: 1.01 (ref 1–1.03)
TARGETS BLD QL SMEAR: ABNORMAL
TROPONIN I SERPL DL<=0.01 NG/ML-MCNC: <0.006 NG/ML (ref 0–0.03)
URN SPEC COLLECT METH UR: NORMAL
UROBILINOGEN UR STRIP-ACNC: NEGATIVE EU/DL
WBC # BLD AUTO: 14.34 K/UL (ref 3.9–12.7)

## 2022-02-05 PROCEDURE — 80053 COMPREHEN METABOLIC PANEL: CPT | Performed by: NURSE PRACTITIONER

## 2022-02-05 PROCEDURE — 84484 ASSAY OF TROPONIN QUANT: CPT | Performed by: NURSE PRACTITIONER

## 2022-02-05 PROCEDURE — 81003 URINALYSIS AUTO W/O SCOPE: CPT | Performed by: NURSE PRACTITIONER

## 2022-02-05 PROCEDURE — 84100 ASSAY OF PHOSPHORUS: CPT | Performed by: NURSE PRACTITIONER

## 2022-02-05 PROCEDURE — 27000207 HC ISOLATION

## 2022-02-05 PROCEDURE — 82728 ASSAY OF FERRITIN: CPT | Performed by: NURSE PRACTITIONER

## 2022-02-05 PROCEDURE — 83615 LACTATE (LD) (LDH) ENZYME: CPT | Performed by: NURSE PRACTITIONER

## 2022-02-05 PROCEDURE — 85025 COMPLETE CBC W/AUTO DIFF WBC: CPT | Performed by: NURSE PRACTITIONER

## 2022-02-05 PROCEDURE — 25000003 PHARM REV CODE 250: Performed by: NURSE PRACTITIONER

## 2022-02-05 PROCEDURE — 21400001 HC TELEMETRY ROOM

## 2022-02-05 PROCEDURE — 63600175 PHARM REV CODE 636 W HCPCS: Performed by: NURSE PRACTITIONER

## 2022-02-05 PROCEDURE — 85730 THROMBOPLASTIN TIME PARTIAL: CPT | Performed by: NURSE PRACTITIONER

## 2022-02-05 PROCEDURE — 85610 PROTHROMBIN TIME: CPT | Performed by: NURSE PRACTITIONER

## 2022-02-05 PROCEDURE — 36415 COLL VENOUS BLD VENIPUNCTURE: CPT | Performed by: NURSE PRACTITIONER

## 2022-02-05 PROCEDURE — 85379 FIBRIN DEGRADATION QUANT: CPT | Performed by: NURSE PRACTITIONER

## 2022-02-05 PROCEDURE — 82550 ASSAY OF CK (CPK): CPT | Performed by: NURSE PRACTITIONER

## 2022-02-05 PROCEDURE — 85652 RBC SED RATE AUTOMATED: CPT | Performed by: NURSE PRACTITIONER

## 2022-02-05 RX ORDER — HYDROMORPHONE HYDROCHLORIDE 2 MG/ML
1 INJECTION, SOLUTION INTRAMUSCULAR; INTRAVENOUS; SUBCUTANEOUS EVERY 4 HOURS
Status: DISCONTINUED | OUTPATIENT
Start: 2022-02-05 | End: 2022-02-05

## 2022-02-05 RX ORDER — DIPHENHYDRAMINE HCL 25 MG
25 CAPSULE ORAL EVERY 8 HOURS
Status: COMPLETED | OUTPATIENT
Start: 2022-02-05 | End: 2022-02-06

## 2022-02-05 RX ORDER — SODIUM CHLORIDE 9 MG/ML
INJECTION, SOLUTION INTRAVENOUS CONTINUOUS
Status: DISCONTINUED | OUTPATIENT
Start: 2022-02-05 | End: 2022-02-08 | Stop reason: HOSPADM

## 2022-02-05 RX ORDER — OXYCODONE AND ACETAMINOPHEN 10; 325 MG/1; MG/1
1 TABLET ORAL EVERY 4 HOURS PRN
Status: DISCONTINUED | OUTPATIENT
Start: 2022-02-05 | End: 2022-02-05

## 2022-02-05 RX ORDER — NALOXONE HCL 0.4 MG/ML
0.02 VIAL (ML) INJECTION
Status: DISCONTINUED | OUTPATIENT
Start: 2022-02-05 | End: 2022-02-08 | Stop reason: HOSPADM

## 2022-02-05 RX ORDER — OXYCODONE AND ACETAMINOPHEN 10; 325 MG/1; MG/1
1 TABLET ORAL EVERY 4 HOURS PRN
Status: DISCONTINUED | OUTPATIENT
Start: 2022-02-05 | End: 2022-02-06

## 2022-02-05 RX ORDER — HYDROMORPHONE HYDROCHLORIDE 2 MG/ML
1 INJECTION, SOLUTION INTRAMUSCULAR; INTRAVENOUS; SUBCUTANEOUS EVERY 4 HOURS
Status: COMPLETED | OUTPATIENT
Start: 2022-02-05 | End: 2022-02-06

## 2022-02-05 RX ADMIN — HYDROMORPHONE HYDROCHLORIDE 1 MG: 2 INJECTION INTRAMUSCULAR; INTRAVENOUS; SUBCUTANEOUS at 10:02

## 2022-02-05 RX ADMIN — SENNOSIDES AND DOCUSATE SODIUM 1 TABLET: 50; 8.6 TABLET ORAL at 10:02

## 2022-02-05 RX ADMIN — HYDROXYUREA 500 MG: 500 CAPSULE ORAL at 08:02

## 2022-02-05 RX ADMIN — HYDROMORPHONE HYDROCHLORIDE 1 MG: 2 INJECTION INTRAMUSCULAR; INTRAVENOUS; SUBCUTANEOUS at 02:02

## 2022-02-05 RX ADMIN — HYDROMORPHONE HYDROCHLORIDE 0.5 MG: 2 INJECTION INTRAMUSCULAR; INTRAVENOUS; SUBCUTANEOUS at 08:02

## 2022-02-05 RX ADMIN — SODIUM CHLORIDE: 0.9 INJECTION, SOLUTION INTRAVENOUS at 10:02

## 2022-02-05 RX ADMIN — ENOXAPARIN SODIUM 40 MG: 40 INJECTION SUBCUTANEOUS at 05:02

## 2022-02-05 RX ADMIN — CHOLECALCIFEROL TAB 25 MCG (1000 UNIT) 1000 UNITS: 25 TAB at 08:02

## 2022-02-05 RX ADMIN — FOLIC ACID 1 MG: 1 TABLET ORAL at 08:02

## 2022-02-05 RX ADMIN — HYDROMORPHONE HYDROCHLORIDE 1 MG: 2 INJECTION INTRAMUSCULAR; INTRAVENOUS; SUBCUTANEOUS at 05:02

## 2022-02-05 RX ADMIN — DIPHENHYDRAMINE HYDROCHLORIDE 25 MG: 25 CAPSULE ORAL at 10:02

## 2022-02-05 RX ADMIN — OXYCODONE HYDROCHLORIDE AND ACETAMINOPHEN 500 MG: 500 TABLET ORAL at 10:02

## 2022-02-05 RX ADMIN — SENNOSIDES AND DOCUSATE SODIUM 1 TABLET: 50; 8.6 TABLET ORAL at 08:02

## 2022-02-05 RX ADMIN — HYDROMORPHONE HYDROCHLORIDE 0.5 MG: 2 INJECTION INTRAMUSCULAR; INTRAVENOUS; SUBCUTANEOUS at 01:02

## 2022-02-05 RX ADMIN — ZINC SULFATE 220 MG (50 MG) CAPSULE 220 MG: CAPSULE at 08:02

## 2022-02-05 RX ADMIN — SODIUM CHLORIDE: 0.9 INJECTION, SOLUTION INTRAVENOUS at 07:02

## 2022-02-05 RX ADMIN — THERA TABS 1 TABLET: TAB at 08:02

## 2022-02-05 RX ADMIN — OXYCODONE HYDROCHLORIDE AND ACETAMINOPHEN 500 MG: 500 TABLET ORAL at 08:02

## 2022-02-05 RX ADMIN — HYDROMORPHONE HYDROCHLORIDE 0.5 MG: 2 INJECTION INTRAMUSCULAR; INTRAVENOUS; SUBCUTANEOUS at 04:02

## 2022-02-05 NOTE — SUBJECTIVE & OBJECTIVE
Past Medical History:   Diagnosis Date    Asthma     Broken thumb 2017    Left    Cigarette smoker     Hemoglobin S-C disease     Sickle cell anemia        Past Surgical History:   Procedure Laterality Date    HAND SURGERY Left 12/21/2017    ORIF thumb    ORIF mandible  01/31/2013    spleenectomy         Review of patient's allergies indicates:   Allergen Reactions    Penicillins Anaphylaxis       No current facility-administered medications on file prior to encounter.     Current Outpatient Medications on File Prior to Encounter   Medication Sig    ascorbic acid, vitamin C, (VITAMIN C) 500 MG tablet Take 1 tablet (500 mg total) by mouth 2 (two) times daily.    folic acid (FOLVITE) 1 MG tablet Take 1 tablet (1 mg total) by mouth once daily.    hydroxyurea (HYDREA) 500 mg Cap TK 1 C PO BID    albuterol (ACCUNEB) 1.25 mg/3 mL Nebu Inhale contents of 1 vial (1.25 mg total) by nebulization every 6 (six) hours as needed (wheezing). Rescue    albuterol (PROVENTIL HFA) 90 mcg/actuation inhaler Inhale 2 puffs into the lungs every 6 (six) hours as needed for Wheezing. Rescue    budesonide-formoterol 160-4.5 mcg (SYMBICORT) 160-4.5 mcg/actuation HFAA Inhale 2 puffs into the lungs every 12 (twelve) hours. Controller    calcium-vitamin D3 500 mg(1,250mg) -200 unit per tablet Take 1 tablet by mouth 2 (two) times daily with meals.    guaiFENesin (MUCINEX) 600 mg 12 hr tablet Take 1 tablet (600 mg total) by mouth 2 (two) times daily.    HYDROcodone-acetaminophen (NORCO)  mg per tablet Take 1 tablet by mouth every 6 (six) hours as needed for Pain.    multivitamin Tab Take 1 tablet by mouth once daily.     Family History     Family history is unknown by patient.        Tobacco Use    Smoking status: Current Every Day Smoker     Packs/day: 0.25     Types: Cigarettes    Smokeless tobacco: Never Used    Tobacco comment: encouraged to quit.    Substance and Sexual Activity    Alcohol use: Yes     Comment:  socially    Drug use: No    Sexual activity: Yes     Partners: Female     Birth control/protection: Condom     Review of Systems     Constitutional: Negative for chills and fever.   HENT: Negative for drooling and voice change.    Eyes: Negative for photophobia and visual disturbance.   Respiratory: Positive for cough. Negative for shortness of breath and wheezing.    Cardiovascular: Negative for chest pain and leg swelling.   Gastrointestinal: Negative for abdominal pain, diarrhea, nausea and vomiting.   Genitourinary: Negative for dysuria, frequency, hematuria and urgency.        Negative for urinary retention.   Musculoskeletal: Positive for back pain (left). Negative for myalgias and neck pain.   Skin: Negative for rash and wound.   Neurological: Negative for syncope, weakness and numbness.        Negative for urinary or bowel incontinence.    Psychiatric/Behavioral: Negative for agitation and confusion.   All other systems reviewed and are negative.  Objective:     Vital Signs (Most Recent):  Temp: 98 °F (36.7 °C) (02/05/22 1138)  Pulse: 96 (02/05/22 1138)  Resp: 18 (02/05/22 1138)  BP: 121/71 (02/05/22 1138)  SpO2: 98 % (02/05/22 1138) Vital Signs (24h Range):  Temp:  [98 °F (36.7 °C)-98.6 °F (37 °C)] 98 °F (36.7 °C)  Pulse:  [] 96  Resp:  [16-20] 18  SpO2:  [96 %-100 %] 98 %  BP: ()/(56-87) 121/71     Weight: 72.6 kg (160 lb)  Body mass index is 25.82 kg/m².    Physical Exam    Nursing note and vitals reviewed.  Constitutional: He appears well-developed and well-nourished. He is not diaphoretic. No distress.   HENT:   Head: Normocephalic and atraumatic.   Right Ear: External ear normal.   Left Ear: External ear normal.   Mouth/Throat: Oropharynx is clear and moist. No oropharyngeal exudate.   Eyes: Conjunctivae and EOM are normal. Pupils are equal, round, and reactive to light. Right eye exhibits no discharge. Left eye exhibits no discharge.   Neck: Neck supple. No JVD present.   Normal range of  motion.  Cardiovascular: Normal rate, regular rhythm, normal heart sounds and intact distal pulses. Exam reveals no gallop and no friction rub.    No murmur heard.  Pulmonary/Chest: Breath sounds normal. No respiratory distress. He has no wheezes. He has no rhonchi. He has no rales.   SpO2 of 98% on room air.    Abdominal: Abdomen is soft. Bowel sounds are normal. He exhibits no distension. There is no abdominal tenderness.   No CVA tenderness.  There is no rebound and no guarding.   Musculoskeletal:         General: No edema.      Cervical back: Normal range of motion and neck supple.      Comments: No midline tenderness. There is mild tenderness to the paraspinal left back.  scratches to legs bilaterally 2/2 hydromorphone    Lymphadenopathy:     He has no cervical adenopathy.   Neurological: He is alert and oriented to person, place, and time. No cranial nerve deficit.   Skin: Skin is warm and dry.   Psychiatric: He has a normal mood and affect. Thought content normal.      Significant Labs: All pertinent labs within the past 24 hours have been reviewed.    Significant Imaging: I have reviewed all pertinent imaging results/findings within the past 24 hours.

## 2022-02-05 NOTE — H&P
[facility] Medicine  History & Physical    Patient Name: Katie Parham  MRN: [id number]  Patient Class: OP- Observation  Admission Date: 2/4/2022  Attending Physician: Julio C Mario MD   Primary Care Provider: Kieran Reed MD         Patient information was obtained from patient and ER records.     Subjective:     Principal Problem:Sickle cell pain crisis    Chief Complaint:   Chief Complaint   Patient presents with    Generalized Body Aches     Pt arrives via EMS, pt is in [facility], pt test positive for Covid 1-26-22.  Pt present today c/o gen weakness/body aches.  Pt does have a Sickle Cell history.  No other concerns, pt a/o x3 to person, place and time, resp easy, skin warm and dry.          HPI: 31 year old male present to the ED with c/o back pain secondary to sickle cell pain crisis. Patient has been incarcerated for the last month, and tested positive for COVID-19 on 1/26/22. He comes in today for persistent 9/10 aching back pain located at the left paraspinal area secondary to sickle cell pain crisis. He states his pain is similar to his previous sickle cell pain flare ups; notes he usually experiences flare ups once a month or once every few months. He states due to him being incarcerated he has only been able to take Tylenol every 12 hours for pain; has been unable to take oxycodone for relief for the last 30 days. He also endorses a cough since his COVID-19 infection which he states has been improving. Otherwise endorses medication compliance with Hydroxyurea. He states he has been unable to have his symbicort or albuterol medications lately in residential. PMHx Asthma, Hemoglobin S-C disease.              Past Medical History:   Diagnosis Date    Asthma     Broken thumb 2017    Left    Cigarette smoker     Hemoglobin S-C disease     Sickle cell anemia        Past Surgical History:   Procedure Laterality Date    HAND SURGERY Left 12/21/2017    ORIF thumb    ORIF mandible   01/31/2013    spleenectomy         Review of patient's allergies indicates:   Allergen Reactions    Penicillins Anaphylaxis       No current facility-administered medications on file prior to encounter.     Current Outpatient Medications on File Prior to Encounter   Medication Sig    ascorbic acid, vitamin C, (VITAMIN C) 500 MG tablet Take 1 tablet (500 mg total) by mouth 2 (two) times daily.    folic acid (FOLVITE) 1 MG tablet Take 1 tablet (1 mg total) by mouth once daily.    hydroxyurea (HYDREA) 500 mg Cap TK 1 C PO BID    albuterol (ACCUNEB) 1.25 mg/3 mL Nebu Inhale contents of 1 vial (1.25 mg total) by nebulization every 6 (six) hours as needed (wheezing). Rescue    albuterol (PROVENTIL HFA) 90 mcg/actuation inhaler Inhale 2 puffs into the lungs every 6 (six) hours as needed for Wheezing. Rescue    budesonide-formoterol 160-4.5 mcg (SYMBICORT) 160-4.5 mcg/actuation HFAA Inhale 2 puffs into the lungs every 12 (twelve) hours. Controller    calcium-vitamin D3 500 mg(1,250mg) -200 unit per tablet Take 1 tablet by mouth 2 (two) times daily with meals.    guaiFENesin (MUCINEX) 600 mg 12 hr tablet Take 1 tablet (600 mg total) by mouth 2 (two) times daily.    HYDROcodone-acetaminophen (NORCO)  mg per tablet Take 1 tablet by mouth every 6 (six) hours as needed for Pain.    multivitamin Tab Take 1 tablet by mouth once daily.     Family History     Family history is unknown by patient.        Tobacco Use    Smoking status: Current Every Day Smoker     Packs/day: 0.25     Types: Cigarettes    Smokeless tobacco: Never Used    Tobacco comment: encouraged to quit.    Substance and Sexual Activity    Alcohol use: Yes     Comment: socially    Drug use: No    Sexual activity: Yes     Partners: Female     Birth control/protection: Condom     Review of Systems   Constitutional: Negative for chills and fever.   HENT: Negative for drooling and voice change.    Eyes: Negative for photophobia and visual  disturbance.   Respiratory: Positive for cough. Negative for shortness of breath and wheezing.    Cardiovascular: Negative for chest pain and leg swelling.   Gastrointestinal: Negative for abdominal pain, diarrhea, nausea and vomiting.   Genitourinary: Negative for dysuria, frequency, hematuria and urgency.        Negative for urinary retention.   Musculoskeletal: Positive for back pain (left). Negative for myalgias and neck pain.   Skin: Negative for rash and wound.   Neurological: Negative for syncope, weakness and numbness.        Negative for urinary or bowel incontinence.    Psychiatric/Behavioral: Negative for agitation and confusion.   All other systems reviewed and are negative.  Objective:     Vital Signs (Most Recent):  Temp: 98.2 °F (36.8 °C) (02/04/22 2142)  Pulse: 98 (02/04/22 2142)  Resp: 18 (02/04/22 2142)  BP: 122/61 (02/04/22 2142)  SpO2: 98 % (02/04/22 2142) Vital Signs (24h Range):  Temp:  [98.2 °F (36.8 °C)-98.3 °F (36.8 °C)] 98.2 °F (36.8 °C)  Pulse:  [14-98] 98  Resp:  [14-19] 18  SpO2:  [97 %-100 %] 98 %  BP: ()/(56-64) 122/61     Weight: 72.6 kg (160 lb)  Body mass index is 25.82 kg/m².    Physical Exam  Nursing note and vitals reviewed.  Constitutional: He appears well-developed and well-nourished. He is not diaphoretic. No distress.   HENT:   Head: Normocephalic and atraumatic.   Right Ear: External ear normal.   Left Ear: External ear normal.   Mouth/Throat: Oropharynx is clear and moist. No oropharyngeal exudate.   Eyes: Conjunctivae and EOM are normal. Pupils are equal, round, and reactive to light. Right eye exhibits no discharge. Left eye exhibits no discharge.   Neck: Neck supple. No JVD present.   Normal range of motion.  Cardiovascular: Normal rate, regular rhythm, normal heart sounds and intact distal pulses. Exam reveals no gallop and no friction rub.    No murmur heard.  Pulmonary/Chest: Breath sounds normal. No respiratory distress. He has no wheezes. He has no rhonchi. He  has no rales.   SpO2 of 98% on room air.    Abdominal: Abdomen is soft. Bowel sounds are normal. He exhibits no distension. There is no abdominal tenderness.   No CVA tenderness.  There is no rebound and no guarding.   Musculoskeletal:         General: No edema.      Cervical back: Normal range of motion and neck supple.      Comments: No midline tenderness. There is mild tenderness to the paraspinal left back.      Lymphadenopathy:     He has no cervical adenopathy.   Neurological: He is alert and oriented to person, place, and time. No cranial nerve deficit.   Skin: Skin is warm and dry.   Psychiatric: He has a normal mood and affect. Thought content normal.      Significant Labs: All pertinent labs within the past 24 hours have been reviewed.    Significant Imaging: I have reviewed all pertinent imaging results/findings within the past 24 hours.    Assessment/Plan:     * Sickle cell pain crisis  IVF   Pain control (diluadid)   Oxygen as needed   rectic 5.0, WBC 13.42, Hgb 11.4      COVID-19 virus infection  Patient currently on RA, no indication for Remdesivir or decadron  Empiric vitamin C, vitamin D, zinc  Encourage use of IS, OOB as tolerated, prone position  covid labs daily  Respiratory inhaler as needed  CXR as needed       Mild asthma without complication  Albuterol inhaler prn   CXR: unremarkable      Tobacco abuse  Patient states he stop smoking a month ago...      Sickle cell anemia  Resume home med         VTE Risk Mitigation (From admission, onward)         Ordered     enoxaparin injection 40 mg  Daily         02/04/22 2159     IP VTE HIGH RISK PATIENT  Once         02/04/22 2159     Place sequential compression device  Until discontinued         02/04/22 2159               As clarification, on 2/4/22 @ 2200, patient should be placed in hospital observation services under my care in collaboration with MD Angie Vasquez NP  Department of Hospital Medicine   [facility] [place] - Emergency  Dept

## 2022-02-05 NOTE — HOSPITAL COURSE
31 year old male present to the ED with c/o back pain secondary to sickle cell pain crisis. Of note the pPatient has been incarcerated for the last month, and tested positive for COVID-19 on 1/26/22. Continued pain despite hydromorphone 0.5 mg q3 hours. He has bette moderate on smear. His pain medication was adjusted to q4H 1 mg and percocet added for breakthrough. IV fluids generously increased to 150 cc NS. Continue hydroxyurea    CXR unimpressive - obtain UA for completeness; suspect recent Covid infection as trigger; continue supportive care with IV pain medication until morning then convert to prn percocet q4H. He remained asymptomatic from COVID and was not stated on treatment with dexamethasone or remdesivir. Pain continued to improve and patient was stable for [facility] back to prison.

## 2022-02-05 NOTE — PROGRESS NOTES
[facility] Medicine  Progress Note    Patient Name: Katie Parham  MRN: [id number]  Patient Class: IP- Inpatient   Admission Date: 2/4/2022  Length of Stay: 0 days  Attending Physician: Julio C Mario MD  Primary Care Provider: Kieran Reed MD        Subjective:     Principal Problem:Sickle cell pain crisis        HPI:  31 year old male present to the ED with c/o back pain secondary to sickle cell pain crisis. Patient has been incarcerated for the last month, and tested positive for COVID-19 on 1/26/22. He comes in today for persistent 9/10 aching back pain located at the left paraspinal area secondary to sickle cell pain crisis. He states his pain is similar to his previous sickle cell pain flare ups; notes he usually experiences flare ups once a month or once every few months. He states due to him being incarcerated he has only been able to take Tylenol every 12 hours for pain; has been unable to take oxycodone for relief for the last 30 days. He also endorses a cough since his COVID-19 infection which he states has been improving. Otherwise endorses medication compliance with Hydroxyurea. He states he has been unable to have his symbicort or albuterol medications lately in [facility]. PMHx Asthma, Hemoglobin S-C disease.              Overview/Hospital Course:  31 year old male present to the ED with c/o back pain secondary to sickle cell pain crisis. Of note the pPatient has been incarcerated for the last month, and tested positive for COVID-19 on 1/26/22. Continued pain despite hydromorphone 0.5 mg q3 hours. He has bette moderate on smear. His pain medication was adjusted to q4H 1 mg and percocet added for breakthrough. IV fluids generously increased to 150 cc NS. Continue hydroxyurea    PLAN: -CXR unimpressive - obtain UA for completeness; suspect recent Covid infection as trigger; continue supportive care with IV pain medication until morning then convert to prn percocet q4H -  re-evaluate in am      Past Medical History:   Diagnosis Date    Asthma     Broken thumb 2017    Left    Cigarette smoker     Hemoglobin S-C disease     Sickle cell anemia        Past Surgical History:   Procedure Laterality Date    HAND SURGERY Left 12/21/2017    ORIF thumb    ORIF mandible  01/31/2013    spleenectomy         Review of patient's allergies indicates:   Allergen Reactions    Penicillins Anaphylaxis       No current facility-administered medications on file prior to encounter.     Current Outpatient Medications on File Prior to Encounter   Medication Sig    ascorbic acid, vitamin C, (VITAMIN C) 500 MG tablet Take 1 tablet (500 mg total) by mouth 2 (two) times daily.    folic acid (FOLVITE) 1 MG tablet Take 1 tablet (1 mg total) by mouth once daily.    hydroxyurea (HYDREA) 500 mg Cap TK 1 C PO BID    albuterol (ACCUNEB) 1.25 mg/3 mL Nebu Inhale contents of 1 vial (1.25 mg total) by nebulization every 6 (six) hours as needed (wheezing). Rescue    albuterol (PROVENTIL HFA) 90 mcg/actuation inhaler Inhale 2 puffs into the lungs every 6 (six) hours as needed for Wheezing. Rescue    budesonide-formoterol 160-4.5 mcg (SYMBICORT) 160-4.5 mcg/actuation HFAA Inhale 2 puffs into the lungs every 12 (twelve) hours. Controller    calcium-vitamin D3 500 mg(1,250mg) -200 unit per tablet Take 1 tablet by mouth 2 (two) times daily with meals.    guaiFENesin (MUCINEX) 600 mg 12 hr tablet Take 1 tablet (600 mg total) by mouth 2 (two) times daily.    HYDROcodone-acetaminophen (NORCO)  mg per tablet Take 1 tablet by mouth every 6 (six) hours as needed for Pain.    multivitamin Tab Take 1 tablet by mouth once daily.     Family History     Family history is unknown by patient.        Tobacco Use    Smoking status: Current Every Day Smoker     Packs/day: 0.25     Types: Cigarettes    Smokeless tobacco: Never Used    Tobacco comment: encouraged to quit.    Substance and Sexual Activity    Alcohol  use: Yes     Comment: socially    Drug use: No    Sexual activity: Yes     Partners: Female     Birth control/protection: Condom     Review of Systems     Constitutional: Negative for chills and fever.   HENT: Negative for drooling and voice change.    Eyes: Negative for photophobia and visual disturbance.   Respiratory: Positive for cough. Negative for shortness of breath and wheezing.    Cardiovascular: Negative for chest pain and leg swelling.   Gastrointestinal: Negative for abdominal pain, diarrhea, nausea and vomiting.   Genitourinary: Negative for dysuria, frequency, hematuria and urgency.        Negative for urinary retention.   Musculoskeletal: Positive for back pain (left). Negative for myalgias and neck pain.   Skin: Negative for rash and wound.   Neurological: Negative for syncope, weakness and numbness.        Negative for urinary or bowel incontinence.    Psychiatric/Behavioral: Negative for agitation and confusion.   All other systems reviewed and are negative.  Objective:     Vital Signs (Most Recent):  Temp: 98 °F (36.7 °C) (02/05/22 1138)  Pulse: 96 (02/05/22 1138)  Resp: 18 (02/05/22 1138)  BP: 121/71 (02/05/22 1138)  SpO2: 98 % (02/05/22 1138) Vital Signs (24h Range):  Temp:  [98 °F (36.7 °C)-98.6 °F (37 °C)] 98 °F (36.7 °C)  Pulse:  [] 96  Resp:  [16-20] 18  SpO2:  [96 %-100 %] 98 %  BP: ()/(56-87) 121/71     Weight: 72.6 kg (160 lb)  Body mass index is 25.82 kg/m².    Physical Exam    Nursing note and vitals reviewed.  Constitutional: He appears well-developed and well-nourished. He is not diaphoretic. No distress.   HENT:   Head: Normocephalic and atraumatic.   Right Ear: External ear normal.   Left Ear: External ear normal.   Mouth/Throat: Oropharynx is clear and moist. No oropharyngeal exudate.   Eyes: Conjunctivae and EOM are normal. Pupils are equal, round, and reactive to light. Right eye exhibits no discharge. Left eye exhibits no discharge.   Neck: Neck supple. No JVD  "present.   Normal range of motion.  Cardiovascular: Normal rate, regular rhythm, normal heart sounds and intact distal pulses. Exam reveals no gallop and no friction rub.    No murmur heard.  Pulmonary/Chest: Breath sounds normal. No respiratory distress. He has no wheezes. He has no rhonchi. He has no rales.   SpO2 of 98% on room air.    Abdominal: Abdomen is soft. Bowel sounds are normal. He exhibits no distension. There is no abdominal tenderness.   No CVA tenderness.  There is no rebound and no guarding.   Musculoskeletal:         General: No edema.      Cervical back: Normal range of motion and neck supple.      Comments: No midline tenderness. There is mild tenderness to the paraspinal left back.  scratches to legs bilaterally 2/2 hydromorphone    Lymphadenopathy:     He has no cervical adenopathy.   Neurological: He is alert and oriented to person, place, and time. No cranial nerve deficit.   Skin: Skin is warm and dry.   Psychiatric: He has a normal mood and affect. Thought content normal.      Significant Labs: All pertinent labs within the past 24 hours have been reviewed.    Significant Imaging: I have reviewed all pertinent imaging results/findings within the past 24 hours.      Assessment/Plan:      * Sickle cell pain crisis  -Increase IVFs and change to (NS) 150 cc/hr continuous  -CXR unimpressive - obtain UA for completeness; suspect recent Covid infection as trigger  -Pain control (diluadid), change dilaudie from q3H to q4 hours increase dose from 0.5 mg-->1mg; add percocet for breakthrough with parameters for both to "Hold for increased somnolence, SBP<105, HR<50, RR<14"  -Oxygen supplementation prn   -Follow cbc - whiet count from 13.42-->14.34      COVID-19 virus infection  Patient currently on RA, no indication for Remdesivir or decadron  Empiric vitamin C, vitamin D, zinc  Encourage use of IS, OOB as tolerated, prone position  covid labs daily  Respiratory inhaler as needed  CXR as needed "       Mild asthma without complication  Albuterol inhaler prn   CXR: unremarkable      Tobacco abuse  Patient states he stop smoking a month ago...      Sickle cell anemia  Resume home med         VTE Risk Mitigation (From admission, onward)         Ordered     enoxaparin injection 40 mg  Daily         02/04/22 2159     IP VTE HIGH RISK PATIENT  Once         02/04/22 2159                Discharge Planning   YENY: 2/7/2022     Code Status: Full Code   Is the patient medically ready for discharge?:     Reason for patient still in hospital (select all that apply): Patient unstable, Patient trending condition and Treatment pain control               Licha Myers, DNP, APRN, FNP-C  Department of Hospital Medicine - [facility]  [address] [place] [address]  Office [phone]; Pager [phone]

## 2022-02-05 NOTE — ASSESSMENT & PLAN NOTE
"-Increase IVFs and change to (NS) 150 cc/hr continuous  -CXR unimpressive - obtain UA for completeness; suspect recent Covid infection as trigger  -Pain control (diluadid), change dilaudie from q3H to q4 hours increase dose from 0.5 mg-->1mg; add percocet for breakthrough with parameters for both to "Hold for increased somnolence, SBP<105, HR<50, RR<14"  -Oxygen supplementation prn   -Follow cbc - whiet count from 13.42-->14.34    "

## 2022-02-05 NOTE — NURSING
Bedside handoff received from nurse Jessy RN. Patient lying in bed without any acute distress noted at this time. Safety precautions maintained.

## 2022-02-05 NOTE — SUBJECTIVE & OBJECTIVE
Past Medical History:   Diagnosis Date    Asthma     Broken thumb 2017    Left    Cigarette smoker     Hemoglobin S-C disease     Sickle cell anemia        Past Surgical History:   Procedure Laterality Date    HAND SURGERY Left 12/21/2017    ORIF thumb    ORIF mandible  01/31/2013    spleenectomy         Review of patient's allergies indicates:   Allergen Reactions    Penicillins Anaphylaxis       No current facility-administered medications on file prior to encounter.     Current Outpatient Medications on File Prior to Encounter   Medication Sig    ascorbic acid, vitamin C, (VITAMIN C) 500 MG tablet Take 1 tablet (500 mg total) by mouth 2 (two) times daily.    folic acid (FOLVITE) 1 MG tablet Take 1 tablet (1 mg total) by mouth once daily.    hydroxyurea (HYDREA) 500 mg Cap TK 1 C PO BID    albuterol (ACCUNEB) 1.25 mg/3 mL Nebu Inhale contents of 1 vial (1.25 mg total) by nebulization every 6 (six) hours as needed (wheezing). Rescue    albuterol (PROVENTIL HFA) 90 mcg/actuation inhaler Inhale 2 puffs into the lungs every 6 (six) hours as needed for Wheezing. Rescue    budesonide-formoterol 160-4.5 mcg (SYMBICORT) 160-4.5 mcg/actuation HFAA Inhale 2 puffs into the lungs every 12 (twelve) hours. Controller    calcium-vitamin D3 500 mg(1,250mg) -200 unit per tablet Take 1 tablet by mouth 2 (two) times daily with meals.    guaiFENesin (MUCINEX) 600 mg 12 hr tablet Take 1 tablet (600 mg total) by mouth 2 (two) times daily.    HYDROcodone-acetaminophen (NORCO)  mg per tablet Take 1 tablet by mouth every 6 (six) hours as needed for Pain.    multivitamin Tab Take 1 tablet by mouth once daily.     Family History     Family history is unknown by patient.        Tobacco Use    Smoking status: Current Every Day Smoker     Packs/day: 0.25     Types: Cigarettes    Smokeless tobacco: Never Used    Tobacco comment: encouraged to quit.    Substance and Sexual Activity    Alcohol use: Yes     Comment:  socially    Drug use: No    Sexual activity: Yes     Partners: Female     Birth control/protection: Condom     Review of Systems   Constitutional: Negative for chills and fever.   HENT: Negative for drooling and voice change.    Eyes: Negative for photophobia and visual disturbance.   Respiratory: Positive for cough. Negative for shortness of breath and wheezing.    Cardiovascular: Negative for chest pain and leg swelling.   Gastrointestinal: Negative for abdominal pain, diarrhea, nausea and vomiting.   Genitourinary: Negative for dysuria, frequency, hematuria and urgency.        Negative for urinary retention.   Musculoskeletal: Positive for back pain (left). Negative for myalgias and neck pain.   Skin: Negative for rash and wound.   Neurological: Negative for syncope, weakness and numbness.        Negative for urinary or bowel incontinence.    Psychiatric/Behavioral: Negative for agitation and confusion.   All other systems reviewed and are negative.  Objective:     Vital Signs (Most Recent):  Temp: 98.2 °F (36.8 °C) (02/04/22 2142)  Pulse: 98 (02/04/22 2142)  Resp: 18 (02/04/22 2142)  BP: 122/61 (02/04/22 2142)  SpO2: 98 % (02/04/22 2142) Vital Signs (24h Range):  Temp:  [98.2 °F (36.8 °C)-98.3 °F (36.8 °C)] 98.2 °F (36.8 °C)  Pulse:  [14-98] 98  Resp:  [14-19] 18  SpO2:  [97 %-100 %] 98 %  BP: ()/(56-64) 122/61     Weight: 72.6 kg (160 lb)  Body mass index is 25.82 kg/m².    Physical Exam  Nursing note and vitals reviewed.  Constitutional: He appears well-developed and well-nourished. He is not diaphoretic. No distress.   HENT:   Head: Normocephalic and atraumatic.   Right Ear: External ear normal.   Left Ear: External ear normal.   Mouth/Throat: Oropharynx is clear and moist. No oropharyngeal exudate.   Eyes: Conjunctivae and EOM are normal. Pupils are equal, round, and reactive to light. Right eye exhibits no discharge. Left eye exhibits no discharge.   Neck: Neck supple. No JVD present.   Normal range  of motion.  Cardiovascular: Normal rate, regular rhythm, normal heart sounds and intact distal pulses. Exam reveals no gallop and no friction rub.    No murmur heard.  Pulmonary/Chest: Breath sounds normal. No respiratory distress. He has no wheezes. He has no rhonchi. He has no rales.   SpO2 of 98% on room air.    Abdominal: Abdomen is soft. Bowel sounds are normal. He exhibits no distension. There is no abdominal tenderness.   No CVA tenderness.  There is no rebound and no guarding.   Musculoskeletal:         General: No edema.      Cervical back: Normal range of motion and neck supple.      Comments: No midline tenderness. There is mild tenderness to the paraspinal left back.      Lymphadenopathy:     He has no cervical adenopathy.   Neurological: He is alert and oriented to person, place, and time. No cranial nerve deficit.   Skin: Skin is warm and dry.   Psychiatric: He has a normal mood and affect. Thought content normal.      Significant Labs: All pertinent labs within the past 24 hours have been reviewed.    Significant Imaging: I have reviewed all pertinent imaging results/findings within the past 24 hours.

## 2022-02-05 NOTE — ASSESSMENT & PLAN NOTE
Patient currently on RA, no indication for Remdesivir or decadron  Empiric vitamin C, vitamin D, zinc  Encourage use of IS, OOB as tolerated, prone position  covid labs daily  Respiratory inhaler as needed  CXR as needed

## 2022-02-05 NOTE — HPI
31 year old male present to the ED with c/o back pain secondary to sickle cell pain crisis. Patient has been incarcerated for the last month, and tested positive for COVID-19 on 1/26/22. He comes in today for persistent 9/10 aching back pain located at the left paraspinal area secondary to sickle cell pain crisis. He states his pain is similar to his previous sickle cell pain flare ups; notes he usually experiences flare ups once a month or once every few months. He states due to him being incarcerated he has only been able to take Tylenol every 12 hours for pain; has been unable to take oxycodone for relief for the last 30 days. He also endorses a cough since his COVID-19 infection which he states has been improving. Otherwise endorses medication compliance with Hydroxyurea. He states he has been unable to have his symbicort or albuterol medications lately [place]. PMHx Asthma, Hemoglobin S-C disease.

## 2022-02-06 LAB
ALBUMIN SERPL BCP-MCNC: 3.4 G/DL (ref 3.5–5.2)
ALP SERPL-CCNC: 59 U/L (ref 55–135)
ALT SERPL W/O P-5'-P-CCNC: 17 U/L (ref 10–44)
ANION GAP SERPL CALC-SCNC: 9 MMOL/L (ref 8–16)
AST SERPL-CCNC: 12 U/L (ref 10–40)
BASOPHILS # BLD AUTO: 0.11 K/UL (ref 0–0.2)
BASOPHILS NFR BLD: 1 % (ref 0–1.9)
BILIRUB SERPL-MCNC: 0.7 MG/DL (ref 0.1–1)
BUN SERPL-MCNC: 5 MG/DL (ref 6–20)
CALCIUM SERPL-MCNC: 8.4 MG/DL (ref 8.7–10.5)
CHLORIDE SERPL-SCNC: 108 MMOL/L (ref 95–110)
CO2 SERPL-SCNC: 26 MMOL/L (ref 23–29)
CREAT SERPL-MCNC: 0.7 MG/DL (ref 0.5–1.4)
DIFFERENTIAL METHOD: ABNORMAL
EOSINOPHIL # BLD AUTO: 1.2 K/UL (ref 0–0.5)
EOSINOPHIL NFR BLD: 11.6 % (ref 0–8)
ERYTHROCYTE [DISTWIDTH] IN BLOOD BY AUTOMATED COUNT: 18.1 % (ref 11.5–14.5)
EST. GFR  (AFRICAN AMERICAN): >60 ML/MIN/1.73 M^2
EST. GFR  (NON AFRICAN AMERICAN): >60 ML/MIN/1.73 M^2
GLUCOSE SERPL-MCNC: 73 MG/DL (ref 70–110)
HCT VFR BLD AUTO: 27.5 % (ref 40–54)
HGB BLD-MCNC: 9.8 G/DL (ref 14–18)
IMM GRANULOCYTES # BLD AUTO: 0.03 K/UL (ref 0–0.04)
IMM GRANULOCYTES NFR BLD AUTO: 0.3 % (ref 0–0.5)
LYMPHOCYTES # BLD AUTO: 5 K/UL (ref 1–4.8)
LYMPHOCYTES NFR BLD: 47.3 % (ref 18–48)
MCH RBC QN AUTO: 32.8 PG (ref 27–31)
MCHC RBC AUTO-ENTMCNC: 35.6 G/DL (ref 32–36)
MCV RBC AUTO: 92 FL (ref 82–98)
MONOCYTES # BLD AUTO: 1.3 K/UL (ref 0.3–1)
MONOCYTES NFR BLD: 11.8 % (ref 4–15)
NEUTROPHILS # BLD AUTO: 3 K/UL (ref 1.8–7.7)
NEUTROPHILS NFR BLD: 28 % (ref 38–73)
NRBC BLD-RTO: 2 /100 WBC
PHOSPHATE SERPL-MCNC: 3.6 MG/DL (ref 2.7–4.5)
PLATELET # BLD AUTO: 524 K/UL (ref 150–450)
PMV BLD AUTO: 10.7 FL (ref 9.2–12.9)
POTASSIUM SERPL-SCNC: 4 MMOL/L (ref 3.5–5.1)
PROT SERPL-MCNC: 6 G/DL (ref 6–8.4)
RBC # BLD AUTO: 2.99 M/UL (ref 4.6–6.2)
RETICS/RBC NFR AUTO: 6.4 % (ref 0.4–2)
SODIUM SERPL-SCNC: 143 MMOL/L (ref 136–145)
WBC # BLD AUTO: 10.55 K/UL (ref 3.9–12.7)

## 2022-02-06 PROCEDURE — 27000207 HC ISOLATION

## 2022-02-06 PROCEDURE — 63600175 PHARM REV CODE 636 W HCPCS: Performed by: NURSE PRACTITIONER

## 2022-02-06 PROCEDURE — 80053 COMPREHEN METABOLIC PANEL: CPT | Performed by: NURSE PRACTITIONER

## 2022-02-06 PROCEDURE — 21400001 HC TELEMETRY ROOM

## 2022-02-06 PROCEDURE — 84100 ASSAY OF PHOSPHORUS: CPT | Performed by: NURSE PRACTITIONER

## 2022-02-06 PROCEDURE — 85025 COMPLETE CBC W/AUTO DIFF WBC: CPT | Performed by: NURSE PRACTITIONER

## 2022-02-06 PROCEDURE — 85045 AUTOMATED RETICULOCYTE COUNT: CPT | Performed by: NURSE PRACTITIONER

## 2022-02-06 PROCEDURE — 63600175 PHARM REV CODE 636 W HCPCS: Performed by: STUDENT IN AN ORGANIZED HEALTH CARE EDUCATION/TRAINING PROGRAM

## 2022-02-06 PROCEDURE — 25000003 PHARM REV CODE 250: Performed by: NURSE PRACTITIONER

## 2022-02-06 PROCEDURE — 36415 COLL VENOUS BLD VENIPUNCTURE: CPT | Performed by: NURSE PRACTITIONER

## 2022-02-06 RX ORDER — OXYCODONE AND ACETAMINOPHEN 10; 325 MG/1; MG/1
1 TABLET ORAL EVERY 4 HOURS PRN
Status: DISCONTINUED | OUTPATIENT
Start: 2022-02-06 | End: 2022-02-08 | Stop reason: HOSPADM

## 2022-02-06 RX ORDER — HYDROMORPHONE HYDROCHLORIDE 2 MG/ML
1 INJECTION, SOLUTION INTRAMUSCULAR; INTRAVENOUS; SUBCUTANEOUS EVERY 4 HOURS PRN
Status: DISCONTINUED | OUTPATIENT
Start: 2022-02-06 | End: 2022-02-08 | Stop reason: HOSPADM

## 2022-02-06 RX ADMIN — HYDROXYUREA 500 MG: 500 CAPSULE ORAL at 09:02

## 2022-02-06 RX ADMIN — DIPHENHYDRAMINE HYDROCHLORIDE 25 MG: 25 CAPSULE ORAL at 07:02

## 2022-02-06 RX ADMIN — FOLIC ACID 1 MG: 1 TABLET ORAL at 09:02

## 2022-02-06 RX ADMIN — THERA TABS 1 TABLET: TAB at 09:02

## 2022-02-06 RX ADMIN — ZINC SULFATE 220 MG (50 MG) CAPSULE 220 MG: CAPSULE at 09:02

## 2022-02-06 RX ADMIN — HYDROMORPHONE HYDROCHLORIDE 1 MG: 2 INJECTION INTRAMUSCULAR; INTRAVENOUS; SUBCUTANEOUS at 08:02

## 2022-02-06 RX ADMIN — OXYCODONE HYDROCHLORIDE AND ACETAMINOPHEN 500 MG: 500 TABLET ORAL at 09:02

## 2022-02-06 RX ADMIN — OXYCODONE AND ACETAMINOPHEN 1 TABLET: 10; 325 TABLET ORAL at 12:02

## 2022-02-06 RX ADMIN — OXYCODONE AND ACETAMINOPHEN 1 TABLET: 10; 325 TABLET ORAL at 07:02

## 2022-02-06 RX ADMIN — HYDROMORPHONE HYDROCHLORIDE 1 MG: 2 INJECTION INTRAMUSCULAR; INTRAVENOUS; SUBCUTANEOUS at 03:02

## 2022-02-06 RX ADMIN — CHOLECALCIFEROL TAB 25 MCG (1000 UNIT) 1000 UNITS: 25 TAB at 09:02

## 2022-02-06 RX ADMIN — SODIUM CHLORIDE: 0.9 INJECTION, SOLUTION INTRAVENOUS at 03:02

## 2022-02-06 RX ADMIN — SENNOSIDES AND DOCUSATE SODIUM 1 TABLET: 50; 8.6 TABLET ORAL at 09:02

## 2022-02-06 RX ADMIN — ENOXAPARIN SODIUM 40 MG: 40 INJECTION SUBCUTANEOUS at 05:02

## 2022-02-06 RX ADMIN — SODIUM CHLORIDE: 0.9 INJECTION, SOLUTION INTRAVENOUS at 07:02

## 2022-02-06 RX ADMIN — OXYCODONE HYDROCHLORIDE AND ACETAMINOPHEN 500 MG: 500 TABLET ORAL at 08:02

## 2022-02-06 RX ADMIN — HYDROMORPHONE HYDROCHLORIDE 1 MG: 2 INJECTION INTRAMUSCULAR; INTRAVENOUS; SUBCUTANEOUS at 02:02

## 2022-02-06 NOTE — NURSING
Bedside handoff received from nurse Summer RN. Patient lying in bed without any acute distress noted at this time. Safety precautions maintained.

## 2022-02-06 NOTE — NURSING
Patient's pain was managed  today with schedule q4 hours dialudine will request pain medication prn and implement breakthrough pain med.

## 2022-02-06 NOTE — PLAN OF CARE
Problem: Fluid Imbalance (Pneumonia)  Goal: Fluid Balance  Outcome: Ongoing, Progressing  Intervention: Monitor and Manage Fluid Balance  Flowsheets (Taken 2/6/2022 1837)  Fluid/Electrolyte Management:   electrolyte supplement adjusted   fluids provided   PRN medications for pain mildly effective. Remains SR on telemetry monitor. No skin issues and no injury during shift. IV NS continues to  infuse. Educated on new medication and verbalized understanding. Bed alarm activated and audible. Call light at side.

## 2022-02-06 NOTE — PLAN OF CARE
[facility]  Initial Discharge Assessment       Primary Care Provider: Kieran Reed MD    Admission Diagnosis: Sickle cell crisis [D57.00]  Myalgia [M79.10]  Left paraspinal back pain [M54.89]  COVID-19 [U07.1]    Admission Date: 2/4/2022  Expected Discharge Date: 2/7/2022    Discharge Barriers Identified: None    Payor: /     Extended Emergency Contact Information  Primary Emergency Contact: Magdalena Deal  Address: [address] [place] [address] [address] [facility] [place] [address] [facility]  Home Phone: [phone]  Mobile Phone: [phone]  Relation: Significant other    Discharge Plan A: Home with family  Discharge Plan B:  (n/a)      [id number] - [place], [place] - [address]  [address] [address]  Phone: [phone] Fax: [phone]      Initial Assessment (most recent)     Adult Discharge Assessment - 02/05/22 1640        Discharge Assessment    Assessment Type Discharge Planning Assessment     Confirmed/corrected address, phone number and insurance Yes     Confirmed Demographics Correct on Facesheet     Source of Information patient     When was your last doctors appointment? --   Unknown    Communicated YENY with patient/caregiver Yes     Reason For Admission Weaknessness and back pain     Lives With significant other     Do you expect to return to your current living situation? Yes     Do you have help at home or someone to help you manage your care at home? Yes     Prior to hospitilization cognitive status: Alert/Oriented     Current cognitive status: Alert/Oriented     Walking or Climbing Stairs Difficulty none     Dressing/Bathing Difficulty none     Home Layout Able to live on 1st floor     Equipment Currently Used at Home nebulizer     Readmission within 30 days? No     Patient currently being followed by outpatient case management? No     Do you currently have service(s) that help you  manage your care at home? No     Do you take prescription medications? Yes     Do you have prescription coverage? No     Do you have any problems affording any of your prescribed medications? TBD     Is the patient taking medications as prescribed? yes     Who is going to help you get home at discharge? Magdalena Brumfield [phone]     How do you get to doctors appointments? car, drives self     Are you on dialysis? No     Do you take coumadin? No     Discharge Plan A Home with family     Discharge Plan B --   n/a    Discharge Plan discussed with: Patient     Discharge Barriers Identified None        Relationship/Environment    Name(s) of Who Lives With Patient Magdalena Brumfield [phone]

## 2022-02-06 NOTE — CONSULTS
Thank you for your consult to [facility]. We have reviewed the patient chart. This patient does meet criteria for [facility] service at this time. Will assume care on 02/06/22 at 6AM     Brent Mirza MD  [facility]

## 2022-02-06 NOTE — PLAN OF CARE
Problem: Infection (Pneumonia)  Goal: Resolution of Infection Signs and Symptoms  Outcome: Ongoing, Progressing

## 2022-02-07 LAB
ALBUMIN SERPL BCP-MCNC: 3.2 G/DL (ref 3.5–5.2)
ALP SERPL-CCNC: 58 U/L (ref 55–135)
ALT SERPL W/O P-5'-P-CCNC: 17 U/L (ref 10–44)
ANION GAP SERPL CALC-SCNC: 6 MMOL/L (ref 8–16)
ANISOCYTOSIS BLD QL SMEAR: SLIGHT
AST SERPL-CCNC: 10 U/L (ref 10–40)
BASOPHILS # BLD AUTO: 0.12 K/UL (ref 0–0.2)
BASOPHILS NFR BLD: 1 % (ref 0–1.9)
BILIRUB SERPL-MCNC: 0.6 MG/DL (ref 0.1–1)
BUN SERPL-MCNC: 4 MG/DL (ref 6–20)
CALCIUM SERPL-MCNC: 8.3 MG/DL (ref 8.7–10.5)
CHLORIDE SERPL-SCNC: 109 MMOL/L (ref 95–110)
CO2 SERPL-SCNC: 27 MMOL/L (ref 23–29)
CREAT SERPL-MCNC: 0.7 MG/DL (ref 0.5–1.4)
DIFFERENTIAL METHOD: ABNORMAL
EOSINOPHIL # BLD AUTO: 1.4 K/UL (ref 0–0.5)
EOSINOPHIL NFR BLD: 11.6 % (ref 0–8)
ERYTHROCYTE [DISTWIDTH] IN BLOOD BY AUTOMATED COUNT: 18.7 % (ref 11.5–14.5)
EST. GFR  (AFRICAN AMERICAN): >60 ML/MIN/1.73 M^2
EST. GFR  (NON AFRICAN AMERICAN): >60 ML/MIN/1.73 M^2
GLUCOSE SERPL-MCNC: 80 MG/DL (ref 70–110)
HCT VFR BLD AUTO: 28.6 % (ref 40–54)
HGB BLD-MCNC: 10.2 G/DL (ref 14–18)
HYPOCHROMIA BLD QL SMEAR: ABNORMAL
IMM GRANULOCYTES # BLD AUTO: 0.05 K/UL (ref 0–0.04)
IMM GRANULOCYTES NFR BLD AUTO: 0.4 % (ref 0–0.5)
LYMPHOCYTES # BLD AUTO: 5.9 K/UL (ref 1–4.8)
LYMPHOCYTES NFR BLD: 49.6 % (ref 18–48)
MCH RBC QN AUTO: 32.4 PG (ref 27–31)
MCHC RBC AUTO-ENTMCNC: 35.7 G/DL (ref 32–36)
MCV RBC AUTO: 91 FL (ref 82–98)
MONOCYTES # BLD AUTO: 1.3 K/UL (ref 0.3–1)
MONOCYTES NFR BLD: 10.5 % (ref 4–15)
NEUTROPHILS # BLD AUTO: 3.2 K/UL (ref 1.8–7.7)
NEUTROPHILS NFR BLD: 26.9 % (ref 38–73)
NRBC BLD-RTO: 2 /100 WBC
OVALOCYTES BLD QL SMEAR: ABNORMAL
PHOSPHATE SERPL-MCNC: 3.4 MG/DL (ref 2.7–4.5)
PLATELET # BLD AUTO: 577 K/UL (ref 150–450)
PLATELET BLD QL SMEAR: ABNORMAL
PMV BLD AUTO: 9.9 FL (ref 9.2–12.9)
POIKILOCYTOSIS BLD QL SMEAR: SLIGHT
POLYCHROMASIA BLD QL SMEAR: ABNORMAL
POTASSIUM SERPL-SCNC: 3.7 MMOL/L (ref 3.5–5.1)
PROT SERPL-MCNC: 5.8 G/DL (ref 6–8.4)
RBC # BLD AUTO: 3.15 M/UL (ref 4.6–6.2)
RETICS/RBC NFR AUTO: 6.1 % (ref 0.4–2)
SICKLE CELLS BLD QL SMEAR: ABNORMAL
SODIUM SERPL-SCNC: 142 MMOL/L (ref 136–145)
TARGETS BLD QL SMEAR: ABNORMAL
WBC # BLD AUTO: 11.86 K/UL (ref 3.9–12.7)

## 2022-02-07 PROCEDURE — 21400001 HC TELEMETRY ROOM

## 2022-02-07 PROCEDURE — 85045 AUTOMATED RETICULOCYTE COUNT: CPT | Performed by: NURSE PRACTITIONER

## 2022-02-07 PROCEDURE — 63600175 PHARM REV CODE 636 W HCPCS: Performed by: STUDENT IN AN ORGANIZED HEALTH CARE EDUCATION/TRAINING PROGRAM

## 2022-02-07 PROCEDURE — 63600175 PHARM REV CODE 636 W HCPCS: Performed by: NURSE PRACTITIONER

## 2022-02-07 PROCEDURE — 84100 ASSAY OF PHOSPHORUS: CPT | Performed by: NURSE PRACTITIONER

## 2022-02-07 PROCEDURE — 85025 COMPLETE CBC W/AUTO DIFF WBC: CPT | Performed by: NURSE PRACTITIONER

## 2022-02-07 PROCEDURE — 36415 COLL VENOUS BLD VENIPUNCTURE: CPT | Performed by: NURSE PRACTITIONER

## 2022-02-07 PROCEDURE — 25000003 PHARM REV CODE 250: Performed by: NURSE PRACTITIONER

## 2022-02-07 PROCEDURE — 99900035 HC TECH TIME PER 15 MIN (STAT)

## 2022-02-07 PROCEDURE — 80053 COMPREHEN METABOLIC PANEL: CPT | Performed by: NURSE PRACTITIONER

## 2022-02-07 PROCEDURE — 94761 N-INVAS EAR/PLS OXIMETRY MLT: CPT

## 2022-02-07 PROCEDURE — 25000003 PHARM REV CODE 250: Performed by: STUDENT IN AN ORGANIZED HEALTH CARE EDUCATION/TRAINING PROGRAM

## 2022-02-07 PROCEDURE — 27000207 HC ISOLATION

## 2022-02-07 RX ADMIN — SODIUM CHLORIDE: 0.9 INJECTION, SOLUTION INTRAVENOUS at 11:02

## 2022-02-07 RX ADMIN — OXYCODONE AND ACETAMINOPHEN 1 TABLET: 10; 325 TABLET ORAL at 10:02

## 2022-02-07 RX ADMIN — OXYCODONE AND ACETAMINOPHEN 1 TABLET: 10; 325 TABLET ORAL at 05:02

## 2022-02-07 RX ADMIN — SODIUM CHLORIDE: 0.9 INJECTION, SOLUTION INTRAVENOUS at 09:02

## 2022-02-07 RX ADMIN — SENNOSIDES AND DOCUSATE SODIUM 1 TABLET: 50; 8.6 TABLET ORAL at 08:02

## 2022-02-07 RX ADMIN — HYDROXYUREA 500 MG: 500 CAPSULE ORAL at 08:02

## 2022-02-07 RX ADMIN — OXYCODONE HYDROCHLORIDE AND ACETAMINOPHEN 500 MG: 500 TABLET ORAL at 09:02

## 2022-02-07 RX ADMIN — SODIUM CHLORIDE: 0.9 INJECTION, SOLUTION INTRAVENOUS at 04:02

## 2022-02-07 RX ADMIN — HYDROMORPHONE HYDROCHLORIDE 1 MG: 2 INJECTION INTRAMUSCULAR; INTRAVENOUS; SUBCUTANEOUS at 07:02

## 2022-02-07 RX ADMIN — HYDROMORPHONE HYDROCHLORIDE 1 MG: 2 INJECTION INTRAMUSCULAR; INTRAVENOUS; SUBCUTANEOUS at 05:02

## 2022-02-07 RX ADMIN — FOLIC ACID 1 MG: 1 TABLET ORAL at 08:02

## 2022-02-07 RX ADMIN — CHOLECALCIFEROL TAB 25 MCG (1000 UNIT) 1000 UNITS: 25 TAB at 08:02

## 2022-02-07 RX ADMIN — HYDROMORPHONE HYDROCHLORIDE 1 MG: 2 INJECTION INTRAMUSCULAR; INTRAVENOUS; SUBCUTANEOUS at 11:02

## 2022-02-07 RX ADMIN — HYDROMORPHONE HYDROCHLORIDE 1 MG: 2 INJECTION INTRAMUSCULAR; INTRAVENOUS; SUBCUTANEOUS at 12:02

## 2022-02-07 RX ADMIN — OXYCODONE HYDROCHLORIDE AND ACETAMINOPHEN 500 MG: 500 TABLET ORAL at 08:02

## 2022-02-07 RX ADMIN — THERA TABS 1 TABLET: TAB at 08:02

## 2022-02-07 RX ADMIN — HYDROMORPHONE HYDROCHLORIDE 1 MG: 2 INJECTION INTRAMUSCULAR; INTRAVENOUS; SUBCUTANEOUS at 01:02

## 2022-02-07 RX ADMIN — ZINC SULFATE 220 MG (50 MG) CAPSULE 220 MG: CAPSULE at 08:02

## 2022-02-07 RX ADMIN — SENNOSIDES AND DOCUSATE SODIUM 1 TABLET: 50; 8.6 TABLET ORAL at 09:02

## 2022-02-07 RX ADMIN — SODIUM CHLORIDE: 0.9 INJECTION, SOLUTION INTRAVENOUS at 02:02

## 2022-02-07 RX ADMIN — ENOXAPARIN SODIUM 40 MG: 40 INJECTION SUBCUTANEOUS at 04:02

## 2022-02-07 NOTE — PROGRESS NOTES
[facility]  Telemedicine Progress Note    Patient Name: Katie Parham  MRN: [id number]  Patient Class: IP- Inpatient   Admission Date: 2/4/2022  Length of Stay: 1 days  Attending Physician: Brent Mirza MD  Primary Care Provider: Kieran Reed MD          Subjective:     Principal Problem:Sickle cell pain crisis        HPI:  31 year old male present to the ED with c/o back pain secondary to sickle cell pain crisis. Patient has been incarcerated for the last month, and tested positive for COVID-19 on 1/26/22. He comes in today for persistent 9/10 aching back pain located at the left paraspinal area secondary to sickle cell pain crisis. He states his pain is similar to his previous sickle cell pain flare ups; notes he usually experiences flare ups once a month or once every few months. He states due to him being incarcerated he has only been able to take Tylenol every 12 hours for pain; has been unable to take oxycodone for relief for the last 30 days. He also endorses a cough since his COVID-19 infection which he states has been improving. Otherwise endorses medication compliance with Hydroxyurea. He states he has been unable to have his symbicort or [facility]. PMHx Asthma, Hemoglobin S-C disease.              Overview/Hospital Course:  31 year old male present to the ED with c/o back pain secondary to sickle cell pain crisis. Of note the pPatient has been incarcerated for the last month, and tested positive for COVID-19 on 1/26/22. Continued pain despite hydromorphone 0.5 mg q3 hours. He has bette moderate on smear. His pain medication was adjusted to q4H 1 mg and percocet added for breakthrough. IV fluids generously increased to 150 cc NS. Continue hydroxyurea    PLAN: -CXR unimpressive - obtain UA for completeness; suspect recent Covid infection as trigger; continue supportive care with IV pain medication until morning then convert  to prn percocet q4H - re-evaluate in am      Interval History: Patients' scheduled dilaudid  this AM, reports pain 6/10, will add PRN dilaudid for severe pain. Patient denies chest pain, SOB, coughing.     Review of Systems   Constitutional: Positive for fatigue. Negative for chills.   HENT: Negative for congestion.    Respiratory: Negative for cough and shortness of breath.    Cardiovascular: Negative for chest pain and leg swelling.   Musculoskeletal: Positive for arthralgias and myalgias.     Objective:     Vital Signs (Most Recent):  Temp: 98.5 °F (36.9 °C) (22)  Pulse: 95 (22)  Resp: 16 (22)  BP: 111/67 (22)  SpO2: 97 % (22) Vital Signs (24h Range):  Temp:  [97.8 °F (36.6 °C)-98.5 °F (36.9 °C)] 98.5 °F (36.9 °C)  Pulse:  [] 95  Resp:  [16-18] 16  SpO2:  [97 %-99 %] 97 %  BP: ()/(60-90) 111/67     Weight: 72.6 kg (160 lb)  Body mass index is 25.82 kg/m².    Intake/Output Summary (Last 24 hours) at 2022  Last data filed at 2022  Gross per 24 hour   Intake 3750 ml   Output 3090 ml   Net 660 ml      Physical Exam  Constitutional:       Appearance: Normal appearance.   HENT:      Head: Normocephalic and atraumatic.   Pulmonary:      Effort: No respiratory distress.   Neurological:      Mental Status: He is alert and oriented to person, place, and time.         Significant Labs:   All pertinent labs within the past 24 hours have been reviewed.  Recent Lab Results       22  0442        Albumin 3.4       Alkaline Phosphatase 59       ALT 17       Anion Gap 9       AST 12       Baso # 0.11       Basophil % 1.0       BILIRUBIN TOTAL 0.7  Comment: For infants and newborns, interpretation of results should be based  on gestational age, weight and in agreement with clinical  observations.    Premature Infant recommended reference ranges:  Up to 24 hours.............<8.0 mg/dL  Up to 48 hours............<12.0 mg/dL  3-5  "days..................<15.0 mg/dL  6-29 days.................<15.0 mg/dL         BUN 5       Calcium 8.4       Chloride 108       CO2 26       Creatinine 0.7       Differential Method Automated       eGFR if  >60       eGFR if non  >60  Comment: Calculation used to obtain the estimated glomerular filtration  rate (eGFR) is the CKD-EPI equation.          Eos # 1.2       Eosinophil % 11.6       Glucose 73       Gran # (ANC) 3.0       Gran % 28.0       Hematocrit 27.5       Hemoglobin 9.8       Immature Grans (Abs) 0.03  Comment: Mild elevation in immature granulocytes is non specific and   can be seen in a variety of conditions including stress response,   acute inflammation, trauma and pregnancy. Correlation with other   laboratory and clinical findings is essential.         Immature Granulocytes 0.3       Lymph # 5.0       Lymph % 47.3       MCH 32.8       MCHC 35.6       MCV 92       Mono # 1.3       Mono % 11.8       MPV 10.7       nRBC 2       Phosphorus 3.6       Platelets 524       Potassium 4.0       PROTEIN TOTAL 6.0       RBC 2.99       RDW 18.1       Retic 6.4       Sodium 143       WBC 10.55             Significant Imaging: I have reviewed all pertinent imaging results/findings within the past 24 hours.      Assessment/Plan:      * Sickle cell pain crisis  -Increase IVFs and change to (NS) 150 cc/hr continuous  -CXR unimpressive - obtain UA for completeness; suspect recent Covid infection as trigger  -Pain control (diluadid), change dilaudie from q3H to q4 hours increase dose from 0.5 mg-->1mg; add percocet for breakthrough with parameters for both to "Hold for increased somnolence, SBP<105, HR<50, RR<14"  -Oxygen supplementation prn   -Follow cbc   -scheduled dilaudid changed to PRN 2/6/22      COVID-19 virus infection  Patient currently on RA, no indication for Remdesivir or decadron  Empiric vitamin C, vitamin D, zinc  Encourage use of IS, OOB as tolerated, prone " position  covid labs daily  Respiratory inhaler as needed  CXR as needed       Mild asthma without complication  Albuterol inhaler prn   CXR: unremarkable      Tobacco abuse  Patient states he stop smoking a month ago...      Sickle cell anemia  Resume home med         VTE Risk Mitigation (From admission, onward)         Ordered     enoxaparin injection 40 mg  Daily         02/04/22 2159     IP VTE HIGH RISK PATIENT  Once         02/04/22 2159                      I have assessed these finding virtually using telemed platform and with assistance of bedside nurse                 The attending portion of this evaluation, treatment, and documentation was performed per Brent Mirza MD via Telemedicine AudioVisual using the secure plista software platform with 2 way audio/video. The provider was located off-site and the patient is located in the hospital. The aforementioned video software was utilized to document the relevant history and physical exam    Brent Mirza MD  Department of Hospital Medicine   [facility]

## 2022-02-07 NOTE — SUBJECTIVE & OBJECTIVE
Interval History: Patient reports pain is improving and thinks he will be ready for d/c tomorrow, continuing current regimen. Patient denies chest pain, SOB, coughing.     Review of Systems   Constitutional: Positive for fatigue. Negative for chills.   HENT: Negative for congestion.    Respiratory: Negative for cough and shortness of breath.    Cardiovascular: Negative for chest pain and leg swelling.   Musculoskeletal: Positive for arthralgias and myalgias.     Objective:     Vital Signs (Most Recent):  Temp: 98.4 °F (36.9 °C) (02/07/22 1611)  Pulse: 71 (02/07/22 1611)  Resp: 20 (02/07/22 1611)  BP: 121/60 (02/07/22 1611)  SpO2: 98 % (02/07/22 1611) Vital Signs (24h Range):  Temp:  [97.7 °F (36.5 °C)-98.7 °F (37.1 °C)] 98.4 °F (36.9 °C)  Pulse:  [71-95] 71  Resp:  [16-20] 20  SpO2:  [97 %-100 %] 98 %  BP: (104-127)/(60-76) 121/60     Weight: 72.6 kg (160 lb)  Body mass index is 25.82 kg/m².    Intake/Output Summary (Last 24 hours) at 2/7/2022 1705  Last data filed at 2/7/2022 1600  Gross per 24 hour   Intake 2410 ml   Output 4890 ml   Net -2480 ml      Physical Exam  Constitutional:       Appearance: Normal appearance.   HENT:      Head: Normocephalic and atraumatic.   Pulmonary:      Effort: No respiratory distress.   Neurological:      Mental Status: He is alert and oriented to person, place, and time.         Significant Labs:   All pertinent labs within the past 24 hours have been reviewed.  Recent Lab Results       02/07/22  0516        Albumin 3.2       Alkaline Phosphatase 58       ALT 17       Anion Gap 6       Aniso Slight       AST 10       Baso # 0.12       Basophil % 1.0       BILIRUBIN TOTAL 0.6  Comment: For infants and newborns, interpretation of results should be based  on gestational age, weight and in agreement with clinical  observations.    Premature Infant recommended reference ranges:  Up to 24 hours.............<8.0 mg/dL  Up to 48 hours............<12.0 mg/dL  3-5 days..................<15.0  mg/dL  6-29 days.................<15.0 mg/dL         BUN 4       Calcium 8.3       Chloride 109       CO2 27       Creatinine 0.7       Differential Method Automated       eGFR if  >60       eGFR if non  >60  Comment: Calculation used to obtain the estimated glomerular filtration  rate (eGFR) is the CKD-EPI equation.          Eos # 1.4       Eosinophil % 11.6       Glucose 80       Gran # (ANC) 3.2       Gran % 26.9       Hematocrit 28.6       Hemoglobin 10.2       Hypo Occasional       Immature Grans (Abs) 0.05  Comment: Mild elevation in immature granulocytes is non specific and   can be seen in a variety of conditions including stress response,   acute inflammation, trauma and pregnancy. Correlation with other   laboratory and clinical findings is essential.         Immature Granulocytes 0.4       Lymph # 5.9       Lymph % 49.6       MCH 32.4       MCHC 35.7       MCV 91       Mono # 1.3       Mono % 10.5       MPV 9.9       nRBC 2       Ovalocytes Occasional       Phosphorus 3.4       Platelet Estimate Increased       Platelets 577       Poik Slight       Poly Occasional       Potassium 3.7       PROTEIN TOTAL 5.8       RBC 3.15       RDW 18.7       Retic 6.1       Sickle Cells Occasional       Sodium 142       Target Cells Moderate       WBC 11.86             Significant Imaging: I have reviewed all pertinent imaging results/findings within the past 24 hours.

## 2022-02-07 NOTE — PLAN OF CARE
Problem: Adult Inpatient Plan of Care  Goal: Plan of Care Review  Outcome: Ongoing, Progressing     Problem: Fluid Imbalance (Pneumonia)  Goal: Fluid Balance  Outcome: Ongoing, Progressing     Problem: Infection (Pneumonia)  Goal: Resolution of Infection Signs and Symptoms  Outcome: Ongoing, Progressing     Problem: Respiratory Compromise (Pneumonia)  Goal: Effective Oxygenation and Ventilation  Outcome: Ongoing, Progressing     Problem: Fall Injury Risk  Goal: Absence of Fall and Fall-Related Injury  Outcome: Ongoing, Progressing

## 2022-02-07 NOTE — ASSESSMENT & PLAN NOTE
"-Increase IVFs and change to (NS) 150 cc/hr continuous  -CXR unimpressive - obtain UA for completeness; suspect recent Covid infection as trigger  -Pain control (diluadid), change dilaudie from q3H to q4 hours increase dose from 0.5 mg-->1mg; add percocet for breakthrough with parameters for both to "Hold for increased somnolence, SBP<105, HR<50, RR<14"  -Oxygen supplementation prn   -Follow cbc   -scheduled dilaudid changed to PRN 2/6/22    "

## 2022-02-07 NOTE — PLAN OF CARE
Problem: Adult Inpatient Plan of Care  Goal: Optimal Comfort and Wellbeing  Outcome: Ongoing, Progressing  Intervention: Provide Person-Centered Care  Flowsheets (Taken 2/7/2022 4706)  Trust Relationship/Rapport:   care explained   questions answered   choices provided   questions encouraged   emotional support provided   reassurance provided     Problem: Fall Injury Risk  Goal: Absence of Fall and Fall-Related Injury  Outcome: Ongoing, Progressing

## 2022-02-07 NOTE — PROGRESS NOTES
[facility] [place]    Patient Name: Katie Parham  MRN: [id number]  Patient Class: IP- Inpatient   Admission Date: 2/4/2022  Length of Stay: 2 days  Attending Physician: Brent Mirza MD  Primary Care Provider: Kieran Reed MD          Subjective:     Principal Problem:Sickle cell pain crisis        HPI:  31 year old male present to the ED with c/o back pain secondary to sickle cell pain crisis. Patient has been incarcerated for the last month, and tested positive for COVID-19 on 1/26/22. He comes in today for persistent 9/10 aching back pain located at the left paraspinal area secondary to sickle cell pain crisis. He states his pain is similar to his previous sickle cell pain flare ups; notes he usually experiences flare ups once a month or once every few months. He states due to him being incarcerated he has only been able to take Tylenol every 12 hours for pain; has been unable to take oxycodone for relief for the last 30 days. He also endorses a cough since his COVID-19 infection which he states has been improving. Otherwise endorses medication compliance with Hydroxyurea. He states he has been unable to have his symbicort or albuterol medications lately in [facility]. PMHx Asthma, Hemoglobin S-C disease.              Overview/Hospital Course:  31 year old male present to the ED with c/o back pain secondary to sickle cell pain crisis. Of note the pPatient has been incarcerated for the last month, and tested positive for COVID-19 on 1/26/22. Continued pain despite hydromorphone 0.5 mg q3 hours. He has bette moderate on smear. His pain medication was adjusted to q4H 1 mg and percocet added for breakthrough. IV fluids generously increased to 150 cc NS. Continue hydroxyurea    CXR unimpressive - obtain UA for completeness; suspect recent Covid infection as trigger; continue supportive care with IV pain medication until morning then convert to prn  percocet q4H - re-evaluate in am      Interval History: Patient reports pain is improving and thinks he will be ready for d/c tomorrow, continuing current regimen. Patient denies chest pain, SOB, coughing.     Review of Systems   Constitutional: Positive for fatigue. Negative for chills.   HENT: Negative for congestion.    Respiratory: Negative for cough and shortness of breath.    Cardiovascular: Negative for chest pain and leg swelling.   Musculoskeletal: Positive for arthralgias and myalgias.     Objective:     Vital Signs (Most Recent):  Temp: 98.4 °F (36.9 °C) (02/07/22 1611)  Pulse: 71 (02/07/22 1611)  Resp: 20 (02/07/22 1611)  BP: 121/60 (02/07/22 1611)  SpO2: 98 % (02/07/22 1611) Vital Signs (24h Range):  Temp:  [97.7 °F (36.5 °C)-98.7 °F (37.1 °C)] 98.4 °F (36.9 °C)  Pulse:  [71-95] 71  Resp:  [16-20] 20  SpO2:  [97 %-100 %] 98 %  BP: (104-127)/(60-76) 121/60     Weight: 72.6 kg (160 lb)  Body mass index is 25.82 kg/m².    Intake/Output Summary (Last 24 hours) at 2/7/2022 1705  Last data filed at 2/7/2022 1600  Gross per 24 hour   Intake 2410 ml   Output 4890 ml   Net -2480 ml      Physical Exam  Constitutional:       Appearance: Normal appearance.   HENT:      Head: Normocephalic and atraumatic.   Pulmonary:      Effort: No respiratory distress.   Neurological:      Mental Status: He is alert and oriented to person, place, and time.         Significant Labs:   All pertinent labs within the past 24 hours have been reviewed.  Recent Lab Results       02/07/22  0516        Albumin 3.2       Alkaline Phosphatase 58       ALT 17       Anion Gap 6       Aniso Slight       AST 10       Baso # 0.12       Basophil % 1.0       BILIRUBIN TOTAL 0.6  Comment: For infants and newborns, interpretation of results should be based  on gestational age, weight and in agreement with clinical  observations.    Premature Infant recommended reference ranges:  Up to 24 hours.............<8.0 mg/dL  Up to 48 hours............<12.0  "mg/dL  3-5 days..................<15.0 mg/dL  6-29 days.................<15.0 mg/dL         BUN 4       Calcium 8.3       Chloride 109       CO2 27       Creatinine 0.7       Differential Method Automated       eGFR if  >60       eGFR if non  >60  Comment: Calculation used to obtain the estimated glomerular filtration  rate (eGFR) is the CKD-EPI equation.          Eos # 1.4       Eosinophil % 11.6       Glucose 80       Gran # (ANC) 3.2       Gran % 26.9       Hematocrit 28.6       Hemoglobin 10.2       Hypo Occasional       Immature Grans (Abs) 0.05  Comment: Mild elevation in immature granulocytes is non specific and   can be seen in a variety of conditions including stress response,   acute inflammation, trauma and pregnancy. Correlation with other   laboratory and clinical findings is essential.         Immature Granulocytes 0.4       Lymph # 5.9       Lymph % 49.6       MCH 32.4       MCHC 35.7       MCV 91       Mono # 1.3       Mono % 10.5       MPV 9.9       nRBC 2       Ovalocytes Occasional       Phosphorus 3.4       Platelet Estimate Increased       Platelets 577       Poik Slight       Poly Occasional       Potassium 3.7       PROTEIN TOTAL 5.8       RBC 3.15       RDW 18.7       Retic 6.1       Sickle Cells Occasional       Sodium 142       Target Cells Moderate       WBC 11.86             Significant Imaging: I have reviewed all pertinent imaging results/findings within the past 24 hours.      Assessment/Plan:      * Sickle cell pain crisis  -Increase IVFs and change to (NS) 150 cc/hr continuous  -CXR unimpressive - obtain UA for completeness; suspect recent Covid infection as trigger  -Pain control (diluadid), change dilaudie from q3H to q4 hours increase dose from 0.5 mg-->1mg; add percocet for breakthrough with parameters for both to "Hold for increased somnolence, SBP<105, HR<50, RR<14"  -Oxygen supplementation prn   -Follow cbc   -scheduled dilaudid changed to PRN " 2/6/22      COVID-19 virus infection  Patient currently on RA, no indication for Remdesivir or decadron  Empiric vitamin C, vitamin D, zinc  Encourage use of IS, OOB as tolerated, prone position  covid labs daily  Respiratory inhaler as needed  CXR as needed       Mild asthma without complication  Albuterol inhaler prn   CXR: unremarkable      Tobacco abuse  Patient states he stop smoking a month ago...      Sickle cell anemia  Resume home med         VTE Risk Mitigation (From admission, onward)         Ordered     enoxaparin injection 40 mg  Daily         02/04/22 2159     IP VTE HIGH RISK PATIENT  Once         02/04/22 2159                      I have assessed these finding virtually using telemed platform and with assistance of bedside nurse                 The attending portion of this evaluation, treatment, and documentation was performed per Brent Mirza MD via Telemedicine AudioVisual using the secure [employer] software platform with 2 way audio/video. The provider was located off-site and the patient is located in the hospital. The aforementioned video software was utilized to document the relevant history and physical exam    Brent Mirza MD  Department of Hospital Medicine   [facility]

## 2022-02-07 NOTE — SUBJECTIVE & OBJECTIVE
Interval History: Patients' scheduled dilaudid  this AM, reports pain 6/10, will add PRN dilaudid for severe pain. Patient denies chest pain, SOB, coughing.     Review of Systems   Constitutional: Positive for fatigue. Negative for chills.   HENT: Negative for congestion.    Respiratory: Negative for cough and shortness of breath.    Cardiovascular: Negative for chest pain and leg swelling.   Musculoskeletal: Positive for arthralgias and myalgias.     Objective:     Vital Signs (Most Recent):  Temp: 98.5 °F (36.9 °C) (22)  Pulse: 95 (22)  Resp: 16 (22)  BP: 111/67 (22)  SpO2: 97 % (22) Vital Signs (24h Range):  Temp:  [97.8 °F (36.6 °C)-98.5 °F (36.9 °C)] 98.5 °F (36.9 °C)  Pulse:  [] 95  Resp:  [16-18] 16  SpO2:  [97 %-99 %] 97 %  BP: ()/(60-90) 111/67     Weight: 72.6 kg (160 lb)  Body mass index is 25.82 kg/m².    Intake/Output Summary (Last 24 hours) at 2022  Last data filed at 2022  Gross per 24 hour   Intake 3750 ml   Output 3090 ml   Net 660 ml      Physical Exam  Constitutional:       Appearance: Normal appearance.   HENT:      Head: Normocephalic and atraumatic.   Pulmonary:      Effort: No respiratory distress.   Neurological:      Mental Status: He is alert and oriented to person, place, and time.         Significant Labs:   All pertinent labs within the past 24 hours have been reviewed.  Recent Lab Results       22  0442        Albumin 3.4       Alkaline Phosphatase 59       ALT 17       Anion Gap 9       AST 12       Baso # 0.11       Basophil % 1.0       BILIRUBIN TOTAL 0.7  Comment: For infants and newborns, interpretation of results should be based  on gestational age, weight and in agreement with clinical  observations.    Premature Infant recommended reference ranges:  Up to 24 hours.............<8.0 mg/dL  Up to 48 hours............<12.0 mg/dL  3-5 days..................<15.0 mg/dL  6-29  days.................<15.0 mg/dL         BUN 5       Calcium 8.4       Chloride 108       CO2 26       Creatinine 0.7       Differential Method Automated       eGFR if  >60       eGFR if non  >60  Comment: Calculation used to obtain the estimated glomerular filtration  rate (eGFR) is the CKD-EPI equation.          Eos # 1.2       Eosinophil % 11.6       Glucose 73       Gran # (ANC) 3.0       Gran % 28.0       Hematocrit 27.5       Hemoglobin 9.8       Immature Grans (Abs) 0.03  Comment: Mild elevation in immature granulocytes is non specific and   can be seen in a variety of conditions including stress response,   acute inflammation, trauma and pregnancy. Correlation with other   laboratory and clinical findings is essential.         Immature Granulocytes 0.3       Lymph # 5.0       Lymph % 47.3       MCH 32.8       MCHC 35.6       MCV 92       Mono # 1.3       Mono % 11.8       MPV 10.7       nRBC 2       Phosphorus 3.6       Platelets 524       Potassium 4.0       PROTEIN TOTAL 6.0       RBC 2.99       RDW 18.1       Retic 6.4       Sodium 143       WBC 10.55             Significant Imaging: I have reviewed all pertinent imaging results/findings within the past 24 hours.

## 2022-02-08 VITALS
HEIGHT: 66 IN | DIASTOLIC BLOOD PRESSURE: 67 MMHG | SYSTOLIC BLOOD PRESSURE: 120 MMHG | BODY MASS INDEX: 25.71 KG/M2 | RESPIRATION RATE: 19 BRPM | WEIGHT: 160 LBS | OXYGEN SATURATION: 97 % | TEMPERATURE: 98 F | HEART RATE: 73 BPM

## 2022-02-08 LAB
ALBUMIN SERPL BCP-MCNC: 3.3 G/DL (ref 3.5–5.2)
ALP SERPL-CCNC: 57 U/L (ref 55–135)
ALT SERPL W/O P-5'-P-CCNC: 19 U/L (ref 10–44)
ANION GAP SERPL CALC-SCNC: 8 MMOL/L (ref 8–16)
ANISOCYTOSIS BLD QL SMEAR: SLIGHT
AST SERPL-CCNC: 11 U/L (ref 10–40)
BASOPHILS # BLD AUTO: 0.1 K/UL (ref 0–0.2)
BASOPHILS NFR BLD: 0.9 % (ref 0–1.9)
BILIRUB SERPL-MCNC: 0.7 MG/DL (ref 0.1–1)
BUN SERPL-MCNC: 4 MG/DL (ref 6–20)
CALCIUM SERPL-MCNC: 8.6 MG/DL (ref 8.7–10.5)
CHLORIDE SERPL-SCNC: 109 MMOL/L (ref 95–110)
CO2 SERPL-SCNC: 25 MMOL/L (ref 23–29)
CREAT SERPL-MCNC: 0.7 MG/DL (ref 0.5–1.4)
DIFFERENTIAL METHOD: ABNORMAL
EOSINOPHIL # BLD AUTO: 1.3 K/UL (ref 0–0.5)
EOSINOPHIL NFR BLD: 11.3 % (ref 0–8)
ERYTHROCYTE [DISTWIDTH] IN BLOOD BY AUTOMATED COUNT: 19.1 % (ref 11.5–14.5)
EST. GFR  (AFRICAN AMERICAN): >60 ML/MIN/1.73 M^2
EST. GFR  (NON AFRICAN AMERICAN): >60 ML/MIN/1.73 M^2
GLUCOSE SERPL-MCNC: 81 MG/DL (ref 70–110)
HCT VFR BLD AUTO: 30.3 % (ref 40–54)
HGB BLD-MCNC: 10.8 G/DL (ref 14–18)
IMM GRANULOCYTES # BLD AUTO: 0.05 K/UL (ref 0–0.04)
IMM GRANULOCYTES NFR BLD AUTO: 0.4 % (ref 0–0.5)
LYMPHOCYTES # BLD AUTO: 5.3 K/UL (ref 1–4.8)
LYMPHOCYTES NFR BLD: 47.4 % (ref 18–48)
MCH RBC QN AUTO: 32.7 PG (ref 27–31)
MCHC RBC AUTO-ENTMCNC: 35.6 G/DL (ref 32–36)
MCV RBC AUTO: 92 FL (ref 82–98)
MONOCYTES # BLD AUTO: 1.2 K/UL (ref 0.3–1)
MONOCYTES NFR BLD: 11.1 % (ref 4–15)
NEUTROPHILS # BLD AUTO: 3.2 K/UL (ref 1.8–7.7)
NEUTROPHILS NFR BLD: 28.9 % (ref 38–73)
NRBC BLD-RTO: 3 /100 WBC
OVALOCYTES BLD QL SMEAR: ABNORMAL
PHOSPHATE SERPL-MCNC: 3.9 MG/DL (ref 2.7–4.5)
PLATELET # BLD AUTO: 592 K/UL (ref 150–450)
PLATELET BLD QL SMEAR: ABNORMAL
PMV BLD AUTO: 9.5 FL (ref 9.2–12.9)
POIKILOCYTOSIS BLD QL SMEAR: SLIGHT
POLYCHROMASIA BLD QL SMEAR: ABNORMAL
POTASSIUM SERPL-SCNC: 3.9 MMOL/L (ref 3.5–5.1)
PROT SERPL-MCNC: 6.3 G/DL (ref 6–8.4)
RBC # BLD AUTO: 3.3 M/UL (ref 4.6–6.2)
RETICS/RBC NFR AUTO: 7.6 % (ref 0.4–2)
SICKLE CELLS BLD QL SMEAR: ABNORMAL
SODIUM SERPL-SCNC: 142 MMOL/L (ref 136–145)
STOMATOCYTES BLD QL SMEAR: PRESENT
TARGETS BLD QL SMEAR: ABNORMAL
WBC # BLD AUTO: 11.15 K/UL (ref 3.9–12.7)

## 2022-02-08 PROCEDURE — 36415 COLL VENOUS BLD VENIPUNCTURE: CPT | Performed by: NURSE PRACTITIONER

## 2022-02-08 PROCEDURE — 25000003 PHARM REV CODE 250: Performed by: NURSE PRACTITIONER

## 2022-02-08 PROCEDURE — 84100 ASSAY OF PHOSPHORUS: CPT | Performed by: NURSE PRACTITIONER

## 2022-02-08 PROCEDURE — 85045 AUTOMATED RETICULOCYTE COUNT: CPT | Performed by: NURSE PRACTITIONER

## 2022-02-08 PROCEDURE — 63600175 PHARM REV CODE 636 W HCPCS: Performed by: STUDENT IN AN ORGANIZED HEALTH CARE EDUCATION/TRAINING PROGRAM

## 2022-02-08 PROCEDURE — 25000003 PHARM REV CODE 250: Performed by: STUDENT IN AN ORGANIZED HEALTH CARE EDUCATION/TRAINING PROGRAM

## 2022-02-08 PROCEDURE — 80053 COMPREHEN METABOLIC PANEL: CPT | Performed by: NURSE PRACTITIONER

## 2022-02-08 PROCEDURE — 85025 COMPLETE CBC W/AUTO DIFF WBC: CPT | Performed by: NURSE PRACTITIONER

## 2022-02-08 RX ADMIN — CHOLECALCIFEROL TAB 25 MCG (1000 UNIT) 1000 UNITS: 25 TAB at 08:02

## 2022-02-08 RX ADMIN — ZINC SULFATE 220 MG (50 MG) CAPSULE 220 MG: CAPSULE at 08:02

## 2022-02-08 RX ADMIN — FOLIC ACID 1 MG: 1 TABLET ORAL at 08:02

## 2022-02-08 RX ADMIN — SODIUM CHLORIDE: 0.9 INJECTION, SOLUTION INTRAVENOUS at 06:02

## 2022-02-08 RX ADMIN — OXYCODONE HYDROCHLORIDE AND ACETAMINOPHEN 500 MG: 500 TABLET ORAL at 08:02

## 2022-02-08 RX ADMIN — OXYCODONE AND ACETAMINOPHEN 1 TABLET: 10; 325 TABLET ORAL at 08:02

## 2022-02-08 RX ADMIN — THERA TABS 1 TABLET: TAB at 08:02

## 2022-02-08 RX ADMIN — HYDROMORPHONE HYDROCHLORIDE 1 MG: 2 INJECTION INTRAMUSCULAR; INTRAVENOUS; SUBCUTANEOUS at 04:02

## 2022-02-08 RX ADMIN — HYDROXYUREA 500 MG: 500 CAPSULE ORAL at 08:02

## 2022-02-08 RX ADMIN — SENNOSIDES AND DOCUSATE SODIUM 1 TABLET: 50; 8.6 TABLET ORAL at 08:02

## 2022-02-08 NOTE — DISCHARGE SUMMARY
[facility]  Discharge Summary      Patient Name: Katie Parham  MRN: [id number]  Patient Class: IP- Inpatient  Admission Date: 2/4/2022  Hospital Length of Stay: 3 days  Discharge Date and Time: 2/8/2022 11:12 AM  Attending Physician: No att. providers found   Discharging Provider: Brent Mirza MD  Primary Care Provider: Kieran Reed MD      HPI:   31 year old male present to the ED with c/o back pain secondary to sickle cell pain crisis. Patient has been incarcerated for the last month, and tested positive for COVID-19 on 1/26/22. He comes in today for persistent 9/10 aching back pain located at the left paraspinal area secondary to sickle cell pain crisis. He states his pain is similar to his previous sickle cell pain flare ups; notes he usually experiences flare ups once a month or once every few months. He states due to him being incarcerated he has only been able to take Tylenol every 12 hours for pain; has been unable to take oxycodone for relief for the last 30 days. He also endorses a cough since his COVID-19 infection which he states has been improving. Otherwise endorses medication compliance with Hydroxyurea. He states he has been unable to have his symbicort or albuterol medications lately in senior living. PMHx Asthma, Hemoglobin S-C disease.              * No surgery found *      Hospital Course:   31 year old male present to the ED with c/o back pain secondary to sickle cell pain crisis. Of note the pPatient has been incarcerated for the last month, and tested positive for COVID-19 on 1/26/22. Continued pain despite hydromorphone 0.5 mg q3 hours. He has bette moderate on smear. His pain medication was adjusted to q4H 1 mg and percocet added for breakthrough. IV fluids generously increased to 150 cc NS. Continue hydroxyurea    CXR unimpressive - obtain UA for completeness; suspect recent Covid infection as trigger; continue supportive care with IV pain medication  until morning then convert to prn percocet q4H. He remained asymptomatic from COVID and was not stated on treatment with dexamethasone or remdesivir. Pain continued to improve and patient was stable for discharge back to shelter.        Goals of Care Treatment Preferences:  Code Status: Full Code      Consults:   Consults (From admission, onward)        Status Ordering Provider     Inpatient virtual consult to Hospital Medicine  Once        Provider:  Brent Mirza MD    Completed PATEL SIERRA          No new Assessment & Plan notes have been filed under this hospital service since the last note was generated.  Service: Hospital Medicine    Final Active Diagnoses:    Diagnosis Date Noted POA    PRINCIPAL PROBLEM:  Sickle cell pain crisis [D57.00] 06/29/2021 Yes    COVID-19 virus infection [U07.1] 01/27/2022 Yes    Mild asthma without complication [J45.909] 09/05/2019 Yes     Chronic    Sickle cell anemia [D57.1] 08/18/2019 Yes     Chronic    Tobacco abuse [Z72.0] 08/18/2019 Yes     Chronic      Problems Resolved During this Admission:       Discharged Condition: stable    Disposition: Law Enforcement    Follow Up:    Patient Instructions:   No discharge procedures on file.    Significant Diagnostic Studies: Labs:   BMP:   Recent Labs   Lab 02/07/22  0516 02/08/22  0618   GLU 80 81    142   K 3.7 3.9    109   CO2 27 25   BUN 4* 4*   CREATININE 0.7 0.7   CALCIUM 8.3* 8.6*    and CMP   Recent Labs   Lab 02/07/22  0516 02/08/22  0618    142   K 3.7 3.9    109   CO2 27 25   GLU 80 81   BUN 4* 4*   CREATININE 0.7 0.7   CALCIUM 8.3* 8.6*   PROT 5.8* 6.3   ALBUMIN 3.2* 3.3*   BILITOT 0.6 0.7   ALKPHOS 58 57   AST 10 11   ALT 17 19   ANIONGAP 6* 8   ESTGFRAFRICA >60 >60   EGFRNONAA >60 >60       Pending Diagnostic Studies:     Procedure Component Value Units Date/Time    EKG 12-lead [[id number]]     Order Status: Sent Lab Status: No result          Medications:  Reconciled Home  Medications:      Medication List      CONTINUE taking these medications    * albuterol 1.25 mg/3 mL Nebu  Commonly known as: ACCUNEB  Inhale contents of 1 vial (1.25 mg total) by nebulization every 6 (six) hours as needed (wheezing). Rescue     * albuterol 90 mcg/actuation inhaler  Commonly known as: PROVENTIL HFA  Inhale 2 puffs into the lungs every 6 (six) hours as needed for Wheezing. Rescue     ascorbic acid (vitamin C) 500 MG tablet  Commonly known as: VITAMIN C  Take 1 tablet (500 mg total) by mouth 2 (two) times daily.     calcium-vitamin D3 500 mg-5 mcg (200 unit) per tablet  Commonly known as: OS-JOÃO 500 + D3  Take 1 tablet by mouth 2 (two) times daily with meals.     folic acid 1 MG tablet  Commonly known as: FOLVITE  Take 1 tablet (1 mg total) by mouth once daily.     guaiFENesin 600 mg 12 hr tablet  Commonly known as: MUCINEX  Take 1 tablet (600 mg total) by mouth 2 (two) times daily.     HYDROcodone-acetaminophen  mg per tablet  Commonly known as: NORCO  Take 1 tablet by mouth every 6 (six) hours as needed for Pain.     hydroxyurea 500 mg Cap  Commonly known as: HYDREA  TK 1 C PO BID     multivitamin Tab  Take 1 tablet by mouth once daily.     SYMBICORT 160-4.5 mcg/actuation Hfaa  Generic drug: budesonide-formoterol 160-4.5 mcg  Inhale 2 puffs into the lungs every 12 (twelve) hours. Controller         * This list has 2 medication(s) that are the same as other medications prescribed for you. Read the directions carefully, and ask your doctor or other care provider to review them with you.                Indwelling Lines/Drains at time of discharge:   Lines/Drains/Airways     None                 Time spent on the discharge of patient: < 35 minutes         The attending portion of this evaluation, treatment, and documentation was performed per Brent Mirza MD via Telemedicine AudioVisual using the secure Vidyo software platform with 2 way audio/video. The provider was located off-site and the  patient is located in the hospital. The aforementioned video software was utilized to document the relevant history and physical exam    Brent Mirza MD  Department of [facility]  [facility]

## 2022-02-08 NOTE — PLAN OF CARE
Problem: Adult Inpatient Plan of Care  Goal: Plan of Care Review  Outcome: Met  Goal: Patient-Specific Goal (Individualized)  Outcome: Met  Goal: Absence of Hospital-Acquired Illness or Injury  Outcome: Met  Goal: Optimal Comfort and Wellbeing  Outcome: Met  Goal: Readiness for Transition of Care  Outcome: Met     Problem: Fluid Imbalance (Pneumonia)  Goal: Fluid Balance  Outcome: Met     Problem: Infection (Pneumonia)  Goal: Resolution of Infection Signs and Symptoms  Outcome: Met     Problem: Respiratory Compromise (Pneumonia)  Goal: Effective Oxygenation and Ventilation  Outcome: Met     Problem: Fall Injury Risk  Goal: Absence of Fall and Fall-Related Injury  Outcome: Met

## 2022-02-08 NOTE — PLAN OF CARE
02/08/22 1109   Final Note   Assessment Type Final Discharge Note   Anticipated Discharge Disposition   (Patient is incarcerated and discharging in police custody to [facility].)   Post-Acute Status   Discharge Delays None known at this time   TN spoke with CM at [facility] to notify him patient is to return.  TN provided Dr Mirza pharmacy number to call in Rx.    TN notified nurse, JOLLY that all CM needs are met.

## 2022-02-08 NOTE — NURSING
Discharge instructions given to patient`s  at bedside. Patient verbalized understanding of instructions. Patient states willingness to comply. Saline lock removed. Tele monitoring removed. Made patient aware that he will be return to [facility] and doctor will call in script.

## 2022-02-23 ENCOUNTER — HOSPITAL ENCOUNTER (EMERGENCY)
Facility: HOSPITAL | Age: 32
Discharge: HOME | End: 2022-02-23
Attending: EMERGENCY MEDICINE
Payer: COMMERCIAL

## 2022-02-23 VITALS
HEIGHT: 66 IN | HEART RATE: 72 BPM | WEIGHT: 160 LBS | BODY MASS INDEX: 25.71 KG/M2 | RESPIRATION RATE: 11 BRPM | OXYGEN SATURATION: 99 % | SYSTOLIC BLOOD PRESSURE: 104 MMHG | TEMPERATURE: 98 F | DIASTOLIC BLOOD PRESSURE: 70 MMHG

## 2022-02-23 DIAGNOSIS — D57.00 SICKLE CELL PAIN CRISIS: Primary | ICD-10-CM

## 2022-02-23 LAB
ALBUMIN SERPL BCP-MCNC: 4.1 G/DL (ref 3.5–5.2)
ALP SERPL-CCNC: 72 U/L (ref 55–135)
ALT SERPL W/O P-5'-P-CCNC: 28 U/L (ref 10–44)
AMPHET+METHAMPHET UR QL: NEGATIVE
ANION GAP SERPL CALC-SCNC: 9 MMOL/L (ref 8–16)
ANISOCYTOSIS BLD QL SMEAR: SLIGHT
AST SERPL-CCNC: 13 U/L (ref 10–40)
BARBITURATES UR QL SCN>200 NG/ML: NEGATIVE
BASOPHILS # BLD AUTO: 0.14 K/UL (ref 0–0.2)
BASOPHILS NFR BLD: 1.2 % (ref 0–1.9)
BENZODIAZ UR QL SCN>200 NG/ML: NEGATIVE
BILIRUB SERPL-MCNC: 0.9 MG/DL (ref 0.1–1)
BILIRUB UR QL STRIP: NEGATIVE
BUN SERPL-MCNC: 6 MG/DL (ref 6–20)
BZE UR QL SCN: NEGATIVE
CALCIUM SERPL-MCNC: 9 MG/DL (ref 8.7–10.5)
CANNABINOIDS UR QL SCN: NEGATIVE
CHLORIDE SERPL-SCNC: 106 MMOL/L (ref 95–110)
CLARITY UR: CLEAR
CO2 SERPL-SCNC: 26 MMOL/L (ref 23–29)
COLOR UR: YELLOW
CREAT SERPL-MCNC: 0.8 MG/DL (ref 0.5–1.4)
CREAT UR-MCNC: 51.7 MG/DL (ref 23–375)
CTP QC/QA: YES
DIFFERENTIAL METHOD: ABNORMAL
EOSINOPHIL # BLD AUTO: 0.8 K/UL (ref 0–0.5)
EOSINOPHIL NFR BLD: 6.9 % (ref 0–8)
ERYTHROCYTE [DISTWIDTH] IN BLOOD BY AUTOMATED COUNT: 17.7 % (ref 11.5–14.5)
EST. GFR  (AFRICAN AMERICAN): >60 ML/MIN/1.73 M^2
EST. GFR  (NON AFRICAN AMERICAN): >60 ML/MIN/1.73 M^2
GLUCOSE SERPL-MCNC: 103 MG/DL (ref 70–110)
GLUCOSE UR QL STRIP: NEGATIVE
HCT VFR BLD AUTO: 33.6 % (ref 40–54)
HGB BLD-MCNC: 12.1 G/DL (ref 14–18)
HGB UR QL STRIP: NEGATIVE
IMM GRANULOCYTES # BLD AUTO: 0.03 K/UL (ref 0–0.04)
IMM GRANULOCYTES NFR BLD AUTO: 0.3 % (ref 0–0.5)
KETONES UR QL STRIP: NEGATIVE
LEUKOCYTE ESTERASE UR QL STRIP: NEGATIVE
LYMPHOCYTES # BLD AUTO: 5.1 K/UL (ref 1–4.8)
LYMPHOCYTES NFR BLD: 45 % (ref 18–48)
MCH RBC QN AUTO: 34.3 PG (ref 27–31)
MCHC RBC AUTO-ENTMCNC: 36 G/DL (ref 32–36)
MCV RBC AUTO: 95 FL (ref 82–98)
METHADONE UR QL SCN>300 NG/ML: NEGATIVE
MONOCYTES # BLD AUTO: 1.3 K/UL (ref 0.3–1)
MONOCYTES NFR BLD: 11.5 % (ref 4–15)
NEUTROPHILS # BLD AUTO: 4 K/UL (ref 1.8–7.7)
NEUTROPHILS NFR BLD: 35.1 % (ref 38–73)
NITRITE UR QL STRIP: NEGATIVE
NRBC BLD-RTO: 1 /100 WBC
OPIATES UR QL SCN: NEGATIVE
OVALOCYTES BLD QL SMEAR: ABNORMAL
PCP UR QL SCN>25 NG/ML: NEGATIVE
PH UR STRIP: 7 [PH] (ref 5–8)
PLATELET # BLD AUTO: 542 K/UL (ref 150–450)
PLATELET BLD QL SMEAR: ABNORMAL
PMV BLD AUTO: 9.6 FL (ref 9.2–12.9)
POC MOLECULAR INFLUENZA A AGN: NEGATIVE
POC MOLECULAR INFLUENZA B AGN: NEGATIVE
POLYCHROMASIA BLD QL SMEAR: ABNORMAL
POTASSIUM SERPL-SCNC: 3.7 MMOL/L (ref 3.5–5.1)
PROT SERPL-MCNC: 7.6 G/DL (ref 6–8.4)
PROT UR QL STRIP: NEGATIVE
RBC # BLD AUTO: 3.53 M/UL (ref 4.6–6.2)
RETICS/RBC NFR AUTO: 3.8 % (ref 0.4–2)
SODIUM SERPL-SCNC: 141 MMOL/L (ref 136–145)
SP GR UR STRIP: 1.01 (ref 1–1.03)
TARGETS BLD QL SMEAR: ABNORMAL
TOXICOLOGY INFORMATION: NORMAL
URN SPEC COLLECT METH UR: NORMAL
UROBILINOGEN UR STRIP-ACNC: NEGATIVE EU/DL
WBC # BLD AUTO: 11.38 K/UL (ref 3.9–12.7)

## 2022-02-23 PROCEDURE — 96375 TX/PRO/DX INJ NEW DRUG ADDON: CPT

## 2022-02-23 PROCEDURE — 81003 URINALYSIS AUTO W/O SCOPE: CPT | Mod: 59 | Performed by: EMERGENCY MEDICINE

## 2022-02-23 PROCEDURE — 85045 AUTOMATED RETICULOCYTE COUNT: CPT | Performed by: EMERGENCY MEDICINE

## 2022-02-23 PROCEDURE — 96374 THER/PROPH/DIAG INJ IV PUSH: CPT

## 2022-02-23 PROCEDURE — 63600175 PHARM REV CODE 636 W HCPCS: Performed by: EMERGENCY MEDICINE

## 2022-02-23 PROCEDURE — 96361 HYDRATE IV INFUSION ADD-ON: CPT

## 2022-02-23 PROCEDURE — 96376 TX/PRO/DX INJ SAME DRUG ADON: CPT

## 2022-02-23 PROCEDURE — 80053 COMPREHEN METABOLIC PANEL: CPT | Performed by: EMERGENCY MEDICINE

## 2022-02-23 PROCEDURE — 85025 COMPLETE CBC W/AUTO DIFF WBC: CPT | Performed by: EMERGENCY MEDICINE

## 2022-02-23 PROCEDURE — 25000003 PHARM REV CODE 250: Performed by: EMERGENCY MEDICINE

## 2022-02-23 PROCEDURE — 99284 EMERGENCY DEPT VISIT MOD MDM: CPT | Mod: 25

## 2022-02-23 PROCEDURE — 80307 DRUG TEST PRSMV CHEM ANLYZR: CPT | Performed by: EMERGENCY MEDICINE

## 2022-02-23 RX ORDER — OXYCODONE AND ACETAMINOPHEN 5; 325 MG/1; MG/1
1 TABLET ORAL
Status: COMPLETED | OUTPATIENT
Start: 2022-02-23 | End: 2022-02-23

## 2022-02-23 RX ORDER — OXYCODONE AND ACETAMINOPHEN 5; 325 MG/1; MG/1
1 TABLET ORAL EVERY 6 HOURS PRN
Qty: 20 TABLET | Refills: 0 | Status: SHIPPED | OUTPATIENT
Start: 2022-02-23 | End: 2023-04-11

## 2022-02-23 RX ORDER — ONDANSETRON 2 MG/ML
4 INJECTION INTRAMUSCULAR; INTRAVENOUS
Status: COMPLETED | OUTPATIENT
Start: 2022-02-23 | End: 2022-02-23

## 2022-02-23 RX ORDER — HYDROMORPHONE HYDROCHLORIDE 2 MG/ML
0.5 INJECTION, SOLUTION INTRAMUSCULAR; INTRAVENOUS; SUBCUTANEOUS
Status: COMPLETED | OUTPATIENT
Start: 2022-02-23 | End: 2022-02-23

## 2022-02-23 RX ORDER — HYDROMORPHONE HYDROCHLORIDE 2 MG/ML
1 INJECTION, SOLUTION INTRAMUSCULAR; INTRAVENOUS; SUBCUTANEOUS
Status: COMPLETED | OUTPATIENT
Start: 2022-02-23 | End: 2022-02-23

## 2022-02-23 RX ORDER — KETOROLAC TROMETHAMINE 30 MG/ML
15 INJECTION, SOLUTION INTRAMUSCULAR; INTRAVENOUS
Status: COMPLETED | OUTPATIENT
Start: 2022-02-23 | End: 2022-02-23

## 2022-02-23 RX ADMIN — HYDROMORPHONE HYDROCHLORIDE 0.5 MG: 2 INJECTION, SOLUTION INTRAMUSCULAR; INTRAVENOUS; SUBCUTANEOUS at 05:02

## 2022-02-23 RX ADMIN — HYDROMORPHONE HYDROCHLORIDE 1 MG: 2 INJECTION, SOLUTION INTRAMUSCULAR; INTRAVENOUS; SUBCUTANEOUS at 03:02

## 2022-02-23 RX ADMIN — HYDROMORPHONE HYDROCHLORIDE 1 MG: 2 INJECTION, SOLUTION INTRAMUSCULAR; INTRAVENOUS; SUBCUTANEOUS at 04:02

## 2022-02-23 RX ADMIN — SODIUM CHLORIDE 1000 ML: 0.9 INJECTION, SOLUTION INTRAVENOUS at 03:02

## 2022-02-23 RX ADMIN — SODIUM CHLORIDE 1000 ML: 0.9 INJECTION, SOLUTION INTRAVENOUS at 04:02

## 2022-02-23 RX ADMIN — ONDANSETRON 4 MG: 2 INJECTION INTRAMUSCULAR; INTRAVENOUS at 03:02

## 2022-02-23 RX ADMIN — OXYCODONE AND ACETAMINOPHEN 1 TABLET: 5; 325 TABLET ORAL at 05:02

## 2022-02-23 RX ADMIN — KETOROLAC TROMETHAMINE 15 MG: 30 INJECTION, SOLUTION INTRAMUSCULAR at 04:02

## 2022-02-23 NOTE — ED TRIAGE NOTES
Pt presents to ED via EMS with complaints of sickle cell crisis. Pt explains that symptoms have been present since last night. Pt states around 4 PM yesterday, he started having lower back pain. Pt states this is not the first time he has had a sickle cell crisis, he states that it always presents like this. Pt denies any other symptoms such as chest pain, SOB, n/v/d, headache. Pt appears stable currently, placed on cardiac monitor and continuous pulse ox. Pt AAOX4. All vitals WNL. Pt rates the pain a 9 and states it feels like throbbing.  at bedside, pt an inmate at correction.

## 2022-02-23 NOTE — ED PROVIDER NOTES
Encounter Date: 2/23/2022       History     Chief Complaint   Patient presents with    Sickle Cell Pain Crisis     [facility] 31 [facility]/o lower back pain related to sickle cell crisis. Patient was ambulatory on scene and VSS     Mr. Parham has a history of sickle cell disease and reports feeling symptoms that are consistent with prior crises.  He feels he is having a crisis.  He reports pain in his low back, legs and arms.  He reports onset of symptoms yesterday.  He is currently in longterm.  He states he has been able to take his routine medications.  He has been able to hydrate.  He denies chest pain or shortness of breath.  He denies vomiting or diarrhea.  He has been taking Tylenol for the symptoms but they have not been helping.    The history is provided by the patient.     Review of patient's allergies indicates:   Allergen Reactions    Penicillins Anaphylaxis     Past Medical History:   Diagnosis Date    Asthma     Broken thumb 2017    Left    Cigarette smoker     Hemoglobin S-C disease     Sickle cell anemia      Past Surgical History:   Procedure Laterality Date    HAND SURGERY Left 12/21/2017    ORIF thumb    ORIF mandible  01/31/2013    spleenectomy       Family History   Family history unknown: Yes     Social History     Tobacco Use    Smoking status: Current Every Day Smoker     Packs/day: 0.25     Types: Cigarettes    Smokeless tobacco: Never Used    Tobacco comment: encouraged to quit.    Substance Use Topics    Alcohol use: Yes     Comment: socially    Drug use: No     Review of Systems   Musculoskeletal:        Arm pain bilateral, low back pain, leg pain, bilateral.  No focal weakness.   All other systems reviewed and are negative.      Physical Exam     Initial Vitals [02/23/22 1419]   BP Pulse Resp Temp SpO2   117/71 94 18 98.7 °F (37.1 °C) 99 %      MAP       --         Physical Exam    Nursing note and vitals reviewed.  Constitutional: He appears well-developed  and well-nourished. He is not diaphoretic.   HENT:   Head: Normocephalic and atraumatic.   Eyes: Conjunctivae and EOM are normal.   Neck:   Normal range of motion.  Cardiovascular: Normal rate, regular rhythm and normal heart sounds.   Pulmonary/Chest: Breath sounds normal. No respiratory distress. He has no wheezes.   Abdominal: Abdomen is soft. He exhibits no distension. There is no abdominal tenderness. There is no rebound.   Musculoskeletal:         General: No edema. Normal range of motion.      Cervical back: Normal range of motion.     Neurological: He is alert and oriented to person, place, and time. GCS score is 15. GCS eye subscore is 4. GCS verbal subscore is 5. GCS motor subscore is 6.   Skin: Skin is warm. Capillary refill takes less than 2 seconds.   Psychiatric: He has a normal mood and affect. Thought content normal.         ED Course   Procedures  Labs Reviewed   CBC W/ AUTO DIFFERENTIAL - Abnormal; Notable for the following components:       Result Value    RBC 3.53 (*)     Hemoglobin 12.1 (*)     Hematocrit 33.6 (*)     MCH 34.3 (*)     RDW 17.7 (*)     Platelets 542 (*)     Lymph # 5.1 (*)     Mono # 1.3 (*)     Eos # 0.8 (*)     nRBC 1 (*)     Gran % 35.1 (*)     Platelet Estimate Increased (*)     All other components within normal limits   RETICULOCYTES - Abnormal; Notable for the following components:    Retic 3.8 (*)     All other components within normal limits   COMPREHENSIVE METABOLIC PANEL   URINALYSIS, REFLEX TO URINE CULTURE    Narrative:     Specimen Source->Urine   DRUG SCREEN PANEL, URINE EMERGENCY    Narrative:     Specimen Source->Urine   POCT INFLUENZA A/B MOLECULAR          Imaging Results    None          Medications   sodium chloride 0.9% bolus 1,000 mL (0 mLs Intravenous Stopped 2/23/22 1617)   HYDROmorphone (PF) injection 1 mg (1 mg Intravenous Given 2/23/22 1510)   ondansetron injection 4 mg (4 mg Intravenous Given 2/23/22 1511)   sodium chloride 0.9% bolus 1,000 mL (0 mLs  Intravenous Stopped 2/23/22 1715)   HYDROmorphone (PF) injection 1 mg (1 mg Intravenous Given 2/23/22 1617)   ketorolac injection 15 mg (15 mg Intravenous Given 2/23/22 1618)   oxyCODONE-acetaminophen 5-325 mg per tablet 1 tablet (1 tablet Oral Given 2/23/22 1715)   HYDROmorphone (PF) injection 0.5 mg (0.5 mg Intravenous Given 2/23/22 1715)     Medical Decision Making:   Clinical Tests:   Lab Tests: Ordered and Reviewed  ED Management:  Vitals noted, wnl. Prior records reviewed. Pt recently had admission for SC crisis and covid, within past 1.5 months.    Pt given NS 2L bolus, pain medications. He feels better. Labs noted, retic count mildly elevated, lower than recent past.   He feels better and would like a trial at outpatient management.   He is currently in nursing home. He has a pain contract with [employer] and doesn't want to ruin that contract. He has not gotten pain meds from that contract in the past 2 months, however, because of being in nursing home. He typically gets pain meds, oxycodone, as outpt. He is still taking his hydroxyurea and other home meds, with tylenol, but no opiods. I will provide a short course of outpt opioids to help him get through current crisis and as a replacement for his pain contract meds that he is not currently getting while in nursing home. We discussed return precautions and the need to continue to hydrate. He has means to hydrate in nursing home but it's not as easy as when he's at home. He is stable for d/c.                        Clinical Impression:   Final diagnoses:  [D57.00] Sickle cell pain crisis (Primary)          ED Disposition Condition    Discharge Stable        ED Prescriptions     Medication Sig Dispense Start Date End Date Auth. Provider    oxyCODONE-acetaminophen (PERCOCET) 5-325 mg per tablet Take 1 tablet by mouth every 6 (six) hours as needed for Pain. 20 tablet 2/23/2022  Josey Cesar MD        Follow-up Information     Follow up With Specialties Details Why Contact Info    [facility] - Emergency Dept Emergency Medicine  As needed, If symptoms worsen [address]  [facility] [id number] [phone]           Josey Cesar MD  02/24/22 5560

## 2023-03-22 ENCOUNTER — TELEPHONE (OUTPATIENT)
Dept: UROLOGY | Facility: CLINIC | Age: 33
End: 2023-03-22
Payer: MEDICAID

## 2023-03-22 NOTE — TELEPHONE ENCOUNTER
No answer/no voicemail        ----- Message from Jeanna Bassett sent at 3/22/2023 12:46 PM CDT -----  Regarding: [phone]  Type: Patient Call Back    Who called:self    What is the request in detail: pt will like a call back from the nurse to reschedule a procedure he was suppose to have done 10/18/2021    Can the clinic reply by [facility]?no    Would the patient rather a call back or a response via [facility]? Call back    Best call back number:[phone]      Additional Information: pt states that it is urgent that he speak to someone because the office canceled his last appt and he have not spoken to anyone since that cancellation.

## 2023-03-23 ENCOUNTER — TELEPHONE (OUTPATIENT)
Dept: UROLOGY | Facility: CLINIC | Age: 33
End: 2023-03-23
Payer: MEDICAID

## 2023-03-23 NOTE — TELEPHONE ENCOUNTER
Pt notified of appt with Dr. Lester 4/13/23 @ 9:15am          ----- Message from Addie Maza sent at 3/23/2023 12:04 PM CDT -----  Regarding: self  .Type:  Patient Returning Call    Who Called: self     Who Left Message for Patient: Noemy Cross RN     Does the patient know what this is regarding? Yes     Would the patient rather a call back or a response via [facility]?  Call back     Best Call Back Number: .[phone] or [phone]

## 2023-04-10 ENCOUNTER — HOSPITAL ENCOUNTER (INPATIENT)
Facility: HOSPITAL | Age: 33
LOS: 3 days | Discharge: HOME | DRG: 812 | End: 2023-04-13
Attending: STUDENT IN AN ORGANIZED HEALTH CARE EDUCATION/TRAINING PROGRAM | Admitting: STUDENT IN AN ORGANIZED HEALTH CARE EDUCATION/TRAINING PROGRAM
Payer: MEDICAID

## 2023-04-10 DIAGNOSIS — R07.9 CHEST PAIN: ICD-10-CM

## 2023-04-10 DIAGNOSIS — D57.00 SICKLE CELL PAIN CRISIS: ICD-10-CM

## 2023-04-10 DIAGNOSIS — D57.219 SICKLE-CELL-HEMOGLOBIN C DISEASE WITH CRISIS: Primary | ICD-10-CM

## 2023-04-10 LAB
ALBUMIN SERPL BCP-MCNC: 4.3 G/DL (ref 3.5–5.2)
ALP SERPL-CCNC: 73 U/L (ref 55–135)
ALT SERPL W/O P-5'-P-CCNC: 18 U/L (ref 10–44)
ANION GAP SERPL CALC-SCNC: 7 MMOL/L (ref 8–16)
AST SERPL-CCNC: 17 U/L (ref 10–40)
BASOPHILS # BLD AUTO: 0.1 K/UL (ref 0–0.2)
BASOPHILS NFR BLD: 0.8 % (ref 0–1.9)
BILIRUB SERPL-MCNC: 1.1 MG/DL (ref 0.1–1)
BUN SERPL-MCNC: 6 MG/DL (ref 6–20)
CALCIUM SERPL-MCNC: 9.7 MG/DL (ref 8.7–10.5)
CHLORIDE SERPL-SCNC: 105 MMOL/L (ref 95–110)
CK SERPL-CCNC: 155 U/L (ref 20–200)
CO2 SERPL-SCNC: 25 MMOL/L (ref 23–29)
CREAT SERPL-MCNC: 0.8 MG/DL (ref 0.5–1.4)
DIFFERENTIAL METHOD: ABNORMAL
EOSINOPHIL # BLD AUTO: 0.4 K/UL (ref 0–0.5)
EOSINOPHIL NFR BLD: 3.5 % (ref 0–8)
ERYTHROCYTE [DISTWIDTH] IN BLOOD BY AUTOMATED COUNT: 15.4 % (ref 11.5–14.5)
EST. GFR  (NO RACE VARIABLE): >60 ML/MIN/1.73 M^2
GLUCOSE SERPL-MCNC: 94 MG/DL (ref 70–110)
HCT VFR BLD AUTO: 34.2 % (ref 40–54)
HGB BLD-MCNC: 12.8 G/DL (ref 14–18)
IMM GRANULOCYTES # BLD AUTO: 0.05 K/UL (ref 0–0.04)
IMM GRANULOCYTES NFR BLD AUTO: 0.4 % (ref 0–0.5)
LYMPHOCYTES # BLD AUTO: 2.6 K/UL (ref 1–4.8)
LYMPHOCYTES NFR BLD: 21.8 % (ref 18–48)
MCH RBC QN AUTO: 31.1 PG (ref 27–31)
MCHC RBC AUTO-ENTMCNC: 37.4 G/DL (ref 32–36)
MCV RBC AUTO: 83 FL (ref 82–98)
MONOCYTES # BLD AUTO: 1.1 K/UL (ref 0.3–1)
MONOCYTES NFR BLD: 9.2 % (ref 4–15)
NEUTROPHILS # BLD AUTO: 7.7 K/UL (ref 1.8–7.7)
NEUTROPHILS NFR BLD: 64.3 % (ref 38–73)
NRBC BLD-RTO: 0 /100 WBC
PLATELET # BLD AUTO: 584 K/UL (ref 150–450)
PMV BLD AUTO: 8.9 FL (ref 9.2–12.9)
POTASSIUM SERPL-SCNC: 3.9 MMOL/L (ref 3.5–5.1)
PROT SERPL-MCNC: 8.2 G/DL (ref 6–8.4)
RBC # BLD AUTO: 4.12 M/UL (ref 4.6–6.2)
RETICS/RBC NFR AUTO: 5 % (ref 0.4–2)
SODIUM SERPL-SCNC: 137 MMOL/L (ref 136–145)
WBC # BLD AUTO: 11.9 K/UL (ref 3.9–12.7)

## 2023-04-10 PROCEDURE — 25000003 PHARM REV CODE 250: Performed by: STUDENT IN AN ORGANIZED HEALTH CARE EDUCATION/TRAINING PROGRAM

## 2023-04-10 PROCEDURE — 63600175 PHARM REV CODE 636 W HCPCS: Performed by: STUDENT IN AN ORGANIZED HEALTH CARE EDUCATION/TRAINING PROGRAM

## 2023-04-10 PROCEDURE — 96375 TX/PRO/DX INJ NEW DRUG ADDON: CPT

## 2023-04-10 PROCEDURE — 85045 AUTOMATED RETICULOCYTE COUNT: CPT | Performed by: STUDENT IN AN ORGANIZED HEALTH CARE EDUCATION/TRAINING PROGRAM

## 2023-04-10 PROCEDURE — 99285 EMERGENCY DEPT VISIT HI MDM: CPT | Mod: 25

## 2023-04-10 PROCEDURE — 12000002 HC ACUTE/MED SURGE SEMI-PRIVATE ROOM

## 2023-04-10 PROCEDURE — 80053 COMPREHEN METABOLIC PANEL: CPT | Performed by: STUDENT IN AN ORGANIZED HEALTH CARE EDUCATION/TRAINING PROGRAM

## 2023-04-10 PROCEDURE — 96361 HYDRATE IV INFUSION ADD-ON: CPT

## 2023-04-10 PROCEDURE — 96374 THER/PROPH/DIAG INJ IV PUSH: CPT | Mod: 59

## 2023-04-10 PROCEDURE — 82550 ASSAY OF CK (CPK): CPT | Performed by: STUDENT IN AN ORGANIZED HEALTH CARE EDUCATION/TRAINING PROGRAM

## 2023-04-10 PROCEDURE — 85025 COMPLETE CBC W/AUTO DIFF WBC: CPT | Performed by: STUDENT IN AN ORGANIZED HEALTH CARE EDUCATION/TRAINING PROGRAM

## 2023-04-10 RX ORDER — KETOROLAC TROMETHAMINE 30 MG/ML
15 INJECTION, SOLUTION INTRAMUSCULAR; INTRAVENOUS
Status: DISCONTINUED | OUTPATIENT
Start: 2023-04-10 | End: 2023-04-10

## 2023-04-10 RX ORDER — HYDROMORPHONE HYDROCHLORIDE 1 MG/ML
1 INJECTION, SOLUTION INTRAMUSCULAR; INTRAVENOUS; SUBCUTANEOUS
Status: COMPLETED | OUTPATIENT
Start: 2023-04-10 | End: 2023-04-11

## 2023-04-10 RX ORDER — KETOROLAC TROMETHAMINE 30 MG/ML
15 INJECTION, SOLUTION INTRAMUSCULAR; INTRAVENOUS
Status: COMPLETED | OUTPATIENT
Start: 2023-04-10 | End: 2023-04-10

## 2023-04-10 RX ORDER — HYDROMORPHONE HYDROCHLORIDE 1 MG/ML
1 INJECTION, SOLUTION INTRAMUSCULAR; INTRAVENOUS; SUBCUTANEOUS
Status: COMPLETED | OUTPATIENT
Start: 2023-04-10 | End: 2023-04-10

## 2023-04-10 RX ORDER — DIPHENHYDRAMINE HCL 25 MG
25 CAPSULE ORAL
Status: COMPLETED | OUTPATIENT
Start: 2023-04-10 | End: 2023-04-10

## 2023-04-10 RX ADMIN — HYDROMORPHONE HYDROCHLORIDE 1 MG: 1 INJECTION, SOLUTION INTRAMUSCULAR; INTRAVENOUS; SUBCUTANEOUS at 10:04

## 2023-04-10 RX ADMIN — SODIUM CHLORIDE 1000 ML: 9 INJECTION, SOLUTION INTRAVENOUS at 10:04

## 2023-04-10 RX ADMIN — KETOROLAC TROMETHAMINE 15 MG: 30 INJECTION, SOLUTION INTRAMUSCULAR; INTRAVENOUS at 10:04

## 2023-04-10 RX ADMIN — DIPHENHYDRAMINE HYDROCHLORIDE 25 MG: 25 CAPSULE ORAL at 10:04

## 2023-04-11 LAB
BASOPHILS # BLD AUTO: 0.11 K/UL (ref 0–0.2)
BASOPHILS NFR BLD: 0.9 % (ref 0–1.9)
DIFFERENTIAL METHOD: ABNORMAL
EOSINOPHIL # BLD AUTO: 1 K/UL (ref 0–0.5)
EOSINOPHIL NFR BLD: 8.8 % (ref 0–8)
ERYTHROCYTE [DISTWIDTH] IN BLOOD BY AUTOMATED COUNT: 15.4 % (ref 11.5–14.5)
HCT VFR BLD AUTO: 32.1 % (ref 40–54)
HGB BLD-MCNC: 11.8 G/DL (ref 14–18)
IMM GRANULOCYTES # BLD AUTO: 0.06 K/UL (ref 0–0.04)
IMM GRANULOCYTES NFR BLD AUTO: 0.5 % (ref 0–0.5)
LYMPHOCYTES # BLD AUTO: 3.9 K/UL (ref 1–4.8)
LYMPHOCYTES NFR BLD: 33.3 % (ref 18–48)
MCH RBC QN AUTO: 30.9 PG (ref 27–31)
MCHC RBC AUTO-ENTMCNC: 36.8 G/DL (ref 32–36)
MCV RBC AUTO: 84 FL (ref 82–98)
MONOCYTES # BLD AUTO: 1.2 K/UL (ref 0.3–1)
MONOCYTES NFR BLD: 10.6 % (ref 4–15)
NEUTROPHILS # BLD AUTO: 5.3 K/UL (ref 1.8–7.7)
NEUTROPHILS NFR BLD: 45.9 % (ref 38–73)
NRBC BLD-RTO: 0 /100 WBC
PLATELET # BLD AUTO: 560 K/UL (ref 150–450)
PMV BLD AUTO: 9.2 FL (ref 9.2–12.9)
RBC # BLD AUTO: 3.82 M/UL (ref 4.6–6.2)
WBC # BLD AUTO: 11.65 K/UL (ref 3.9–12.7)

## 2023-04-11 PROCEDURE — 63600175 PHARM REV CODE 636 W HCPCS: Performed by: HOSPITALIST

## 2023-04-11 PROCEDURE — 85025 COMPLETE CBC W/AUTO DIFF WBC: CPT

## 2023-04-11 PROCEDURE — G0378 HOSPITAL OBSERVATION PER HR: HCPCS

## 2023-04-11 PROCEDURE — S5010 5% DEXTROSE AND 0.45% SALINE: HCPCS | Performed by: STUDENT IN AN ORGANIZED HEALTH CARE EDUCATION/TRAINING PROGRAM

## 2023-04-11 PROCEDURE — 25000003 PHARM REV CODE 250

## 2023-04-11 PROCEDURE — 63600175 PHARM REV CODE 636 W HCPCS

## 2023-04-11 PROCEDURE — 11000001 HC ACUTE MED/SURG PRIVATE ROOM

## 2023-04-11 PROCEDURE — 63600175 PHARM REV CODE 636 W HCPCS: Performed by: STUDENT IN AN ORGANIZED HEALTH CARE EDUCATION/TRAINING PROGRAM

## 2023-04-11 PROCEDURE — 63600175 PHARM REV CODE 636 W HCPCS: Performed by: PHYSICIAN ASSISTANT

## 2023-04-11 PROCEDURE — 96372 THER/PROPH/DIAG INJ SC/IM: CPT

## 2023-04-11 PROCEDURE — 25000003 PHARM REV CODE 250: Performed by: STUDENT IN AN ORGANIZED HEALTH CARE EDUCATION/TRAINING PROGRAM

## 2023-04-11 RX ORDER — HYDROMORPHONE HYDROCHLORIDE 1 MG/ML
1 INJECTION, SOLUTION INTRAMUSCULAR; INTRAVENOUS; SUBCUTANEOUS
Status: COMPLETED | OUTPATIENT
Start: 2023-04-11 | End: 2023-04-11

## 2023-04-11 RX ORDER — ONDANSETRON 8 MG/1
8 TABLET, ORALLY DISINTEGRATING ORAL EVERY 8 HOURS PRN
Status: DISCONTINUED | OUTPATIENT
Start: 2023-04-11 | End: 2023-04-13 | Stop reason: HOSPADM

## 2023-04-11 RX ORDER — ALBUTEROL SULFATE 90 UG/1
2 AEROSOL, METERED RESPIRATORY (INHALATION) EVERY 6 HOURS PRN
Status: DISCONTINUED | OUTPATIENT
Start: 2023-04-11 | End: 2023-04-11 | Stop reason: ALTCHOICE

## 2023-04-11 RX ORDER — ENOXAPARIN SODIUM 100 MG/ML
40 INJECTION SUBCUTANEOUS EVERY 24 HOURS
Status: DISCONTINUED | OUTPATIENT
Start: 2023-04-11 | End: 2023-04-13 | Stop reason: HOSPADM

## 2023-04-11 RX ORDER — TALC
6 POWDER (GRAM) TOPICAL NIGHTLY PRN
Status: DISCONTINUED | OUTPATIENT
Start: 2023-04-11 | End: 2023-04-13 | Stop reason: HOSPADM

## 2023-04-11 RX ORDER — HYDROXYUREA 500 MG/1
500 CAPSULE ORAL DAILY
Status: DISCONTINUED | OUTPATIENT
Start: 2023-04-11 | End: 2023-04-13 | Stop reason: HOSPADM

## 2023-04-11 RX ORDER — SODIUM CHLORIDE 9 MG/ML
INJECTION, SOLUTION INTRAVENOUS CONTINUOUS
Status: DISCONTINUED | OUTPATIENT
Start: 2023-04-11 | End: 2023-04-13 | Stop reason: HOSPADM

## 2023-04-11 RX ORDER — HYDROMORPHONE HYDROCHLORIDE 1 MG/ML
1 INJECTION, SOLUTION INTRAMUSCULAR; INTRAVENOUS; SUBCUTANEOUS ONCE
Status: COMPLETED | OUTPATIENT
Start: 2023-04-11 | End: 2023-04-11

## 2023-04-11 RX ORDER — ACETAMINOPHEN 325 MG/1
650 TABLET ORAL EVERY 4 HOURS PRN
Status: DISCONTINUED | OUTPATIENT
Start: 2023-04-11 | End: 2023-04-13 | Stop reason: HOSPADM

## 2023-04-11 RX ORDER — HYDROMORPHONE HYDROCHLORIDE 1 MG/ML
1 INJECTION, SOLUTION INTRAMUSCULAR; INTRAVENOUS; SUBCUTANEOUS EVERY 4 HOURS PRN
Status: DISCONTINUED | OUTPATIENT
Start: 2023-04-11 | End: 2023-04-11

## 2023-04-11 RX ORDER — DEXTROSE MONOHYDRATE AND SODIUM CHLORIDE 5; .45 G/100ML; G/100ML
1000 INJECTION, SOLUTION INTRAVENOUS
Status: COMPLETED | OUTPATIENT
Start: 2023-04-11 | End: 2023-04-11

## 2023-04-11 RX ORDER — ALBUTEROL SULFATE 0.83 MG/ML
SOLUTION RESPIRATORY (INHALATION)
COMMUNITY

## 2023-04-11 RX ORDER — ALBUTEROL SULFATE 2.5 MG/.5ML
2.5 SOLUTION RESPIRATORY (INHALATION) EVERY 4 HOURS PRN
Status: DISCONTINUED | OUTPATIENT
Start: 2023-04-11 | End: 2023-04-13 | Stop reason: HOSPADM

## 2023-04-11 RX ORDER — ALBUTEROL SULFATE 2.5 MG/.5ML
2.5 SOLUTION RESPIRATORY (INHALATION) EVERY 6 HOURS PRN
Status: DISCONTINUED | OUTPATIENT
Start: 2023-04-11 | End: 2023-04-11

## 2023-04-11 RX ORDER — HYDROMORPHONE HYDROCHLORIDE 1 MG/ML
2 INJECTION, SOLUTION INTRAMUSCULAR; INTRAVENOUS; SUBCUTANEOUS EVERY 4 HOURS PRN
Status: DISCONTINUED | OUTPATIENT
Start: 2023-04-11 | End: 2023-04-13

## 2023-04-11 RX ORDER — FOLIC ACID 1 MG/1
1 TABLET ORAL DAILY
Status: DISCONTINUED | OUTPATIENT
Start: 2023-04-11 | End: 2023-04-13 | Stop reason: HOSPADM

## 2023-04-11 RX ORDER — OXYCODONE HYDROCHLORIDE 5 MG/1
TABLET ORAL
COMMUNITY
Start: 2023-03-30

## 2023-04-11 RX ORDER — POLYETHYLENE GLYCOL 3350 17 G/17G
17 POWDER, FOR SOLUTION ORAL DAILY
Status: DISCONTINUED | OUTPATIENT
Start: 2023-04-11 | End: 2023-04-13 | Stop reason: HOSPADM

## 2023-04-11 RX ORDER — MAG HYDROX/ALUMINUM HYD/SIMETH 200-200-20
30 SUSPENSION, ORAL (FINAL DOSE FORM) ORAL 4 TIMES DAILY PRN
Status: DISCONTINUED | OUTPATIENT
Start: 2023-04-11 | End: 2023-04-13 | Stop reason: HOSPADM

## 2023-04-11 RX ORDER — HYDROMORPHONE HYDROCHLORIDE 1 MG/ML
2 INJECTION, SOLUTION INTRAMUSCULAR; INTRAVENOUS; SUBCUTANEOUS ONCE
Status: COMPLETED | OUTPATIENT
Start: 2023-04-11 | End: 2023-04-11

## 2023-04-11 RX ORDER — NALOXONE HCL 0.4 MG/ML
0.02 VIAL (ML) INJECTION
Status: DISCONTINUED | OUTPATIENT
Start: 2023-04-11 | End: 2023-04-13 | Stop reason: HOSPADM

## 2023-04-11 RX ORDER — OXYCODONE HYDROCHLORIDE 5 MG/1
5 TABLET ORAL EVERY 6 HOURS PRN
Status: DISCONTINUED | OUTPATIENT
Start: 2023-04-11 | End: 2023-04-12

## 2023-04-11 RX ORDER — PROCHLORPERAZINE EDISYLATE 5 MG/ML
5 INJECTION INTRAMUSCULAR; INTRAVENOUS EVERY 6 HOURS PRN
Status: DISCONTINUED | OUTPATIENT
Start: 2023-04-11 | End: 2023-04-13 | Stop reason: HOSPADM

## 2023-04-11 RX ORDER — GLUTAMINE 5 G/1
15 POWDER, FOR SOLUTION ORAL
COMMUNITY
Start: 2023-01-31

## 2023-04-11 RX ADMIN — SODIUM CHLORIDE: 9 INJECTION, SOLUTION INTRAVENOUS at 09:04

## 2023-04-11 RX ADMIN — FOLIC ACID 1 MG: 1 TABLET ORAL at 08:04

## 2023-04-11 RX ADMIN — OXYCODONE HYDROCHLORIDE 5 MG: 5 TABLET ORAL at 11:04

## 2023-04-11 RX ADMIN — HYDROXYUREA 500 MG: 500 CAPSULE ORAL at 08:04

## 2023-04-11 RX ADMIN — HYDROMORPHONE HYDROCHLORIDE 2 MG: 1 INJECTION, SOLUTION INTRAMUSCULAR; INTRAVENOUS; SUBCUTANEOUS at 05:04

## 2023-04-11 RX ADMIN — HYDROMORPHONE HYDROCHLORIDE 1 MG: 1 INJECTION, SOLUTION INTRAMUSCULAR; INTRAVENOUS; SUBCUTANEOUS at 08:04

## 2023-04-11 RX ADMIN — HYDROMORPHONE HYDROCHLORIDE 2 MG: 1 INJECTION, SOLUTION INTRAMUSCULAR; INTRAVENOUS; SUBCUTANEOUS at 01:04

## 2023-04-11 RX ADMIN — HYDROMORPHONE HYDROCHLORIDE 1 MG: 1 INJECTION, SOLUTION INTRAMUSCULAR; INTRAVENOUS; SUBCUTANEOUS at 10:04

## 2023-04-11 RX ADMIN — HYDROMORPHONE HYDROCHLORIDE 1 MG: 1 INJECTION, SOLUTION INTRAMUSCULAR; INTRAVENOUS; SUBCUTANEOUS at 01:04

## 2023-04-11 RX ADMIN — SODIUM CHLORIDE: 9 INJECTION, SOLUTION INTRAVENOUS at 08:04

## 2023-04-11 RX ADMIN — ENOXAPARIN SODIUM 40 MG: 40 INJECTION SUBCUTANEOUS at 04:04

## 2023-04-11 RX ADMIN — DEXTROSE AND SODIUM CHLORIDE 1000 ML: 5; 450 INJECTION, SOLUTION INTRAVENOUS at 01:04

## 2023-04-11 RX ADMIN — HYDROMORPHONE HYDROCHLORIDE 2 MG: 1 INJECTION, SOLUTION INTRAMUSCULAR; INTRAVENOUS; SUBCUTANEOUS at 09:04

## 2023-04-11 RX ADMIN — POLYETHYLENE GLYCOL 3350 17 G: 17 POWDER, FOR SOLUTION ORAL at 08:04

## 2023-04-11 RX ADMIN — HYDROMORPHONE HYDROCHLORIDE 1 MG: 1 INJECTION, SOLUTION INTRAMUSCULAR; INTRAVENOUS; SUBCUTANEOUS at 04:04

## 2023-04-11 RX ADMIN — HYDROMORPHONE HYDROCHLORIDE 1 MG: 1 INJECTION, SOLUTION INTRAMUSCULAR; INTRAVENOUS; SUBCUTANEOUS at 12:04

## 2023-04-11 RX ADMIN — SODIUM CHLORIDE: 9 INJECTION, SOLUTION INTRAVENOUS at 02:04

## 2023-04-11 NOTE — ASSESSMENT & PLAN NOTE
Patient endorses that he quit smoking sometime last year denies the need for nicotine replacement encouraged continued cessation

## 2023-04-11 NOTE — ED NOTES
Pt ambulatory to room 20 c/o L lower back pain. Hx of sickle cell- reports no relief with home oxycodone. Pt reports went to [facility] today and left due to wait time.

## 2023-04-11 NOTE — HPI
Katie Parham 33 y.o. male with sickle cell disease (SC) and asthma presents to the hospital with a chief complaint of lower back pain for the past 9 hours.  Patient reports that he initially went to outside emergency department but prolonged waiting.  Resulted in leaving without being seen presenting to our hospital.  Patient attempted self-treatment with home Norco without relief.  Patient describes his pain as sometimes sharp and stabbing other times throbbing rates pain at 8/10.  Patient reports this similar to his sickle cell pain crises.  Denies any injury to the back.  Denies any dysuria or hematuria.  Denies any chest pain or shortness of breath.    In the ED patient was afebrile without leukocytosis, slight normocytic anemia noted, retic 5.0, anion gap 7, bilirubin total 1.1 CPK WNL, patient was placed in observation for management of acute sickle cell pain crisis

## 2023-04-11 NOTE — PROGRESS NOTES
Unable to schedule patient for a hospital follow up appointment with Dr. Derek Lawler.Dolores with scheduling stated that  Patient will have to call his PCP and schedule his hospital follow up due to insurance issues.

## 2023-04-11 NOTE — PLAN OF CARE
Problem: Adult Inpatient Plan of Care  Goal: Plan of Care Review  Outcome: Ongoing, Progressing  Goal: Patient-Specific Goal (Individualized)  Outcome: Ongoing, Progressing     Problem: Pain Acute  Goal: Acceptable Pain Control and Functional Ability  Outcome: Ongoing, Progressing  Intervention: Develop Pain Management Plan  Flowsheets (Taken 4/11/2023 0830)  Pain Management Interventions:   around-the-clock dosing utilized   awakened for pain meds per patient request   care clustered   pillow support provided   position adjusted   quiet environment facilitated     Problem: Adult Inpatient Plan of Care  Goal: Plan of Care Review  Outcome: Ongoing, Progressing     Problem: Adult Inpatient Plan of Care  Goal: Plan of Care Review  Outcome: Ongoing, Progressing     Problem: Adult Inpatient Plan of Care  Goal: Patient-Specific Goal (Individualized)  Outcome: Ongoing, Progressing     Problem: Adult Inpatient Plan of Care  Goal: Patient-Specific Goal (Individualized)  Outcome: Ongoing, Progressing     Problem: Pain Acute  Goal: Acceptable Pain Control and Functional Ability  Outcome: Ongoing, Progressing  Intervention: Develop Pain Management Plan  Flowsheets (Taken 4/11/2023 0830)  Pain Management Interventions:   around-the-clock dosing utilized   awakened for pain meds per patient request   care clustered   pillow support provided   position adjusted   quiet environment facilitated     Problem: Pain Acute  Goal: Acceptable Pain Control and Functional Ability  Outcome: Ongoing, Progressing     Problem: Pain Acute  Goal: Acceptable Pain Control and Functional Ability  Intervention: Develop Pain Management Plan  Flowsheets (Taken 4/11/2023 0830)  Pain Management Interventions:   around-the-clock dosing utilized   awakened for pain meds per patient request   care clustered   pillow support provided   position adjusted   quiet environment facilitated     Problem: Pain Acute  Goal: Acceptable Pain Control and Functional  Ability  Intervention: Develop Pain Management Plan  Flowsheets (Taken 4/11/2023 2679)  Pain Management Interventions:   around-the-clock dosing utilized   awakened for pain meds per patient request   care clustered   pillow support provided   position adjusted   quiet environment facilitated

## 2023-04-11 NOTE — ED PROVIDER NOTES
"Encounter Date: 4/10/2023       History     Chief Complaint   Patient presents with    Sickle Cell Pain Crisis     L Lower back pain that started today. Reports no relief with home Rx. Denies chest pain. States pain is " throbbing".      33-year-old male with past medical history of sickle cell disease (SC) and asthma who presents with lower back pain for the past 9 hours.  Patient reports going to outside emergency department but was waiting for prolonged period of time so came here for evaluation.  Patient reports this similar to his sickle cell pain crises.  Denies any injury to the back.  Denies any dysuria or hematuria.  Denies any chest pain or shortness of breath.  Reported taking his home Norco without any relief of his pain.      Review of patient's allergies indicates:   Allergen Reactions    Penicillins Anaphylaxis     Past Medical History:   Diagnosis Date    Asthma     Broken thumb 2017    Left    Cigarette smoker     Hemoglobin S-C disease     Sickle cell anemia      Past Surgical History:   Procedure Laterality Date    HAND SURGERY Left 12/21/2017    ORIF thumb    ORIF mandible  01/31/2013    spleenectomy       Family History   Family history unknown: Yes     Social History     Tobacco Use    Smoking status: Every Day     Packs/day: 0.25     Types: Cigarettes    Smokeless tobacco: Never    Tobacco comments:     encouraged to quit.    Substance Use Topics    Alcohol use: Yes     Comment: socially    Drug use: No     Review of Systems   Constitutional:  Negative for chills and fever.   HENT:  Negative for congestion and rhinorrhea.    Eyes:  Negative for pain.   Respiratory:  Negative for cough and shortness of breath.    Cardiovascular:  Negative for chest pain and leg swelling.   Gastrointestinal:  Negative for abdominal pain, nausea and vomiting.   Endocrine: Negative for polyuria.   Genitourinary:  Negative for dysuria and hematuria.   Musculoskeletal:  Positive for back pain. Negative for gait " problem and neck pain.   Skin:  Negative for rash.   Allergic/Immunologic: Negative for immunocompromised state.   Neurological:  Negative for weakness and headaches.     Physical Exam     Initial Vitals [04/10/23 2148]   BP Pulse Resp Temp SpO2   139/82 79 16 98.3 °F (36.8 °C) 98 %      MAP       --         Physical Exam    Constitutional: No distress.   HENT:   Head: Normocephalic and atraumatic.   Eyes: Conjunctivae and EOM are normal. Pupils are equal, round, and reactive to light.   Neck:   Normal range of motion.  Cardiovascular:  Regular rhythm.           Pulses:       Radial pulses are 2+ on the right side and 2+ on the left side.        Posterior tibial pulses are 2+ on the right side and 2+ on the left side.   Pulmonary/Chest: Breath sounds normal. No respiratory distress.   Abdominal: Abdomen is soft. Bowel sounds are normal. He exhibits no distension. There is no abdominal tenderness. There is no rebound and no guarding.   Musculoskeletal:         General: No tenderness. Normal range of motion.      Cervical back: Normal range of motion.     Lymphadenopathy:     He has no cervical adenopathy.   Neurological: He is alert and oriented to person, place, and time.   Skin: Skin is warm. Capillary refill takes less than 2 seconds.   Psychiatric: His behavior is normal.       ED Course   Procedures  Labs Reviewed   CBC W/ AUTO DIFFERENTIAL - Abnormal; Notable for the following components:       Result Value    RBC 4.12 (*)     Hemoglobin 12.8 (*)     Hematocrit 34.2 (*)     MCH 31.1 (*)     MCHC 37.4 (*)     RDW 15.4 (*)     Platelets 584 (*)     MPV 8.9 (*)     Immature Grans (Abs) 0.05 (*)     Mono # 1.1 (*)     All other components within normal limits   COMPREHENSIVE METABOLIC PANEL - Abnormal; Notable for the following components:    Total Bilirubin 1.1 (*)     Anion Gap 7 (*)     All other components within normal limits   RETICULOCYTES - Abnormal; Notable for the following components:    Retic 5.0 (*)      All other components within normal limits   CK   CBC W/ AUTO DIFFERENTIAL          Imaging Results    None          Medications   melatonin tablet 6 mg (has no administration in time range)   ondansetron disintegrating tablet 8 mg (has no administration in time range)   prochlorperazine injection Soln 5 mg (has no administration in time range)   polyethylene glycol packet 17 g (has no administration in time range)   aluminum-magnesium hydroxide-simethicone 200-200-20 mg/5 mL suspension 30 mL (has no administration in time range)   acetaminophen tablet 650 mg (has no administration in time range)   naloxone 0.4 mg/mL injection 0.02 mg (has no administration in time range)   hydroxyurea capsule 500 mg (has no administration in time range)   folic acid tablet 1 mg (has no administration in time range)   enoxaparin injection 40 mg (has no administration in time range)   oxyCODONE immediate release tablet 5 mg (has no administration in time range)   HYDROmorphone injection 1 mg (has no administration in time range)   sodium chloride 0.9% bolus 1,000 mL 1,000 mL (0 mLs Intravenous Stopped 4/10/23 2319)   HYDROmorphone injection 1 mg (1 mg Intravenous Given 4/10/23 2219)   ketorolac injection 15 mg (15 mg Intravenous Given 4/10/23 2219)   diphenhydrAMINE capsule 25 mg (25 mg Oral Given 4/10/23 2218)   HYDROmorphone injection 1 mg (1 mg Intravenous Given 4/11/23 0005)   dextrose 5 % and 0.45 % NaCl infusion (1,000 mLs Intravenous New Bag 4/11/23 0127)   HYDROmorphone injection 1 mg (1 mg Intravenous Given 4/11/23 0126)     Medical Decision Making:   History:   Old Medical Records: I decided to obtain old medical records.  Initial Assessment:   33-year-old male with past medical history of sickle cell disease (SC) and asthma who presents with lower back pain for the past 9 hours. This patient with known sickle cell disease presents with their classic pain syndrome for a vaso-occlusive crisis. Considered acute chest,  stroke, splenic sequestration, and other emergent complications of sickle cell disease. Considered alternate etiologies of this patient's pain to include fracture, MSK pain, infection/abscess, and other ischemic etiologies but doubt these are likely.    Will plan for pain control using patient's pain management plan, basic labs/reticulocyte count, likely discharge if pain is well controlled.     Clinical Tests:   Lab Tests: Ordered and Reviewed           ED Course as of 04/11/23 0157   Mon Apr 10, 2023   2235 CBC without leukocytosis, hemoglobin at patient's baseline.  Platelets mildly elevated likely reactive.  Retic of 5.0 [AS]   2247 Chem 14 negative for hypo-or hyper natremia, kalemia, chloridemia, or other electrolyte abnormalities; BUN and creatinine were within normal limits indicating normal kidney function, ALT and AST were within normal limits indicating normal liver function.   [AS]   2338 Patient reports pain is currently 8/10 from 10/10.  Will give another round of pain medicine [AS]   Tue Apr 11, 2023   0100 Given patient reports persistent pain and requiring more than 2 doses of pain medication will admit for pain control.  After review of the patient's physical exam, ED testing, and history/symptoms, the patient requires additional care in the hospital. Discussed patients case with inpatient provider (MD/KENRICK/PA)  who will accept the patient and any pending labs/imaging/interventions. The diagnosis, treatment and plan were discussed with the patient. All questions or concerns have been addressed.   [AS]      ED Course User Index  [AS] Manuela Pride MD          Please put in 35 minutes of critical care    Separate from teaching and exclusive of procedure and ekg time  Includes:  Time at bedside  Time reviewing test results  Time discussing case with staff  Time documenting the medical record  Time spent with family members  Time spent with consults  Management       DISCLAIMER: This note was prepared  with MModal voice recognition transcription software. Garbled syntax, mangled pronouns, and other bizarre constructions may be attributed to that software system.   Clinical Impression:   Final diagnoses:  [D57.00] Sickle cell pain crisis        ED Disposition Condition    Observation Stable                Manuela Pride MD  04/11/23 0157

## 2023-04-11 NOTE — H&P
"[facility]  History & Physical    Patient Name: Katie Parham  MRN: [id number]  Patient Class: OP- Observation  Admission Date: 4/10/2023  Attending Physician: Shahab Gonzales MD   Primary Care Provider: Kieran Reed MD         Patient information was obtained from patient, past medical records and ER records.     Subjective:     Principal Problem:Sickle cell pain crisis    Chief Complaint:   Chief Complaint   Patient presents with    Sickle Cell Pain Crisis     L Lower back pain that started today. Reports no relief with home Rx. Denies chest pain. States pain is " throbbing".         HPI: Katie Parham 33 y.o. male with sickle cell disease (SC) and asthma presents to the hospital with a chief complaint of lower back pain for the past 9 hours.  Patient reports that he initially went to outside emergency department but prolonged waiting.  Resulted in leaving without being seen presenting to our hospital.  Patient attempted self-treatment with home Norco without relief.  Patient describes his pain as sometimes sharp and stabbing other times throbbing rates pain at 8/10.  Patient reports this similar to his sickle cell pain crises.  Denies any injury to the back.  Denies any dysuria or hematuria.  Denies any chest pain or shortness of breath.    In the ED patient was afebrile without leukocytosis, slight normocytic anemia noted, retic 5.0, anion gap 7, bilirubin total 1.1 CPK WNL, patient was placed in observation for management of acute sickle cell pain crisis      Past Medical History:   Diagnosis Date    Asthma     Broken thumb 2017    Left    Cigarette smoker     Hemoglobin S-C disease     Sickle cell anemia        Past Surgical History:   Procedure Laterality Date    HAND SURGERY Left 12/21/2017    ORIF thumb    ORIF mandible  01/31/2013    spleenectomy         Review of patient's allergies indicates:   Allergen Reactions    Penicillins Anaphylaxis       No current " facility-administered medications on file prior to encounter.     Current Outpatient Medications on File Prior to Encounter   Medication Sig    albuterol (PROVENTIL HFA) 90 mcg/actuation inhaler Inhale 2 puffs into the lungs every 6 (six) hours as needed for Wheezing. Rescue    ascorbic acid, vitamin C, (VITAMIN C) 500 MG tablet Take 1 tablet (500 mg total) by mouth 2 (two) times daily.    budesonide-formoterol 160-4.5 mcg (SYMBICORT) 160-4.5 mcg/actuation HFAA Inhale 2 puffs into the lungs every 12 (twelve) hours. Controller    calcium-vitamin D3 500 mg(1,250mg) -200 unit per tablet Take 1 tablet by mouth 2 (two) times daily with meals.    folic acid (FOLVITE) 1 MG tablet Take 1 tablet (1 mg total) by mouth once daily.    guaiFENesin (MUCINEX) 600 mg 12 hr tablet Take 1 tablet (600 mg total) by mouth 2 (two) times daily.    HYDROcodone-acetaminophen (NORCO)  mg per tablet Take 1 tablet by mouth every 6 (six) hours as needed for Pain.    hydroxyurea (HYDREA) 500 mg Cap TK 1 C PO BID    multivitamin Tab Take 1 tablet by mouth once daily.    oxyCODONE-acetaminophen (PERCOCET) 5-325 mg per tablet Take 1 tablet by mouth every 6 (six) hours as needed for Pain.     Family History       Family history is unknown by patient.          Tobacco Use    Smoking status: Every Day     Packs/day: 0.25     Types: Cigarettes    Smokeless tobacco: Never    Tobacco comments:     encouraged to quit.    Substance and Sexual Activity    Alcohol use: Yes     Comment: socially    Drug use: No    Sexual activity: Yes     Partners: Female     Birth control/protection: Condom     Review of Systems   Constitutional:  Negative for chills and fever.   HENT:  Negative for congestion and rhinorrhea.    Eyes:  Negative for photophobia and visual disturbance.   Respiratory:  Negative for cough and shortness of breath.    Cardiovascular:  Negative for chest pain, palpitations and leg swelling.   Gastrointestinal:  Negative for  abdominal pain, diarrhea, nausea and vomiting.   Genitourinary:  Negative for frequency, hematuria and urgency.   Musculoskeletal:  Positive for back pain.   Skin:  Negative for pallor, rash and wound.   Neurological:  Negative for light-headedness and headaches.   Psychiatric/Behavioral:  Negative for confusion and decreased concentration.    Objective:     Vital Signs (Most Recent):  Temp: 97.6 °F (36.4 °C) (04/11/23 0217)  Pulse: 70 (04/11/23 0217)  Resp: 18 (04/11/23 0217)  BP: (!) 145/68 (04/11/23 0217)  SpO2: 100 % (04/11/23 0217)   Vital Signs (24h Range):  Temp:  [97.6 °F (36.4 °C)-98.3 °F (36.8 °C)] 97.6 °F (36.4 °C)  Pulse:  [70-79] 70  Resp:  [16-18] 18  SpO2:  [98 %-100 %] 100 %  BP: (125-145)/(67-85) 145/68     Weight: 77.1 kg (170 lb)  Body mass index is 27.44 kg/m².    Physical Exam  Vitals and nursing note reviewed.   Constitutional:       General: He is not in acute distress.     Appearance: He is well-developed.   HENT:      Head: Normocephalic and atraumatic.      Right Ear: External ear normal.      Left Ear: External ear normal.      Nose: Nose normal.   Eyes:      Conjunctiva/sclera: Conjunctivae normal.      Pupils: Pupils are equal, round, and reactive to light.   Cardiovascular:      Rate and Rhythm: Normal rate and regular rhythm.   Pulmonary:      Effort: Pulmonary effort is normal. No respiratory distress.      Breath sounds: Normal breath sounds. No wheezing or rales.   Abdominal:      General: Bowel sounds are normal. There is no distension.      Palpations: Abdomen is soft.      Tenderness: There is no abdominal tenderness.      Comments: No palpable hepatomegaly or splenomegaly   Musculoskeletal:         General: No tenderness. Normal range of motion.      Cervical back: Normal range of motion and neck supple.   Skin:     General: Skin is warm and dry.      Capillary Refill: Capillary refill takes less than 2 seconds.   Neurological:      Mental Status: He is alert and oriented to  person, place, and time.   Psychiatric:         Thought Content: Thought content normal.         CRANIAL NERVES     CN III, IV, VI   Pupils are equal, round, and reactive to light.     Significant Labs: All pertinent labs within the past 24 hours have been reviewed.  Recent Lab Results         04/10/23  2210        Albumin 4.3       Alkaline Phosphatase 73       ALT 18       Anion Gap 7       AST 17       Baso # 0.10       Basophil % 0.8       BILIRUBIN TOTAL 1.1  Comment: For infants and newborns, interpretation of results should be based  on gestational age, weight and in agreement with clinical  observations.    Premature Infant recommended reference ranges:  Up to 24 hours.............<8.0 mg/dL  Up to 48 hours............<12.0 mg/dL  3-5 days..................<15.0 mg/dL  6-29 days.................<15.0 mg/dL         BUN 6       Calcium 9.7       Chloride 105       CO2 25              Creatinine 0.8       Differential Method Automated       eGFR >60       Eos # 0.4       Eosinophil % 3.5       Glucose 94       Gran # (ANC) 7.7       Gran % 64.3       Hematocrit 34.2       Hemoglobin 12.8       Immature Grans (Abs) 0.05  Comment: Mild elevation in immature granulocytes is non specific and   can be seen in a variety of conditions including stress response,   acute inflammation, trauma and pregnancy. Correlation with other   laboratory and clinical findings is essential.         Immature Granulocytes 0.4       Lymph # 2.6       Lymph % 21.8       MCH 31.1       MCHC 37.4       MCV 83       Mono # 1.1       Mono % 9.2       MPV 8.9       nRBC 0       Platelets 584       Potassium 3.9       PROTEIN TOTAL 8.2       RBC 4.12       RDW 15.4       Retic 5.0       Sodium 137       WBC 11.90               Significant Imaging: I have reviewed all pertinent imaging results/findings within the past 24 hours.  Imaging Results    None           Assessment/Plan:     * Sickle cell pain crisis  Patient presents due to  lower back pain similar to my sickle cell pain crisis patient initially rated pain 10/10 was treated in the emergency department currently 8/10, reticulocytes 5.0, H&H stable and within baseline, patient does not require transfusing at this time, received 1 L bolus and infusion of D5 0.45% saline in the ED. patient denies chest pain, widespread body arthralgia or myalgia I do not suspect acute chest syndrome,     Continue IVF with  mL/hr  Continue home hydroxyurea and folate   Prn analgesics  nausea meds prn  -Oxygen continuous  -pulse ox q 4hours    Mild asthma without complication  Albuterol inhaler PRN, no current signs or symptoms of respiratory distress continue to monitor    Tobacco abuse  Patient endorses that he quit smoking sometime last year denies the need for nicotine replacement encouraged continued cessation    Sickle cell anemia  Hemoglobin 12.8 on admit, daily CBC      VTE Risk Mitigation (From admission, onward)         Ordered     enoxaparin injection 40 mg  Daily         04/11/23 0106     IP VTE HIGH RISK PATIENT  Once         04/11/23 0106     Place sequential compression device  Until discontinued         04/11/23 0106                     On 04/11/2023, patient should be placed in hospital observation services under my care in collaboration with Shahab Gonzales MD.      Manuel Blevins NP  Department of Hospital Medicine  [facility] - Emergency Dept

## 2023-04-11 NOTE — NURSING
[facility]  Nurses Note -- 4 Eyes      4/11/2023       Skin assessed on: Admit      [x] No Pressure Injuries Present    []Prevention Measures Documented    [] Yes LDA  for Pressure Injury Previously documented     [] Yes New Pressure Injury Discovered   [] LDA for New Pressure Injury Added      Attending RN:  Marilu Panchal, RAPHAEL     Second: NAT Quiroga

## 2023-04-11 NOTE — PLAN OF CARE
04/11/23 0905   Discharge Planning   Assessment Type Discharge Planning Brief Assessment   Resource/Environmental Concerns none   Support Systems Spouse/significant other   Equipment Currently Used at Home nebulizer   Current Living Arrangements home   Patient/Family Anticipates Transition to home with family   Patient/Family Anticipated Services at Transition none   DME Needed Upon Discharge  none   Discharge Plan A Home with family  (with instructions to follow up)       [id number] [place], [place] [address] [facility] [address] [place] [address]  [place] [address]  Phone: [phone] Fax: [phone]

## 2023-04-11 NOTE — NURSING
Upon arrival to floor: patient oriented to room, call bell in reach and bed in lowest position. Patient complaining of pain 9/10 No apparent distress noted.

## 2023-04-11 NOTE — ASSESSMENT & PLAN NOTE
Patient presents due to lower back pain similar to my sickle cell pain crisis patient initially rated pain 10/10 was treated in the emergency department currently 8/10, reticulocytes 5.0, H&H stable and within baseline, patient does not require transfusing at this time, received 1 L bolus and infusion of D5 0.45% saline in the ED. patient denies chest pain, widespread body arthralgia or myalgia I do not suspect acute chest syndrome,     Continue IVF with  mL/hr  Continue home hydroxyurea and folate   Prn analgesics  nausea meds prn  -Oxygen continuous  -pulse ox q 4hours

## 2023-04-11 NOTE — CARE UPDATE
Assumed care of Mr Katie Parham admitted with [facility] crisis. Pain mostly located in lower back. Not controlled with dilaudid 1mg Q4h. He is shaking in bed currently from pain. Increased pain regimen. Not currently stable for discharge.     Alva Helton MD  04/11/2023  1:32 PM

## 2023-04-11 NOTE — NURSING
Called to give report to [facility] nurse Aileen nurse in report, informed nurse to call ED to get report on pt

## 2023-04-11 NOTE — SUBJECTIVE & OBJECTIVE
Past Medical History:   Diagnosis Date    Asthma     Broken thumb 2017    Left    Cigarette smoker     Hemoglobin S-C disease     Sickle cell anemia        Past Surgical History:   Procedure Laterality Date    HAND SURGERY Left 12/21/2017    ORIF thumb    ORIF mandible  01/31/2013    spleenectomy         Review of patient's allergies indicates:   Allergen Reactions    Penicillins Anaphylaxis       No current facility-administered medications on file prior to encounter.     Current Outpatient Medications on File Prior to Encounter   Medication Sig    albuterol (PROVENTIL HFA) 90 mcg/actuation inhaler Inhale 2 puffs into the lungs every 6 (six) hours as needed for Wheezing. Rescue    ascorbic acid, vitamin C, (VITAMIN C) 500 MG tablet Take 1 tablet (500 mg total) by mouth 2 (two) times daily.    budesonide-formoterol 160-4.5 mcg (SYMBICORT) 160-4.5 mcg/actuation HFAA Inhale 2 puffs into the lungs every 12 (twelve) hours. Controller    calcium-vitamin D3 500 mg(1,250mg) -200 unit per tablet Take 1 tablet by mouth 2 (two) times daily with meals.    folic acid (FOLVITE) 1 MG tablet Take 1 tablet (1 mg total) by mouth once daily.    guaiFENesin (MUCINEX) 600 mg 12 hr tablet Take 1 tablet (600 mg total) by mouth 2 (two) times daily.    HYDROcodone-acetaminophen (NORCO)  mg per tablet Take 1 tablet by mouth every 6 (six) hours as needed for Pain.    hydroxyurea (HYDREA) 500 mg Cap TK 1 C PO BID    multivitamin Tab Take 1 tablet by mouth once daily.    oxyCODONE-acetaminophen (PERCOCET) 5-325 mg per tablet Take 1 tablet by mouth every 6 (six) hours as needed for Pain.     Family History       Family history is unknown by patient.          Tobacco Use    Smoking status: Every Day     Packs/day: 0.25     Types: Cigarettes    Smokeless tobacco: Never    Tobacco comments:     encouraged to quit.    Substance and Sexual Activity    Alcohol use: Yes     Comment: socially    Drug use: No    Sexual activity: Yes     Partners:  Female     Birth control/protection: Condom     Review of Systems   Constitutional:  Negative for chills and fever.   HENT:  Negative for congestion and rhinorrhea.    Eyes:  Negative for photophobia and visual disturbance.   Respiratory:  Negative for cough and shortness of breath.    Cardiovascular:  Negative for chest pain, palpitations and leg swelling.   Gastrointestinal:  Negative for abdominal pain, diarrhea, nausea and vomiting.   Genitourinary:  Negative for frequency, hematuria and urgency.   Musculoskeletal:  Positive for back pain.   Skin:  Negative for pallor, rash and wound.   Neurological:  Negative for light-headedness and headaches.   Psychiatric/Behavioral:  Negative for confusion and decreased concentration.    Objective:     Vital Signs (Most Recent):  Temp: 97.6 °F (36.4 °C) (04/11/23 0217)  Pulse: 70 (04/11/23 0217)  Resp: 18 (04/11/23 0217)  BP: (!) 145/68 (04/11/23 0217)  SpO2: 100 % (04/11/23 0217)   Vital Signs (24h Range):  Temp:  [97.6 °F (36.4 °C)-98.3 °F (36.8 °C)] 97.6 °F (36.4 °C)  Pulse:  [70-79] 70  Resp:  [16-18] 18  SpO2:  [98 %-100 %] 100 %  BP: (125-145)/(67-85) 145/68     Weight: 77.1 kg (170 lb)  Body mass index is 27.44 kg/m².    Physical Exam  Vitals and nursing note reviewed.   Constitutional:       General: He is not in acute distress.     Appearance: He is well-developed.   HENT:      Head: Normocephalic and atraumatic.      Right Ear: External ear normal.      Left Ear: External ear normal.      Nose: Nose normal.   Eyes:      Conjunctiva/sclera: Conjunctivae normal.      Pupils: Pupils are equal, round, and reactive to light.   Cardiovascular:      Rate and Rhythm: Normal rate and regular rhythm.   Pulmonary:      Effort: Pulmonary effort is normal. No respiratory distress.      Breath sounds: Normal breath sounds. No wheezing or rales.   Abdominal:      General: Bowel sounds are normal. There is no distension.      Palpations: Abdomen is soft.      Tenderness: There is  no abdominal tenderness.      Comments: No palpable hepatomegaly or splenomegaly   Musculoskeletal:         General: No tenderness. Normal range of motion.      Cervical back: Normal range of motion and neck supple.   Skin:     General: Skin is warm and dry.      Capillary Refill: Capillary refill takes less than 2 seconds.   Neurological:      Mental Status: He is alert and oriented to person, place, and time.   Psychiatric:         Thought Content: Thought content normal.         CRANIAL NERVES     CN III, IV, VI   Pupils are equal, round, and reactive to light.     Significant Labs: All pertinent labs within the past 24 hours have been reviewed.  Recent Lab Results         04/10/23  2210        Albumin 4.3       Alkaline Phosphatase 73       ALT 18       Anion Gap 7       AST 17       Baso # 0.10       Basophil % 0.8       BILIRUBIN TOTAL 1.1  Comment: For infants and newborns, interpretation of results should be based  on gestational age, weight and in agreement with clinical  observations.    Premature Infant recommended reference ranges:  Up to 24 hours.............<8.0 mg/dL  Up to 48 hours............<12.0 mg/dL  3-5 days..................<15.0 mg/dL  6-29 days.................<15.0 mg/dL         BUN 6       Calcium 9.7       Chloride 105       CO2 25              Creatinine 0.8       Differential Method Automated       eGFR >60       Eos # 0.4       Eosinophil % 3.5       Glucose 94       Gran # (ANC) 7.7       Gran % 64.3       Hematocrit 34.2       Hemoglobin 12.8       Immature Grans (Abs) 0.05  Comment: Mild elevation in immature granulocytes is non specific and   can be seen in a variety of conditions including stress response,   acute inflammation, trauma and pregnancy. Correlation with other   laboratory and clinical findings is essential.         Immature Granulocytes 0.4       Lymph # 2.6       Lymph % 21.8       MCH 31.1       MCHC 37.4       MCV 83       Mono # 1.1       Mono % 9.2        MPV 8.9       nRBC 0       Platelets 584       Potassium 3.9       PROTEIN TOTAL 8.2       RBC 4.12       RDW 15.4       Retic 5.0       Sodium 137       WBC 11.90               Significant Imaging: I have reviewed all pertinent imaging results/findings within the past 24 hours.  Imaging Results    None

## 2023-04-11 NOTE — PLAN OF CARE
Problem: Pain Acute  Goal: Acceptable Pain Control and Functional Ability  Intervention: Develop Pain Management Plan  Flowsheets (Taken 4/11/2023 0642)  Pain Management Interventions:   care clustered   medication offered   quiet environment facilitated  Intervention: Prevent or Manage Pain  Flowsheets (Taken 4/11/2023 0642)  Medication Review/Management: (IV Dilaudid 1mg)   medications reviewed   high-risk medications identified  Intervention: Optimize Psychosocial Wellbeing  Flowsheets (Taken 4/11/2023 0642)  Supportive Measures:   active listening utilized   self-care encouraged

## 2023-04-11 NOTE — PHARMACY MED REC
"Admission Medication History     The home medication history was taken by Mica Palafox CPhT.      You may go to "Admission" then "Reconcile Home Medications" tabs to review and/or act upon these items.     The home medication list has been updated by the Pharmacy department.   Please read ALL comments highlighted in yellow.   Please address this information as you see fit.    Feel free to contact us if you have any questions or require assistance.      The medications listed below were removed from the home medication list. Please reorder if appropriate:  Patient reports no longer taking the following medication(s):  Vitamin C 500 mg  Symbicort 160-4.5 mg tab  Calcium-vitamin D3 500 mg tab  Mucinex  NORCO  mg  Multivitamin    Medications listed below were obtained from: Patient/family and Analytic software- [employer]  (Not in a hospital admission)            Mica Palafox CPhT.  [phone]                  .        "

## 2023-04-11 NOTE — NURSING
Received report from Rhett RN, care assumed per this nurse, pt found resting in bed w/nadn IVF infusing safety measures in place

## 2023-04-12 PROBLEM — D57.20 HEMOGLOBIN S-C DISEASE: Status: ACTIVE | Noted: 2019-08-18

## 2023-04-12 PROBLEM — D57.219 SICKLE-CELL-HEMOGLOBIN C DISEASE WITH CRISIS: Status: ACTIVE | Noted: 2021-06-29

## 2023-04-12 PROBLEM — R52 PAIN: Status: RESOLVED | Noted: 2021-09-12 | Resolved: 2023-04-12

## 2023-04-12 PROBLEM — U07.1 COVID-19 VIRUS INFECTION: Status: RESOLVED | Noted: 2022-01-27 | Resolved: 2023-04-12

## 2023-04-12 PROBLEM — J18.9 MULTIFOCAL PNEUMONIA: Status: RESOLVED | Noted: 2019-11-10 | Resolved: 2023-04-12

## 2023-04-12 PROBLEM — D57.1 SICKLE CELL DISEASE: Chronic | Status: RESOLVED | Noted: 2019-12-16 | Resolved: 2023-04-12

## 2023-04-12 PROBLEM — D57.01 ACUTE CHEST SYNDROME: Status: RESOLVED | Noted: 2021-06-29 | Resolved: 2023-04-12

## 2023-04-12 PROBLEM — J18.9 PNEUMONIA OF LEFT LUNG DUE TO INFECTIOUS ORGANISM: Status: RESOLVED | Noted: 2021-09-08 | Resolved: 2023-04-12

## 2023-04-12 LAB
ALBUMIN SERPL BCP-MCNC: 3.7 G/DL (ref 3.5–5.2)
ALP SERPL-CCNC: 56 U/L (ref 55–135)
ALT SERPL W/O P-5'-P-CCNC: 14 U/L (ref 10–44)
ANION GAP SERPL CALC-SCNC: 11 MMOL/L (ref 8–16)
AST SERPL-CCNC: 20 U/L (ref 10–40)
BASOPHILS # BLD AUTO: 0.11 K/UL (ref 0–0.2)
BASOPHILS NFR BLD: 1 % (ref 0–1.9)
BILIRUB SERPL-MCNC: 0.8 MG/DL (ref 0.1–1)
BUN SERPL-MCNC: 6 MG/DL (ref 6–20)
CALCIUM SERPL-MCNC: 8.9 MG/DL (ref 8.7–10.5)
CHLORIDE SERPL-SCNC: 110 MMOL/L (ref 95–110)
CO2 SERPL-SCNC: 18 MMOL/L (ref 23–29)
CREAT SERPL-MCNC: 0.8 MG/DL (ref 0.5–1.4)
DIFFERENTIAL METHOD: ABNORMAL
EOSINOPHIL # BLD AUTO: 0.3 K/UL (ref 0–0.5)
EOSINOPHIL NFR BLD: 2.8 % (ref 0–8)
ERYTHROCYTE [DISTWIDTH] IN BLOOD BY AUTOMATED COUNT: 15.1 % (ref 11.5–14.5)
EST. GFR  (NO RACE VARIABLE): >60 ML/MIN/1.73 M^2
GLUCOSE SERPL-MCNC: 75 MG/DL (ref 70–110)
HCT VFR BLD AUTO: 33.4 % (ref 40–54)
HGB BLD-MCNC: 12.2 G/DL (ref 14–18)
IMM GRANULOCYTES # BLD AUTO: 0.03 K/UL (ref 0–0.04)
IMM GRANULOCYTES NFR BLD AUTO: 0.3 % (ref 0–0.5)
LYMPHOCYTES # BLD AUTO: 3.4 K/UL (ref 1–4.8)
LYMPHOCYTES NFR BLD: 29.5 % (ref 18–48)
MCH RBC QN AUTO: 30.9 PG (ref 27–31)
MCHC RBC AUTO-ENTMCNC: 36.5 G/DL (ref 32–36)
MCV RBC AUTO: 85 FL (ref 82–98)
MONOCYTES # BLD AUTO: 1.3 K/UL (ref 0.3–1)
MONOCYTES NFR BLD: 11.3 % (ref 4–15)
NEUTROPHILS # BLD AUTO: 6.3 K/UL (ref 1.8–7.7)
NEUTROPHILS NFR BLD: 55.1 % (ref 38–73)
NRBC BLD-RTO: 0 /100 WBC
PLATELET # BLD AUTO: 582 K/UL (ref 150–450)
PMV BLD AUTO: 9.3 FL (ref 9.2–12.9)
POTASSIUM SERPL-SCNC: 4.6 MMOL/L (ref 3.5–5.1)
PROT SERPL-MCNC: 7.3 G/DL (ref 6–8.4)
RBC # BLD AUTO: 3.95 M/UL (ref 4.6–6.2)
SODIUM SERPL-SCNC: 139 MMOL/L (ref 136–145)
WBC # BLD AUTO: 11.37 K/UL (ref 3.9–12.7)

## 2023-04-12 PROCEDURE — 25000003 PHARM REV CODE 250: Performed by: HOSPITALIST

## 2023-04-12 PROCEDURE — 11000001 HC ACUTE MED/SURG PRIVATE ROOM

## 2023-04-12 PROCEDURE — 99900035 HC TECH TIME PER 15 MIN (STAT)

## 2023-04-12 PROCEDURE — 25000003 PHARM REV CODE 250

## 2023-04-12 PROCEDURE — 36415 COLL VENOUS BLD VENIPUNCTURE: CPT | Performed by: HOSPITALIST

## 2023-04-12 PROCEDURE — 80053 COMPREHEN METABOLIC PANEL: CPT | Performed by: HOSPITALIST

## 2023-04-12 PROCEDURE — 85025 COMPLETE CBC W/AUTO DIFF WBC: CPT

## 2023-04-12 PROCEDURE — 63600175 PHARM REV CODE 636 W HCPCS: Performed by: HOSPITALIST

## 2023-04-12 PROCEDURE — 63600175 PHARM REV CODE 636 W HCPCS

## 2023-04-12 RX ORDER — OXYCODONE AND ACETAMINOPHEN 10; 325 MG/1; MG/1
1 TABLET ORAL EVERY 6 HOURS
Status: DISCONTINUED | OUTPATIENT
Start: 2023-04-12 | End: 2023-04-13 | Stop reason: HOSPADM

## 2023-04-12 RX ADMIN — SODIUM CHLORIDE: 9 INJECTION, SOLUTION INTRAVENOUS at 05:04

## 2023-04-12 RX ADMIN — HYDROMORPHONE HYDROCHLORIDE 2 MG: 1 INJECTION, SOLUTION INTRAMUSCULAR; INTRAVENOUS; SUBCUTANEOUS at 07:04

## 2023-04-12 RX ADMIN — HYDROMORPHONE HYDROCHLORIDE 2 MG: 1 INJECTION, SOLUTION INTRAMUSCULAR; INTRAVENOUS; SUBCUTANEOUS at 01:04

## 2023-04-12 RX ADMIN — OXYCODONE AND ACETAMINOPHEN 1 TABLET: 10; 325 TABLET ORAL at 11:04

## 2023-04-12 RX ADMIN — HYDROMORPHONE HYDROCHLORIDE 2 MG: 1 INJECTION, SOLUTION INTRAMUSCULAR; INTRAVENOUS; SUBCUTANEOUS at 09:04

## 2023-04-12 RX ADMIN — HYDROMORPHONE HYDROCHLORIDE 2 MG: 1 INJECTION, SOLUTION INTRAMUSCULAR; INTRAVENOUS; SUBCUTANEOUS at 05:04

## 2023-04-12 RX ADMIN — OXYCODONE AND ACETAMINOPHEN 1 TABLET: 10; 325 TABLET ORAL at 05:04

## 2023-04-12 RX ADMIN — HYDROXYUREA 500 MG: 500 CAPSULE ORAL at 08:04

## 2023-04-12 RX ADMIN — FOLIC ACID 1 MG: 1 TABLET ORAL at 08:04

## 2023-04-12 RX ADMIN — ENOXAPARIN SODIUM 40 MG: 40 INJECTION SUBCUTANEOUS at 04:04

## 2023-04-12 RX ADMIN — OXYCODONE AND ACETAMINOPHEN 1 TABLET: 10; 325 TABLET ORAL at 12:04

## 2023-04-12 NOTE — ASSESSMENT & PLAN NOTE
Albuterol inhaler PRN, no current signs or symptoms of respiratory distress or asthma exacerbation

## 2023-04-12 NOTE — ASSESSMENT & PLAN NOTE
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

## 2023-04-12 NOTE — ASSESSMENT & PLAN NOTE
Patient presents due to lower back pain similar to my sickle cell pain crisis patient initially rated pain 10/10 was treated in the emergency department. Admitted with HbSC crisis (electrophoresis 9/2021 reviewed)  - initially started IV, dilaudid 1mg IV q4 with incomplete pain control  - dilaudid increased to 2mg IV q4 on 4/11 with some but incomplete improvement in pain  - will add percocet 10/325 scheduled Q6 to the PRN dilaudid  - continue home hydrea and folic acid  - Last CBC, CMP, retic reviewed. Hgb stable, actually improved from admit. TBili normalized. CXR was not done on admit but he has no signs of acute chest (though he has had it in the past). Will continue to monitor closely  - reviewed [facility] [place]- last seen outpatient Hematology 1/2023, at that time Endari was prescribed, will ask if he has been taking it

## 2023-04-12 NOTE — PLAN OF CARE
Problem: Adult Inpatient Plan of Care  Goal: Plan of Care Review  Outcome: Ongoing, Progressing  Flowsheets (Taken 4/12/2023 0815)  Plan of Care Reviewed With: patient  Goal: Patient-Specific Goal (Individualized)  Outcome: Ongoing, Progressing     Problem: Pain Acute  Goal: Acceptable Pain Control and Functional Ability  Outcome: Ongoing, Progressing  Intervention: Develop Pain Management Plan  Flowsheets (Taken 4/12/2023 0815)  Pain Management Interventions:   around-the-clock dosing utilized   awakened for pain meds per patient request   care clustered   position adjusted   quiet environment facilitated     Problem: Adult Inpatient Plan of Care  Goal: Plan of Care Review  Outcome: Ongoing, Progressing  Flowsheets (Taken 4/12/2023 0815)  Plan of Care Reviewed With: patient     Problem: Adult Inpatient Plan of Care  Goal: Plan of Care Review  Outcome: Ongoing, Progressing  Flowsheets (Taken 4/12/2023 0815)  Plan of Care Reviewed With: patient     Problem: Adult Inpatient Plan of Care  Goal: Patient-Specific Goal (Individualized)  Outcome: Ongoing, Progressing     Problem: Adult Inpatient Plan of Care  Goal: Patient-Specific Goal (Individualized)  Outcome: Ongoing, Progressing     Problem: Pain Acute  Goal: Acceptable Pain Control and Functional Ability  Outcome: Ongoing, Progressing  Intervention: Develop Pain Management Plan  Flowsheets (Taken 4/12/2023 0815)  Pain Management Interventions:   around-the-clock dosing utilized   awakened for pain meds per patient request   care clustered   position adjusted   quiet environment facilitated     Problem: Pain Acute  Goal: Acceptable Pain Control and Functional Ability  Outcome: Ongoing, Progressing     Problem: Pain Acute  Goal: Acceptable Pain Control and Functional Ability  Intervention: Develop Pain Management Plan  Flowsheets (Taken 4/12/2023 0815)  Pain Management Interventions:   around-the-clock dosing utilized   awakened for pain meds per patient request    care clustered   position adjusted   quiet environment facilitated     Problem: Pain Acute  Goal: Acceptable Pain Control and Functional Ability  Intervention: Develop Pain Management Plan  Flowsheets (Taken 4/12/2023 5015)  Pain Management Interventions:   around-the-clock dosing utilized   awakened for pain meds per patient request   care clustered   position adjusted   quiet environment facilitated

## 2023-04-12 NOTE — HOSPITAL COURSE
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 Started IVF, IV dilaudid. Pain control improved. He is feeling well enough for discharge. He has pain meds at home. He is stable for discharge to home with Hematology follow up.

## 2023-04-12 NOTE — ASSESSMENT & PLAN NOTE
Patient endorses that he quit smoking sometime last year denies the need for nicotine replacement encouraged continued cessation. Discussed x4 minutes.

## 2023-04-12 NOTE — SUBJECTIVE & OBJECTIVE
Interval History: Still has back pain, slightly better after increased dilaudid dosing but still not well controlled. Poor appetite.     Review of Systems   Constitutional:  Positive for appetite change. Negative for chills and fever.   Respiratory:  Negative for shortness of breath.    Cardiovascular:  Negative for chest pain.   Gastrointestinal:  Negative for abdominal pain.   Genitourinary:  Negative for difficulty urinating.   Musculoskeletal:  Positive for arthralgias and back pain.   Skin:  Negative for rash.   Neurological:  Negative for weakness and numbness.   Psychiatric/Behavioral:  Negative for confusion.    Objective:     Vital Signs (Most Recent):  Temp: 98.6 °F (37 °C) (04/12/23 0752)  Pulse: (!) 58 (04/12/23 0957)  Resp: 16 (04/12/23 0937)  BP: 131/62 (04/12/23 0752)  SpO2: 98 % (04/12/23 0957)   Vital Signs (24h Range):  Temp:  [98.1 °F (36.7 °C)-98.6 °F (37 °C)] 98.6 °F (37 °C)  Pulse:  [58-81] 58  Resp:  [16-20] 16  SpO2:  [96 %-100 %] 98 %  BP: (117-135)/(56-87) 131/62     Weight: 78.2 kg (172 lb 6.4 oz)  Body mass index is 27.83 kg/m².    Intake/Output Summary (Last 24 hours) at 4/12/2023 1115  Last data filed at 4/12/2023 0958  Gross per 24 hour   Intake 360 ml   Output 2000 ml   Net -1640 ml      Physical Exam  Vitals and nursing note reviewed.   Constitutional:       General: He is not in acute distress.     Appearance: He is ill-appearing. He is not toxic-appearing.      Comments: Uncomfortable appearing, moving around in bed   HENT:      Head: Normocephalic and atraumatic.      Nose: Nose normal.      Mouth/Throat:      Mouth: Mucous membranes are moist.   Cardiovascular:      Rate and Rhythm: Regular rhythm. Bradycardia present.   Pulmonary:      Effort: Pulmonary effort is normal.      Breath sounds: Normal breath sounds.      Comments: Room air  Abdominal:      General: Bowel sounds are normal. There is no distension.      Palpations: Abdomen is soft.      Tenderness: There is no  abdominal tenderness. There is no guarding.   Musculoskeletal:      Right lower leg: No edema.      Left lower leg: No edema.   Skin:     General: Skin is warm and dry.   Neurological:      Mental Status: He is alert and oriented to person, place, and time.       Significant Labs: All pertinent labs within the past 24 hours have been reviewed.    Significant Imaging: I have reviewed all pertinent imaging results/findings within the past 24 hours.

## 2023-04-12 NOTE — PROGRESS NOTES
[facility] Medicine  Progress Note    Patient Name: Katie Parham  MRN: [id number]  Patient Class: OP- Observation   Admission Date: 4/10/2023  Length of Stay: 0 days  Attending Physician: Alva Helton MD  Primary Care Provider: Kieran Reed MD        Subjective:     Principal Problem:Sickle-cell-hemoglobin C disease with crisis        HPI:  Katie Parham 33 y.o. male with sickle cell disease (SC) and asthma presents to the hospital with a chief complaint of lower back pain for the past 9 hours.  Patient reports that he initially went to outside emergency department but prolonged waiting.  Resulted in leaving without being seen presenting to our hospital.  Patient attempted self-treatment with home Norco without relief.  Patient describes his pain as sometimes sharp and stabbing other times throbbing rates pain at 8/10.  Patient reports this similar to his sickle cell pain crises.  Denies any injury to the back.  Denies any dysuria or hematuria.  Denies any chest pain or shortness of breath.    In the ED patient was afebrile without leukocytosis, slight normocytic anemia noted, retic 5.0, anion gap 7, bilirubin total 1.1 CPK WNL, patient was placed in observation for management of acute sickle cell pain crisis      Overview/Hospital Course:  Mr Katie Parham has hemoglobin SC disease and was admitted with crisis manifesting as low back pain. Started IVF, IV dilaudid. Pain still not controlled.       Interval History: Still has back pain, slightly better after increased dilaudid dosing but still not well controlled. Poor appetite.     Review of Systems   Constitutional:  Positive for appetite change. Negative for chills and fever.   Respiratory:  Negative for shortness of breath.    Cardiovascular:  Negative for chest pain.   Gastrointestinal:  Negative for abdominal pain.   Genitourinary:  Negative for difficulty urinating.   Musculoskeletal:  Positive for arthralgias and back pain.    Skin:  Negative for rash.   Neurological:  Negative for weakness and numbness.   Psychiatric/Behavioral:  Negative for confusion.    Objective:     Vital Signs (Most Recent):  Temp: 98.6 °F (37 °C) (04/12/23 0752)  Pulse: (!) 58 (04/12/23 0957)  Resp: 16 (04/12/23 0937)  BP: 131/62 (04/12/23 0752)  SpO2: 98 % (04/12/23 0957)   Vital Signs (24h Range):  Temp:  [98.1 °F (36.7 °C)-98.6 °F (37 °C)] 98.6 °F (37 °C)  Pulse:  [58-81] 58  Resp:  [16-20] 16  SpO2:  [96 %-100 %] 98 %  BP: (117-135)/(56-87) 131/62     Weight: 78.2 kg (172 lb 6.4 oz)  Body mass index is 27.83 kg/m².    Intake/Output Summary (Last 24 hours) at 4/12/2023 1115  Last data filed at 4/12/2023 0958  Gross per 24 hour   Intake 360 ml   Output 2000 ml   Net -1640 ml      Physical Exam  Vitals and nursing note reviewed.   Constitutional:       General: He is not in acute distress.     Appearance: He is ill-appearing. He is not toxic-appearing.      Comments: Uncomfortable appearing, moving around in bed   HENT:      Head: Normocephalic and atraumatic.      Nose: Nose normal.      Mouth/Throat:      Mouth: Mucous membranes are moist.   Cardiovascular:      Rate and Rhythm: Regular rhythm. Bradycardia present.   Pulmonary:      Effort: Pulmonary effort is normal.      Breath sounds: Normal breath sounds.      Comments: Room air  Abdominal:      General: Bowel sounds are normal. There is no distension.      Palpations: Abdomen is soft.      Tenderness: There is no abdominal tenderness. There is no guarding.   Musculoskeletal:      Right lower leg: No edema.      Left lower leg: No edema.   Skin:     General: Skin is warm and dry.   Neurological:      Mental Status: He is alert and oriented to person, place, and time.       Significant Labs: All pertinent labs within the past 24 hours have been reviewed.    Significant Imaging: I have reviewed all pertinent imaging results/findings within the past 24 hours.      Assessment/Plan:      *  Sickle-cell-hemoglobin C disease with crisis  Patient presents due to lower back pain similar to my sickle cell pain crisis patient initially rated pain 10/10 was treated in the emergency department. Admitted with HbSC crisis (electrophoresis 9/2021 reviewed)  - initially started IV, dilaudid 1mg IV q4 with incomplete pain control  - dilaudid increased to 2mg IV q4 on 4/11 with some but incomplete improvement in pain  - will add percocet 10/325 scheduled Q6 to the PRN dilaudid  - continue home hydrea and folic acid  - Last CBC, CMP, retic reviewed. Hgb stable, actually improved from admit. TBili normalized. CXR was not done on admit but he has no signs of acute chest (though he has had it in the past). Will continue to monitor closely  - reviewed [facility] records- last seen outpatient Hematology 1/2023, at that time Endari was prescribed, will ask if he has been taking it     Mild asthma without complication  Albuterol inhaler PRN, no current signs or symptoms of respiratory distress or asthma exacerbation     Tobacco abuse  Patient endorses that he quit smoking sometime last year denies the need for nicotine replacement encouraged continued cessation. Discussed x4 minutes.     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       VTE Risk Mitigation (From admission, onward)         Ordered     enoxaparin injection 40 mg  Daily         04/11/23 0106     IP VTE HIGH RISK PATIENT  Once         04/11/23 0106     Place sequential compression device  Until discontinued         04/11/23 0106                Discharge Planning   YENY: 4/12/2023     Code Status: Full Code   Is the patient medically ready for discharge?:     Reason for patient still in hospital (select all that apply): Patient trending condition  Discharge  Plan A: Home with family (with instructions to follow up)                  Alva Helton MD  Department of Hospital Medicine   [facility]

## 2023-04-13 VITALS
SYSTOLIC BLOOD PRESSURE: 142 MMHG | WEIGHT: 172.38 LBS | BODY MASS INDEX: 27.7 KG/M2 | DIASTOLIC BLOOD PRESSURE: 83 MMHG | OXYGEN SATURATION: 98 % | HEIGHT: 66 IN | TEMPERATURE: 99 F | RESPIRATION RATE: 20 BRPM | HEART RATE: 55 BPM

## 2023-04-13 LAB
ALBUMIN SERPL BCP-MCNC: 3.3 G/DL (ref 3.5–5.2)
ALP SERPL-CCNC: 61 U/L (ref 55–135)
ALT SERPL W/O P-5'-P-CCNC: 13 U/L (ref 10–44)
ANION GAP SERPL CALC-SCNC: 3 MMOL/L (ref 8–16)
AST SERPL-CCNC: 10 U/L (ref 10–40)
BASOPHILS # BLD AUTO: 0.13 K/UL (ref 0–0.2)
BASOPHILS NFR BLD: 1.2 % (ref 0–1.9)
BILIRUB SERPL-MCNC: 0.8 MG/DL (ref 0.1–1)
BUN SERPL-MCNC: 5 MG/DL (ref 6–20)
CALCIUM SERPL-MCNC: 8.4 MG/DL (ref 8.7–10.5)
CHLORIDE SERPL-SCNC: 111 MMOL/L (ref 95–110)
CO2 SERPL-SCNC: 25 MMOL/L (ref 23–29)
CREAT SERPL-MCNC: 0.8 MG/DL (ref 0.5–1.4)
DIFFERENTIAL METHOD: ABNORMAL
EOSINOPHIL # BLD AUTO: 0.3 K/UL (ref 0–0.5)
EOSINOPHIL NFR BLD: 2.8 % (ref 0–8)
ERYTHROCYTE [DISTWIDTH] IN BLOOD BY AUTOMATED COUNT: 14.9 % (ref 11.5–14.5)
EST. GFR  (NO RACE VARIABLE): >60 ML/MIN/1.73 M^2
GLUCOSE SERPL-MCNC: 95 MG/DL (ref 70–110)
HCT VFR BLD AUTO: 30.3 % (ref 40–54)
HGB BLD-MCNC: 11 G/DL (ref 14–18)
IMM GRANULOCYTES # BLD AUTO: 0.02 K/UL (ref 0–0.04)
IMM GRANULOCYTES NFR BLD AUTO: 0.2 % (ref 0–0.5)
LYMPHOCYTES # BLD AUTO: 5.4 K/UL (ref 1–4.8)
LYMPHOCYTES NFR BLD: 50.6 % (ref 18–48)
MCH RBC QN AUTO: 30.6 PG (ref 27–31)
MCHC RBC AUTO-ENTMCNC: 36.3 G/DL (ref 32–36)
MCV RBC AUTO: 84 FL (ref 82–98)
MONOCYTES # BLD AUTO: 1.3 K/UL (ref 0.3–1)
MONOCYTES NFR BLD: 12.6 % (ref 4–15)
NEUTROPHILS # BLD AUTO: 3.5 K/UL (ref 1.8–7.7)
NEUTROPHILS NFR BLD: 32.6 % (ref 38–73)
NRBC BLD-RTO: 0 /100 WBC
PLATELET # BLD AUTO: 502 K/UL (ref 150–450)
PMV BLD AUTO: 9.8 FL (ref 9.2–12.9)
POTASSIUM SERPL-SCNC: 3.4 MMOL/L (ref 3.5–5.1)
PROT SERPL-MCNC: 6.2 G/DL (ref 6–8.4)
RBC # BLD AUTO: 3.59 M/UL (ref 4.6–6.2)
SODIUM SERPL-SCNC: 139 MMOL/L (ref 136–145)
WBC # BLD AUTO: 10.65 K/UL (ref 3.9–12.7)

## 2023-04-13 PROCEDURE — 25000003 PHARM REV CODE 250

## 2023-04-13 PROCEDURE — 80053 COMPREHEN METABOLIC PANEL: CPT | Performed by: HOSPITALIST

## 2023-04-13 PROCEDURE — 25000003 PHARM REV CODE 250: Performed by: HOSPITALIST

## 2023-04-13 PROCEDURE — 36415 COLL VENOUS BLD VENIPUNCTURE: CPT | Performed by: HOSPITALIST

## 2023-04-13 PROCEDURE — 63600175 PHARM REV CODE 636 W HCPCS: Performed by: HOSPITALIST

## 2023-04-13 PROCEDURE — 94761 N-INVAS EAR/PLS OXIMETRY MLT: CPT

## 2023-04-13 PROCEDURE — 85025 COMPLETE CBC W/AUTO DIFF WBC: CPT

## 2023-04-13 RX ORDER — MUPIROCIN 20 MG/G
OINTMENT TOPICAL 2 TIMES DAILY
Status: DISCONTINUED | OUTPATIENT
Start: 2023-04-13 | End: 2023-04-13 | Stop reason: HOSPADM

## 2023-04-13 RX ORDER — HYDROMORPHONE HYDROCHLORIDE 1 MG/ML
1 INJECTION, SOLUTION INTRAMUSCULAR; INTRAVENOUS; SUBCUTANEOUS EVERY 4 HOURS PRN
Status: DISCONTINUED | OUTPATIENT
Start: 2023-04-13 | End: 2023-04-13 | Stop reason: HOSPADM

## 2023-04-13 RX ORDER — POTASSIUM CHLORIDE 20 MEQ/1
40 TABLET, EXTENDED RELEASE ORAL ONCE
Status: COMPLETED | OUTPATIENT
Start: 2023-04-13 | End: 2023-04-13

## 2023-04-13 RX ADMIN — MUPIROCIN: 20 OINTMENT TOPICAL at 10:04

## 2023-04-13 RX ADMIN — OXYCODONE AND ACETAMINOPHEN 1 TABLET: 10; 325 TABLET ORAL at 11:04

## 2023-04-13 RX ADMIN — FOLIC ACID 1 MG: 1 TABLET ORAL at 09:04

## 2023-04-13 RX ADMIN — HYDROMORPHONE HYDROCHLORIDE 2 MG: 1 INJECTION, SOLUTION INTRAMUSCULAR; INTRAVENOUS; SUBCUTANEOUS at 08:04

## 2023-04-13 RX ADMIN — HYDROMORPHONE HYDROCHLORIDE 1 MG: 1 INJECTION, SOLUTION INTRAMUSCULAR; INTRAVENOUS; SUBCUTANEOUS at 12:04

## 2023-04-13 RX ADMIN — HYDROXYUREA 500 MG: 500 CAPSULE ORAL at 09:04

## 2023-04-13 RX ADMIN — POTASSIUM CHLORIDE 40 MEQ: 1500 TABLET, EXTENDED RELEASE ORAL at 10:04

## 2023-04-13 RX ADMIN — HYDROMORPHONE HYDROCHLORIDE 2 MG: 1 INJECTION, SOLUTION INTRAMUSCULAR; INTRAVENOUS; SUBCUTANEOUS at 04:04

## 2023-04-13 RX ADMIN — OXYCODONE AND ACETAMINOPHEN 1 TABLET: 10; 325 TABLET ORAL at 05:04

## 2023-04-13 RX ADMIN — HYDROMORPHONE HYDROCHLORIDE 2 MG: 1 INJECTION, SOLUTION INTRAMUSCULAR; INTRAVENOUS; SUBCUTANEOUS at 12:04

## 2023-04-13 NOTE — DISCHARGE SUMMARY
[facility] Medicine  Discharge Summary      Patient Name: Katie Parham  MRN: [id number]  CINDY: [id number]  Patient Class: IP- Inpatient  Admission Date: 4/10/2023  Hospital Length of Stay: 1 days  Discharge Date and Time:  04/13/2023 1:24 PM  Attending Physician: Alva Helton MD   Discharging Provider: Alva Helton MD  Primary Care Provider: Kieran Reed MD    Primary Care Team: Networked reference to record PCT     HPI:   Katie Parahm 33 y.o. male with sickle cell disease (SC) and asthma presents to the hospital with a chief complaint of lower back pain for the past 9 hours.  Patient reports that he initially went to outside emergency department but prolonged waiting.  Resulted in leaving without being seen presenting to our hospital.  Patient attempted self-treatment with home Norco without relief.  Patient describes his pain as sometimes sharp and stabbing other times throbbing rates pain at 8/10.  Patient reports this similar to his sickle cell pain crises.  Denies any injury to the back.  Denies any dysuria or hematuria.  Denies any chest pain or shortness of breath.    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 Started IVF, IV dilaudid. Pain control improved. He is feeling well enough for discharge. He has pain meds at home. He is stable for discharge to home with Hematology follow up.        Goals of Care Treatment Preferences:  Code Status: Full Code      Consults:     No new Assessment & Plan notes have been filed under this hospital service since the last note was generated.  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    Specialties: Internal Medicine, Pediatrics  Why: Please call Dr Reed Office to schedule a hospital follow up  Contact information:  [address]  [address]  [facility] [address]  [phone]                       Patient Instructions:      Diet Adult Regular     Notify your health care provider if you experience any of the following:  temperature >100.4     Notify your health care provider if you experience any of the following:  persistent nausea and vomiting or diarrhea     Notify your health care provider if you experience any of the following:  severe uncontrolled pain     Notify your health care provider if you experience any of the following:  redness, tenderness, or signs of infection (pain, swelling, redness, odor or green/yellow discharge around incision site)     Notify your health care provider if you experience any of the following:  difficulty breathing or increased cough     Notify your health care provider if you experience any of the following:  severe persistent headache     Notify your health care provider if you experience any of the following:  worsening rash     Notify your health care provider if you experience any of the following:  persistent dizziness, light-headedness, or visual disturbances     Notify your health care provider if you experience any of the following:  increased confusion or weakness     Activity as tolerated       Significant Diagnostic Studies: N/A    Pending Diagnostic Studies:     None          Medications:  Reconciled Home Medications:      Medication List      CONTINUE taking these medications    * albuterol 90 mcg/actuation inhaler  Commonly known as: PROVENTIL HFA  Inhale 2 puffs into the lungs every 6 (six) hours as needed for Wheezing. Rescue     * albuterol 2.5 mg /3 mL (0.083 %) nebulizer solution  Commonly known as: PROVENTIL  albuterol sulfate 2.5 mg/3 mL (0.083 %) solution for nebulization     ENDARI 5 gram Pwpk  Generic drug: glutamine (sickle cell)  Take 15 g by mouth.     folic acid 1 MG tablet  Commonly known as: FOLVITE  Take 1 tablet (1 mg total) by mouth once daily.     hydroxyurea 500 mg Cap  Commonly known as: HYDREA  TK 1 C PO BID     oxyCODONE 5 MG immediate release tablet  Commonly known as: ROXICODONE  Take by mouth.         * This list has 2 medication(s) that are the same as other medications prescribed for you. Read the directions carefully, and ask your doctor or other care provider to review them with you.                Indwelling Lines/Drains at time of discharge:   Lines/Drains/Airways     None                 Time spent on the discharge of patient: 35 minutes         Alva Helton MD  Department of Hospital Medicine  [facility]

## 2023-06-13 ENCOUNTER — HOSPITAL ENCOUNTER (EMERGENCY)
Facility: HOSPITAL | Age: 33
Discharge: HOME | End: 2023-06-14
Attending: EMERGENCY MEDICINE
Payer: MEDICAID

## 2023-06-13 DIAGNOSIS — D57.00 SICKLE CELL PAIN CRISIS: Primary | ICD-10-CM

## 2023-06-13 LAB
ALBUMIN SERPL BCP-MCNC: 4.3 G/DL (ref 3.5–5.2)
ALP SERPL-CCNC: 80 U/L (ref 55–135)
ALT SERPL W/O P-5'-P-CCNC: 15 U/L (ref 10–44)
ANION GAP SERPL CALC-SCNC: 12 MMOL/L (ref 8–16)
ANISOCYTOSIS BLD QL SMEAR: SLIGHT
AST SERPL-CCNC: 14 U/L (ref 10–40)
BASOPHILS # BLD AUTO: 0.12 K/UL (ref 0–0.2)
BASOPHILS NFR BLD: 1.5 % (ref 0–1.9)
BILIRUB SERPL-MCNC: 1.3 MG/DL (ref 0.1–1)
BILIRUB UR QL STRIP: NEGATIVE
BUN SERPL-MCNC: 6 MG/DL (ref 6–20)
CALCIUM SERPL-MCNC: 9.3 MG/DL (ref 8.7–10.5)
CHLORIDE SERPL-SCNC: 106 MMOL/L (ref 95–110)
CLARITY UR: CLEAR
CO2 SERPL-SCNC: 22 MMOL/L (ref 23–29)
COLOR UR: YELLOW
CREAT SERPL-MCNC: 0.9 MG/DL (ref 0.5–1.4)
DIFFERENTIAL METHOD: ABNORMAL
EOSINOPHIL # BLD AUTO: 0.4 K/UL (ref 0–0.5)
EOSINOPHIL NFR BLD: 5.1 % (ref 0–8)
ERYTHROCYTE [DISTWIDTH] IN BLOOD BY AUTOMATED COUNT: 15.4 % (ref 11.5–14.5)
EST. GFR  (NO RACE VARIABLE): >60 ML/MIN/1.73 M^2
GLUCOSE SERPL-MCNC: 81 MG/DL (ref 70–110)
GLUCOSE UR QL STRIP: NEGATIVE
HCT VFR BLD AUTO: 36.4 % (ref 40–54)
HGB BLD-MCNC: 13.5 G/DL (ref 14–18)
HGB UR QL STRIP: ABNORMAL
IMM GRANULOCYTES # BLD AUTO: 0.03 K/UL (ref 0–0.04)
IMM GRANULOCYTES NFR BLD AUTO: 0.4 % (ref 0–0.5)
KETONES UR QL STRIP: NEGATIVE
LEUKOCYTE ESTERASE UR QL STRIP: NEGATIVE
LYMPHOCYTES # BLD AUTO: 2.2 K/UL (ref 1–4.8)
LYMPHOCYTES NFR BLD: 26.7 % (ref 18–48)
MAGNESIUM SERPL-MCNC: 2 MG/DL (ref 1.6–2.6)
MCH RBC QN AUTO: 30.1 PG (ref 27–31)
MCHC RBC AUTO-ENTMCNC: 37.1 G/DL (ref 32–36)
MCV RBC AUTO: 81 FL (ref 82–98)
MONOCYTES # BLD AUTO: 2.3 K/UL (ref 0.3–1)
MONOCYTES NFR BLD: 27.8 % (ref 4–15)
NEUTROPHILS # BLD AUTO: 3.2 K/UL (ref 1.8–7.7)
NEUTROPHILS NFR BLD: 38.5 % (ref 38–73)
NITRITE UR QL STRIP: NEGATIVE
NRBC BLD-RTO: 0 /100 WBC
PH UR STRIP: 6 [PH] (ref 5–8)
PLATELET # BLD AUTO: 480 K/UL (ref 150–450)
PLATELET BLD QL SMEAR: ABNORMAL
PMV BLD AUTO: 9.1 FL (ref 9.2–12.9)
POIKILOCYTOSIS BLD QL SMEAR: SLIGHT
POTASSIUM SERPL-SCNC: 3.5 MMOL/L (ref 3.5–5.1)
PROT SERPL-MCNC: 7.7 G/DL (ref 6–8.4)
PROT UR QL STRIP: NEGATIVE
RBC # BLD AUTO: 4.49 M/UL (ref 4.6–6.2)
RETICS/RBC NFR AUTO: 3.8 % (ref 0.4–2)
SODIUM SERPL-SCNC: 140 MMOL/L (ref 136–145)
SP GR UR STRIP: 1.01 (ref 1–1.03)
TARGETS BLD QL SMEAR: ABNORMAL
URN SPEC COLLECT METH UR: ABNORMAL
UROBILINOGEN UR STRIP-ACNC: NEGATIVE EU/DL
WBC # BLD AUTO: 8.2 K/UL (ref 3.9–12.7)

## 2023-06-13 PROCEDURE — 81003 URINALYSIS AUTO W/O SCOPE: CPT

## 2023-06-13 PROCEDURE — 25000003 PHARM REV CODE 250: Performed by: PHYSICIAN ASSISTANT

## 2023-06-13 PROCEDURE — 85025 COMPLETE CBC W/AUTO DIFF WBC: CPT | Performed by: PHYSICIAN ASSISTANT

## 2023-06-13 PROCEDURE — 96374 THER/PROPH/DIAG INJ IV PUSH: CPT

## 2023-06-13 PROCEDURE — 96361 HYDRATE IV INFUSION ADD-ON: CPT

## 2023-06-13 PROCEDURE — 63600175 PHARM REV CODE 636 W HCPCS: Performed by: PHYSICIAN ASSISTANT

## 2023-06-13 PROCEDURE — 85045 AUTOMATED RETICULOCYTE COUNT: CPT | Performed by: PHYSICIAN ASSISTANT

## 2023-06-13 PROCEDURE — 99284 EMERGENCY DEPT VISIT MOD MDM: CPT | Mod: 25

## 2023-06-13 PROCEDURE — 96375 TX/PRO/DX INJ NEW DRUG ADDON: CPT

## 2023-06-13 PROCEDURE — 80053 COMPREHEN METABOLIC PANEL: CPT | Performed by: PHYSICIAN ASSISTANT

## 2023-06-13 PROCEDURE — 83735 ASSAY OF MAGNESIUM: CPT | Performed by: PHYSICIAN ASSISTANT

## 2023-06-13 RX ORDER — KETOROLAC TROMETHAMINE 30 MG/ML
15 INJECTION, SOLUTION INTRAMUSCULAR; INTRAVENOUS
Status: DISCONTINUED | OUTPATIENT
Start: 2023-06-13 | End: 2023-06-13

## 2023-06-13 RX ORDER — OXYCODONE HYDROCHLORIDE 5 MG/1
10 TABLET ORAL
Status: COMPLETED | OUTPATIENT
Start: 2023-06-13 | End: 2023-06-13

## 2023-06-13 RX ORDER — KETOROLAC TROMETHAMINE 30 MG/ML
15 INJECTION, SOLUTION INTRAMUSCULAR; INTRAVENOUS
Status: COMPLETED | OUTPATIENT
Start: 2023-06-13 | End: 2023-06-13

## 2023-06-13 RX ORDER — ACETAMINOPHEN 500 MG
1000 TABLET ORAL
Status: COMPLETED | OUTPATIENT
Start: 2023-06-13 | End: 2023-06-13

## 2023-06-13 RX ORDER — MORPHINE SULFATE 4 MG/ML
6 INJECTION, SOLUTION INTRAMUSCULAR; INTRAVENOUS
Status: COMPLETED | OUTPATIENT
Start: 2023-06-13 | End: 2023-06-13

## 2023-06-13 RX ORDER — ONDANSETRON 2 MG/ML
4 INJECTION INTRAMUSCULAR; INTRAVENOUS
Status: COMPLETED | OUTPATIENT
Start: 2023-06-13 | End: 2023-06-13

## 2023-06-13 RX ADMIN — ONDANSETRON 4 MG: 2 INJECTION INTRAMUSCULAR; INTRAVENOUS at 10:06

## 2023-06-13 RX ADMIN — KETOROLAC TROMETHAMINE 15 MG: 30 INJECTION, SOLUTION INTRAMUSCULAR; INTRAVENOUS at 11:06

## 2023-06-13 RX ADMIN — ACETAMINOPHEN 1000 MG: 500 TABLET ORAL at 08:06

## 2023-06-13 RX ADMIN — SODIUM CHLORIDE, POTASSIUM CHLORIDE, SODIUM LACTATE AND CALCIUM CHLORIDE 1000 ML: 600; 310; 30; 20 INJECTION, SOLUTION INTRAVENOUS at 10:06

## 2023-06-13 RX ADMIN — OXYCODONE HYDROCHLORIDE 10 MG: 5 TABLET ORAL at 11:06

## 2023-06-13 RX ADMIN — MORPHINE SULFATE 6 MG: 4 INJECTION INTRAVENOUS at 10:06

## 2023-06-14 VITALS
RESPIRATION RATE: 18 BRPM | OXYGEN SATURATION: 98 % | HEART RATE: 86 BPM | TEMPERATURE: 98 F | DIASTOLIC BLOOD PRESSURE: 64 MMHG | WEIGHT: 170 LBS | BODY MASS INDEX: 27.32 KG/M2 | SYSTOLIC BLOOD PRESSURE: 111 MMHG | HEIGHT: 66 IN

## 2023-06-14 NOTE — ED TRIAGE NOTES
34 yo male presents to the ED with c/o lower back pain with radiation down bilateral legs. Pt reports that he began experiencing symptoms this morning; states that the pain is chronic, secondary to sickle-cell disease. Pt also reports a PMH of asthma. Pt rates pain at a a 9 on a PRS of 0-10.

## 2023-06-14 NOTE — ED PROVIDER NOTES
Encounter Date: 6/13/2023       History     Chief Complaint   Patient presents with    Back Pain     Pt reports to the ED with C/O lumbar back pain that started this AM. Pt denies any falls, trauma, or heavy lifting. Pt reports the pain radiates down bi lateral legs. Pt reports some relief with oxycodone at home. Pt AAOx4, RESP easy and unlabored. Pt awaiting QT bed.      34yo M smoker presents to ED complaining of left low back pain since this morning.    Patient admits to similar no back pain with previous sickle cell crises.  He states this is his typical sickle cell crisis. Denies relief with Roxicodone 15mg.  No trauma.  No rash.  No leg weakness.  No history of any spinal surgeries.  No bowel or bladder incontinence or retention.  Denies difficulty with urination.  Denies history nephrolithiasis.  Does admit to poor appetite and intake today since onset of symptoms.  No nausea vomiting.  No abdominal pain.  No chest pain.  No shortness of breath.  No cough.  No neck pain or stiffness.    PMH:  Asthma  Hgb SC dz  Hx blood transfusion      Daily rx:  Hydroxyurea  Folic acid  Endari      Review of patient's allergies indicates:   Allergen Reactions    Penicillins Anaphylaxis     Past Medical History:   Diagnosis Date    Asthma     Broken thumb 2017    Left    Cigarette smoker     Hemoglobin S-C disease     Sickle cell anemia      Past Surgical History:   Procedure Laterality Date    HAND SURGERY Left 12/21/2017    ORIF thumb    ORIF mandible  01/31/2013    spleenectomy       Family History   Family history unknown: Yes     Social History     Tobacco Use    Smoking status: Every Day     Packs/day: 0.25     Types: Cigarettes    Smokeless tobacco: Never    Tobacco comments:     encouraged to quit.    Substance Use Topics    Alcohol use: Yes     Comment: socially    Drug use: No     Review of Systems   Constitutional:  Positive for appetite change. Negative for chills and fever.   Respiratory:  Negative for cough and  shortness of breath.    Cardiovascular:  Negative for chest pain.   Gastrointestinal:  Negative for abdominal pain, nausea and vomiting.   Genitourinary:         No bowel or bladder incontinence or retention   Musculoskeletal:  Positive for back pain. Negative for myalgias, neck pain and neck stiffness.   Neurological:  Negative for weakness.     Physical Exam     Initial Vitals [06/13/23 1752]   BP Pulse Resp Temp SpO2   111/70 100 16 99.7 °F (37.6 °C) 97 %      MAP       --         Physical Exam    Nursing note and vitals reviewed.  Constitutional: He appears well-developed and well-nourished. He is not diaphoretic. No distress.   HENT:   Head: Normocephalic and atraumatic.   Neck: Neck supple.   Normal range of motion.  Cardiovascular:  Intact distal pulses.           2+ PT bilaterally. No unilateral leg swelling or calf tenderness.   Pulmonary/Chest: No respiratory distress.   Abdominal: There is no abdominal tenderness.   Musculoskeletal:         General: Normal range of motion.      Cervical back: Normal range of motion and neck supple.      Comments: Mild TTP to left-sided lumbar paraspinal musculature, upper gluteal musculature.  No midline spinal tenderness.     Neurological: He is alert and oriented to person, place, and time.   Skin: Skin is warm.   Psychiatric: Thought content normal.   Flat affect, depressed mood       ED Course   Procedures  Labs Reviewed   URINALYSIS, REFLEX TO URINE CULTURE - Abnormal; Notable for the following components:       Result Value    Occult Blood UA Trace (*)     All other components within normal limits    Narrative:     Specimen Source->Urine   CBC W/ AUTO DIFFERENTIAL - Abnormal; Notable for the following components:    RBC 4.49 (*)     Hemoglobin 13.5 (*)     Hematocrit 36.4 (*)     MCV 81 (*)     MCHC 37.1 (*)     RDW 15.4 (*)     Platelets 480 (*)     MPV 9.1 (*)     Mono # 2.3 (*)     Mono % 27.8 (*)     Platelet Estimate Increased (*)     All other components  within normal limits   COMPREHENSIVE METABOLIC PANEL - Abnormal; Notable for the following components:    CO2 22 (*)     Total Bilirubin 1.3 (*)     All other components within normal limits   RETICULOCYTES - Abnormal; Notable for the following components:    Retic 3.8 (*)     All other components within normal limits   MAGNESIUM   MAGNESIUM          Imaging Results              X-Ray Chest 1 View (Final result)  Result time 06/13/23 21:30:41      Final result by Nixon Cano MD (06/13/23 21:30:41)                   Impression:      No acute cardiopulmonary process identified.      Electronically signed by: Nixon Cano MD  Date:    06/13/2023  Time:    21:30               Narrative:    EXAMINATION:  XR CHEST 1 VIEW    CLINICAL HISTORY:  Hb-SS disease with crisis, unspecified    TECHNIQUE:  Single frontal view of the chest was performed.    COMPARISON:  February 2022.    FINDINGS:  Cardiac silhouette is normal in size.  Lungs are symmetrically expanded.  No evidence of focal consolidative process, pneumothorax, or significant pleural effusion.  No acute osseous abnormality identified.                                    X-Rays:   Independently Interpreted Readings:   Chest X-Ray: Personal interpretation:  No cardiomegaly, no pleural effusion, no obvious pneumothorax, no convincing bony abnormality.  Questionable perihilar scarring, similar previous dated 11/03/2020.   Medications   lactated ringers bolus 1,000 mL (0 mLs Intravenous Stopped 6/13/23 2330)   morphine injection 6 mg (6 mg Intravenous Given 6/13/23 2208)   ondansetron injection 4 mg (4 mg Intravenous Given 6/13/23 2209)   acetaminophen tablet 1,000 mg (1,000 mg Oral Given 6/13/23 2046)   ketorolac injection 15 mg (15 mg Intravenous Given 6/13/23 2337)   oxyCODONE immediate release tablet 10 mg (10 mg Oral Given 6/13/23 2335)     Medical Decision Making:   Differential Diagnosis:   Sickle cell crisis, lumbar go, sciatica, cauda equina, meningitis,  acute chest,           ED Course as of 06/14/23 0500   Tue Jun 13, 2023   1466 No significant anemia.  Only slightly elevated reticulocyte count and bilirubin.  No abdominal pain.  No new cough.  Denies shortness of breath.  No evidence of acute chest.  No convincing evidence of aplastic crisis.  Patient states that he is had similar low back pain with previous acute pain crises. [SM]   2352 Feels better on re-evaluation.  Comfortable with discharge.  Has enough pain medication at home.  Patient states this is his typical sickle cell pain crises.  Denies any novel complaints.  Vitals reassuring.  Patient feels comfortable with discharge and outpatient follow-up. [SM]      ED Course User Index  [SM] Cornelius Rai PA-C                 Clinical Impression:   Final diagnoses:  [D57.00] Sickle cell pain crisis (Primary)        ED Disposition Condition    Discharge Stable          ED Prescriptions    None       Follow-up Information       Follow up With Specialties Details Why Contact Nancy Ortega DO  Schedule an appointment as soon as possible for a visit  For reevaluation [address] [place] [id number]    [phone] (Work)    [phone] (Fax)             Cornelius Rai PA-C  06/14/23 0505

## 2023-06-14 NOTE — DISCHARGE INSTRUCTIONS
Continue with your typical medications and pain medication as needed for pain control.    Please return to this ED if you experience worsening pain, shortness of breath, chest pain, fever, severe abdominal pain, frequent vomiting, if unable to eat or drink, leg weakness, if any other problems occur.

## 2023-07-19 ENCOUNTER — OFFICE VISIT (OUTPATIENT)
Dept: UROLOGY | Facility: CLINIC | Age: 33
End: 2023-07-19
Payer: MEDICAID

## 2023-07-19 VITALS — BODY MASS INDEX: 26.14 KG/M2 | WEIGHT: 161.94 LBS

## 2023-07-19 DIAGNOSIS — R10.9 FLANK PAIN: ICD-10-CM

## 2023-07-19 DIAGNOSIS — R39.9 LOWER URINARY TRACT SYMPTOMS: Primary | ICD-10-CM

## 2023-07-19 PROCEDURE — 87086 URINE CULTURE/COLONY COUNT: CPT | Performed by: STUDENT IN AN ORGANIZED HEALTH CARE EDUCATION/TRAINING PROGRAM

## 2023-07-19 PROCEDURE — 1159F PR MEDICATION LIST DOCUMENTED IN MEDICAL RECORD: ICD-10-PCS | Mod: CPTII,,, | Performed by: STUDENT IN AN ORGANIZED HEALTH CARE EDUCATION/TRAINING PROGRAM

## 2023-07-19 PROCEDURE — 1159F MED LIST DOCD IN RCRD: CPT | Mod: CPTII,,, | Performed by: STUDENT IN AN ORGANIZED HEALTH CARE EDUCATION/TRAINING PROGRAM

## 2023-07-19 PROCEDURE — 99213 PR OFFICE/OUTPT VISIT, EST, LEVL III, 20-29 MIN: ICD-10-PCS | Mod: S$PBB,,, | Performed by: STUDENT IN AN ORGANIZED HEALTH CARE EDUCATION/TRAINING PROGRAM

## 2023-07-19 PROCEDURE — 99999 PR PBB SHADOW E&M-EST. PATIENT-LVL III: CPT | Mod: PBBFAC,,, | Performed by: STUDENT IN AN ORGANIZED HEALTH CARE EDUCATION/TRAINING PROGRAM

## 2023-07-19 PROCEDURE — 1160F RVW MEDS BY RX/DR IN RCRD: CPT | Mod: CPTII,,, | Performed by: STUDENT IN AN ORGANIZED HEALTH CARE EDUCATION/TRAINING PROGRAM

## 2023-07-19 PROCEDURE — 99213 OFFICE O/P EST LOW 20 MIN: CPT | Mod: PBBFAC | Performed by: STUDENT IN AN ORGANIZED HEALTH CARE EDUCATION/TRAINING PROGRAM

## 2023-07-19 PROCEDURE — 99999 PR PBB SHADOW E&M-EST. PATIENT-LVL III: ICD-10-PCS | Mod: PBBFAC,,, | Performed by: STUDENT IN AN ORGANIZED HEALTH CARE EDUCATION/TRAINING PROGRAM

## 2023-07-19 PROCEDURE — 87591 N.GONORRHOEAE DNA AMP PROB: CPT | Performed by: STUDENT IN AN ORGANIZED HEALTH CARE EDUCATION/TRAINING PROGRAM

## 2023-07-19 PROCEDURE — 1160F PR REVIEW ALL MEDS BY PRESCRIBER/CLIN PHARMACIST DOCUMENTED: ICD-10-PCS | Mod: CPTII,,, | Performed by: STUDENT IN AN ORGANIZED HEALTH CARE EDUCATION/TRAINING PROGRAM

## 2023-07-19 PROCEDURE — 3008F PR BODY MASS INDEX (BMI) DOCUMENTED: ICD-10-PCS | Mod: CPTII,,, | Performed by: STUDENT IN AN ORGANIZED HEALTH CARE EDUCATION/TRAINING PROGRAM

## 2023-07-19 PROCEDURE — 3008F BODY MASS INDEX DOCD: CPT | Mod: CPTII,,, | Performed by: STUDENT IN AN ORGANIZED HEALTH CARE EDUCATION/TRAINING PROGRAM

## 2023-07-19 PROCEDURE — 99213 OFFICE O/P EST LOW 20 MIN: CPT | Mod: S$PBB,,, | Performed by: STUDENT IN AN ORGANIZED HEALTH CARE EDUCATION/TRAINING PROGRAM

## 2023-07-19 RX ORDER — DOXYCYCLINE HYCLATE 100 MG
100 TABLET ORAL EVERY 12 HOURS
Qty: 20 TABLET | Refills: 0 | Status: SHIPPED | OUTPATIENT
Start: 2023-07-19 | End: 2023-07-29

## 2023-07-19 NOTE — PROGRESS NOTES
Patient ID: Katie Parham is a 33 y.o. male.    Chief Complaint: Urinary Frequency (Pt states he is waking up every 2hrs to go to the restroom. Pt also states previous dr says he has an enlarged prostate.)    Referral: No referring provider defined for this encounter.     HPI  33 y.o. who presents to the Urology clinic for evaluation of LUTS, present for past few years. 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 Patient notes recurrence of hematuria since that initial event 8 months ago, not associated with flank pain or nausea. Patient denies dysuria. Denies new sexual contacts or concern for UTI. He drinks 12 bottles of water daily due to rigorous activity at work.     Medically Necessary ROS documented in HPI    Past Medical History  Active Ambulatory Problems     Diagnosis Date Noted    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    Tobacco abuse 08/18/2019    Mild asthma without complication 09/05/2019    Sickle-cell-hemoglobin C disease with crisis 06/29/2021    Sickle cell crisis acute chest syndrome 09/09/2021    Sc disease, with acute chest syndrome 09/10/2021     Resolved Ambulatory Problems     Diagnosis Date Noted    Viral illness     Acute respiratory failure with hypoxia 08/18/2019    Acute asthma exacerbation 08/18/2019    Cellulitis of both feet 09/05/2019    Pneumonia of left upper lobe due to infectious organism 11/10/2019    Multifocal pneumonia 11/10/2019    Sickle cell pain crisis 12/15/2019    Sickle cell disease 12/16/2019    Sickle cell pain crisis 08/21/2020    Non-traumatic rhabdomyolysis 08/21/2020    Possible Acute chest syndrome 06/29/2021    Acute hypoxemic respiratory failure 06/29/2021    Pneumonia of left lung due to infectious organism 09/08/2021    Pain 09/12/2021    COVID-19 virus infection 01/27/2022     Past Medical History:   Diagnosis Date    Asthma     Broken thumb 2017    Cigarette  smoker     Sickle cell anemia          Past Surgical History  Past Surgical History:   Procedure Laterality Date    HAND SURGERY Left 12/21/2017    ORIF thumb    ORIF mandible  01/31/2013    spleenectomy         Social History  Social Connections: Not on file       Medications    Current Outpatient Medications:     albuterol (PROVENTIL) 2.5 mg /3 mL (0.083 %) nebulizer solution, albuterol sulfate 2.5 mg/3 mL (0.083 %) solution for nebulization, Disp: , Rfl:     glutamine, sickle cell, (ENDARI) 5 gram PwPk, Take 15 g by mouth., Disp: , Rfl:     hydroxyurea (HYDREA) 500 mg Cap, TK 1 C PO BID, Disp: , Rfl: 3    oxyCODONE (ROXICODONE) 5 MG immediate release tablet, Take by mouth., Disp: , Rfl:     folic acid (FOLVITE) 1 MG tablet, Take 1 tablet (1 mg total) by mouth once daily., Disp: 30 tablet, Rfl: 1    Allergies  Review of patient's allergies indicates:   Allergen Reactions    Penicillins Anaphylaxis       Patient's PMH, FH, Social hx, Medications, allergies reviewed and updated as pertinent to today's visit    Objective:      Physical Exam  Constitutional:       General: He is not in acute distress.     Appearance: He is well-developed. He is not ill-appearing, toxic-appearing or diaphoretic.   HENT:      Head: Normocephalic and atraumatic.      Mouth/Throat:      Mouth: Mucous membranes are moist.   Eyes:      Conjunctiva/sclera: Conjunctivae normal.   Pulmonary:      Effort: Pulmonary effort is normal. No respiratory distress.   Abdominal:      General: Abdomen is flat. There is no distension.      Palpations: Abdomen is soft.      Tenderness: There is right CVA tenderness. There is no left CVA tenderness.   Musculoskeletal:         General: No swelling or deformity.      Cervical back: Neck supple.   Skin:     General: Skin is warm.      Findings: No rash.   Neurological:      Mental Status: He is alert and oriented to person, place, and time.      Gait: Gait normal.   Psychiatric:         Mood and Affect: Mood  normal.         Thought Content: Thought content normal.         Judgment: Judgment normal.           Assessment:       1. Lower urinary tract symptoms    2. Flank pain        Plan:         Ureaplasma/ urine culture  GC testing, discussed with patient  R flank pain, gross hematuria, suspect urolithiasis  POCT ua w/o blood or suggestion of infection  Doxycycline Rx provided for possible infectious etiology of symptoms  Avoid bladder irritants

## 2023-07-20 LAB
C TRACH DNA SPEC QL NAA+PROBE: NOT DETECTED
N GONORRHOEA DNA SPEC QL NAA+PROBE: NOT DETECTED

## 2023-07-21 LAB — BACTERIA UR CULT: NO GROWTH

## 2023-08-11 ENCOUNTER — OCCUPATIONAL HEALTH (OUTPATIENT)
Dept: URGENT CARE | Facility: CLINIC | Age: 33
End: 2023-08-11

## 2023-08-11 DIAGNOSIS — Z00.00 ENCOUNTER FOR PHYSICAL EXAMINATION: Primary | ICD-10-CM

## 2023-08-11 PROCEDURE — 99499 PHYSICAL, BASIC COMPLEXITY: ICD-10-PCS | Mod: S$GLB,,, | Performed by: FAMILY MEDICINE

## 2023-08-11 PROCEDURE — 99499 UNLISTED E&M SERVICE: CPT | Mod: S$GLB,,, | Performed by: FAMILY MEDICINE

## 2023-08-24 ENCOUNTER — HOSPITAL ENCOUNTER (EMERGENCY)
Facility: HOSPITAL | Age: 33
Discharge: HOME | End: 2023-08-24
Attending: EMERGENCY MEDICINE
Payer: MEDICAID

## 2023-08-24 DIAGNOSIS — R07.89 CHEST WALL PAIN: Primary | ICD-10-CM

## 2023-08-24 DIAGNOSIS — L03.115 CELLULITIS OF RIGHT FOOT: ICD-10-CM

## 2023-08-24 DIAGNOSIS — R07.9 CHEST PAIN: ICD-10-CM

## 2023-08-24 LAB
ALBUMIN SERPL BCP-MCNC: 3.9 G/DL (ref 3.5–5.2)
ALP SERPL-CCNC: 82 U/L (ref 55–135)
ALT SERPL W/O P-5'-P-CCNC: 20 U/L (ref 10–44)
ANION GAP SERPL CALC-SCNC: 17 MMOL/L (ref 8–16)
ANION GAP SERPL CALC-SCNC: 8 MMOL/L (ref 8–16)
ANISOCYTOSIS BLD QL SMEAR: SLIGHT
AST SERPL-CCNC: 16 U/L (ref 10–40)
BASOPHILS # BLD AUTO: 0.13 K/UL (ref 0–0.2)
BASOPHILS NFR BLD: 0.8 % (ref 0–1.9)
BILIRUB SERPL-MCNC: 0.9 MG/DL (ref 0.1–1)
BILIRUB UR QL STRIP: NEGATIVE
BNP SERPL-MCNC: <10 PG/ML (ref 0–99)
BUN SERPL-MCNC: 3 MG/DL (ref 6–30)
BUN SERPL-MCNC: 5 MG/DL (ref 6–20)
CALCIUM SERPL-MCNC: 8.8 MG/DL (ref 8.7–10.5)
CHLORIDE SERPL-SCNC: 104 MMOL/L (ref 95–110)
CHLORIDE SERPL-SCNC: 99 MMOL/L (ref 95–110)
CLARITY UR: CLEAR
CO2 SERPL-SCNC: 26 MMOL/L (ref 23–29)
COLOR UR: YELLOW
CREAT SERPL-MCNC: 0.7 MG/DL (ref 0.5–1.4)
CREAT SERPL-MCNC: 0.8 MG/DL (ref 0.5–1.4)
DIFFERENTIAL METHOD: ABNORMAL
EOSINOPHIL # BLD AUTO: 1.7 K/UL (ref 0–0.5)
EOSINOPHIL NFR BLD: 10.2 % (ref 0–8)
ERYTHROCYTE [DISTWIDTH] IN BLOOD BY AUTOMATED COUNT: 14.6 % (ref 11.5–14.5)
EST. GFR  (NO RACE VARIABLE): >60 ML/MIN/1.73 M^2
ETHANOL SERPL-MCNC: <10 MG/DL
GLUCOSE SERPL-MCNC: 78 MG/DL (ref 70–110)
GLUCOSE SERPL-MCNC: 85 MG/DL (ref 70–110)
GLUCOSE UR QL STRIP: NEGATIVE
HCT VFR BLD AUTO: 32.1 % (ref 40–54)
HCT VFR BLD CALC: 33 %PCV (ref 36–54)
HGB BLD-MCNC: 11.9 G/DL (ref 14–18)
HGB UR QL STRIP: NEGATIVE
IMM GRANULOCYTES # BLD AUTO: 0.08 K/UL (ref 0–0.04)
IMM GRANULOCYTES NFR BLD AUTO: 0.5 % (ref 0–0.5)
KETONES UR QL STRIP: NEGATIVE
LEUKOCYTE ESTERASE UR QL STRIP: NEGATIVE
LYMPHOCYTES # BLD AUTO: 4.2 K/UL (ref 1–4.8)
LYMPHOCYTES NFR BLD: 25 % (ref 18–48)
MAGNESIUM SERPL-MCNC: 2 MG/DL (ref 1.6–2.6)
MCH RBC QN AUTO: 31.6 PG (ref 27–31)
MCHC RBC AUTO-ENTMCNC: 37.1 G/DL (ref 32–36)
MCV RBC AUTO: 85 FL (ref 82–98)
MONOCYTES # BLD AUTO: 2.3 K/UL (ref 0.3–1)
MONOCYTES NFR BLD: 13.8 % (ref 4–15)
NEUTROPHILS # BLD AUTO: 8.3 K/UL (ref 1.8–7.7)
NEUTROPHILS NFR BLD: 49.7 % (ref 38–73)
NITRITE UR QL STRIP: NEGATIVE
NRBC BLD-RTO: 0 /100 WBC
PH UR STRIP: 7 [PH] (ref 5–8)
PLATELET # BLD AUTO: 486 K/UL (ref 150–450)
PLATELET BLD QL SMEAR: ABNORMAL
PMV BLD AUTO: 9.9 FL (ref 9.2–12.9)
POC IONIZED CALCIUM: 1.22 MMOL/L (ref 1.06–1.42)
POC TCO2 (MEASURED): 28 MMOL/L (ref 23–29)
POIKILOCYTOSIS BLD QL SMEAR: SLIGHT
POTASSIUM BLD-SCNC: 3.8 MMOL/L (ref 3.5–5.1)
POTASSIUM SERPL-SCNC: 3.9 MMOL/L (ref 3.5–5.1)
PROCALCITONIN SERPL IA-MCNC: 0.07 NG/ML
PROT SERPL-MCNC: 7.3 G/DL (ref 6–8.4)
PROT UR QL STRIP: NEGATIVE
RBC # BLD AUTO: 3.77 M/UL (ref 4.6–6.2)
RETICS/RBC NFR AUTO: 4.9 % (ref 0.4–2)
SAMPLE: ABNORMAL
SODIUM BLD-SCNC: 139 MMOL/L (ref 136–145)
SODIUM SERPL-SCNC: 138 MMOL/L (ref 136–145)
SP GR UR STRIP: 1.01 (ref 1–1.03)
TARGETS BLD QL SMEAR: ABNORMAL
TROPONIN I SERPL DL<=0.01 NG/ML-MCNC: <0.006 NG/ML (ref 0–0.03)
URN SPEC COLLECT METH UR: NORMAL
UROBILINOGEN UR STRIP-ACNC: NEGATIVE EU/DL
WBC # BLD AUTO: 16.63 K/UL (ref 3.9–12.7)

## 2023-08-24 PROCEDURE — 93005 ELECTROCARDIOGRAM TRACING: CPT

## 2023-08-24 PROCEDURE — 82803 BLOOD GASES ANY COMBINATION: CPT

## 2023-08-24 PROCEDURE — 81003 URINALYSIS AUTO W/O SCOPE: CPT | Mod: 59 | Performed by: EMERGENCY MEDICINE

## 2023-08-24 PROCEDURE — 99285 EMERGENCY DEPT VISIT HI MDM: CPT | Mod: 25

## 2023-08-24 PROCEDURE — 84295 ASSAY OF SERUM SODIUM: CPT

## 2023-08-24 PROCEDURE — 84145 PROCALCITONIN (PCT): CPT | Performed by: EMERGENCY MEDICINE

## 2023-08-24 PROCEDURE — 93010 EKG 12-LEAD: ICD-10-PCS | Mod: ,,, | Performed by: INTERNAL MEDICINE

## 2023-08-24 PROCEDURE — 85025 COMPLETE CBC W/AUTO DIFF WBC: CPT | Performed by: EMERGENCY MEDICINE

## 2023-08-24 PROCEDURE — 99900035 HC TECH TIME PER 15 MIN (STAT)

## 2023-08-24 PROCEDURE — 83880 ASSAY OF NATRIURETIC PEPTIDE: CPT | Performed by: EMERGENCY MEDICINE

## 2023-08-24 PROCEDURE — 82330 ASSAY OF CALCIUM: CPT

## 2023-08-24 PROCEDURE — 93010 ELECTROCARDIOGRAM REPORT: CPT | Mod: ,,, | Performed by: INTERNAL MEDICINE

## 2023-08-24 PROCEDURE — 84132 ASSAY OF SERUM POTASSIUM: CPT

## 2023-08-24 PROCEDURE — 80048 BASIC METABOLIC PNL TOTAL CA: CPT | Mod: XB

## 2023-08-24 PROCEDURE — 85045 AUTOMATED RETICULOCYTE COUNT: CPT | Performed by: EMERGENCY MEDICINE

## 2023-08-24 PROCEDURE — 82565 ASSAY OF CREATININE: CPT | Mod: 91

## 2023-08-24 PROCEDURE — 80053 COMPREHEN METABOLIC PANEL: CPT | Performed by: EMERGENCY MEDICINE

## 2023-08-24 PROCEDURE — 84484 ASSAY OF TROPONIN QUANT: CPT | Performed by: EMERGENCY MEDICINE

## 2023-08-24 PROCEDURE — 83735 ASSAY OF MAGNESIUM: CPT | Performed by: EMERGENCY MEDICINE

## 2023-08-24 PROCEDURE — 82077 ASSAY SPEC XCP UR&BREATH IA: CPT | Performed by: EMERGENCY MEDICINE

## 2023-08-24 PROCEDURE — 85014 HEMATOCRIT: CPT

## 2023-08-24 RX ORDER — CLINDAMYCIN HYDROCHLORIDE 150 MG/1
450 CAPSULE ORAL EVERY 8 HOURS
Qty: 63 CAPSULE | Refills: 0 | Status: SHIPPED | OUTPATIENT
Start: 2023-08-24 | End: 2023-08-31

## 2023-08-24 NOTE — Clinical Note
"Katie"Mr Parham" Prasad was seen and treated in our emergency department on 8/24/2023.  He may return to work on 08/28/2023.       If you have any questions or concerns, please don't hesitate to call.      LUIS Vela RN    "

## 2023-08-25 VITALS
TEMPERATURE: 98 F | HEIGHT: 66 IN | WEIGHT: 160 LBS | HEART RATE: 88 BPM | SYSTOLIC BLOOD PRESSURE: 130 MMHG | DIASTOLIC BLOOD PRESSURE: 58 MMHG | BODY MASS INDEX: 25.71 KG/M2 | OXYGEN SATURATION: 100 % | RESPIRATION RATE: 16 BRPM

## 2023-08-25 NOTE — DISCHARGE INSTRUCTIONS
Emergency department testing is within acceptable ranges.  Your leg pain and swelling is likely related to a skin infection.  Complete the course of antibiotics prescribed.  Continue the remainder of your medications as you have been prescribed.  Schedule close follow-up with your hematologist.  Return to the emergency department for any new, worsening or significantly concerning symptoms.    Thank you for coming to our Emergency Department today. It is important to remember that some problems are difficult to diagnose and may not be found during your first visit. Be sure to follow up with your primary care doctor and review any labs/imaging that was performed with them. If you do not have a primary care doctor, you may contact the one listed on your discharge paperwork or you may also call the [facility] Appointment Desk at [phone] to schedule an appointment with one.     All medications may potentially have side effects and it is impossible to predict which medications may give you side effects. If you feel that you are having a negative effect of any medication you should immediately stop taking them and seek medical attention.    Return to the ER with any questions/concerns, new/concerning symptoms, worsening or failure to improve. Do not drive or make any important decisions for 24 hours if you have received any pain medications, sedatives or mood altering drugs during your ER visit.

## 2023-08-25 NOTE — ED NOTES
Pt sleeping, aroused to shaking.  Pt discharged and is calling a friend to pick him up .  D/C instructions given.  Pt verbalized understanding.

## 2023-08-25 NOTE — ED PROVIDER NOTES
"Encounter Date: 8/24/2023       History     Chief Complaint   Patient presents with    Pain And Symptom Management     Pt presents to the ED with c/o pain to chest wall after being hit in chest by wooden box while playing football a few days ago. Also c/o swelling and pain to right foot/ankle since yesterday, states "my sickle cell is acting up." Took oxycodone pta with mild relief. Pt slurring words and appears fatigued in triage.     32yo male with hx of sickle cell disease presents to the emergency department for evaluation of right-sided anterior chest wall pain for the past 2-3 days after striking his chest on a wooden box.  Patient additionally reports pain to the right foot and ankle beginning yesterday.  He denies fall or injury to the ankle.  He reports taking Norco prior to arrival without improvement symptoms.  Patient denies fever, numbness, weakness, shortness of breath, exertional symptoms.      Review of patient's allergies indicates:   Allergen Reactions    Penicillins Anaphylaxis     Past Medical History:   Diagnosis Date    Asthma     Broken thumb 2017    Left    Cigarette smoker     Hemoglobin S-C disease     Sickle cell anemia      Past Surgical History:   Procedure Laterality Date    HAND SURGERY Left 12/21/2017    ORIF thumb    ORIF mandible  01/31/2013    spleenectomy       Family History   Family history unknown: Yes     Social History     Tobacco Use    Smoking status: Every Day     Current packs/day: 0.25     Types: Cigarettes, Cigars    Smokeless tobacco: Never    Tobacco comments:     encouraged to quit.    Substance Use Topics    Alcohol use: Yes     Comment: socially    Drug use: No     Review of Systems   Constitutional:  Negative for fever.   HENT:  Negative for facial swelling and sore throat.    Eyes:  Negative for pain and discharge.   Respiratory:  Negative for choking and shortness of breath.    Cardiovascular:  Positive for chest pain.   Gastrointestinal:  Negative for abdominal " pain, nausea and vomiting.   Genitourinary:  Negative for dysuria and frequency.   Musculoskeletal:  Positive for arthralgias and myalgias. Negative for back pain.   Skin:  Negative for rash.   Neurological:  Negative for weakness and numbness.       Physical Exam     Initial Vitals [08/24/23 1934]   BP Pulse Resp Temp SpO2   132/64 96 16 99.1 °F (37.3 °C) 98 %      MAP       --         Physical Exam    Nursing note and vitals reviewed.  Constitutional: He is not diaphoretic. No distress.   HENT:   Head: Normocephalic and atraumatic.   Protecting airway   Eyes: Conjunctivae and EOM are normal. No scleral icterus.   Neck: Neck supple. No tracheal deviation present.   Normal range of motion.  Cardiovascular:  Normal rate, regular rhythm and intact distal pulses.           Pulmonary/Chest: No stridor. No respiratory distress. He has no wheezes. He has no rales.   Speaking in full sentences  Faint abrasion to right chest wall with mild tenderness   Abdominal: Abdomen is soft. He exhibits no distension. There is no abdominal tenderness.   Musculoskeletal:         General: No tenderness. Normal range of motion.      Cervical back: Normal range of motion and neck supple.     Neurological: He is alert. He has normal strength. No cranial nerve deficit or sensory deficit.   Skin: Skin is warm and dry.   Abrasion to the right anterior distal tibia, no purulence or fluctuance  Mild erythema and warmth to touch to right distal tibia, right ankle   Psychiatric: He has a normal mood and affect.         ED Course   Procedures  Labs Reviewed   RETICULOCYTES - Abnormal; Notable for the following components:       Result Value    Retic 4.9 (*)     All other components within normal limits   CBC W/ AUTO DIFFERENTIAL - Abnormal; Notable for the following components:    WBC 16.63 (*)     RBC 3.77 (*)     Hemoglobin 11.9 (*)     Hematocrit 32.1 (*)     MCH 31.6 (*)     MCHC 37.1 (*)     RDW 14.6 (*)     Platelets 486 (*)     Gran # (ANC)  8.3 (*)     Immature Grans (Abs) 0.08 (*)     Mono # 2.3 (*)     Eos # 1.7 (*)     Eosinophil % 10.2 (*)     All other components within normal limits   COMPREHENSIVE METABOLIC PANEL - Abnormal; Notable for the following components:    BUN 5 (*)     All other components within normal limits   ISTAT PROCEDURE - Abnormal; Notable for the following components:    POC BUN 3 (*)     POC Anion Gap 17 (*)     POC Hematocrit 33 (*)     All other components within normal limits   URINALYSIS, REFLEX TO URINE CULTURE    Narrative:     Specimen Source->Urine   TROPONIN I   B-TYPE NATRIURETIC PEPTIDE   PROCALCITONIN   MAGNESIUM   ALCOHOL,MEDICAL (ETHANOL)     EKG Readings: (Independently Interpreted)   Initial Reading: No STEMI. Rhythm: Normal Sinus Rhythm. Heart Rate: 84. Ectopy: No Ectopy. Conduction: Normal. ST Segments: Normal ST Segments. T Waves: Normal. Clinical Impression: Normal Sinus Rhythm     ECG Results              EKG 12-LEAD (Final result)  Result time 08/25/23 11:48:44      Final result by Unknown User (08/25/23 11:48:44)                                      Imaging Results              X-Ray Chest AP Portable (Final result)  Result time 08/24/23 21:28:47      Final result by Maik Arreaga MD (08/24/23 21:28:47)                   Impression:      Radiographic findings as above.      Electronically signed by: Maik Arreaga  Date:    08/24/2023  Time:    21:28               Narrative:    EXAMINATION:  XR CHEST AP PORTABLE    CLINICAL HISTORY:  Chest Pain;    TECHNIQUE:  Single frontal view of the chest was performed.    COMPARISON:  Prior examinations, most recent of which is chest radiograph June 13, 2023    FINDINGS:  Single portable chest view is submitted.  When accounting for position and technique and depth of inspiration, the appearance of the cardiomediastinal silhouette is stable.    There is mild prominence of the central pulmonary vascular.  There is bilateral pattern of mild interstitial and  patchy alveolar infiltrates.  There is no focal dense consolidation.  There is no evidence for pleural effusion and no evidence for pneumothorax.    The visualized osseous structures appear intact.                                       Medications - No data to display  Medical Decision Making  Includes but not limited to:  Musculoskeletal pain, contusion, abrasion, sickle cell crisis, ACS, cellulitis    Amount and/or Complexity of Data Reviewed  Labs: ordered.  Radiology: ordered.    Risk  Prescription drug management.               ED Course as of 08/25/23 1311   Thu Aug 24, 2023   2228 Patient is afebrile and in no acute distress at time history and physical.  Vitals within acceptable ranges.  EKG independently interpreted by me as normal sinus rhythm with a rate of 84 beats per minute.  There is no ectopy.  There are normal ST segments, normal T-waves, normal axis.  Lungs are clear to auscultation.  Labs notable for leukocytosis of 16.6, unclear if this is related to infectious process or to detection of nucleated red blood cells.  Renal function and electrolytes within normal ranges.  Chest x-ray does not demonstrate pneumonia.  BNP, troponin within normal ranges.  I have low clinical suspicion of ACS in this patient.  Patient clinically does not have acute chest syndrome.  Patient has reticulocytes of 4.9.  I have low clinical suspicion of aplastic crisis.  Patient complains of pain and swelling in the right foot/ankle beginning yesterday.  Clinically there is some mild erythema, mild edema and an abrasion to the right lower leg.  Symptoms are most consistent with cellulitis.  At time of history and physical patient is drowsy it is answering questions appropriately and is easily arousable.  On reassessment patient is sleeping comfortably in stretcher.  He does not appear to require analgesia at this time.  He is stable for discharge on trial of management with oral antibiotics, close follow-up with primary  physician, Hematology. [SG]      ED Course User Index  [SG] Sourav Brunson MD          This chart was completed using dictation software, as a result there may be some transcription errors.           Clinical Impression:   Final diagnoses:  [R07.9] Chest pain  [R07.89] Chest wall pain (Primary)  [L03.115] Cellulitis of right foot        ED Disposition Condition    Discharge Stable          ED Prescriptions       Medication Sig Dispense Start Date End Date Auth. Provider    clindamycin (CLEOCIN) 150 MG capsule Take 3 capsules (450 mg total) by mouth every 8 (eight) hours. for 7 days 63 capsule 8/24/2023 8/31/2023 Sourav Brunson MD          Follow-up Information       Follow up With Specialties Details Why Contact Info    Your Primary Physician  Schedule an appointment as soon as possible for a visit   Make an appointment to see your primary care physician as soon as possible for follow-up             Sourav Brunson MD  08/25/23 5229

## 2023-08-25 NOTE — ED NOTES
"Pt with a history of Sickle Cell Disease presents with c/o chest pain after hit to chest 3 days ago.  Pt has an abrasion and mild swelling to left chest. even rise and fall, no crepitus.  Tender on palp.  Denies sob.  Pt also c/o bilat feet pain and swelling to right foot.  Denies trauma.  Pt states "sickle cell crisis".  +sore throat, no difficulty swallowing.  No fever. 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  Pt is drowsy and oriented.  Pt states he took a "pain pill" at 2pm today.  Resp even and unlabored, skin warm and dry.  HOB elevated, SR up x 2. Call bell within reach.    "

## 2023-12-19 ENCOUNTER — HOSPITAL ENCOUNTER (EMERGENCY)
Facility: HOSPITAL | Age: 33
Discharge: HOME | End: 2023-12-19
Attending: EMERGENCY MEDICINE
Payer: MEDICAID

## 2023-12-19 VITALS
WEIGHT: 180 LBS | SYSTOLIC BLOOD PRESSURE: 115 MMHG | DIASTOLIC BLOOD PRESSURE: 61 MMHG | BODY MASS INDEX: 28.93 KG/M2 | RESPIRATION RATE: 16 BRPM | HEIGHT: 66 IN | OXYGEN SATURATION: 95 % | HEART RATE: 89 BPM | TEMPERATURE: 99 F

## 2023-12-19 DIAGNOSIS — J11.1 INFLUENZA: Primary | ICD-10-CM

## 2023-12-19 DIAGNOSIS — E87.6 HYPOKALEMIA: ICD-10-CM

## 2023-12-19 DIAGNOSIS — D57.00 SICKLE CELL PAIN CRISIS: ICD-10-CM

## 2023-12-19 DIAGNOSIS — K29.70 GASTRITIS, PRESENCE OF BLEEDING UNSPECIFIED, UNSPECIFIED CHRONICITY, UNSPECIFIED GASTRITIS TYPE: ICD-10-CM

## 2023-12-19 LAB
ALBUMIN SERPL BCP-MCNC: 2.2 G/DL (ref 3.5–5.2)
ALLENS TEST: ABNORMAL
ALLENS TEST: ABNORMAL
ALP SERPL-CCNC: 52 U/L (ref 55–135)
ALT SERPL W/O P-5'-P-CCNC: 32 U/L (ref 10–44)
ANION GAP SERPL CALC-SCNC: 16 MMOL/L (ref 8–16)
ANION GAP SERPL CALC-SCNC: 8 MMOL/L (ref 8–16)
AST SERPL-CCNC: 41 U/L (ref 10–40)
BASOPHILS # BLD AUTO: 0.08 K/UL (ref 0–0.2)
BASOPHILS NFR BLD: 1 % (ref 0–1.9)
BILIRUB SERPL-MCNC: 0.8 MG/DL (ref 0.1–1)
BUN SERPL-MCNC: 4 MG/DL (ref 6–20)
BUN SERPL-MCNC: 4 MG/DL (ref 6–30)
CALCIUM SERPL-MCNC: 5.1 MG/DL (ref 8.7–10.5)
CHLORIDE SERPL-SCNC: 100 MMOL/L (ref 95–110)
CHLORIDE SERPL-SCNC: 118 MMOL/L (ref 95–110)
CO2 SERPL-SCNC: 15 MMOL/L (ref 23–29)
CREAT SERPL-MCNC: 0.6 MG/DL (ref 0.5–1.4)
CREAT SERPL-MCNC: 0.8 MG/DL (ref 0.5–1.4)
CTP QC/QA: YES
CTP QC/QA: YES
DELSYS: ABNORMAL
DIFFERENTIAL METHOD: ABNORMAL
EOSINOPHIL # BLD AUTO: 0 K/UL (ref 0–0.5)
EOSINOPHIL NFR BLD: 0.4 % (ref 0–8)
ERYTHROCYTE [DISTWIDTH] IN BLOOD BY AUTOMATED COUNT: 15.3 % (ref 11.5–14.5)
EST. GFR  (NO RACE VARIABLE): >60 ML/MIN/1.73 M^2
GLUCOSE SERPL-MCNC: 63 MG/DL (ref 70–110)
GLUCOSE SERPL-MCNC: 88 MG/DL (ref 70–110)
HCO3 UR-SCNC: 27.5 MMOL/L (ref 24–28)
HCT VFR BLD AUTO: 35 % (ref 40–54)
HCT VFR BLD CALC: 32 %PCV (ref 36–54)
HGB BLD-MCNC: 12.9 G/DL (ref 14–18)
IMM GRANULOCYTES # BLD AUTO: 0.07 K/UL (ref 0–0.04)
IMM GRANULOCYTES NFR BLD AUTO: 0.8 % (ref 0–0.5)
LYMPHOCYTES # BLD AUTO: 2.7 K/UL (ref 1–4.8)
LYMPHOCYTES NFR BLD: 32.2 % (ref 18–48)
MCH RBC QN AUTO: 30.2 PG (ref 27–31)
MCHC RBC AUTO-ENTMCNC: 36.9 G/DL (ref 32–36)
MCV RBC AUTO: 82 FL (ref 82–98)
MONOCYTES # BLD AUTO: 2.1 K/UL (ref 0.3–1)
MONOCYTES NFR BLD: 24.6 % (ref 4–15)
NEUTROPHILS # BLD AUTO: 3.5 K/UL (ref 1.8–7.7)
NEUTROPHILS NFR BLD: 41 % (ref 38–73)
NRBC BLD-RTO: 6 /100 WBC
PCO2 BLDA: 44.1 MMHG (ref 35–45)
PH SMN: 7.4 [PH] (ref 7.35–7.45)
PLATELET # BLD AUTO: 265 K/UL (ref 150–450)
PMV BLD AUTO: 10 FL (ref 9.2–12.9)
PO2 BLDA: 39 MMHG (ref 40–60)
POC BE: 2 MMOL/L
POC IONIZED CALCIUM: 1.09 MMOL/L (ref 1.06–1.42)
POC MOLECULAR INFLUENZA A AGN: NEGATIVE
POC MOLECULAR INFLUENZA B AGN: POSITIVE
POC SATURATED O2: 73 % (ref 95–100)
POC TCO2 (MEASURED): 29 MMOL/L (ref 23–29)
POC TCO2: 29 MMOL/L (ref 24–29)
POCT GLUCOSE: 88 MG/DL (ref 70–110)
POIKILOCYTOSIS BLD QL SMEAR: SLIGHT
POLYCHROMASIA BLD QL SMEAR: ABNORMAL
POTASSIUM BLD-SCNC: 3.4 MMOL/L (ref 3.5–5.1)
POTASSIUM SERPL-SCNC: 2.3 MMOL/L (ref 3.5–5.1)
PROT SERPL-MCNC: 4.2 G/DL (ref 6–8.4)
RBC # BLD AUTO: 4.27 M/UL (ref 4.6–6.2)
RETICS/RBC NFR AUTO: 4.1 % (ref 0.4–2)
SAMPLE: ABNORMAL
SAMPLE: ABNORMAL
SARS-COV-2 RDRP RESP QL NAA+PROBE: NEGATIVE
SICKLE CELLS BLD QL SMEAR: ABNORMAL
SITE: ABNORMAL
SITE: ABNORMAL
SODIUM BLD-SCNC: 140 MMOL/L (ref 136–145)
SODIUM SERPL-SCNC: 141 MMOL/L (ref 136–145)
TARGETS BLD QL SMEAR: ABNORMAL
WBC # BLD AUTO: 8.42 K/UL (ref 3.9–12.7)

## 2023-12-19 PROCEDURE — 93005 ELECTROCARDIOGRAM TRACING: CPT

## 2023-12-19 PROCEDURE — 25000003 PHARM REV CODE 250: Performed by: EMERGENCY MEDICINE

## 2023-12-19 PROCEDURE — 80053 COMPREHEN METABOLIC PANEL: CPT | Performed by: EMERGENCY MEDICINE

## 2023-12-19 PROCEDURE — C9113 INJ PANTOPRAZOLE SODIUM, VIA: HCPCS | Performed by: EMERGENCY MEDICINE

## 2023-12-19 PROCEDURE — 25000003 PHARM REV CODE 250: Performed by: STUDENT IN AN ORGANIZED HEALTH CARE EDUCATION/TRAINING PROGRAM

## 2023-12-19 PROCEDURE — 93010 EKG 12-LEAD: ICD-10-PCS | Mod: ,,, | Performed by: INTERNAL MEDICINE

## 2023-12-19 PROCEDURE — 82330 ASSAY OF CALCIUM: CPT

## 2023-12-19 PROCEDURE — 93010 ELECTROCARDIOGRAM REPORT: CPT | Mod: ,,, | Performed by: INTERNAL MEDICINE

## 2023-12-19 PROCEDURE — 87502 INFLUENZA DNA AMP PROBE: CPT

## 2023-12-19 PROCEDURE — 96375 TX/PRO/DX INJ NEW DRUG ADDON: CPT

## 2023-12-19 PROCEDURE — 87635 SARS-COV-2 COVID-19 AMP PRB: CPT | Performed by: PHYSICIAN ASSISTANT

## 2023-12-19 PROCEDURE — 96374 THER/PROPH/DIAG INJ IV PUSH: CPT

## 2023-12-19 PROCEDURE — 84295 ASSAY OF SERUM SODIUM: CPT | Mod: 91

## 2023-12-19 PROCEDURE — 96361 HYDRATE IV INFUSION ADD-ON: CPT

## 2023-12-19 PROCEDURE — 63600175 PHARM REV CODE 636 W HCPCS: Performed by: EMERGENCY MEDICINE

## 2023-12-19 PROCEDURE — 82803 BLOOD GASES ANY COMBINATION: CPT

## 2023-12-19 PROCEDURE — 85014 HEMATOCRIT: CPT | Mod: 91

## 2023-12-19 PROCEDURE — 99285 EMERGENCY DEPT VISIT HI MDM: CPT | Mod: 25

## 2023-12-19 PROCEDURE — 84132 ASSAY OF SERUM POTASSIUM: CPT | Mod: 91

## 2023-12-19 PROCEDURE — 82962 GLUCOSE BLOOD TEST: CPT

## 2023-12-19 PROCEDURE — 99900035 HC TECH TIME PER 15 MIN (STAT)

## 2023-12-19 PROCEDURE — 85025 COMPLETE CBC W/AUTO DIFF WBC: CPT | Performed by: PHYSICIAN ASSISTANT

## 2023-12-19 PROCEDURE — 85045 AUTOMATED RETICULOCYTE COUNT: CPT | Performed by: PHYSICIAN ASSISTANT

## 2023-12-19 PROCEDURE — 80048 BASIC METABOLIC PNL TOTAL CA: CPT | Mod: XB

## 2023-12-19 PROCEDURE — 82565 ASSAY OF CREATININE: CPT

## 2023-12-19 RX ORDER — ONDANSETRON 2 MG/ML
4 INJECTION INTRAMUSCULAR; INTRAVENOUS
Status: COMPLETED | OUTPATIENT
Start: 2023-12-19 | End: 2023-12-19

## 2023-12-19 RX ORDER — ONDANSETRON 4 MG/1
4 TABLET, FILM COATED ORAL EVERY 6 HOURS PRN
Qty: 12 TABLET | Refills: 0 | Status: ON HOLD | OUTPATIENT
Start: 2023-12-19 | End: 2024-03-15 | Stop reason: HOSPADM

## 2023-12-19 RX ORDER — MORPHINE SULFATE 4 MG/ML
6 INJECTION, SOLUTION INTRAMUSCULAR; INTRAVENOUS
Status: COMPLETED | OUTPATIENT
Start: 2023-12-19 | End: 2023-12-19

## 2023-12-19 RX ORDER — PANTOPRAZOLE SODIUM 40 MG/10ML
40 INJECTION, POWDER, LYOPHILIZED, FOR SOLUTION INTRAVENOUS
Status: COMPLETED | OUTPATIENT
Start: 2023-12-19 | End: 2023-12-19

## 2023-12-19 RX ORDER — PANTOPRAZOLE SODIUM 40 MG/1
40 TABLET, DELAYED RELEASE ORAL DAILY
Qty: 30 TABLET | Refills: 0 | Status: ON HOLD | OUTPATIENT
Start: 2023-12-19 | End: 2024-03-15 | Stop reason: HOSPADM

## 2023-12-19 RX ADMIN — POTASSIUM BICARBONATE 50 MEQ: 977.5 TABLET, EFFERVESCENT ORAL at 08:12

## 2023-12-19 RX ADMIN — PANTOPRAZOLE SODIUM 40 MG: 40 INJECTION, POWDER, FOR SOLUTION INTRAVENOUS at 05:12

## 2023-12-19 RX ADMIN — SODIUM CHLORIDE 2000 ML: 9 INJECTION, SOLUTION INTRAVENOUS at 05:12

## 2023-12-19 RX ADMIN — MORPHINE SULFATE 6 MG: 4 INJECTION, SOLUTION INTRAMUSCULAR; INTRAVENOUS at 05:12

## 2023-12-19 RX ADMIN — ONDANSETRON 4 MG: 2 INJECTION INTRAMUSCULAR; INTRAVENOUS at 05:12

## 2023-12-19 NOTE — Clinical Note
"Katie "Mr Kami Parham was seen and treated in our emergency department on 12/19/2023.  He may return to work on 12/25/2023.       If you have any questions or concerns, please don't hesitate to call.      Deepa Murray MD"

## 2023-12-19 NOTE — ED PROVIDER NOTES
Encounter Date: 12/19/2023    SCRIBE #1 NOTE: I, Kt Varela, am scribing for, and in the presence of,  Deepa Murray MD. I have scribed the following portions of the note - Other sections scribed: HPI, ROS, PE, MDM.       History     Chief Complaint   Patient presents with    Sickle Cell Pain Crisis     Back pain since Saturday. Taking oxycodone without relief,.     Generalized Body Aches     With cough, congestion, subj fever, and sore throat x3 days. States exposed to flu. Took tylenol 3 hours ago     Katie Parham is a 33 y.o. male, with a PMHx of sickle cell anemia on folic acid, asthma, who presents to the ED with complaint of sickle cell pain crisis onset 3 days ago. Patient reports associated back pain, weakness, bilateral leg pain, lightheadedness, fatigue, nausea, emesis (with streaks of blood). Patient states he took OTC cold and flu medication, Tylenol, Oxycodone to treat his symptoms with no relief. No other exacerbating or alleviating factors. Denies any other associated symptoms. Patient denies receiving the influenza immunization this year. Patient states he is on folic acid. He is followed for sickle cell disease at [facility].    The history is provided by the patient. No  was used.     Review of patient's allergies indicates:   Allergen Reactions    Penicillins Anaphylaxis     Past Medical History:   Diagnosis Date    Asthma     Broken thumb 2017    Left    Cigarette smoker     Hemoglobin S-C disease     Sickle cell anemia      Past Surgical History:   Procedure Laterality Date    HAND SURGERY Left 12/21/2017    ORIF thumb    ORIF mandible  01/31/2013    spleenectomy       Family History   Family history unknown: Yes     Social History     Tobacco Use    Smoking status: Every Day     Current packs/day: 0.25     Types: Cigarettes, Cigars    Smokeless tobacco: Never    Tobacco comments:     encouraged to quit.    Substance Use Topics    Alcohol use: Yes     Comment:  socially    Drug use: No     Review of Systems   Constitutional:  Positive for fatigue. Negative for chills and fever.   HENT:  Negative for congestion and sore throat.    Eyes:  Negative for visual disturbance.   Respiratory:  Negative for cough and shortness of breath.    Cardiovascular:  Negative for chest pain.   Gastrointestinal:  Positive for nausea and vomiting. Negative for abdominal pain.   Genitourinary:  Negative for dysuria.   Musculoskeletal:  Positive for back pain and myalgias.   Skin:  Negative for rash.   Neurological:  Positive for weakness and light-headedness. Negative for headaches.   Psychiatric/Behavioral:  Negative for confusion.        Physical Exam     Initial Vitals [12/19/23 1346]   BP Pulse Resp Temp SpO2   114/64 108 19 98.7 °F (37.1 °C) 95 %      MAP       --         Physical Exam    Nursing note and vitals reviewed.  Constitutional: He appears well-developed and well-nourished. He is not diaphoretic. No distress.   HENT:   Head: Normocephalic and atraumatic.   Mouth/Throat: Oropharynx is clear and moist.   Eyes: Pupils are equal, round, and reactive to light.   Neck: Neck supple.   Cardiovascular:  Normal rate and regular rhythm.           Pulmonary/Chest: Breath sounds normal. No respiratory distress.   Abdominal: Abdomen is soft. Bowel sounds are normal.   Musculoskeletal:         General: No edema.      Cervical back: Neck supple.     Neurological: He is alert. GCS score is 15. GCS eye subscore is 4. GCS verbal subscore is 5. GCS motor subscore is 6.   Ambulatory with a steady gait   Skin: Skin is warm and dry.   Psychiatric: He has a normal mood and affect.         ED Course   Procedures  Labs Reviewed   CBC W/ AUTO DIFFERENTIAL - Abnormal; Notable for the following components:       Result Value    RBC 4.27 (*)     Hemoglobin 12.9 (*)     Hematocrit 35.0 (*)     MCHC 36.9 (*)     RDW 15.3 (*)     Immature Granulocytes 0.8 (*)     Immature Grans (Abs) 0.07 (*)     Mono # 2.1 (*)   "   nRBC 6 (*)     Mono % 24.6 (*)     Sickle Cells Occasional (*)     All other components within normal limits   RETICULOCYTES - Abnormal; Notable for the following components:    Retic 4.1 (*)     All other components within normal limits   COMPREHENSIVE METABOLIC PANEL - Abnormal; Notable for the following components:    Potassium 2.3 (*)     Chloride 118 (*)     CO2 15 (*)     Glucose 63 (*)     BUN 4 (*)     Calcium 5.1 (*)     Total Protein 4.2 (*)     Albumin 2.2 (*)     Alkaline Phosphatase 52 (*)     AST 41 (*)     All other components within normal limits    Narrative:      Potassium critical result(s) called and verbal readback obtained   from Deb Madsen by [facility] 12/19/2023 20:12   POCT INFLUENZA A/B MOLECULAR - Abnormal; Notable for the following components:    POC Molecular Influenza B Ag Positive (*)     All other components within normal limits   ISTAT PROCEDURE - Abnormal; Notable for the following components:    POC PO2 39 (*)     All other components within normal limits   ISTAT PROCEDURE - Abnormal; Notable for the following components:    POC BUN 4 (*)     POC Potassium 3.4 (*)     POC Hematocrit 32 (*)     All other components within normal limits   SARS-COV-2 RDRP GENE   POCT GLUCOSE          Imaging Results              X-Ray Chest PA And Lateral (Final result)  Result time 12/19/23 14:31:57      Final result by Jacob Wilson MD (12/19/23 14:31:57)                   Impression:      No acute cardiopulmonary finding.      Electronically signed by: Jacob Wilson MD  Date:    12/19/2023  Time:    14:31               Narrative:    EXAMINATION:  XR CHEST PA AND LATERAL    CLINICAL HISTORY:  Provided history is "  Hb-SS disease with crisis, unspecified".    TECHNIQUE:  Frontal and lateral views of the chest were performed.    COMPARISON:  08/24/2023.    FINDINGS:  Cardiomediastinal silhouette is not enlarged.  Coarse interstitial lung markings but no large focal area of consolidation.  No " sizable pleural effusion.  No pneumothorax.                                       Medications   sodium chloride 0.9% bolus 2,000 mL 2,000 mL (0 mLs Intravenous Stopped 12/19/23 2035)   pantoprazole injection 40 mg (40 mg Intravenous Given 12/19/23 1731)   morphine injection 6 mg (6 mg Intravenous Given 12/19/23 1729)   ondansetron injection 4 mg (4 mg Intravenous Given 12/19/23 1727)   potassium bicarbonate disintegrating tablet 50 mEq (50 mEq Oral Given 12/19/23 2035)     Medical Decision Making  33-year-old male with history sickle cell disease presenting with flu-like illness for 3 days, also reports streaks of blood in his vomit.  Complaining of body pain including back and leg pain consistent with previous sickle cell pain crises.  On exam, the patient's has mild tachycardia, he is ambulatory with steady gait, abdomen is soft and nontender.  His workup was initiated triage with labs, COVID and flu testing.  The patient is flu positive.  Will check labs to assess for any severe anemia, electrolyte disturbance, will get chest x-ray.  Differential includes but not limited to influenza, viral illness, anemia, sickle cell pain crisis, gastritis.  Symptom onset greater than 72 hours ago, out of the window for treatment with Tamiflu.    Amount and/or Complexity of Data Reviewed  Labs: ordered. Decision-making details documented in ED Course.  Radiology: ordered. Decision-making details documented in ED Course.    Risk  Prescription drug management.            Scribe Attestation:   Scribe #1: I performed the above scribed service and the documentation accurately describes the services I performed. I attest to the accuracy of the note.        ED Course as of 12/20/23 0348   Tue Dec 19, 2023   1908 Patient reassessed, nausea has improved.  He has given a cup of water for a p.o. challenge.  CBC shows no leukocytosis, hemoglobin 12.9, hematocrit 35, appears to be near baseline, platelet count is normal.  Chest x-ray  without acute finding.  Awaiting CMP for final disposition. [LH]   2028 Potassium(!!): 2.3 [AS]   2038 Comprehensive metabolic panel(!!)  Uncertain if lab abnormalities are due to lab error especially given point of care glucose and lab glucose discrepancy.  Will obtain a VBG with wally to see if lab work is similar. [AS]   2136 Potassium, Blood Gas(!): 3.4 [AS]      ED Course User Index  [AS] Manuela Pride MD  [LH] Deepa Murray MD                           Clinical Impression:  Final diagnoses:  [D57.00] Sickle cell pain crisis  [J11.1] Influenza (Primary)  [K29.70] Gastritis, presence of bleeding unspecified, unspecified chronicity, unspecified gastritis type  [E87.6] Hypokalemia          ED Disposition Condition    Discharge Stable          ED Prescriptions       Medication Sig Dispense Start Date End Date Auth. Provider    pantoprazole (PROTONIX) 40 MG tablet Take 1 tablet (40 mg total) by mouth once daily. 30 tablet 12/19/2023 12/18/2024 Deepa Murray MD    ondansetron (ZOFRAN) 4 MG tablet Take 1 tablet (4 mg total) by mouth every 6 (six) hours as needed. 12 tablet 12/19/2023 -- Deepa Murray MD          Follow-up Information       Follow up With Specialties Details Why Contact [facility] Schedule an appointment as soon as possible for a visit   [address] [facility] [address]  [phone]      [facility] Gastroenterology Schedule an appointment as soon as possible for a visit   [address] [facility] [place] [address] [phone]            I, Deepa Murray MD, personally performed the services described in this documentation. All medical record entries made by the scribe were at my direction and in my presence. I have reviewed the chart and agree that the record reflects my personal performance and is accurate and complete.      Deepa Murray,  MD  12/19/23 1929       Deepa Murray MD  12/20/23 1582

## 2023-12-19 NOTE — ED TRIAGE NOTES
Pt complaining of cough, congestion, headache, flu like symptoms. Pt reports being weak. Pt has hx of sickle cell. Denies chest pain and SOB.

## 2023-12-20 NOTE — DISCHARGE INSTRUCTIONS
Thank you for coming to our Emergency Department today. It is important to remember that some problems are difficult to diagnose and may not be found during your Emergency Department visit. Be sure to follow up with your primary care doctor and review all labs/imaging/tests that were performed during this visit with them. Some labs/tests may be outside of the normal range and require non-emergent follow-up and further investigation to help diagnose/exclude/prevent complications or other medical conditions.    If you do not have a primary care doctor, you may contact the one listed on your discharge paperwork or you may also call the [facility] Appointment Desk at [phone] to schedule an appointment and establish care with one. It is important to your health that you have a primary care doctor.    Medicaid Escalation Line:   [phone] - Please contact this number if you are having difficulty getting follow up with a Primary Care Provider or Speciality Provider.     Please take all medications as directed. All medications may potentially have side-effects and it is impossible to predict which medications may give you side-effects or what side-effects (if any) they will give you.. If you feel that you are having a negative effect or side-effect of any medication you should immediately stop taking them and seek medical attention. If you feel that you are having a life-threatening reaction call [phone].    Return to the ER with any questions/concerns, new/concerning symptoms, worsening or failure to improve.     Do not drive, swim, climb to height, take a bath or make any important decisions for 24 hours if you have received any pain medications, sedatives or mood altering drugs during your ER visit.

## 2024-02-11 ENCOUNTER — HOSPITAL ENCOUNTER (EMERGENCY)
Facility: HOSPITAL | Age: 34
Discharge: HOME | End: 2024-02-11
Attending: STUDENT IN AN ORGANIZED HEALTH CARE EDUCATION/TRAINING PROGRAM
Payer: MEDICAID

## 2024-02-11 VITALS
HEIGHT: 66 IN | OXYGEN SATURATION: 98 % | DIASTOLIC BLOOD PRESSURE: 66 MMHG | TEMPERATURE: 98 F | BODY MASS INDEX: 27.32 KG/M2 | SYSTOLIC BLOOD PRESSURE: 145 MMHG | RESPIRATION RATE: 18 BRPM | HEART RATE: 96 BPM | WEIGHT: 170 LBS
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
## 2024-02-11 DIAGNOSIS — M54.50 ACUTE MIDLINE LOW BACK PAIN WITHOUT SCIATICA: Primary | ICD-10-CM

## 2024-02-11 PROCEDURE — 99283 EMERGENCY DEPT VISIT LOW MDM: CPT | Mod: 25
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
## 2024-02-12 NOTE — DISCHARGE INSTRUCTIONS
Thank you for coming to our Emergency Department today. It is important to remember that some problems or medical conditions are difficult to diagnose and may not be found during your Emergency Department visit.     Be sure to follow up with your primary care doctor and review all labs/imaging/tests that were performed during your ER visit with them. Some labs/tests may be outside of the normal range and require non-emergent follow-up and further investigation to help diagnose/exclude/prevent complications or other potentially serious medical conditions that were not addressed during your ER visit.    If you do not have a primary care doctor, you may contact the one listed on your discharge paperwork or you may also call the [facility] Appointment Desk at [phone] to schedule an appointment and establish care with one. It is important to your health that you have a primary care doctor.    Please take all medications as directed. All medications may potentially have side-effects and it is impossible to predict which medications may give you side-effects or what side-effects (if any) they will give you.. If you feel that you are having a negative effect or side-effect of any medication you should immediately stop taking them and seek medical attention. If you feel that you are having a life-threatening reaction call 911.    Return to the ER with any questions/concerns, new/concerning symptoms, worsening or failure to improve.     Do not drive, swim, climb to height, take a bath, operate heavy machinery, drink alcohol or take potentially sedating medications, sign any legal documents or make any important decisions for 24 hours if you have received any pain medications, sedatives or mood altering drugs during your ER visit or within 24 hours of taking them if they have been prescribed to you.     You can find additional resources for Dentists, hearing aids, durable medical equipment, low cost pharmacies and  other resources at https://[facility].org    BELOW THIS LINE ONLY APPLIES IF YOU HAVE A COVID TEST PENDING OR IF YOU HAVE BEEN DIAGNOSED WITH COVID:  Please access [facility] to review the results of your test. Until the results of your COVID test return, you should isolate yourself so as not to potentially spread illness to others.   If your COVID test returns positive, you should isolate yourself so as not to spread illness to others. After five full days, if you are feeling better and you have not had fever for 24 hours, you can return to your typical daily activities, but you must wear a mask around others for an additional 5 days.   If your COVID test returns negative and you are either unvaccinated or more than six months out from your two-dose vaccine and are not yet boosted, you should still quarantine for 5 full days followed by strict mask use for an additional 5 full days.   If your COVID test returns negative and you have received your 2-dose initial vaccine as well as a booster, you should continue strict mask use for 10 full days after the exposure.  For all those exposed, best practice includes a test at day 5 after the exposure. This can be a home test or a test through one of the many testing centers throughout our community.   Masking is always advised to limit the spread of COVID. Cdc.gov is an excellent site to obtain the latest up to date recommendations regarding COVID and COVID testing.     CDC Testing and Quarantine Guidelines for patients with exposure to a known-positive COVID-19 person:  A close exposure is defined as anyone who has had an exposure (masked or unmasked) to a known COVID -19 positive person within 6 feet of someone for a cumulative total of 15 minutes or more over a 24-hour period.   Vaccinated and/or if you recently had a positive covid test within 90 days do NOT need to quarantine after contact with someone who had COVID-19 unless you develop symptoms.   Fully vaccinated  people who have not had a positive test within 90 days, should get tested 3-5 days after their exposure, even if they don't have symptoms and wear a mask indoors in public for 14 days following exposure or until their test result is negative.      Unvaccinated and/or NOT had a positive test within 90 days and meet close exposure  You are required by CDC guidelines to quarantine for at least 5 days from time of exposure followed by 5 days of strict masking. It is recommended, but not required to test after 5 days, unless you develop symptoms, in which case you should test at that time.  If you get tested after 5 days and your test is positive, your 5 day period of isolation starts the day of the positive test.    If your exposure does not meet the above definition, you can return to your normal daily activities to include social distancing, wearing a mask and frequent handwashing.      Here is a link to guidance from the CDC:  https://www.cdc.gov/media/releases/2021/s1227-isolation-quarantine-guidance.html      [place] Dept Of Health Testing Sites:  https://ldh.la.gov/page/3934      [facility] website with testing locations and guidance:  [email or web address]
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
## 2024-03-10 ENCOUNTER — HOSPITAL ENCOUNTER (INPATIENT)
Facility: HOSPITAL | Age: 34
LOS: 5 days | Discharge: HOME | DRG: 871 | End: 2024-03-15
Attending: STUDENT IN AN ORGANIZED HEALTH CARE EDUCATION/TRAINING PROGRAM | Admitting: HOSPITALIST
Payer: MEDICAID

## 2024-03-10 DIAGNOSIS — M79.661 RIGHT CALF PAIN: ICD-10-CM

## 2024-03-10 DIAGNOSIS — S71.131D GUN SHOT WOUND OF THIGH/FEMUR, RIGHT, SUBSEQUENT ENCOUNTER: Primary | ICD-10-CM

## 2024-03-10 DIAGNOSIS — T14.90XA TRAUMA: ICD-10-CM

## 2024-03-10 DIAGNOSIS — I26.99 PULMONARY EMBOLISM: ICD-10-CM

## 2024-03-10 DIAGNOSIS — R06.02 SOB (SHORTNESS OF BREATH): ICD-10-CM

## 2024-03-10 DIAGNOSIS — R07.9 CHEST PAIN: ICD-10-CM

## 2024-03-10 DIAGNOSIS — J18.9 LEFT UPPER LOBE PNEUMONIA: ICD-10-CM

## 2024-03-10 PROBLEM — R79.89 ELEVATED D-DIMER: Status: ACTIVE | Noted: 2024-03-10

## 2024-03-10 LAB
ALBUMIN SERPL BCP-MCNC: 3.8 G/DL (ref 3.5–5.2)
ALP SERPL-CCNC: 77 U/L (ref 55–135)
ALT SERPL W/O P-5'-P-CCNC: 37 U/L (ref 10–44)
ANION GAP SERPL CALC-SCNC: 6 MMOL/L (ref 8–16)
ANISOCYTOSIS BLD QL SMEAR: SLIGHT
APTT PPP: 28.8 SEC (ref 21–32)
AST SERPL-CCNC: 26 U/L (ref 10–40)
BASOPHILS # BLD AUTO: 0.11 K/UL (ref 0–0.2)
BASOPHILS NFR BLD: 0.5 % (ref 0–1.9)
BILIRUB SERPL-MCNC: 1.9 MG/DL (ref 0.1–1)
BUN SERPL-MCNC: 6 MG/DL (ref 6–20)
CALCIUM SERPL-MCNC: 9.1 MG/DL (ref 8.7–10.5)
CHLORIDE SERPL-SCNC: 102 MMOL/L (ref 95–110)
CK SERPL-CCNC: 588 U/L (ref 20–200)
CO2 SERPL-SCNC: 27 MMOL/L (ref 23–29)
CREAT SERPL-MCNC: 0.8 MG/DL (ref 0.5–1.4)
CTP QC/QA: YES
CTP QC/QA: YES
D DIMER PPP IA.FEU-MCNC: 3.51 MG/L FEU
DIFFERENTIAL METHOD BLD: ABNORMAL
EOSINOPHIL # BLD AUTO: 1.3 K/UL (ref 0–0.5)
EOSINOPHIL NFR BLD: 6.3 % (ref 0–8)
ERYTHROCYTE [DISTWIDTH] IN BLOOD BY AUTOMATED COUNT: 14.4 % (ref 11.5–14.5)
EST. GFR  (NO RACE VARIABLE): >60 ML/MIN/1.73 M^2
GLUCOSE SERPL-MCNC: 88 MG/DL (ref 70–110)
HCT VFR BLD AUTO: 27.9 % (ref 40–54)
HGB BLD-MCNC: 10.1 G/DL (ref 14–18)
IMM GRANULOCYTES # BLD AUTO: 0.12 K/UL (ref 0–0.04)
IMM GRANULOCYTES NFR BLD AUTO: 0.6 % (ref 0–0.5)
INR PPP: 1 (ref 0.8–1.2)
LYMPHOCYTES # BLD AUTO: 3.5 K/UL (ref 1–4.8)
LYMPHOCYTES NFR BLD: 17.5 % (ref 18–48)
MAGNESIUM SERPL-MCNC: 1.9 MG/DL (ref 1.6–2.6)
MCH RBC QN AUTO: 31.7 PG (ref 27–31)
MCHC RBC AUTO-ENTMCNC: 36.2 G/DL (ref 32–36)
MCV RBC AUTO: 88 FL (ref 82–98)
MONOCYTES # BLD AUTO: 2.8 K/UL (ref 0.3–1)
MONOCYTES NFR BLD: 14.1 % (ref 4–15)
NEUTROPHILS # BLD AUTO: 12.3 K/UL (ref 1.8–7.7)
NEUTROPHILS NFR BLD: 61 % (ref 38–73)
NRBC BLD-RTO: 0 /100 WBC
PLATELET # BLD AUTO: 430 K/UL (ref 150–450)
PMV BLD AUTO: 9.7 FL (ref 9.2–12.9)
POC MOLECULAR INFLUENZA A AGN: NEGATIVE
POC MOLECULAR INFLUENZA B AGN: NEGATIVE
POIKILOCYTOSIS BLD QL SMEAR: SLIGHT
POLYCHROMASIA BLD QL SMEAR: ABNORMAL
POTASSIUM SERPL-SCNC: 4.3 MMOL/L (ref 3.5–5.1)
PROT SERPL-MCNC: 7.6 G/DL (ref 6–8.4)
PROTHROMBIN TIME: 11.2 SEC (ref 9–12.5)
RBC # BLD AUTO: 3.19 M/UL (ref 4.6–6.2)
RETICS/RBC NFR AUTO: 8.4 % (ref 0.4–2)
SARS-COV-2 RDRP RESP QL NAA+PROBE: NEGATIVE
SICKLE CELLS BLD QL SMEAR: ABNORMAL
SODIUM SERPL-SCNC: 135 MMOL/L (ref 136–145)
TARGETS BLD QL SMEAR: ABNORMAL
TROPONIN I SERPL DL<=0.01 NG/ML-MCNC: <0.006 NG/ML (ref 0–0.03)
WBC # BLD AUTO: 20.14 K/UL (ref 3.9–12.7)

## 2024-03-10 PROCEDURE — 96361 HYDRATE IV INFUSION ADD-ON: CPT

## 2024-03-10 PROCEDURE — 83735 ASSAY OF MAGNESIUM: CPT | Performed by: PHYSICIAN ASSISTANT

## 2024-03-10 PROCEDURE — 93010 ELECTROCARDIOGRAM REPORT: CPT | Mod: ,,, | Performed by: INTERNAL MEDICINE

## 2024-03-10 PROCEDURE — 85025 COMPLETE CBC W/AUTO DIFF WBC: CPT | Performed by: PHYSICIAN ASSISTANT

## 2024-03-10 PROCEDURE — 85730 THROMBOPLASTIN TIME PARTIAL: CPT | Performed by: PHYSICIAN ASSISTANT

## 2024-03-10 PROCEDURE — 25000003 PHARM REV CODE 250: Performed by: STUDENT IN AN ORGANIZED HEALTH CARE EDUCATION/TRAINING PROGRAM

## 2024-03-10 PROCEDURE — 12000002 HC ACUTE/MED SURGE SEMI-PRIVATE ROOM

## 2024-03-10 PROCEDURE — 85045 AUTOMATED RETICULOCYTE COUNT: CPT | Performed by: PHYSICIAN ASSISTANT

## 2024-03-10 PROCEDURE — 96365 THER/PROPH/DIAG IV INF INIT: CPT

## 2024-03-10 PROCEDURE — 87635 SARS-COV-2 COVID-19 AMP PRB: CPT | Performed by: PHYSICIAN ASSISTANT

## 2024-03-10 PROCEDURE — 96375 TX/PRO/DX INJ NEW DRUG ADDON: CPT

## 2024-03-10 PROCEDURE — 63600175 PHARM REV CODE 636 W HCPCS: Performed by: STUDENT IN AN ORGANIZED HEALTH CARE EDUCATION/TRAINING PROGRAM

## 2024-03-10 PROCEDURE — 84484 ASSAY OF TROPONIN QUANT: CPT | Performed by: PHYSICIAN ASSISTANT

## 2024-03-10 PROCEDURE — 80053 COMPREHEN METABOLIC PANEL: CPT | Performed by: PHYSICIAN ASSISTANT

## 2024-03-10 PROCEDURE — G0378 HOSPITAL OBSERVATION PER HR: HCPCS

## 2024-03-10 PROCEDURE — 99285 EMERGENCY DEPT VISIT HI MDM: CPT | Mod: 25

## 2024-03-10 PROCEDURE — 85610 PROTHROMBIN TIME: CPT | Performed by: PHYSICIAN ASSISTANT

## 2024-03-10 PROCEDURE — 93005 ELECTROCARDIOGRAM TRACING: CPT

## 2024-03-10 PROCEDURE — 25500020 PHARM REV CODE 255: Performed by: STUDENT IN AN ORGANIZED HEALTH CARE EDUCATION/TRAINING PROGRAM

## 2024-03-10 PROCEDURE — 85379 FIBRIN DEGRADATION QUANT: CPT | Performed by: PHYSICIAN ASSISTANT

## 2024-03-10 PROCEDURE — 82550 ASSAY OF CK (CPK): CPT | Performed by: PHYSICIAN ASSISTANT

## 2024-03-10 RX ORDER — SODIUM CHLORIDE 0.9 % (FLUSH) 0.9 %
10 SYRINGE (ML) INJECTION EVERY 12 HOURS PRN
Status: DISCONTINUED | OUTPATIENT
Start: 2024-03-10 | End: 2024-03-15 | Stop reason: HOSPADM

## 2024-03-10 RX ORDER — IBUPROFEN 200 MG
16 TABLET ORAL
Status: DISCONTINUED | OUTPATIENT
Start: 2024-03-10 | End: 2024-03-15 | Stop reason: HOSPADM

## 2024-03-10 RX ORDER — IBUPROFEN 200 MG
24 TABLET ORAL
Status: DISCONTINUED | OUTPATIENT
Start: 2024-03-10 | End: 2024-03-15 | Stop reason: HOSPADM

## 2024-03-10 RX ORDER — POLYETHYLENE GLYCOL 3350 17 G/17G
17 POWDER, FOR SOLUTION ORAL DAILY
Status: DISCONTINUED | OUTPATIENT
Start: 2024-03-11 | End: 2024-03-15 | Stop reason: HOSPADM

## 2024-03-10 RX ORDER — HEPARIN SODIUM,PORCINE/D5W 25000/250
18 INTRAVENOUS SOLUTION INTRAVENOUS CONTINUOUS
Status: DISCONTINUED | OUTPATIENT
Start: 2024-03-10 | End: 2024-03-11

## 2024-03-10 RX ORDER — ONDANSETRON HYDROCHLORIDE 2 MG/ML
4 INJECTION, SOLUTION INTRAVENOUS EVERY 8 HOURS PRN
Status: DISCONTINUED | OUTPATIENT
Start: 2024-03-10 | End: 2024-03-15 | Stop reason: HOSPADM

## 2024-03-10 RX ORDER — HYDROMORPHONE HYDROCHLORIDE 1 MG/ML
1 INJECTION, SOLUTION INTRAMUSCULAR; INTRAVENOUS; SUBCUTANEOUS
Status: DISCONTINUED | OUTPATIENT
Start: 2024-03-10 | End: 2024-03-11

## 2024-03-10 RX ORDER — TALC
6 POWDER (GRAM) TOPICAL NIGHTLY PRN
Status: DISCONTINUED | OUTPATIENT
Start: 2024-03-10 | End: 2024-03-15 | Stop reason: HOSPADM

## 2024-03-10 RX ORDER — ACETAMINOPHEN 325 MG/1
650 TABLET ORAL EVERY 4 HOURS PRN
Status: DISCONTINUED | OUTPATIENT
Start: 2024-03-10 | End: 2024-03-11

## 2024-03-10 RX ORDER — GLUCAGON 1 MG
1 KIT INJECTION
Status: DISCONTINUED | OUTPATIENT
Start: 2024-03-10 | End: 2024-03-15 | Stop reason: HOSPADM

## 2024-03-10 RX ORDER — NALOXONE HCL 0.4 MG/ML
0.02 VIAL (ML) INJECTION
Status: DISCONTINUED | OUTPATIENT
Start: 2024-03-10 | End: 2024-03-15 | Stop reason: HOSPADM

## 2024-03-10 RX ADMIN — IOHEXOL 80 ML: 350 INJECTION, SOLUTION INTRAVENOUS at 09:03

## 2024-03-10 RX ADMIN — HEPARIN SODIUM 18 UNITS/KG/HR: 10000 INJECTION, SOLUTION INTRAVENOUS at 10:03

## 2024-03-10 RX ADMIN — CEFTRIAXONE SODIUM 2 G: 2 INJECTION, POWDER, FOR SOLUTION INTRAMUSCULAR; INTRAVENOUS at 08:03

## 2024-03-10 RX ADMIN — SODIUM CHLORIDE, POTASSIUM CHLORIDE, SODIUM LACTATE AND CALCIUM CHLORIDE 1000 ML: 600; 310; 30; 20 INJECTION, SOLUTION INTRAVENOUS at 07:03

## 2024-03-11 PROBLEM — Z72.0 TOBACCO ABUSE: Chronic | Status: RESOLVED | Noted: 2019-08-18 | Resolved: 2024-03-11

## 2024-03-11 PROBLEM — A41.9 SEPSIS: Status: ACTIVE | Noted: 2024-03-11

## 2024-03-11 PROBLEM — A41.9 SEPSIS: Status: RESOLVED | Noted: 2024-03-11 | Resolved: 2024-03-11

## 2024-03-11 PROBLEM — S71.131D: Status: ACTIVE | Noted: 2024-03-11

## 2024-03-11 PROBLEM — J18.1 LUNG CONSOLIDATION: Status: RESOLVED | Noted: 2019-11-10 | Resolved: 2024-03-11

## 2024-03-11 PROBLEM — J18.1 LUNG CONSOLIDATION: Status: ACTIVE | Noted: 2019-11-10

## 2024-03-11 LAB
ALBUMIN SERPL BCP-MCNC: 3.5 G/DL (ref 3.5–5.2)
ALP SERPL-CCNC: 72 U/L (ref 55–135)
ALT SERPL W/O P-5'-P-CCNC: 30 U/L (ref 10–44)
ANION GAP SERPL CALC-SCNC: 10 MMOL/L (ref 8–16)
APTT PPP: 26.9 SEC (ref 21–32)
APTT PPP: 33.5 SEC (ref 21–32)
APTT PPP: 39.9 SEC (ref 21–32)
ASCENDING AORTA: 3.03 CM
AST SERPL-CCNC: 21 U/L (ref 10–40)
AV INDEX (PROSTH): 0.83
AV MEAN GRADIENT: 7 MMHG
AV PEAK GRADIENT: 10 MMHG
AV VALVE AREA BY VELOCITY RATIO: 4.04 CM²
AV VALVE AREA: 3.42 CM²
AV VELOCITY RATIO: 0.98
BASOPHILS # BLD AUTO: 0.09 K/UL (ref 0–0.2)
BASOPHILS # BLD AUTO: 0.09 K/UL (ref 0–0.2)
BASOPHILS NFR BLD: 0.4 % (ref 0–1.9)
BASOPHILS NFR BLD: 0.4 % (ref 0–1.9)
BILIRUB SERPL-MCNC: 1.6 MG/DL (ref 0.1–1)
BSA FOR ECHO PROCEDURE: 1.95 M2
BUN SERPL-MCNC: 8 MG/DL (ref 6–20)
CALCIUM SERPL-MCNC: 9.1 MG/DL (ref 8.7–10.5)
CHLORIDE SERPL-SCNC: 104 MMOL/L (ref 95–110)
CO2 SERPL-SCNC: 25 MMOL/L (ref 23–29)
CREAT SERPL-MCNC: 0.9 MG/DL (ref 0.5–1.4)
CV ECHO LV RWT: 0.34 CM
DIFFERENTIAL METHOD BLD: ABNORMAL
DIFFERENTIAL METHOD BLD: ABNORMAL
DOP CALC AO PEAK VEL: 1.6 M/S
DOP CALC AO VTI: 30.2 CM
DOP CALC LVOT AREA: 4.1 CM2
DOP CALC LVOT DIAMETER: 2.29 CM
DOP CALC LVOT PEAK VEL: 1.57 M/S
DOP CALC LVOT STROKE VOLUME: 103.33 CM3
DOP CALCLVOT PEAK VEL VTI: 25.1 CM
E WAVE DECELERATION TIME: 106.61 MSEC
E/A RATIO: 1.6
E/E' RATIO: 6.12 M/S
ECHO LV POSTERIOR WALL: 0.75 CM (ref 0.6–1.1)
EOSINOPHIL # BLD AUTO: 0.7 K/UL (ref 0–0.5)
EOSINOPHIL # BLD AUTO: 0.7 K/UL (ref 0–0.5)
EOSINOPHIL NFR BLD: 2.8 % (ref 0–8)
EOSINOPHIL NFR BLD: 3.3 % (ref 0–8)
ERYTHROCYTE [DISTWIDTH] IN BLOOD BY AUTOMATED COUNT: 14.3 % (ref 11.5–14.5)
ERYTHROCYTE [DISTWIDTH] IN BLOOD BY AUTOMATED COUNT: 14.5 % (ref 11.5–14.5)
EST. GFR  (NO RACE VARIABLE): >60 ML/MIN/1.73 M^2
FRACTIONAL SHORTENING: 33 % (ref 28–44)
GLUCOSE SERPL-MCNC: 101 MG/DL (ref 70–110)
HCT VFR BLD AUTO: 25.5 % (ref 40–54)
HCT VFR BLD AUTO: 29.1 % (ref 40–54)
HGB BLD-MCNC: 10.2 G/DL (ref 14–18)
HGB BLD-MCNC: 9.3 G/DL (ref 14–18)
IMM GRANULOCYTES # BLD AUTO: 0.12 K/UL (ref 0–0.04)
IMM GRANULOCYTES # BLD AUTO: 0.16 K/UL (ref 0–0.04)
IMM GRANULOCYTES NFR BLD AUTO: 0.5 % (ref 0–0.5)
IMM GRANULOCYTES NFR BLD AUTO: 0.7 % (ref 0–0.5)
INTERVENTRICULAR SEPTUM: 0.64 CM (ref 0.6–1.1)
IVC DIAMETER: 2.15 CM
IVRT: 68.51 MSEC
LA MAJOR: 4.6 CM
LA MINOR: 4.74 CM
LA WIDTH: 4.7 CM
LACTATE SERPL-SCNC: 0.8 MMOL/L (ref 0.5–2.2)
LEFT ATRIUM SIZE: 4.34 CM
LEFT ATRIUM VOLUME INDEX: 42.6 ML/M2
LEFT ATRIUM VOLUME: 80.95 CM3
LEFT INTERNAL DIMENSION IN SYSTOLE: 2.94 CM (ref 2.1–4)
LEFT VENTRICLE DIASTOLIC VOLUME INDEX: 45.43 ML/M2
LEFT VENTRICLE DIASTOLIC VOLUME: 86.31 ML
LEFT VENTRICLE MASS INDEX: 47 G/M2
LEFT VENTRICLE SYSTOLIC VOLUME INDEX: 17.5 ML/M2
LEFT VENTRICLE SYSTOLIC VOLUME: 33.25 ML
LEFT VENTRICULAR INTERNAL DIMENSION IN DIASTOLE: 4.37 CM (ref 3.5–6)
LEFT VENTRICULAR MASS: 90.16 G
LV LATERAL E/E' RATIO: 5.32 M/S
LV SEPTAL E/E' RATIO: 7.21 M/S
LVOT MG: 5.54 MMHG
LVOT MV: 1.12 CM/S
LYMPHOCYTES # BLD AUTO: 3.4 K/UL (ref 1–4.8)
LYMPHOCYTES # BLD AUTO: 3.5 K/UL (ref 1–4.8)
LYMPHOCYTES NFR BLD: 13.8 % (ref 18–48)
LYMPHOCYTES NFR BLD: 16.1 % (ref 18–48)
MAGNESIUM SERPL-MCNC: 1.9 MG/DL (ref 1.6–2.6)
MCH RBC QN AUTO: 30.5 PG (ref 27–31)
MCH RBC QN AUTO: 31.2 PG (ref 27–31)
MCHC RBC AUTO-ENTMCNC: 35.1 G/DL (ref 32–36)
MCHC RBC AUTO-ENTMCNC: 36.5 G/DL (ref 32–36)
MCV RBC AUTO: 86 FL (ref 82–98)
MCV RBC AUTO: 87 FL (ref 82–98)
MONOCYTES # BLD AUTO: 3 K/UL (ref 0.3–1)
MONOCYTES # BLD AUTO: 4.9 K/UL (ref 0.3–1)
MONOCYTES NFR BLD: 14 % (ref 4–15)
MONOCYTES NFR BLD: 20.1 % (ref 4–15)
MV PEAK A VEL: 0.63 M/S
MV PEAK E VEL: 1.01 M/S
MV STENOSIS PRESSURE HALF TIME: 30.92 MS
MV VALVE AREA P 1/2 METHOD: 7.12 CM2
NEUTROPHILS # BLD AUTO: 14.2 K/UL (ref 1.8–7.7)
NEUTROPHILS # BLD AUTO: 15.1 K/UL (ref 1.8–7.7)
NEUTROPHILS NFR BLD: 62.4 % (ref 38–73)
NEUTROPHILS NFR BLD: 65.5 % (ref 38–73)
NRBC BLD-RTO: 0 /100 WBC
NRBC BLD-RTO: 0 /100 WBC
OHS QRS DURATION: 92 MS
OHS QTC CALCULATION: 419 MS
PHOSPHATE SERPL-MCNC: 2.8 MG/DL (ref 2.7–4.5)
PISA TR MAX VEL: 2.45 M/S
PLATELET # BLD AUTO: 413 K/UL (ref 150–450)
PLATELET # BLD AUTO: 442 K/UL (ref 150–450)
PMV BLD AUTO: 10.9 FL (ref 9.2–12.9)
PMV BLD AUTO: 9.8 FL (ref 9.2–12.9)
POTASSIUM SERPL-SCNC: 3.8 MMOL/L (ref 3.5–5.1)
PROCALCITONIN SERPL IA-MCNC: 0.18 NG/ML
PROT SERPL-MCNC: 7.3 G/DL (ref 6–8.4)
PULM VEIN S/D RATIO: 0.86
PV PEAK D VEL: 0.65 M/S
PV PEAK GRADIENT: 7 MMHG
PV PEAK S VEL: 0.56 M/S
PV PEAK VELOCITY: 1.33 M/S
RA MAJOR: 4.77 CM
RA PRESSURE ESTIMATED: 3 MMHG
RA WIDTH: 4.03 CM
RBC # BLD AUTO: 2.98 M/UL (ref 4.6–6.2)
RBC # BLD AUTO: 3.34 M/UL (ref 4.6–6.2)
RIGHT VENTRICULAR END-DIASTOLIC DIMENSION: 4.4 CM
RV TB RVSP: 5 MMHG
SINUS: 3.09 CM
SODIUM SERPL-SCNC: 139 MMOL/L (ref 136–145)
STJ: 2.58 CM
TDI LATERAL: 0.19 M/S
TDI SEPTAL: 0.14 M/S
TDI: 0.17 M/S
TR MAX PG: 24 MMHG
TRICUSPID ANNULAR PLANE SYSTOLIC EXCURSION: 2.37 CM
TROPONIN I SERPL DL<=0.01 NG/ML-MCNC: <0.006 NG/ML (ref 0–0.03)
TV PEAK GRADIENT: 2 MMHG
TV REST PULMONARY ARTERY PRESSURE: 27 MMHG
WBC # BLD AUTO: 21.68 K/UL (ref 3.9–12.7)
WBC # BLD AUTO: 24.26 K/UL (ref 3.9–12.7)
Z-SCORE OF LEFT VENTRICULAR DIMENSION IN END DIASTOLE: -1.99
Z-SCORE OF LEFT VENTRICULAR DIMENSION IN END SYSTOLE: -0.88

## 2024-03-11 PROCEDURE — 25000003 PHARM REV CODE 250

## 2024-03-11 PROCEDURE — 27000221 HC OXYGEN, UP TO 24 HOURS

## 2024-03-11 PROCEDURE — A9558 XE133 XENON 10MCI: HCPCS | Performed by: HOSPITALIST

## 2024-03-11 PROCEDURE — 21400001 HC TELEMETRY ROOM

## 2024-03-11 PROCEDURE — 94761 N-INVAS EAR/PLS OXIMETRY MLT: CPT

## 2024-03-11 PROCEDURE — 84145 PROCALCITONIN (PCT): CPT

## 2024-03-11 PROCEDURE — 85025 COMPLETE CBC W/AUTO DIFF WBC: CPT

## 2024-03-11 PROCEDURE — 93005 ELECTROCARDIOGRAM TRACING: CPT

## 2024-03-11 PROCEDURE — A9540 TC99M MAA: HCPCS | Performed by: HOSPITALIST

## 2024-03-11 PROCEDURE — 36415 COLL VENOUS BLD VENIPUNCTURE: CPT

## 2024-03-11 PROCEDURE — 25000003 PHARM REV CODE 250: Performed by: HOSPITALIST

## 2024-03-11 PROCEDURE — 80053 COMPREHEN METABOLIC PANEL: CPT

## 2024-03-11 PROCEDURE — 99223 1ST HOSP IP/OBS HIGH 75: CPT | Mod: ,,,

## 2024-03-11 PROCEDURE — 94640 AIRWAY INHALATION TREATMENT: CPT

## 2024-03-11 PROCEDURE — 85730 THROMBOPLASTIN TIME PARTIAL: CPT | Mod: 91 | Performed by: HOSPITALIST

## 2024-03-11 PROCEDURE — 83605 ASSAY OF LACTIC ACID: CPT

## 2024-03-11 PROCEDURE — 99900035 HC TECH TIME PER 15 MIN (STAT)

## 2024-03-11 PROCEDURE — 25000242 PHARM REV CODE 250 ALT 637 W/ HCPCS: Performed by: HOSPITALIST

## 2024-03-11 PROCEDURE — 83735 ASSAY OF MAGNESIUM: CPT

## 2024-03-11 PROCEDURE — 84484 ASSAY OF TROPONIN QUANT: CPT

## 2024-03-11 PROCEDURE — 85025 COMPLETE CBC W/AUTO DIFF WBC: CPT | Mod: 91 | Performed by: HOSPITALIST

## 2024-03-11 PROCEDURE — 36415 COLL VENOUS BLD VENIPUNCTURE: CPT | Mod: XB | Performed by: HOSPITALIST

## 2024-03-11 PROCEDURE — 84100 ASSAY OF PHOSPHORUS: CPT

## 2024-03-11 PROCEDURE — 63600175 PHARM REV CODE 636 W HCPCS: Performed by: HOSPITALIST

## 2024-03-11 PROCEDURE — 85730 THROMBOPLASTIN TIME PARTIAL: CPT | Performed by: STUDENT IN AN ORGANIZED HEALTH CARE EDUCATION/TRAINING PROGRAM

## 2024-03-11 PROCEDURE — 63600175 PHARM REV CODE 636 W HCPCS

## 2024-03-11 PROCEDURE — 63600175 PHARM REV CODE 636 W HCPCS: Performed by: STUDENT IN AN ORGANIZED HEALTH CARE EDUCATION/TRAINING PROGRAM

## 2024-03-11 PROCEDURE — 87040 BLOOD CULTURE FOR BACTERIA: CPT

## 2024-03-11 PROCEDURE — 93010 ELECTROCARDIOGRAM REPORT: CPT | Mod: ,,, | Performed by: INTERNAL MEDICINE

## 2024-03-11 PROCEDURE — S4991 NICOTINE PATCH NONLEGEND: HCPCS

## 2024-03-11 RX ORDER — IPRATROPIUM BROMIDE AND ALBUTEROL SULFATE 2.5; .5 MG/3ML; MG/3ML
3 SOLUTION RESPIRATORY (INHALATION) EVERY 4 HOURS
Status: DISCONTINUED | OUTPATIENT
Start: 2024-03-11 | End: 2024-03-15 | Stop reason: HOSPADM

## 2024-03-11 RX ORDER — HYDROXYUREA 500 MG/1
500 CAPSULE ORAL 2 TIMES DAILY
Status: DISCONTINUED | OUTPATIENT
Start: 2024-03-11 | End: 2024-03-12

## 2024-03-11 RX ORDER — OXYCODONE HYDROCHLORIDE 15 MG/1
15 TABLET ORAL EVERY 8 HOURS PRN
Status: DISCONTINUED | OUTPATIENT
Start: 2024-03-11 | End: 2024-03-12

## 2024-03-11 RX ORDER — GUAIFENESIN 100 MG/5ML
200 SOLUTION ORAL EVERY 4 HOURS PRN
Status: DISCONTINUED | OUTPATIENT
Start: 2024-03-11 | End: 2024-03-15 | Stop reason: HOSPADM

## 2024-03-11 RX ORDER — IBUPROFEN 200 MG
1 TABLET ORAL DAILY
Status: DISCONTINUED | OUTPATIENT
Start: 2024-03-11 | End: 2024-03-15 | Stop reason: HOSPADM

## 2024-03-11 RX ORDER — HEPARIN SODIUM 5000 [USP'U]/ML
5000 INJECTION, SOLUTION INTRAVENOUS; SUBCUTANEOUS EVERY 8 HOURS
Status: DISCONTINUED | OUTPATIENT
Start: 2024-03-11 | End: 2024-03-15 | Stop reason: HOSPADM

## 2024-03-11 RX ORDER — HYDROMORPHONE HYDROCHLORIDE 1 MG/ML
1 INJECTION, SOLUTION INTRAMUSCULAR; INTRAVENOUS; SUBCUTANEOUS EVERY 4 HOURS PRN
Status: DISCONTINUED | OUTPATIENT
Start: 2024-03-11 | End: 2024-03-11

## 2024-03-11 RX ORDER — OXYCODONE HYDROCHLORIDE 15 MG/1
15 TABLET ORAL EVERY 8 HOURS PRN
Status: DISCONTINUED | OUTPATIENT
Start: 2024-03-11 | End: 2024-03-11

## 2024-03-11 RX ORDER — SODIUM CHLORIDE 9 MG/ML
INJECTION, SOLUTION INTRAVENOUS CONTINUOUS
Status: ACTIVE | OUTPATIENT
Start: 2024-03-11 | End: 2024-03-11

## 2024-03-11 RX ORDER — ACETAMINOPHEN 325 MG/1
650 TABLET ORAL EVERY 4 HOURS PRN
Status: DISCONTINUED | OUTPATIENT
Start: 2024-03-11 | End: 2024-03-15 | Stop reason: HOSPADM

## 2024-03-11 RX ORDER — SODIUM CHLORIDE 9 MG/ML
INJECTION, SOLUTION INTRAVENOUS CONTINUOUS
Status: DISCONTINUED | OUTPATIENT
Start: 2024-03-11 | End: 2024-03-11

## 2024-03-11 RX ORDER — OXYCODONE HYDROCHLORIDE 15 MG/1
15 TABLET ORAL ONCE
Status: COMPLETED | OUTPATIENT
Start: 2024-03-11 | End: 2024-03-12

## 2024-03-11 RX ORDER — FOLIC ACID 1 MG/1
1 TABLET ORAL DAILY
Status: DISCONTINUED | OUTPATIENT
Start: 2024-03-11 | End: 2024-03-15 | Stop reason: HOSPADM

## 2024-03-11 RX ORDER — HYDROMORPHONE HYDROCHLORIDE 1 MG/ML
1 INJECTION, SOLUTION INTRAMUSCULAR; INTRAVENOUS; SUBCUTANEOUS EVERY 6 HOURS PRN
Status: DISCONTINUED | OUTPATIENT
Start: 2024-03-11 | End: 2024-03-11

## 2024-03-11 RX ORDER — ACETAMINOPHEN 325 MG/1
650 TABLET ORAL EVERY 4 HOURS PRN
Status: DISCONTINUED | OUTPATIENT
Start: 2024-03-11 | End: 2024-03-11

## 2024-03-11 RX ORDER — BENZONATATE 100 MG/1
100 CAPSULE ORAL 3 TIMES DAILY PRN
Status: DISCONTINUED | OUTPATIENT
Start: 2024-03-11 | End: 2024-03-15 | Stop reason: HOSPADM

## 2024-03-11 RX ORDER — HYDROMORPHONE HYDROCHLORIDE 1 MG/ML
2 INJECTION, SOLUTION INTRAMUSCULAR; INTRAVENOUS; SUBCUTANEOUS EVERY 4 HOURS PRN
Status: DISCONTINUED | OUTPATIENT
Start: 2024-03-11 | End: 2024-03-12

## 2024-03-11 RX ADMIN — XENON XE-133 20.2 MILLICURIE: 20 GAS RESPIRATORY (INHALATION) at 11:03

## 2024-03-11 RX ADMIN — ACETAMINOPHEN 650 MG: 325 TABLET ORAL at 05:03

## 2024-03-11 RX ADMIN — HEPARIN SODIUM 5000 UNITS: 5000 INJECTION INTRAVENOUS; SUBCUTANEOUS at 02:03

## 2024-03-11 RX ADMIN — OXYCODONE HYDROCHLORIDE 15 MG: 15 TABLET ORAL at 02:03

## 2024-03-11 RX ADMIN — FOLIC ACID 1 MG: 1 TABLET ORAL at 08:03

## 2024-03-11 RX ADMIN — ACETAMINOPHEN 650 MG: 325 TABLET ORAL at 02:03

## 2024-03-11 RX ADMIN — HYDROMORPHONE HYDROCHLORIDE 1 MG: 1 INJECTION, SOLUTION INTRAMUSCULAR; INTRAVENOUS; SUBCUTANEOUS at 11:03

## 2024-03-11 RX ADMIN — IPRATROPIUM BROMIDE AND ALBUTEROL SULFATE 3 ML: 2.5; .5 SOLUTION RESPIRATORY (INHALATION) at 12:03

## 2024-03-11 RX ADMIN — HYDROMORPHONE HYDROCHLORIDE 1 MG: 1 INJECTION, SOLUTION INTRAMUSCULAR; INTRAVENOUS; SUBCUTANEOUS at 01:03

## 2024-03-11 RX ADMIN — HYDROXYUREA 500 MG: 500 CAPSULE ORAL at 08:03

## 2024-03-11 RX ADMIN — KIT FOR THE PREPARATION OF TECHNETIUM TC 99M ALBUMIN AGGREGATED 5.3 MILLICURIE: 2 INJECTION, POWDER, LYOPHILIZED, FOR SUSPENSION INTRAPERITONEAL; INTRAVENOUS at 11:03

## 2024-03-11 RX ADMIN — ACETAMINOPHEN 650 MG: 325 TABLET ORAL at 01:03

## 2024-03-11 RX ADMIN — CEFTRIAXONE 1 G: 1 INJECTION, POWDER, FOR SOLUTION INTRAMUSCULAR; INTRAVENOUS at 10:03

## 2024-03-11 RX ADMIN — ACETAMINOPHEN 650 MG: 325 TABLET ORAL at 04:03

## 2024-03-11 RX ADMIN — IPRATROPIUM BROMIDE AND ALBUTEROL SULFATE 3 ML: 2.5; .5 SOLUTION RESPIRATORY (INHALATION) at 04:03

## 2024-03-11 RX ADMIN — SODIUM CHLORIDE: 9 INJECTION, SOLUTION INTRAVENOUS at 06:03

## 2024-03-11 RX ADMIN — HEPARIN SODIUM 5000 UNITS: 5000 INJECTION INTRAVENOUS; SUBCUTANEOUS at 09:03

## 2024-03-11 RX ADMIN — IPRATROPIUM BROMIDE AND ALBUTEROL SULFATE 3 ML: 2.5; .5 SOLUTION RESPIRATORY (INHALATION) at 11:03

## 2024-03-11 RX ADMIN — HEPARIN SODIUM 18 UNITS/KG/HR: 10000 INJECTION, SOLUTION INTRAVENOUS at 12:03

## 2024-03-11 RX ADMIN — HYDROXYUREA 500 MG: 500 CAPSULE ORAL at 09:03

## 2024-03-11 RX ADMIN — HYDROMORPHONE HYDROCHLORIDE 2 MG: 1 INJECTION, SOLUTION INTRAMUSCULAR; INTRAVENOUS; SUBCUTANEOUS at 09:03

## 2024-03-11 RX ADMIN — HYDROMORPHONE HYDROCHLORIDE 2 MG: 1 INJECTION, SOLUTION INTRAMUSCULAR; INTRAVENOUS; SUBCUTANEOUS at 05:03

## 2024-03-11 RX ADMIN — AZITHROMYCIN 500 MG: 500 INJECTION, POWDER, LYOPHILIZED, FOR SOLUTION INTRAVENOUS at 05:03

## 2024-03-11 RX ADMIN — IPRATROPIUM BROMIDE AND ALBUTEROL SULFATE 3 ML: 2.5; .5 SOLUTION RESPIRATORY (INHALATION) at 08:03

## 2024-03-11 NOTE — ASSESSMENT & PLAN NOTE
This patient does have evidence of infective focus. My overall impression is  severe sepsis .  Source: Respiratory- RANULFO pneumonia  Antibiotics given-   Antibiotics (72h ago, onward)      Start     Stop Route Frequency Ordered    03/11/24 2100  cefTRIAXone (Rocephin) 1 g in dextrose 5 % in water (D5W) 100 mL IVPB (MB+)         -- IV Every 24 hours (non-standard times) 03/11/24 0422    03/11/24 0420  azithromycin (ZITHROMAX) 500 mg in dextrose 5 % (D5W) 250 mL IVPB (Vial-Mate)         03/14/24 0429 IV Every 24 hours (non-standard times) 03/11/24 0421          Latest lactate reviewed-  Recent Labs   Lab 03/11/24  0132   LACTATE 0.8     Organ dysfunction indicated by Acute respiratory failure    Fluid challenge Ideal Body Weight- The patient's ideal body weight is Ideal body weight: 63.8 kg (140 lb 10.5 oz) which will be used to calculate fluid bolus of 30 ml/kg for treatment of septic shock.  Post- resuscitation assessment Yes Perfusion exam was performed within 6 hours of septic shock presentation after bolus shows Adequate tissue perfusion assessed by non-invasive monitoring .  Will Not start Pressors- Levophed for MAP of 65
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
## 2024-03-11 NOTE — ADMISSIONCARE
AdmissionCare    Guideline: Pulmonary Embolism - OBS, Observation    Based on the indications selected for the patient, the bed status of Observation was determined to be MET    The following indications were selected as present at the time of evaluation of the patient:      - Personal or family history of bleeding tendency or bleeding disorder (ie, monitoring of initial response to anticoagulation needed)   - Respiratory symptoms (eg, Tachypnea, dyspnea)    AdmissionCare documentation entered by: MARGIE Nguyen    [facility], 27th edition, Copyright © 2023 [facility], [facility] All Rights Reserved.  [id number]
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
## 2024-03-11 NOTE — PLAN OF CARE
03/11/24 0916   Discharge Planning   Assessment Type Discharge Planning Brief Assessment   Resource/Environmental Concerns none   Support Systems Spouse/significant other   Equipment Currently Used at Home nebulizer   Current Living Arrangements home   Patient/Family Anticipates Transition to home with family   Patient/Family Anticipated Services at Transition none   DME Needed Upon Discharge  none   Discharge Plan A Home with family       [facility] [employer] [id number] - [place], [place]  [address] [place] [address]  [place] [address]  Phone: [phone] Fax: [phone]

## 2024-03-11 NOTE — ASSESSMENT & PLAN NOTE
Patient with history of asthma. No PFTs to review. No wheezing on exam.   - schedule duonebs Q4h  - does not need steroids at this time. Symptoms are more consistent with pneumonia than with asthma exacerbation.
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
## 2024-03-11 NOTE — ASSESSMENT & PLAN NOTE
-CTA chest ordered and shows suspected PE within left upper lobe superior lingular branch segmental artery  -Right lower extremity U/S shows no evidence of DVT    -Continue heparin drip for now  -P.r.n. pain control  - V/Q scan ordered to try to rule in/out PE
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
## 2024-03-11 NOTE — ASSESSMENT & PLAN NOTE
Per chart review, patient states that he stopped smoking in 2022  Nicotine patch ordered as needed

## 2024-03-11 NOTE — ASSESSMENT & PLAN NOTE
-Patient presents with left-sided chest pain and shortness on breath for the past 2 days.  Troponin normal and EKG shows sinus tachycardia with S1Q3T3 pattern concerning for possible PE. CTA chest shows left upper lobe superior lingular branch segmental artery PE.   -Patient is started on heparin drip and Hematology/oncology consulted  -Chest pain likely reactive from PE and sickle cell. Unlikely ACS related.     Plan:  -cardiac telemetry  -trend troponin  -continuous oxygen prn   -echocardiogram ordered
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
## 2024-03-11 NOTE — ASSESSMENT & PLAN NOTE
-Patient presents with chest pain and shortness on breath for the past 2 days.  D-dimer elevated.  CTA chest shows possible pulmonary embolism in left upper lobe with subsegmental consolidation within bilateral lower lobes, left greater than right.  -Patient with fever of 103.1 on admission to hospital floor and tachycardic. Leukocytosis WBC 20 on admit. Flu/COVID negative    - continue treatment with CTX/azithromycin  - check sputum culture if he is able to produce.  - schedule duonebs Q4h  - incentive spirometer  - continue IVF for now  - PRN tessalon and guaifenesin  - chest pain is most likely due to PNA/potential PE. Considered acute chest syndrome- see SC disease for discussion
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
## 2024-03-11 NOTE — NURSING
[facility]  Nurses Note -- 4 Eyes      3/11/2024       Skin assessed on: Q Shift      [x] No Pressure Injuries Present    [x]Prevention Measures Documented    [] Yes LDA  for Pressure Injury Previously documented     [] Yes New Pressure Injury Discovered   [] LDA for New Pressure Injury Added      Attending RN:  Gretel Marinelli RN     Second RN:  Shamar Woodward RN
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
## 2024-03-11 NOTE — NURSING
[facility] MEWS TRIGGER FOLLOW UP       MEWS Monitoring, Score is: 5  Indication for review: Temp 103.1,     Patient just arrived from ED. PRN acetaminophen given for fever. Reassessment pending.    0330: temp 102.8    0510: temp 99.6
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
## 2024-03-12 LAB
ALBUMIN SERPL BCP-MCNC: 3.2 G/DL (ref 3.5–5.2)
ALP SERPL-CCNC: 63 U/L (ref 55–135)
ALT SERPL W/O P-5'-P-CCNC: 27 U/L (ref 10–44)
ANION GAP SERPL CALC-SCNC: 9 MMOL/L (ref 8–16)
AST SERPL-CCNC: 15 U/L (ref 10–40)
BASOPHILS # BLD AUTO: 0.09 K/UL (ref 0–0.2)
BASOPHILS NFR BLD: 0.4 % (ref 0–1.9)
BILIRUB SERPL-MCNC: 1.7 MG/DL (ref 0.1–1)
BUN SERPL-MCNC: 6 MG/DL (ref 6–20)
CALCIUM SERPL-MCNC: 8.8 MG/DL (ref 8.7–10.5)
CHLORIDE SERPL-SCNC: 101 MMOL/L (ref 95–110)
CO2 SERPL-SCNC: 23 MMOL/L (ref 23–29)
CREAT SERPL-MCNC: 0.9 MG/DL (ref 0.5–1.4)
DIFFERENTIAL METHOD BLD: ABNORMAL
EOSINOPHIL # BLD AUTO: 0 K/UL (ref 0–0.5)
EOSINOPHIL NFR BLD: 0.2 % (ref 0–8)
ERYTHROCYTE [DISTWIDTH] IN BLOOD BY AUTOMATED COUNT: 14.4 % (ref 11.5–14.5)
EST. GFR  (NO RACE VARIABLE): >60 ML/MIN/1.73 M^2
GLUCOSE SERPL-MCNC: 139 MG/DL (ref 70–110)
HCT VFR BLD AUTO: 25.7 % (ref 40–54)
HGB BLD-MCNC: 9.3 G/DL (ref 14–18)
IMM GRANULOCYTES # BLD AUTO: 0.14 K/UL (ref 0–0.04)
IMM GRANULOCYTES NFR BLD AUTO: 0.6 % (ref 0–0.5)
LYMPHOCYTES # BLD AUTO: 2.5 K/UL (ref 1–4.8)
LYMPHOCYTES NFR BLD: 10.1 % (ref 18–48)
MAGNESIUM SERPL-MCNC: 1.8 MG/DL (ref 1.6–2.6)
MCH RBC QN AUTO: 31.1 PG (ref 27–31)
MCHC RBC AUTO-ENTMCNC: 36.2 G/DL (ref 32–36)
MCV RBC AUTO: 86 FL (ref 82–98)
MONOCYTES # BLD AUTO: 3.9 K/UL (ref 0.3–1)
MONOCYTES NFR BLD: 15.9 % (ref 4–15)
NEUTROPHILS # BLD AUTO: 18 K/UL (ref 1.8–7.7)
NEUTROPHILS NFR BLD: 72.8 % (ref 38–73)
NRBC BLD-RTO: 0 /100 WBC
PHOSPHATE SERPL-MCNC: 1.9 MG/DL (ref 2.7–4.5)
PLATELET # BLD AUTO: 427 K/UL (ref 150–450)
PMV BLD AUTO: 9.7 FL (ref 9.2–12.9)
POTASSIUM SERPL-SCNC: 3.3 MMOL/L (ref 3.5–5.1)
PROT SERPL-MCNC: 7.2 G/DL (ref 6–8.4)
RBC # BLD AUTO: 2.99 M/UL (ref 4.6–6.2)
SODIUM SERPL-SCNC: 133 MMOL/L (ref 136–145)
WBC # BLD AUTO: 24.7 K/UL (ref 3.9–12.7)

## 2024-03-12 PROCEDURE — 25000003 PHARM REV CODE 250: Performed by: HOSPITALIST

## 2024-03-12 PROCEDURE — 63600175 PHARM REV CODE 636 W HCPCS

## 2024-03-12 PROCEDURE — C1751 CATH, INF, PER/CENT/MIDLINE: HCPCS

## 2024-03-12 PROCEDURE — 27000221 HC OXYGEN, UP TO 24 HOURS

## 2024-03-12 PROCEDURE — A4216 STERILE WATER/SALINE, 10 ML: HCPCS | Performed by: STUDENT IN AN ORGANIZED HEALTH CARE EDUCATION/TRAINING PROGRAM

## 2024-03-12 PROCEDURE — 85025 COMPLETE CBC W/AUTO DIFF WBC: CPT

## 2024-03-12 PROCEDURE — 99223 1ST HOSP IP/OBS HIGH 75: CPT | Mod: ,,, | Performed by: STUDENT IN AN ORGANIZED HEALTH CARE EDUCATION/TRAINING PROGRAM

## 2024-03-12 PROCEDURE — 84100 ASSAY OF PHOSPHORUS: CPT

## 2024-03-12 PROCEDURE — 80053 COMPREHEN METABOLIC PANEL: CPT

## 2024-03-12 PROCEDURE — 83735 ASSAY OF MAGNESIUM: CPT

## 2024-03-12 PROCEDURE — 94640 AIRWAY INHALATION TREATMENT: CPT

## 2024-03-12 PROCEDURE — 99900035 HC TECH TIME PER 15 MIN (STAT)

## 2024-03-12 PROCEDURE — 25000003 PHARM REV CODE 250: Performed by: STUDENT IN AN ORGANIZED HEALTH CARE EDUCATION/TRAINING PROGRAM

## 2024-03-12 PROCEDURE — 25000003 PHARM REV CODE 250

## 2024-03-12 PROCEDURE — 36410 VNPNXR 3YR/> PHY/QHP DX/THER: CPT

## 2024-03-12 PROCEDURE — 94761 N-INVAS EAR/PLS OXIMETRY MLT: CPT

## 2024-03-12 PROCEDURE — 36415 COLL VENOUS BLD VENIPUNCTURE: CPT

## 2024-03-12 PROCEDURE — 25000242 PHARM REV CODE 250 ALT 637 W/ HCPCS: Performed by: HOSPITALIST

## 2024-03-12 PROCEDURE — 63600175 PHARM REV CODE 636 W HCPCS: Performed by: HOSPITALIST

## 2024-03-12 PROCEDURE — 21400001 HC TELEMETRY ROOM

## 2024-03-12 RX ORDER — OXYCODONE HYDROCHLORIDE 15 MG/1
15 TABLET ORAL EVERY 8 HOURS PRN
Status: DISCONTINUED | OUTPATIENT
Start: 2024-03-12 | End: 2024-03-13

## 2024-03-12 RX ORDER — SODIUM CHLORIDE 0.9 % (FLUSH) 0.9 %
10 SYRINGE (ML) INJECTION
Status: DISCONTINUED | OUTPATIENT
Start: 2024-03-12 | End: 2024-03-15 | Stop reason: HOSPADM

## 2024-03-12 RX ORDER — SODIUM CHLORIDE 9 MG/ML
INJECTION, SOLUTION INTRAVENOUS CONTINUOUS
Status: DISCONTINUED | OUTPATIENT
Start: 2024-03-12 | End: 2024-03-15 | Stop reason: HOSPADM

## 2024-03-12 RX ORDER — SODIUM CHLORIDE 0.9 % (FLUSH) 0.9 %
10 SYRINGE (ML) INJECTION EVERY 6 HOURS
Status: DISCONTINUED | OUTPATIENT
Start: 2024-03-12 | End: 2024-03-15 | Stop reason: HOSPADM

## 2024-03-12 RX ORDER — HYDROMORPHONE HYDROCHLORIDE 1 MG/ML
2 INJECTION, SOLUTION INTRAMUSCULAR; INTRAVENOUS; SUBCUTANEOUS EVERY 4 HOURS PRN
Status: DISCONTINUED | OUTPATIENT
Start: 2024-03-12 | End: 2024-03-13

## 2024-03-12 RX ADMIN — IPRATROPIUM BROMIDE AND ALBUTEROL SULFATE 3 ML: 2.5; .5 SOLUTION RESPIRATORY (INHALATION) at 03:03

## 2024-03-12 RX ADMIN — OXYCODONE HYDROCHLORIDE 15 MG: 15 TABLET ORAL at 12:03

## 2024-03-12 RX ADMIN — AZITHROMYCIN 500 MG: 500 INJECTION, POWDER, LYOPHILIZED, FOR SOLUTION INTRAVENOUS at 04:03

## 2024-03-12 RX ADMIN — OXYCODONE HYDROCHLORIDE 15 MG: 15 TABLET ORAL at 07:03

## 2024-03-12 RX ADMIN — HYDROMORPHONE HYDROCHLORIDE 2 MG: 1 INJECTION, SOLUTION INTRAMUSCULAR; INTRAVENOUS; SUBCUTANEOUS at 09:03

## 2024-03-12 RX ADMIN — SODIUM CHLORIDE: 9 INJECTION, SOLUTION INTRAVENOUS at 11:03

## 2024-03-12 RX ADMIN — IPRATROPIUM BROMIDE AND ALBUTEROL SULFATE 3 ML: 2.5; .5 SOLUTION RESPIRATORY (INHALATION) at 06:03

## 2024-03-12 RX ADMIN — ACETAMINOPHEN 650 MG: 325 TABLET ORAL at 11:03

## 2024-03-12 RX ADMIN — HEPARIN SODIUM 5000 UNITS: 5000 INJECTION INTRAVENOUS; SUBCUTANEOUS at 06:03

## 2024-03-12 RX ADMIN — HEPARIN SODIUM 5000 UNITS: 5000 INJECTION INTRAVENOUS; SUBCUTANEOUS at 01:03

## 2024-03-12 RX ADMIN — IPRATROPIUM BROMIDE AND ALBUTEROL SULFATE 3 ML: 2.5; .5 SOLUTION RESPIRATORY (INHALATION) at 11:03

## 2024-03-12 RX ADMIN — Medication 10 ML: at 05:03

## 2024-03-12 RX ADMIN — HEPARIN SODIUM 5000 UNITS: 5000 INJECTION INTRAVENOUS; SUBCUTANEOUS at 09:03

## 2024-03-12 RX ADMIN — POTASSIUM PHOSPHATE, MONOBASIC 500 MG: 500 TABLET, SOLUBLE ORAL at 11:03

## 2024-03-12 RX ADMIN — IPRATROPIUM BROMIDE AND ALBUTEROL SULFATE 3 ML: 2.5; .5 SOLUTION RESPIRATORY (INHALATION) at 04:03

## 2024-03-12 RX ADMIN — ACETAMINOPHEN 650 MG: 325 TABLET ORAL at 04:03

## 2024-03-12 RX ADMIN — FOLIC ACID 1 MG: 1 TABLET ORAL at 08:03

## 2024-03-12 RX ADMIN — HYDROMORPHONE HYDROCHLORIDE 2 MG: 1 INJECTION, SOLUTION INTRAMUSCULAR; INTRAVENOUS; SUBCUTANEOUS at 04:03

## 2024-03-12 RX ADMIN — Medication 10 ML: at 11:03

## 2024-03-12 RX ADMIN — IPRATROPIUM BROMIDE AND ALBUTEROL SULFATE 3 ML: 2.5; .5 SOLUTION RESPIRATORY (INHALATION) at 07:03

## 2024-03-12 RX ADMIN — HYDROMORPHONE HYDROCHLORIDE 2 MG: 1 INJECTION, SOLUTION INTRAMUSCULAR; INTRAVENOUS; SUBCUTANEOUS at 03:03

## 2024-03-12 RX ADMIN — HYDROXYUREA 500 MG: 500 CAPSULE ORAL at 08:03

## 2024-03-12 RX ADMIN — HYDROMORPHONE HYDROCHLORIDE 2 MG: 1 INJECTION, SOLUTION INTRAMUSCULAR; INTRAVENOUS; SUBCUTANEOUS at 10:03

## 2024-03-12 RX ADMIN — SODIUM CHLORIDE: 9 INJECTION, SOLUTION INTRAVENOUS at 09:03

## 2024-03-12 RX ADMIN — CEFTRIAXONE 1 G: 1 INJECTION, POWDER, FOR SOLUTION INTRAMUSCULAR; INTRAVENOUS at 09:03
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
## 2024-03-12 NOTE — SUBJECTIVE & OBJECTIVE
Interval History: still having significant left sided chest pain that radiates around to his back. Shortness of breath mainly due to pain.     Review of Systems   Constitutional:  Positive for fatigue and fever. Negative for chills.   Respiratory:  Positive for cough and shortness of breath. Negative for chest tightness and wheezing.    Cardiovascular:  Positive for chest pain. Negative for palpitations and leg swelling.   Gastrointestinal:  Negative for abdominal distention, abdominal pain, constipation, diarrhea, nausea and vomiting.   Genitourinary:  Negative for difficulty urinating and dysuria.   Skin:  Positive for wound.   Neurological:  Negative for weakness and numbness.   Psychiatric/Behavioral:  Negative for confusion.      Objective:     Vital Signs (Most Recent):  Temp: 98.2 °F (36.8 °C) (03/12/24 0729)  Pulse: (!) 120 (03/12/24 1047)  Resp: 18 (03/12/24 1043)  BP: 116/60 (03/12/24 0729)  SpO2: 96 % (03/12/24 0729) Vital Signs (24h Range):  Temp:  [98.1 °F (36.7 °C)-102.7 °F (39.3 °C)] 98.2 °F (36.8 °C)  Pulse:  [] 120  Resp:  [14-19] 18  SpO2:  [94 %-100 %] 96 %  BP: (116-136)/(56-75) 116/60     Weight: 79.9 kg (176 lb 2.4 oz)  Body mass index is 28.43 kg/m².    Intake/Output Summary (Last 24 hours) at 3/12/2024 1056  Last data filed at 3/12/2024 0800  Gross per 24 hour   Intake 880 ml   Output 2200 ml   Net -1320 ml           Physical Exam  Vitals and nursing note reviewed.   Constitutional:       General: He is not in acute distress.     Appearance: He is ill-appearing. He is not toxic-appearing.   HENT:      Head: Normocephalic and atraumatic.      Nose: Nose normal.      Mouth/Throat:      Mouth: Mucous membranes are moist.   Cardiovascular:      Rate and Rhythm: Normal rate and regular rhythm.      Pulses: Normal pulses.      Heart sounds: Normal heart sounds.      Comments: Pedal pulses are equal bilaterally  Pulmonary:      Breath sounds: Rhonchi (L base) present. No wheezing.       Comments: Increased effort. On 3L NC  Abdominal:      General: Bowel sounds are normal. There is no distension.      Palpations: Abdomen is soft. There is no mass.      Tenderness: There is no abdominal tenderness. There is no guarding.   Musculoskeletal:      Right lower leg: No edema.      Left lower leg: No edema.   Skin:     General: Skin is warm and dry.      Comments: Entry and exit site on R thigh. Minimal swelling around these sites, no fluctuance. No erythema/hyperpigmentation. No drainage.    Neurological:      Mental Status: He is alert. Mental status is at baseline.             Significant Labs: All pertinent labs within the past 24 hours have been reviewed.    Significant Imaging: I have reviewed all pertinent imaging results/findings within the past 24 hours.
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
## 2024-03-12 NOTE — NURSING
Patient received Midline to up right medial side of arm due to poor venous access.  X-ray was ordered for placement

## 2024-03-12 NOTE — PROGRESS NOTES
Pt complaining of chest pain,10/10 further adds its been hurting since yesterday, this is why he came to hospital.    PRN Inj Hydromorphone 2mg IV given pain.      On reassessment,pain down to 8 but pt still moaning in pain.  ANDRE Mclean notified, RKG done as per the order.    One time dose of tab Oxycodone 15 mg given as per the provider order.      Post med assessment, pt resting well, sleeping, arouse to voice.  Plan of care ongoing
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
## 2024-03-12 NOTE — ASSESSMENT & PLAN NOTE
-Patient presents with chest pain and shortness on breath for the past 2 days.  D-dimer elevated.  CTA chest shows possible pulmonary embolism in left upper lobe with subsegmental consolidation within bilateral lower lobes, left greater than right.  -Patient with fever of 103.1 on admission to hospital floor and tachycardic. Leukocytosis WBC 20 on admit. Flu/COVID negative    - continue treatment with CTX/azithromycin  - check sputum culture if he is able to produce.  - schedule duonebs Q4h  - incentive spirometer  - continue IVF for now  - PRN tessalon and guaifenesin  - chest pain is most likely due to PNA/potential PE. V/Q scan with low probability of PE. Heparin stopped.  Considered acute chest syndrome- see SC disease for discussion

## 2024-03-12 NOTE — PLAN OF CARE
Case Management Assessment     [facility]  Pharmacy: [facility]     Patient Arrived From: home  Existing Help at Home: SO    Barriers to Discharge: none    Discharge Plan:    A. Home with family   B. Home with family      SW completed initial assessment and discussed discharge planning  with patient at his bedside. Patient lives with his significant other and will be providing transportation for him to get home when discharge from the hospital. Per dr. Mario patient will be here for another 3-4 days.     03/12/24 1041   Discharge Assessment   Assessment Type Discharge Planning Assessment   Confirmed/corrected address, phone number and insurance Yes   Confirmed Demographics Correct on Facesheet   Source of Information patient   Communicated YENY with patient/caregiver Date not available/Unable to determine   Reason For Admission Left upper lobe pneumonia   People in Home significant other   Do you expect to return to your current living situation? Yes   Do you have help at home or someone to help you manage your care at home? Yes   Who are your caregiver(s) and their phone number(s)? Magdalena Deal (Significant other) [phone] ([employer])   Prior to hospitilization cognitive status: Alert/Oriented   Current cognitive status: Alert/Oriented   Walking or Climbing Stairs Difficulty no   Dressing/Bathing Difficulty no   Equipment Currently Used at Home nebulizer   Readmission within 30 days? No   Patient currently being followed by outpatient case management? No   Do you currently have service(s) that help you manage your care at home? No   Do you take prescription medications? Yes   Do you have prescription coverage? Yes   Coverage Medicaid   Do you have any problems affording any of your prescribed medications? No   Is the patient taking medications as prescribed? yes   Who is going to help you get home at discharge? Magdalena Deal (Significant other) [phone] (Mobile)   How do  you get to doctors appointments? car, drives self;family or friend will provide   Are you on dialysis? No   Do you take coumadin? No   Discharge Plan A Home with family   Discharge Plan B Home with family   DME Needed Upon Discharge  none   Discharge Plan discussed with: Patient   Transition of Care Barriers None   Physical Activity   On average, how many days per week do you engage in moderate to strenuous exercise (like a brisk walk)? Pt Unable   OTHER   Name(s) of People in Home Magdalena Deal (Significant other) [phone] (Mobile)

## 2024-03-12 NOTE — NURSING
[facility]  Nurses Note -- 4 Eyes      3/12/2024       Skin assessed on: Q Shift      [x] No Pressure Injuries Present    []Prevention Measures Documented    [] Yes LDA  for Pressure Injury Previously documented     [] Yes New Pressure Injury Discovered   [] LDA for New Pressure Injury Added      Attending RN:  Gretel Marinelli RN     Second RN:  Jodi Goldberg RN
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
## 2024-03-13 LAB
ALBUMIN SERPL BCP-MCNC: 3 G/DL (ref 3.5–5.2)
ALP SERPL-CCNC: 67 U/L (ref 55–135)
ALT SERPL W/O P-5'-P-CCNC: 33 U/L (ref 10–44)
ANION GAP SERPL CALC-SCNC: 10 MMOL/L (ref 8–16)
AST SERPL-CCNC: 19 U/L (ref 10–40)
BASOPHILS # BLD AUTO: 0.08 K/UL (ref 0–0.2)
BASOPHILS NFR BLD: 0.4 % (ref 0–1.9)
BILIRUB SERPL-MCNC: 1.2 MG/DL (ref 0.1–1)
BUN SERPL-MCNC: 4 MG/DL (ref 6–20)
CALCIUM SERPL-MCNC: 8.3 MG/DL (ref 8.7–10.5)
CHLORIDE SERPL-SCNC: 102 MMOL/L (ref 95–110)
CO2 SERPL-SCNC: 25 MMOL/L (ref 23–29)
CREAT SERPL-MCNC: 0.8 MG/DL (ref 0.5–1.4)
DIFFERENTIAL METHOD BLD: ABNORMAL
EOSINOPHIL # BLD AUTO: 0.3 K/UL (ref 0–0.5)
EOSINOPHIL NFR BLD: 1.3 % (ref 0–8)
ERYTHROCYTE [DISTWIDTH] IN BLOOD BY AUTOMATED COUNT: 15.3 % (ref 11.5–14.5)
EST. GFR  (NO RACE VARIABLE): >60 ML/MIN/1.73 M^2
GLUCOSE SERPL-MCNC: 106 MG/DL (ref 70–110)
HCT VFR BLD AUTO: 23 % (ref 40–54)
HGB BLD-MCNC: 8.2 G/DL (ref 14–18)
IMM GRANULOCYTES # BLD AUTO: 0.12 K/UL (ref 0–0.04)
IMM GRANULOCYTES NFR BLD AUTO: 0.6 % (ref 0–0.5)
LYMPHOCYTES # BLD AUTO: 3.1 K/UL (ref 1–4.8)
LYMPHOCYTES NFR BLD: 14.9 % (ref 18–48)
MAGNESIUM SERPL-MCNC: 1.9 MG/DL (ref 1.6–2.6)
MCH RBC QN AUTO: 29.9 PG (ref 27–31)
MCHC RBC AUTO-ENTMCNC: 35.7 G/DL (ref 32–36)
MCV RBC AUTO: 84 FL (ref 82–98)
MONOCYTES # BLD AUTO: 3.7 K/UL (ref 0.3–1)
MONOCYTES NFR BLD: 18.1 % (ref 4–15)
NEUTROPHILS # BLD AUTO: 13.3 K/UL (ref 1.8–7.7)
NEUTROPHILS NFR BLD: 64.7 % (ref 38–73)
NRBC BLD-RTO: 0 /100 WBC
OHS QRS DURATION: 96 MS
OHS QTC CALCULATION: 416 MS
PHOSPHATE SERPL-MCNC: 2.8 MG/DL (ref 2.7–4.5)
PLATELET # BLD AUTO: 425 K/UL (ref 150–450)
PMV BLD AUTO: 10.2 FL (ref 9.2–12.9)
POTASSIUM SERPL-SCNC: 4 MMOL/L (ref 3.5–5.1)
PROT SERPL-MCNC: 6.3 G/DL (ref 6–8.4)
RBC # BLD AUTO: 2.74 M/UL (ref 4.6–6.2)
RETICS/RBC NFR AUTO: 4.5 % (ref 0.4–2)
SODIUM SERPL-SCNC: 137 MMOL/L (ref 136–145)
WBC # BLD AUTO: 20.55 K/UL (ref 3.9–12.7)

## 2024-03-13 PROCEDURE — 94761 N-INVAS EAR/PLS OXIMETRY MLT: CPT

## 2024-03-13 PROCEDURE — 21400001 HC TELEMETRY ROOM

## 2024-03-13 PROCEDURE — 84100 ASSAY OF PHOSPHORUS: CPT

## 2024-03-13 PROCEDURE — 83735 ASSAY OF MAGNESIUM: CPT

## 2024-03-13 PROCEDURE — 27000221 HC OXYGEN, UP TO 24 HOURS

## 2024-03-13 PROCEDURE — 25000242 PHARM REV CODE 250 ALT 637 W/ HCPCS: Performed by: HOSPITALIST

## 2024-03-13 PROCEDURE — 25000003 PHARM REV CODE 250

## 2024-03-13 PROCEDURE — 94760 N-INVAS EAR/PLS OXIMETRY 1: CPT

## 2024-03-13 PROCEDURE — 25000003 PHARM REV CODE 250: Performed by: STUDENT IN AN ORGANIZED HEALTH CARE EDUCATION/TRAINING PROGRAM

## 2024-03-13 PROCEDURE — A4216 STERILE WATER/SALINE, 10 ML: HCPCS | Performed by: STUDENT IN AN ORGANIZED HEALTH CARE EDUCATION/TRAINING PROGRAM

## 2024-03-13 PROCEDURE — 63600175 PHARM REV CODE 636 W HCPCS

## 2024-03-13 PROCEDURE — 25000003 PHARM REV CODE 250: Performed by: HOSPITALIST

## 2024-03-13 PROCEDURE — 94640 AIRWAY INHALATION TREATMENT: CPT

## 2024-03-13 PROCEDURE — 63600175 PHARM REV CODE 636 W HCPCS: Performed by: STUDENT IN AN ORGANIZED HEALTH CARE EDUCATION/TRAINING PROGRAM

## 2024-03-13 PROCEDURE — 85045 AUTOMATED RETICULOCYTE COUNT: CPT | Performed by: STUDENT IN AN ORGANIZED HEALTH CARE EDUCATION/TRAINING PROGRAM

## 2024-03-13 PROCEDURE — 85025 COMPLETE CBC W/AUTO DIFF WBC: CPT

## 2024-03-13 PROCEDURE — 63600175 PHARM REV CODE 636 W HCPCS: Performed by: HOSPITALIST

## 2024-03-13 PROCEDURE — 80053 COMPREHEN METABOLIC PANEL: CPT

## 2024-03-13 PROCEDURE — 99900035 HC TECH TIME PER 15 MIN (STAT)

## 2024-03-13 RX ORDER — HYDROMORPHONE HYDROCHLORIDE 1 MG/ML
2 INJECTION, SOLUTION INTRAMUSCULAR; INTRAVENOUS; SUBCUTANEOUS
Status: DISCONTINUED | OUTPATIENT
Start: 2024-03-13 | End: 2024-03-15 | Stop reason: HOSPADM

## 2024-03-13 RX ORDER — MUPIROCIN 20 MG/G
OINTMENT TOPICAL 2 TIMES DAILY
Status: DISCONTINUED | OUTPATIENT
Start: 2024-03-13 | End: 2024-03-15 | Stop reason: HOSPADM

## 2024-03-13 RX ORDER — OXYCODONE HYDROCHLORIDE 15 MG/1
15 TABLET ORAL EVERY 4 HOURS PRN
Status: DISCONTINUED | OUTPATIENT
Start: 2024-03-13 | End: 2024-03-15 | Stop reason: HOSPADM

## 2024-03-13 RX ADMIN — HYDROMORPHONE HYDROCHLORIDE 2 MG: 1 INJECTION, SOLUTION INTRAMUSCULAR; INTRAVENOUS; SUBCUTANEOUS at 01:03

## 2024-03-13 RX ADMIN — ACETAMINOPHEN 650 MG: 325 TABLET ORAL at 11:03

## 2024-03-13 RX ADMIN — CEFTRIAXONE 1 G: 1 INJECTION, POWDER, FOR SOLUTION INTRAMUSCULAR; INTRAVENOUS at 08:03

## 2024-03-13 RX ADMIN — Medication 10 ML: at 11:03

## 2024-03-13 RX ADMIN — OXYCODONE HYDROCHLORIDE 15 MG: 15 TABLET ORAL at 08:03

## 2024-03-13 RX ADMIN — SODIUM CHLORIDE: 9 INJECTION, SOLUTION INTRAVENOUS at 04:03

## 2024-03-13 RX ADMIN — ACETAMINOPHEN 650 MG: 325 TABLET ORAL at 08:03

## 2024-03-13 RX ADMIN — IPRATROPIUM BROMIDE AND ALBUTEROL SULFATE 3 ML: 2.5; .5 SOLUTION RESPIRATORY (INHALATION) at 07:03

## 2024-03-13 RX ADMIN — IPRATROPIUM BROMIDE AND ALBUTEROL SULFATE 3 ML: 2.5; .5 SOLUTION RESPIRATORY (INHALATION) at 11:03

## 2024-03-13 RX ADMIN — HEPARIN SODIUM 5000 UNITS: 5000 INJECTION INTRAVENOUS; SUBCUTANEOUS at 05:03

## 2024-03-13 RX ADMIN — HYDROMORPHONE HYDROCHLORIDE 2 MG: 1 INJECTION, SOLUTION INTRAMUSCULAR; INTRAVENOUS; SUBCUTANEOUS at 06:03

## 2024-03-13 RX ADMIN — OXYCODONE HYDROCHLORIDE 15 MG: 15 TABLET ORAL at 05:03

## 2024-03-13 RX ADMIN — OXYCODONE HYDROCHLORIDE 15 MG: 15 TABLET ORAL at 12:03

## 2024-03-13 RX ADMIN — HYDROMORPHONE HYDROCHLORIDE 2 MG: 1 INJECTION, SOLUTION INTRAMUSCULAR; INTRAVENOUS; SUBCUTANEOUS at 02:03

## 2024-03-13 RX ADMIN — POTASSIUM PHOSPHATE, MONOBASIC 500 MG: 500 TABLET, SOLUBLE ORAL at 08:03

## 2024-03-13 RX ADMIN — OXYCODONE HYDROCHLORIDE 15 MG: 15 TABLET ORAL at 04:03

## 2024-03-13 RX ADMIN — HEPARIN SODIUM 5000 UNITS: 5000 INJECTION INTRAVENOUS; SUBCUTANEOUS at 09:03

## 2024-03-13 RX ADMIN — HEPARIN SODIUM 5000 UNITS: 5000 INJECTION INTRAVENOUS; SUBCUTANEOUS at 02:03

## 2024-03-13 RX ADMIN — IPRATROPIUM BROMIDE AND ALBUTEROL SULFATE 3 ML: 2.5; .5 SOLUTION RESPIRATORY (INHALATION) at 03:03

## 2024-03-13 RX ADMIN — HYDROMORPHONE HYDROCHLORIDE 2 MG: 1 INJECTION, SOLUTION INTRAMUSCULAR; INTRAVENOUS; SUBCUTANEOUS at 10:03

## 2024-03-13 RX ADMIN — AZITHROMYCIN 500 MG: 500 INJECTION, POWDER, LYOPHILIZED, FOR SOLUTION INTRAVENOUS at 04:03

## 2024-03-13 RX ADMIN — Medication 10 ML: at 04:03

## 2024-03-13 RX ADMIN — MUPIROCIN: 20 OINTMENT TOPICAL at 11:03

## 2024-03-13 RX ADMIN — FOLIC ACID 1 MG: 1 TABLET ORAL at 08:03

## 2024-03-13 RX ADMIN — Medication 10 ML: at 05:03
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
## 2024-03-13 NOTE — NURSING
[facility] MEWS TRIGGER FOLLOW UP       MEWS Monitoring, Score is: 5   Indication for review: Temp, HR    Bedside NurseMike contacted, no concerns verbalized at this time, instructed to call [phone] for further concerns or assistance..
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
## 2024-03-13 NOTE — PLAN OF CARE
Problem: Adult Inpatient Plan of Care  Goal: Plan of Care Review  Outcome: Ongoing, Progressing  Goal: Patient-Specific Goal (Individualized)  Outcome: Ongoing, Progressing  Goal: Absence of Hospital-Acquired Illness or Injury  Outcome: Ongoing, Progressing  Goal: Optimal Comfort and Wellbeing  Outcome: Ongoing, Progressing  Goal: Readiness for Transition of Care  Outcome: Ongoing, Progressing     Problem: Adjustment to Illness (Sepsis/Septic Shock)  Goal: Optimal Coping  Outcome: Ongoing, Progressing     Problem: Bleeding (Sepsis/Septic Shock)  Goal: Absence of Bleeding  Outcome: Ongoing, Progressing     Problem: Glycemic Control Impaired (Sepsis/Septic Shock)  Goal: Blood Glucose Level Within Desired Range  Outcome: Ongoing, Progressing     Problem: Infection Progression (Sepsis/Septic Shock)  Goal: Absence of Infection Signs and Symptoms  Outcome: Ongoing, Progressing     Problem: Nutrition Impaired (Sepsis/Septic Shock)  Goal: Optimal Nutrition Intake  Outcome: Ongoing, Progressing     Problem: Fluid Imbalance (Pneumonia)  Goal: Fluid Balance  Outcome: Ongoing, Progressing     Problem: Infection (Pneumonia)  Goal: Resolution of Infection Signs and Symptoms  Outcome: Ongoing, Progressing     Problem: Respiratory Compromise (Pneumonia)  Goal: Effective Oxygenation and Ventilation  Outcome: Ongoing, Progressing     Problem: Impaired Wound Healing  Goal: Optimal Wound Healing  Outcome: Ongoing, Progressing     Problem: Fall Injury Risk  Goal: Absence of Fall and Fall-Related Injury  Outcome: Ongoing, Progressing     Problem: Skin Injury Risk Increased  Goal: Skin Health and Integrity  Outcome: Ongoing, Progressing     Problem: Infection  Goal: Absence of Infection Signs and Symptoms  Outcome: Ongoing, Progressing

## 2024-03-13 NOTE — PLAN OF CARE
Problem: Adjustment to Illness (Sepsis/Septic Shock)  Goal: Optimal Coping  3/12/2024 1938 by Gretel Marinelli, RN  Outcome: Ongoing, Progressing  3/12/2024 1540 by Gretel Marinelli, RN  Outcome: Ongoing, Progressing
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
## 2024-03-13 NOTE — NURSING
[facility], [facility]  Nurses Note -- 4 Eyes      3/13/2024       Skin assessed on: Q Shift      [x] No Pressure Injuries Present    [x]Prevention Measures Documented    [] Yes LDA  for Pressure Injury Previously documented     [] Yes New Pressure Injury Discovered   [] LDA for New Pressure Injury Added      Attending RN:  Mike Tolliver, RN     Second RN:  Mica OLEARY RN

## 2024-03-13 NOTE — PROGRESS NOTES
[facility], [facility] [place]  Nurses Note -- 4 Eyes      3/12/2024       Skin assessed on: Q Shift      [x] No Pressure Injuries Present    []Prevention Measures Documented    [] Yes LDA  for Pressure Injury Previously documented     [] Yes New Pressure Injury Discovered   [] LDA for New Pressure Injury Added      Attending RN:  Jodi Fair, RAPHAEL     Second RN:  Gretel Marinelli RN

## 2024-03-13 NOTE — PLAN OF CARE
Problem: Adult Inpatient Plan of Care  Goal: Plan of Care Review  Outcome: Ongoing, Progressing  Flowsheets (Taken 3/13/2024 1742)  Plan of Care Reviewed With: patient  Goal: Patient-Specific Goal (Individualized)  Outcome: Ongoing, Progressing  Goal: Absence of Hospital-Acquired Illness or Injury  Outcome: Ongoing, Progressing  Goal: Optimal Comfort and Wellbeing  Outcome: Ongoing, Progressing  Intervention: Monitor Pain and Promote Comfort  Flowsheets (Taken 3/13/2024 1742)  Pain Management Interventions: medication offered  Goal: Readiness for Transition of Care  Outcome: Ongoing, Progressing     Problem: Adjustment to Illness (Sepsis/Septic Shock)  Goal: Optimal Coping  Outcome: Ongoing, Progressing     Problem: Bleeding (Sepsis/Septic Shock)  Goal: Absence of Bleeding  Outcome: Ongoing, Progressing     Problem: Glycemic Control Impaired (Sepsis/Septic Shock)  Goal: Blood Glucose Level Within Desired Range  Outcome: Ongoing, Progressing     Problem: Infection Progression (Sepsis/Septic Shock)  Goal: Absence of Infection Signs and Symptoms  Outcome: Ongoing, Progressing  Intervention: Promote Stabilization  Flowsheets (Taken 3/13/2024 1742)  Fever Reduction/Comfort Measures: (Tylenol) other (see comments)  Fluid/Electrolyte Management: fluids provided     Problem: Nutrition Impaired (Sepsis/Septic Shock)  Goal: Optimal Nutrition Intake  Outcome: Ongoing, Progressing     Problem: Fluid Imbalance (Pneumonia)  Goal: Fluid Balance  Outcome: Ongoing, Progressing     Problem: Infection (Pneumonia)  Goal: Resolution of Infection Signs and Symptoms  Outcome: Ongoing, Progressing     Problem: Respiratory Compromise (Pneumonia)  Goal: Effective Oxygenation and Ventilation  Outcome: Ongoing, Progressing     Problem: Impaired Wound Healing  Goal: Optimal Wound Healing  Outcome: Ongoing, Progressing     Problem: Fall Injury Risk  Goal: Absence of Fall and Fall-Related Injury  Outcome: Ongoing, Progressing  Intervention:  Identify and Manage Contributors  Flowsheets (Taken 3/13/2024 1742)  Medication Review/Management: medications reviewed  Intervention: Promote Injury-Free Environment  Flowsheets (Taken 3/13/2024 1742)  Safety Promotion/Fall Prevention:   assistive device/personal item within reach   bed alarm set     Problem: Skin Injury Risk Increased  Goal: Skin Health and Integrity  Outcome: Ongoing, Progressing     Problem: Infection  Goal: Absence of Infection Signs and Symptoms  Outcome: Ongoing, Progressing  Intervention: Prevent or Manage Infection  Flowsheets (Taken 3/13/2024 1742)  Fever Reduction/Comfort Measures: (Tylenol) other (see comments)

## 2024-03-14 LAB
ANISOCYTOSIS BLD QL SMEAR: SLIGHT
BASOPHILS # BLD AUTO: 0.07 K/UL (ref 0–0.2)
BASOPHILS NFR BLD: 0.4 % (ref 0–1.9)
DIFFERENTIAL METHOD BLD: ABNORMAL
EOSINOPHIL # BLD AUTO: 0.3 K/UL (ref 0–0.5)
EOSINOPHIL NFR BLD: 1.8 % (ref 0–8)
ERYTHROCYTE [DISTWIDTH] IN BLOOD BY AUTOMATED COUNT: 16 % (ref 11.5–14.5)
HCT VFR BLD AUTO: 24.4 % (ref 40–54)
HGB BLD-MCNC: 8.6 G/DL (ref 14–18)
IMM GRANULOCYTES # BLD AUTO: 0.08 K/UL (ref 0–0.04)
IMM GRANULOCYTES NFR BLD AUTO: 0.4 % (ref 0–0.5)
LYMPHOCYTES # BLD AUTO: 2.3 K/UL (ref 1–4.8)
LYMPHOCYTES NFR BLD: 12.5 % (ref 18–48)
MCH RBC QN AUTO: 29.1 PG (ref 27–31)
MCHC RBC AUTO-ENTMCNC: 35.2 G/DL (ref 32–36)
MCV RBC AUTO: 82 FL (ref 82–98)
MONOCYTES # BLD AUTO: 2.8 K/UL (ref 0.3–1)
MONOCYTES NFR BLD: 15.5 % (ref 4–15)
NEUTROPHILS # BLD AUTO: 12.5 K/UL (ref 1.8–7.7)
NEUTROPHILS NFR BLD: 69.4 % (ref 38–73)
NRBC BLD-RTO: 0 /100 WBC
PLATELET # BLD AUTO: 489 K/UL (ref 150–450)
PLATELET BLD QL SMEAR: ABNORMAL
PMV BLD AUTO: 10.3 FL (ref 9.2–12.9)
RBC # BLD AUTO: 2.96 M/UL (ref 4.6–6.2)
SICKLE CELLS BLD QL SMEAR: ABNORMAL
TARGETS BLD QL SMEAR: ABNORMAL
WBC # BLD AUTO: 17.96 K/UL (ref 3.9–12.7)

## 2024-03-14 PROCEDURE — 25000003 PHARM REV CODE 250: Performed by: STUDENT IN AN ORGANIZED HEALTH CARE EDUCATION/TRAINING PROGRAM

## 2024-03-14 PROCEDURE — 94640 AIRWAY INHALATION TREATMENT: CPT

## 2024-03-14 PROCEDURE — 94761 N-INVAS EAR/PLS OXIMETRY MLT: CPT

## 2024-03-14 PROCEDURE — 21400001 HC TELEMETRY ROOM

## 2024-03-14 PROCEDURE — 25000003 PHARM REV CODE 250

## 2024-03-14 PROCEDURE — 63600175 PHARM REV CODE 636 W HCPCS: Performed by: HOSPITALIST

## 2024-03-14 PROCEDURE — 99900035 HC TECH TIME PER 15 MIN (STAT)

## 2024-03-14 PROCEDURE — 36415 COLL VENOUS BLD VENIPUNCTURE: CPT | Performed by: STUDENT IN AN ORGANIZED HEALTH CARE EDUCATION/TRAINING PROGRAM

## 2024-03-14 PROCEDURE — 63600175 PHARM REV CODE 636 W HCPCS

## 2024-03-14 PROCEDURE — 63600175 PHARM REV CODE 636 W HCPCS: Performed by: STUDENT IN AN ORGANIZED HEALTH CARE EDUCATION/TRAINING PROGRAM

## 2024-03-14 PROCEDURE — 25000242 PHARM REV CODE 250 ALT 637 W/ HCPCS: Performed by: HOSPITALIST

## 2024-03-14 PROCEDURE — 85025 COMPLETE CBC W/AUTO DIFF WBC: CPT | Performed by: STUDENT IN AN ORGANIZED HEALTH CARE EDUCATION/TRAINING PROGRAM

## 2024-03-14 PROCEDURE — A4216 STERILE WATER/SALINE, 10 ML: HCPCS | Performed by: STUDENT IN AN ORGANIZED HEALTH CARE EDUCATION/TRAINING PROGRAM

## 2024-03-14 PROCEDURE — 25000003 PHARM REV CODE 250: Performed by: HOSPITALIST

## 2024-03-14 PROCEDURE — 94799 UNLISTED PULMONARY SVC/PX: CPT | Mod: XB

## 2024-03-14 RX ADMIN — IPRATROPIUM BROMIDE AND ALBUTEROL SULFATE 3 ML: 2.5; .5 SOLUTION RESPIRATORY (INHALATION) at 11:03

## 2024-03-14 RX ADMIN — CEFTRIAXONE 1 G: 1 INJECTION, POWDER, FOR SOLUTION INTRAMUSCULAR; INTRAVENOUS at 08:03

## 2024-03-14 RX ADMIN — HYDROMORPHONE HYDROCHLORIDE 2 MG: 1 INJECTION, SOLUTION INTRAMUSCULAR; INTRAVENOUS; SUBCUTANEOUS at 06:03

## 2024-03-14 RX ADMIN — Medication 10 ML: at 05:03

## 2024-03-14 RX ADMIN — IPRATROPIUM BROMIDE AND ALBUTEROL SULFATE 3 ML: 2.5; .5 SOLUTION RESPIRATORY (INHALATION) at 04:03

## 2024-03-14 RX ADMIN — Medication 10 ML: at 12:03

## 2024-03-14 RX ADMIN — HYDROMORPHONE HYDROCHLORIDE 2 MG: 1 INJECTION, SOLUTION INTRAMUSCULAR; INTRAVENOUS; SUBCUTANEOUS at 02:03

## 2024-03-14 RX ADMIN — POTASSIUM PHOSPHATE, MONOBASIC 500 MG: 500 TABLET, SOLUBLE ORAL at 08:03

## 2024-03-14 RX ADMIN — OXYCODONE HYDROCHLORIDE 15 MG: 15 TABLET ORAL at 01:03

## 2024-03-14 RX ADMIN — IPRATROPIUM BROMIDE AND ALBUTEROL SULFATE 3 ML: 2.5; .5 SOLUTION RESPIRATORY (INHALATION) at 07:03

## 2024-03-14 RX ADMIN — ACETAMINOPHEN 650 MG: 325 TABLET ORAL at 07:03

## 2024-03-14 RX ADMIN — IPRATROPIUM BROMIDE AND ALBUTEROL SULFATE 3 ML: 2.5; .5 SOLUTION RESPIRATORY (INHALATION) at 12:03

## 2024-03-14 RX ADMIN — HEPARIN SODIUM 5000 UNITS: 5000 INJECTION INTRAVENOUS; SUBCUTANEOUS at 09:03

## 2024-03-14 RX ADMIN — ACETAMINOPHEN 650 MG: 325 TABLET ORAL at 05:03

## 2024-03-14 RX ADMIN — IPRATROPIUM BROMIDE AND ALBUTEROL SULFATE 3 ML: 2.5; .5 SOLUTION RESPIRATORY (INHALATION) at 03:03

## 2024-03-14 RX ADMIN — SODIUM CHLORIDE: 9 INJECTION, SOLUTION INTRAVENOUS at 03:03

## 2024-03-14 RX ADMIN — HYDROMORPHONE HYDROCHLORIDE 2 MG: 1 INJECTION, SOLUTION INTRAMUSCULAR; INTRAVENOUS; SUBCUTANEOUS at 09:03

## 2024-03-14 RX ADMIN — OXYCODONE HYDROCHLORIDE 15 MG: 15 TABLET ORAL at 05:03

## 2024-03-14 RX ADMIN — HEPARIN SODIUM 5000 UNITS: 5000 INJECTION INTRAVENOUS; SUBCUTANEOUS at 05:03

## 2024-03-14 RX ADMIN — SODIUM CHLORIDE: 9 INJECTION, SOLUTION INTRAVENOUS at 02:03

## 2024-03-14 RX ADMIN — OXYCODONE HYDROCHLORIDE 15 MG: 15 TABLET ORAL at 06:03

## 2024-03-14 RX ADMIN — HYDROMORPHONE HYDROCHLORIDE 2 MG: 1 INJECTION, SOLUTION INTRAMUSCULAR; INTRAVENOUS; SUBCUTANEOUS at 08:03

## 2024-03-14 RX ADMIN — FOLIC ACID 1 MG: 1 TABLET ORAL at 08:03

## 2024-03-14 RX ADMIN — HEPARIN SODIUM 5000 UNITS: 5000 INJECTION INTRAVENOUS; SUBCUTANEOUS at 02:03

## 2024-03-14 RX ADMIN — OXYCODONE HYDROCHLORIDE 15 MG: 15 TABLET ORAL at 12:03

## 2024-03-14 RX ADMIN — MELATONIN TAB 3 MG 6 MG: 3 TAB at 09:03

## 2024-03-14 NOTE — NURSING
[facility], [facility]  Nurses Note -- 4 Eyes      3/14/2024       Skin assessed on: Q Shift      [x] No Pressure Injuries Present    []Prevention Measures Documented    [] Yes LDA  for Pressure Injury Previously documented     [] Yes New Pressure Injury Discovered   [] LDA for New Pressure Injury Added      Attending RN:  Gretel Marinelli RN     Second RN:  Jodi Goldberg RN
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
## 2024-03-14 NOTE — PLAN OF CARE
Problem: Adjustment to Illness (Sepsis/Septic Shock)  Goal: Optimal Coping  Outcome: Ongoing, Progressing
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
## 2024-03-14 NOTE — PROGRESS NOTES
[facility], [facility] [place]  Nurses Note -- 4 Eyes      3/14/2024       Skin assessed on: Q Shift      [x] No Pressure Injuries Present    [x]Prevention Measures Documented    [] Yes LDA  for Pressure Injury Previously documented     [] Yes New Pressure Injury Discovered   [] LDA for New Pressure Injury Added      Attending RN:  Jodi Fair RN     Second RN:  Mike Tolliver RN
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
## 2024-03-14 NOTE — SUBJECTIVE & OBJECTIVE
Interval History: no acute issues, still with pain but better with increased frequency.     Review of Systems  Objective:     Vital Signs (Most Recent):  Temp: 98.2 °F (36.8 °C) (03/14/24 1118)  Pulse: 96 (03/14/24 1453)  Resp: 18 (03/14/24 1410)  BP: 110/67 (03/14/24 1118)  SpO2: 100 % (03/14/24 1118) Vital Signs (24h Range):  Temp:  [98.2 °F (36.8 °C)-101.3 °F (38.5 °C)] 98.2 °F (36.8 °C)  Pulse:  [] 96  Resp:  [17-20] 18  SpO2:  [95 %-100 %] 100 %  BP: (110-130)/(56-68) 110/67     Weight: 79.9 kg (176 lb 2.4 oz)  Body mass index is 28.43 kg/m².    Intake/Output Summary (Last 24 hours) at 3/14/2024 1526  Last data filed at 3/14/2024 1203  Gross per 24 hour   Intake 960 ml   Output 3000 ml   Net -2040 ml           Physical Exam  Vitals reviewed.   Constitutional:       General: He is not in acute distress.     Appearance: He is ill-appearing.   Cardiovascular:      Rate and Rhythm: Normal rate and regular rhythm.      Pulses: Normal pulses.   Pulmonary:      Effort: Pulmonary effort is normal. No respiratory distress.      Breath sounds: No wheezing.   Abdominal:      General: Bowel sounds are normal. There is no distension.   Skin:     General: Skin is warm.   Neurological:      Mental Status: He is alert. Mental status is at baseline.   Psychiatric:         Mood and Affect: Mood normal.         Thought Content: Thought content normal.             Significant Labs: All pertinent labs within the past 24 hours have been reviewed.    Significant Imaging: I have reviewed all pertinent imaging results/findings within the past 24 hours.

## 2024-03-15 VITALS
WEIGHT: 176.13 LBS | SYSTOLIC BLOOD PRESSURE: 121 MMHG | OXYGEN SATURATION: 99 % | DIASTOLIC BLOOD PRESSURE: 85 MMHG | HEART RATE: 106 BPM | BODY MASS INDEX: 28.31 KG/M2 | TEMPERATURE: 99 F | RESPIRATION RATE: 18 BRPM | HEIGHT: 66 IN

## 2024-03-15 LAB
ANISOCYTOSIS BLD QL SMEAR: SLIGHT
BACTERIA BLD CULT: NORMAL
BASOPHILS NFR BLD: 0 % (ref 0–1.9)
DIFFERENTIAL METHOD BLD: ABNORMAL
EOSINOPHIL NFR BLD: 2 % (ref 0–8)
ERYTHROCYTE [DISTWIDTH] IN BLOOD BY AUTOMATED COUNT: 16.3 % (ref 11.5–14.5)
HCT VFR BLD AUTO: 24.4 % (ref 40–54)
HGB BLD-MCNC: 8.6 G/DL (ref 14–18)
IMM GRANULOCYTES # BLD AUTO: ABNORMAL K/UL (ref 0–0.04)
IMM GRANULOCYTES NFR BLD AUTO: ABNORMAL % (ref 0–0.5)
LYMPHOCYTES NFR BLD: 39 % (ref 18–48)
MCH RBC QN AUTO: 29.1 PG (ref 27–31)
MCHC RBC AUTO-ENTMCNC: 35.2 G/DL (ref 32–36)
MCV RBC AUTO: 82 FL (ref 82–98)
MONOCYTES NFR BLD: 7 % (ref 4–15)
NEUTROPHILS NFR BLD: 51 % (ref 38–73)
NEUTS BAND NFR BLD MANUAL: 1 %
NRBC BLD-RTO: 0 /100 WBC
PLATELET # BLD AUTO: 270 K/UL (ref 150–450)
PLATELET BLD QL SMEAR: ABNORMAL
PMV BLD AUTO: 10.4 FL (ref 9.2–12.9)
POLYCHROMASIA BLD QL SMEAR: ABNORMAL
RBC # BLD AUTO: 2.96 M/UL (ref 4.6–6.2)
SICKLE CELLS BLD QL SMEAR: ABNORMAL
SPHEROCYTES BLD QL SMEAR: ABNORMAL
TARGETS BLD QL SMEAR: ABNORMAL
WBC # BLD AUTO: 14.94 K/UL (ref 3.9–12.7)

## 2024-03-15 PROCEDURE — 94761 N-INVAS EAR/PLS OXIMETRY MLT: CPT

## 2024-03-15 PROCEDURE — 25000003 PHARM REV CODE 250

## 2024-03-15 PROCEDURE — 25000242 PHARM REV CODE 250 ALT 637 W/ HCPCS: Performed by: HOSPITALIST

## 2024-03-15 PROCEDURE — 36415 COLL VENOUS BLD VENIPUNCTURE: CPT | Performed by: STUDENT IN AN ORGANIZED HEALTH CARE EDUCATION/TRAINING PROGRAM

## 2024-03-15 PROCEDURE — 85027 COMPLETE CBC AUTOMATED: CPT | Performed by: STUDENT IN AN ORGANIZED HEALTH CARE EDUCATION/TRAINING PROGRAM

## 2024-03-15 PROCEDURE — 25000003 PHARM REV CODE 250: Performed by: STUDENT IN AN ORGANIZED HEALTH CARE EDUCATION/TRAINING PROGRAM

## 2024-03-15 PROCEDURE — 27000221 HC OXYGEN, UP TO 24 HOURS

## 2024-03-15 PROCEDURE — 94640 AIRWAY INHALATION TREATMENT: CPT

## 2024-03-15 PROCEDURE — 63600175 PHARM REV CODE 636 W HCPCS: Performed by: HOSPITALIST

## 2024-03-15 PROCEDURE — 99900035 HC TECH TIME PER 15 MIN (STAT)

## 2024-03-15 PROCEDURE — 85007 BL SMEAR W/DIFF WBC COUNT: CPT | Performed by: STUDENT IN AN ORGANIZED HEALTH CARE EDUCATION/TRAINING PROGRAM

## 2024-03-15 PROCEDURE — 25000003 PHARM REV CODE 250: Performed by: HOSPITALIST

## 2024-03-15 PROCEDURE — 63600175 PHARM REV CODE 636 W HCPCS: Performed by: STUDENT IN AN ORGANIZED HEALTH CARE EDUCATION/TRAINING PROGRAM

## 2024-03-15 RX ORDER — LEVOFLOXACIN 750 MG/1
750 TABLET ORAL DAILY
Qty: 5 TABLET | Refills: 0 | Status: SHIPPED | OUTPATIENT
Start: 2024-03-15

## 2024-03-15 RX ADMIN — IPRATROPIUM BROMIDE AND ALBUTEROL SULFATE 3 ML: 2.5; .5 SOLUTION RESPIRATORY (INHALATION) at 07:03

## 2024-03-15 RX ADMIN — HEPARIN SODIUM 5000 UNITS: 5000 INJECTION INTRAVENOUS; SUBCUTANEOUS at 05:03

## 2024-03-15 RX ADMIN — HYDROMORPHONE HYDROCHLORIDE 2 MG: 1 INJECTION, SOLUTION INTRAMUSCULAR; INTRAVENOUS; SUBCUTANEOUS at 01:03

## 2024-03-15 RX ADMIN — GUAIFENESIN 200 MG: 200 SOLUTION ORAL at 05:03

## 2024-03-15 RX ADMIN — POLYETHYLENE GLYCOL 3350 17 G: 17 POWDER, FOR SOLUTION ORAL at 03:03

## 2024-03-15 RX ADMIN — OXYCODONE HYDROCHLORIDE 15 MG: 15 TABLET ORAL at 05:03

## 2024-03-15 RX ADMIN — IPRATROPIUM BROMIDE AND ALBUTEROL SULFATE 3 ML: 2.5; .5 SOLUTION RESPIRATORY (INHALATION) at 03:03

## 2024-03-15 RX ADMIN — POTASSIUM PHOSPHATE, MONOBASIC 500 MG: 500 TABLET, SOLUBLE ORAL at 09:03

## 2024-03-15 RX ADMIN — FOLIC ACID 1 MG: 1 TABLET ORAL at 09:03

## 2024-03-15 RX ADMIN — HEPARIN SODIUM 5000 UNITS: 5000 INJECTION INTRAVENOUS; SUBCUTANEOUS at 02:03

## 2024-03-15 RX ADMIN — OXYCODONE HYDROCHLORIDE 15 MG: 15 TABLET ORAL at 12:03

## 2024-03-15 RX ADMIN — IPRATROPIUM BROMIDE AND ALBUTEROL SULFATE 3 ML: 2.5; .5 SOLUTION RESPIRATORY (INHALATION) at 11:03

## 2024-03-15 RX ADMIN — SODIUM CHLORIDE: 9 INJECTION, SOLUTION INTRAVENOUS at 01:03

## 2024-03-15 RX ADMIN — HYDROMORPHONE HYDROCHLORIDE 2 MG: 1 INJECTION, SOLUTION INTRAMUSCULAR; INTRAVENOUS; SUBCUTANEOUS at 12:03

## 2024-03-15 NOTE — NURSING
[facility] [facility] [place] 4 [place]      3/14/2024       Skin assessed on: Q Shift      [x] No Pressure Injuries Present    [x]Prevention Measures Documented    [] Yes LDA  for Pressure Injury Previously documented     [] Yes New Pressure Injury Discovered   [] LDA for New Pressure Injury Added      Attending RN:  Abril Caballero RN     Second RN:  Gretel CASTELAN RN

## 2024-03-15 NOTE — PLAN OF CARE
Problem: Adult Inpatient Plan of Care  Goal: Plan of Care Review  Outcome: Ongoing, Progressing     Problem: Adjustment to Illness (Sepsis/Septic Shock)  Goal: Optimal Coping  Outcome: Ongoing, Progressing     Problem: Bleeding (Sepsis/Septic Shock)  Goal: Absence of Bleeding  Outcome: Ongoing, Progressing     Problem: Glycemic Control Impaired (Sepsis/Septic Shock)  Goal: Blood Glucose Level Within Desired Range  Outcome: Ongoing, Progressing     Problem: Infection Progression (Sepsis/Septic Shock)  Goal: Absence of Infection Signs and Symptoms  Outcome: Ongoing, Progressing     Problem: Nutrition Impaired (Sepsis/Septic Shock)  Goal: Optimal Nutrition Intake  Outcome: Ongoing, Progressing     Problem: Fluid Imbalance (Pneumonia)  Goal: Fluid Balance  Outcome: Ongoing, Progressing     Problem: Infection (Pneumonia)  Goal: Resolution of Infection Signs and Symptoms  Outcome: Ongoing, Progressing     Problem: Respiratory Compromise (Pneumonia)  Goal: Effective Oxygenation and Ventilation  Outcome: Ongoing, Progressing     Problem: Impaired Wound Healing  Goal: Optimal Wound Healing  Outcome: Ongoing, Progressing     Problem: Fall Injury Risk  Goal: Absence of Fall and Fall-Related Injury  Outcome: Ongoing, Progressing     Problem: Skin Injury Risk Increased  Goal: Skin Health and Integrity  Outcome: Ongoing, Progressing     Problem: Infection  Goal: Absence of Infection Signs and Symptoms  Outcome: Ongoing, Progressing

## 2024-03-15 NOTE — NURSING
Patient continue to have high fever and uncontrolled pain.  Will continue to treat for pain and fever.  Patient was sitting up in chair today for long period and tolerated with issues.

## 2024-03-15 NOTE — NURSING
[facility], [facility] [place]  Nurses Note -- 4 Eyes      3/15/2024       Skin assessed on: Q Shift      [x] No Pressure Injuries Present    []Prevention Measures Documented    [] Yes LDA  for Pressure Injury Previously documented     [] Yes New Pressure Injury Discovered   [] LDA for New Pressure Injury Added      Attending :  Rosana Denton LPN     Second RN:  Abril Caballero RN

## 2024-03-15 NOTE — PLAN OF CARE
REASSESSMENT:  Per SIBR rounds still spiking fevers yesterday.  Continue antibiotics.  Will check pain control.  Will need pcp follow up.     Plan A:  Home with family  Plan B:  home with famil     03/15/24 1002   Discharge Reassessment   Assessment Type Discharge Planning Reassessment   Did the patient's condition or plan change since previous assessment? No   Discharge Plan discussed with: Patient   Communicated YENY with patient/caregiver Date not available/Unable to determine   Discharge Plan A Home with family   Discharge Plan B Home with family   DME Needed Upon Discharge  none   Transition of Care Barriers None   Why the patient remains in the hospital Requires continued medical care   Post-Acute Status   Coverage medicaid   Discharge Delays None known at this time

## 2024-03-15 NOTE — PLAN OF CARE
Problem: Adult Inpatient Plan of Care  Goal: Plan of Care Review  Outcome: Met  Goal: Patient-Specific Goal (Individualized)  Outcome: Met  Goal: Absence of Hospital-Acquired Illness or Injury  Outcome: Met  Goal: Optimal Comfort and Wellbeing  Outcome: Met  Goal: Readiness for Transition of Care  Outcome: Met     Problem: Adjustment to Illness (Sepsis/Septic Shock)  Goal: Optimal Coping  Outcome: Met     Problem: Bleeding (Sepsis/Septic Shock)  Goal: Absence of Bleeding  Outcome: Met     Problem: Glycemic Control Impaired (Sepsis/Septic Shock)  Goal: Blood Glucose Level Within Desired Range  Outcome: Met     Problem: Infection Progression (Sepsis/Septic Shock)  Goal: Absence of Infection Signs and Symptoms  Outcome: Met     Problem: Nutrition Impaired (Sepsis/Septic Shock)  Goal: Optimal Nutrition Intake  Outcome: Met     Problem: Fluid Imbalance (Pneumonia)  Goal: Fluid Balance  Outcome: Met     Problem: Infection (Pneumonia)  Goal: Resolution of Infection Signs and Symptoms  Outcome: Met     Problem: Respiratory Compromise (Pneumonia)  Goal: Effective Oxygenation and Ventilation  Outcome: Met     Problem: Impaired Wound Healing  Goal: Optimal Wound Healing  Outcome: Met     Problem: Fall Injury Risk  Goal: Absence of Fall and Fall-Related Injury  Outcome: Met     Problem: Skin Injury Risk Increased  Goal: Skin Health and Integrity  Outcome: Met     Problem: Infection  Goal: Absence of Infection Signs and Symptoms  Outcome: Met
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
## 2024-06-10 PROBLEM — J18.9 LEFT UPPER LOBE PNEUMONIA: Status: RESOLVED | Noted: 2019-11-10 | Resolved: 2024-06-10

## 2024-06-10 PROBLEM — A41.9 SEPSIS: Status: RESOLVED | Noted: 2024-03-11 | Resolved: 2024-06-10

## 2024-07-11 ENCOUNTER — HOSPITAL ENCOUNTER (EMERGENCY)
Facility: HOSPITAL | Age: 34
Discharge: LEFT BEFORE DISCHARGE | End: 2024-07-12
Attending: EMERGENCY MEDICINE
Payer: MEDICAID

## 2024-07-11 DIAGNOSIS — R06.02 SOB (SHORTNESS OF BREATH): ICD-10-CM

## 2024-07-11 DIAGNOSIS — R07.81 PLEURITIC CHEST PAIN: ICD-10-CM

## 2024-07-11 DIAGNOSIS — J06.9 VIRAL URI WITH COUGH: Primary | ICD-10-CM

## 2024-07-11 LAB
CTP QC/QA: YES
CTP QC/QA: YES
POC MOLECULAR INFLUENZA A AGN: NEGATIVE
POC MOLECULAR INFLUENZA B AGN: NEGATIVE
SARS-COV-2 RDRP RESP QL NAA+PROBE: NEGATIVE

## 2024-07-11 PROCEDURE — 87635 SARS-COV-2 COVID-19 AMP PRB: CPT | Performed by: EMERGENCY MEDICINE

## 2024-07-11 PROCEDURE — 87502 INFLUENZA DNA AMP PROBE: CPT

## 2024-07-11 PROCEDURE — 99285 EMERGENCY DEPT VISIT HI MDM: CPT | Mod: 25

## 2024-07-12 VITALS
DIASTOLIC BLOOD PRESSURE: 81 MMHG | WEIGHT: 180 LBS | TEMPERATURE: 99 F | HEART RATE: 75 BPM | RESPIRATION RATE: 13 BRPM | OXYGEN SATURATION: 97 % | SYSTOLIC BLOOD PRESSURE: 122 MMHG | HEIGHT: 66 IN | BODY MASS INDEX: 28.93 KG/M2

## 2024-07-12 LAB
ALBUMIN SERPL BCP-MCNC: 3.8 G/DL (ref 3.5–5.2)
ALP SERPL-CCNC: 89 U/L (ref 55–135)
ALT SERPL W/O P-5'-P-CCNC: 24 U/L (ref 10–44)
ANION GAP SERPL CALC-SCNC: 10 MMOL/L (ref 8–16)
ANISOCYTOSIS BLD QL SMEAR: SLIGHT
AST SERPL-CCNC: 17 U/L (ref 10–40)
BASOPHILS # BLD AUTO: 0.08 K/UL (ref 0–0.2)
BASOPHILS NFR BLD: 0.5 % (ref 0–1.9)
BILIRUB SERPL-MCNC: 0.9 MG/DL (ref 0.1–1)
BUN SERPL-MCNC: 6 MG/DL (ref 6–20)
CALCIUM SERPL-MCNC: 9.2 MG/DL (ref 8.7–10.5)
CHLORIDE SERPL-SCNC: 103 MMOL/L (ref 95–110)
CO2 SERPL-SCNC: 25 MMOL/L (ref 23–29)
CREAT SERPL-MCNC: 0.9 MG/DL (ref 0.5–1.4)
D DIMER PPP IA.FEU-MCNC: 1.24 MG/L FEU
DIFFERENTIAL METHOD BLD: ABNORMAL
EOSINOPHIL # BLD AUTO: 2 K/UL (ref 0–0.5)
EOSINOPHIL NFR BLD: 13.6 % (ref 0–8)
ERYTHROCYTE [DISTWIDTH] IN BLOOD BY AUTOMATED COUNT: 15.5 % (ref 11.5–14.5)
EST. GFR  (NO RACE VARIABLE): >60 ML/MIN/1.73 M^2
GLUCOSE SERPL-MCNC: 72 MG/DL (ref 70–110)
HCT VFR BLD AUTO: 32.5 % (ref 40–54)
HGB BLD-MCNC: 11.9 G/DL (ref 14–18)
HYPOCHROMIA BLD QL SMEAR: ABNORMAL
IMM GRANULOCYTES # BLD AUTO: 0.06 K/UL (ref 0–0.04)
IMM GRANULOCYTES NFR BLD AUTO: 0.4 % (ref 0–0.5)
LIPASE SERPL-CCNC: 13 U/L (ref 4–60)
LYMPHOCYTES # BLD AUTO: 4.6 K/UL (ref 1–4.8)
LYMPHOCYTES NFR BLD: 30.8 % (ref 18–48)
MAGNESIUM SERPL-MCNC: 1.8 MG/DL (ref 1.6–2.6)
MCH RBC QN AUTO: 30.4 PG (ref 27–31)
MCHC RBC AUTO-ENTMCNC: 36.6 G/DL (ref 32–36)
MCV RBC AUTO: 83 FL (ref 82–98)
MONOCYTES # BLD AUTO: 1.9 K/UL (ref 0.3–1)
MONOCYTES NFR BLD: 12.8 % (ref 4–15)
NEUTROPHILS # BLD AUTO: 6.2 K/UL (ref 1.8–7.7)
NEUTROPHILS NFR BLD: 41.9 % (ref 38–73)
NRBC BLD-RTO: 0 /100 WBC
OHS QRS DURATION: 92 MS
OHS QTC CALCULATION: 421 MS
PLATELET # BLD AUTO: 491 K/UL (ref 150–450)
PLATELET BLD QL SMEAR: ABNORMAL
PMV BLD AUTO: 8.6 FL (ref 9.2–12.9)
POIKILOCYTOSIS BLD QL SMEAR: SLIGHT
POLYCHROMASIA BLD QL SMEAR: ABNORMAL
POTASSIUM SERPL-SCNC: 3.6 MMOL/L (ref 3.5–5.1)
PROCALCITONIN SERPL IA-MCNC: 0.11 NG/ML
PROT SERPL-MCNC: 7.4 G/DL (ref 6–8.4)
RBC # BLD AUTO: 3.91 M/UL (ref 4.6–6.2)
RETICS/RBC NFR AUTO: 6.1 % (ref 0.4–2)
SICKLE CELLS BLD QL SMEAR: ABNORMAL
SODIUM SERPL-SCNC: 138 MMOL/L (ref 136–145)
TARGETS BLD QL SMEAR: ABNORMAL
TROPONIN I SERPL DL<=0.01 NG/ML-MCNC: <0.006 NG/ML (ref 0–0.03)
WBC # BLD AUTO: 14.91 K/UL (ref 3.9–12.7)

## 2024-07-12 PROCEDURE — 93005 ELECTROCARDIOGRAM TRACING: CPT

## 2024-07-12 PROCEDURE — 85379 FIBRIN DEGRADATION QUANT: CPT | Performed by: PHYSICIAN ASSISTANT

## 2024-07-12 PROCEDURE — 83690 ASSAY OF LIPASE: CPT | Performed by: PHYSICIAN ASSISTANT

## 2024-07-12 PROCEDURE — 25500020 PHARM REV CODE 255: Performed by: EMERGENCY MEDICINE

## 2024-07-12 PROCEDURE — 84484 ASSAY OF TROPONIN QUANT: CPT | Performed by: PHYSICIAN ASSISTANT

## 2024-07-12 PROCEDURE — 94640 AIRWAY INHALATION TREATMENT: CPT

## 2024-07-12 PROCEDURE — 96374 THER/PROPH/DIAG INJ IV PUSH: CPT

## 2024-07-12 PROCEDURE — 25000003 PHARM REV CODE 250: Performed by: PHYSICIAN ASSISTANT

## 2024-07-12 PROCEDURE — 85045 AUTOMATED RETICULOCYTE COUNT: CPT | Performed by: PHYSICIAN ASSISTANT

## 2024-07-12 PROCEDURE — 63600175 PHARM REV CODE 636 W HCPCS: Performed by: PHYSICIAN ASSISTANT

## 2024-07-12 PROCEDURE — 80053 COMPREHEN METABOLIC PANEL: CPT | Performed by: PHYSICIAN ASSISTANT

## 2024-07-12 PROCEDURE — 84145 PROCALCITONIN (PCT): CPT | Performed by: PHYSICIAN ASSISTANT

## 2024-07-12 PROCEDURE — 85025 COMPLETE CBC W/AUTO DIFF WBC: CPT | Performed by: PHYSICIAN ASSISTANT

## 2024-07-12 PROCEDURE — 83735 ASSAY OF MAGNESIUM: CPT | Performed by: PHYSICIAN ASSISTANT

## 2024-07-12 PROCEDURE — 25000242 PHARM REV CODE 250 ALT 637 W/ HCPCS: Performed by: PHYSICIAN ASSISTANT

## 2024-07-12 PROCEDURE — 93010 ELECTROCARDIOGRAM REPORT: CPT | Mod: ,,, | Performed by: INTERNAL MEDICINE

## 2024-07-12 RX ORDER — KETOROLAC TROMETHAMINE 30 MG/ML
15 INJECTION, SOLUTION INTRAMUSCULAR; INTRAVENOUS
Status: DISCONTINUED | OUTPATIENT
Start: 2024-07-12 | End: 2024-07-12 | Stop reason: HOSPADM

## 2024-07-12 RX ORDER — ALUMINUM HYDROXIDE, MAGNESIUM HYDROXIDE, AND SIMETHICONE 1200; 120; 1200 MG/30ML; MG/30ML; MG/30ML
30 SUSPENSION ORAL ONCE
Status: COMPLETED | OUTPATIENT
Start: 2024-07-12 | End: 2024-07-12

## 2024-07-12 RX ORDER — IPRATROPIUM BROMIDE AND ALBUTEROL SULFATE 2.5; .5 MG/3ML; MG/3ML
3 SOLUTION RESPIRATORY (INHALATION)
Status: COMPLETED | OUTPATIENT
Start: 2024-07-12 | End: 2024-07-12

## 2024-07-12 RX ORDER — LIDOCAINE HYDROCHLORIDE 20 MG/ML
15 SOLUTION OROPHARYNGEAL ONCE
Status: COMPLETED | OUTPATIENT
Start: 2024-07-12 | End: 2024-07-12

## 2024-07-12 RX ORDER — PANTOPRAZOLE SODIUM 40 MG/10ML
40 INJECTION, POWDER, LYOPHILIZED, FOR SOLUTION INTRAVENOUS
Status: COMPLETED | OUTPATIENT
Start: 2024-07-12 | End: 2024-07-12

## 2024-07-12 RX ADMIN — LIDOCAINE HYDROCHLORIDE 15 ML: 20 SOLUTION ORAL at 12:07

## 2024-07-12 RX ADMIN — PANTOPRAZOLE SODIUM 40 MG: 40 INJECTION, POWDER, FOR SOLUTION INTRAVENOUS at 12:07

## 2024-07-12 RX ADMIN — IPRATROPIUM BROMIDE AND ALBUTEROL SULFATE 3 ML: 2.5; .5 SOLUTION RESPIRATORY (INHALATION) at 12:07

## 2024-07-12 RX ADMIN — IOHEXOL 75 ML: 350 INJECTION, SOLUTION INTRAVENOUS at 01:07

## 2024-07-12 RX ADMIN — ALUMINUM HYDROXIDE, MAGNESIUM HYDROXIDE, AND SIMETHICONE 30 ML: 200; 200; 20 SUSPENSION ORAL at 12:07

## 2024-07-12 NOTE — ED PROVIDER NOTES
Encounter Date: 7/11/2024       History     Chief Complaint   Patient presents with    Midaxillary Pain     Pt presents to the ED with c/o left midaxillary pain since this AM that is intermittent and worsened with deep inspiration. Endorses cough and congestion x2 days. Took zyrtec this AM with no relief.      33yo M smoker presents to ED complaining of 2 day history of left inframammary chest discomfort, shortness of breath, nonproductive cough.    Children at home with URI symptoms.  States initially with reactive cough, mom discomfort to left inframammary chest, worse with deep inspiration.  States eventually develop shortness of breath, worsening today.  Today also develop chest pain to the left mid axillary chest wall, worse with deep inspiration.  States fever, chills this afternoon, took Tylenol prior to arrival with improvement.  Associated nasal congestion, rhinorrhea, no odynophagia.  No neck pain or stiffness.  Also with epigastric abdominal pain, denies known history of GERD, denies history of cholelithiasis, denies history of pancreatitis.  Chronic constipation, mildly worsened recently.  Denies nausea vomiting.  Does admit to poor appetite and intake since onset of symptoms.  Did take oxycodone this morning with improvement of symptoms.  Abdominal pain not worsened with lying supine.  Chest pain not worsened with lying supine, not worsened with movement.  No personal history of ACS.  No hypertension, hyperlipidemia, DM.  Mom with heart failure, unsure if family history of premature cardiac disease.    States does not feel like sickle cell related pain or acute chest type pain.  No history of VTE.  No unilateral leg swelling or calf pain.  No exogenous estrogen.    Compliant with folic acid, hydroxyurea  Hematology-Oncology: [facility]    Past medical history:   Hgb sc disease  S/p splenectomy  Hx recurrent pneumonia  Hx blood transfusion  Asthma      TTE 3/11/24:  ·  Left Ventricle: There is normal systolic  function with a visually estimated ejection fraction of 60 - 65%.  ·  Right Ventricle: Mild right ventricular enlargement. Systolic function is normal.  ·  Right Atrium: Right atrium is mildly dilated.  ·  Pulmonary Artery: The estimated pulmonary artery systolic pressure is 27 mmHg.  ·  IVC/SVC: Normal venous pressure at 3 mmHg.         Review of patient's allergies indicates:   Allergen Reactions    Penicillins Anaphylaxis     Past Medical History:   Diagnosis Date    Asthma     Broken thumb 2017    Left    Cigarette smoker     Hemoglobin S-C disease     Sickle cell anemia      Past Surgical History:   Procedure Laterality Date    HAND SURGERY Left 12/21/2017    ORIF thumb    ORIF mandible  01/31/2013    spleenectomy       Family History   Family history unknown: Yes     Social History     Tobacco Use    Smoking status: Every Day     Current packs/day: 0.25     Types: Cigarettes, Cigars    Smokeless tobacco: Never    Tobacco comments:     encouraged to quit.    Substance Use Topics    Alcohol use: Yes     Comment: socially    Drug use: No     Review of Systems   Constitutional:  Positive for appetite change, chills and fever.   HENT:  Positive for congestion. Negative for sore throat.    Respiratory:  Positive for cough and shortness of breath.    Cardiovascular:  Positive for chest pain. Negative for leg swelling.   Gastrointestinal:  Positive for abdominal pain and constipation. Negative for nausea and vomiting.   Musculoskeletal:  Negative for arthralgias, back pain, joint swelling, neck pain and neck stiffness.   Neurological:  Negative for syncope.       Physical Exam     Initial Vitals [07/11/24 2326]   BP Pulse Resp Temp SpO2   129/62 84 16 98.9 °F (37.2 °C) 95 %      MAP       --         Physical Exam    Nursing note and vitals reviewed.  Constitutional: He appears well-developed and well-nourished. He is not diaphoretic. No distress.   Ill appearing, nontoxic.  A bit drowsy on initial evaluation.   HENT:    Head: Normocephalic and atraumatic.   Neck: Neck supple.   Normal range of motion.  Cardiovascular:  Normal rate and regular rhythm.           2+ PT bilaterally.  No unilateral swelling or calf tenderness, no pretibial edema.   Pulmonary/Chest: No respiratory distress.   Mild tenderness to the left inframammary, mid axillary chest wall without bony deformity or overlying skin changes.  There is faint inspiratory and expiratory wheeze to right mid and lower lung zone, mild associated upper airway noise; there is faint inspiratory crackles to left lower lung zone, expiratory wheeze to left lower lung zone.  No hypoxia on room air, no tachypnea, no accessory muscle use.   Abdominal: Abdomen is soft. Bowel sounds are normal. He exhibits no distension.   Mild epigastric tenderness; difficult to test Hartman's sign as there is pain with deep inspiration to the chest wall   Musculoskeletal:      Cervical back: Normal range of motion and neck supple.     Neurological: He is alert and oriented to person, place, and time. GCS score is 15. GCS eye subscore is 4. GCS verbal subscore is 5. GCS motor subscore is 6.   Skin: Skin is warm.   Psychiatric: Thought content normal.   Flat affect         ED Course   Procedures  Labs Reviewed   CBC W/ AUTO DIFFERENTIAL - Abnormal; Notable for the following components:       Result Value    WBC 14.91 (*)     RBC 3.91 (*)     Hemoglobin 11.9 (*)     Hematocrit 32.5 (*)     MCHC 36.6 (*)     RDW 15.5 (*)     Platelets 491 (*)     MPV 8.6 (*)     Immature Grans (Abs) 0.06 (*)     Mono # 1.9 (*)     Eos # 2.0 (*)     Eosinophil % 13.6 (*)     Platelet Estimate Increased (*)     Sickle Cells Occasional (*)     All other components within normal limits   D DIMER, QUANTITATIVE - Abnormal; Notable for the following components:    D-Dimer 1.24 (*)     All other components within normal limits   RETICULOCYTES - Abnormal; Notable for the following components:    Retic 6.1 (*)     All other  components within normal limits   COMPREHENSIVE METABOLIC PANEL   MAGNESIUM   LIPASE   TROPONIN I   PROCALCITONIN   RETICULOCYTES   SARS-COV-2 RDRP GENE   POCT INFLUENZA A/B MOLECULAR     EKG Readings: (Independently Interpreted)   Normal sinus rhythm, ventricular rate 70 beats per minute.  Normal NE, normal QT, normal QRS duration.  No right axis deviation.  No convincing ST elevation.  Incomplete right bundle-branch block. Flattening versus inversion T-wave V2.  No gross change compared to previous dated 03/11/2024.       Imaging Results              CTA Chest Non-Coronary (PE Studies) (Final result)  Result time 07/12/24 02:57:44      Final result by Analia Traylor MD (07/12/24 02:57:44)                   Impression:      1. Limited assessment of pulmonary artery thromboembolism due to technical factors discussed above.  No definite evidence of large central pulmonary embolus or compelling evidence of pulmonary artery thromboembolism to the proximal segmental levels.  However, evaluation for more distal PE is precluded.  Correlation with D-dimer value and lower extremity venous Doppler ultrasound as warranted.  2. Limited assessment of pulmonary parenchyma due to respiratory motion artifact.  Interval resolution of previously identified bibasilar consolidation seen on prior study of 03/10/2024.  Additionally, decreased consolidation in the left upper lobe/lingula relative to that exam with a few minimal patchy/streaky opacities in this region.  3. Additional findings as above.      Electronically signed by: Analia Traylor MD  Date:    07/12/2024  Time:    02:57               Narrative:    EXAMINATION:  CTA CHEST NON CORONARY (PE STUDIES)    CLINICAL HISTORY:  Pulmonary embolism (PE) suspected, positive D-dimer;    TECHNIQUE:  Low dose axial images, sagittal and coronal reformations were obtained from the thoracic inlet to the lung bases following the IV administration of 75 mL of Omnipaque 350.  Contrast timing  was optimized to evaluate the pulmonary arteries.  MIP images were performed.    COMPARISON:  CTA chest 03/10/2024    FINDINGS:  The thoracic aorta maintains caliber, contour course without significant atherosclerosis.  The heart is not enlarged.  No significant pericardial fluid present.  Normal sized left axillary lymph nodes present.  Hilar contours are within normal limits.  The esophagus maintains normal caliber, contour and course.    Please note image quality is degraded by artifact from the patient's upper extremities and respiratory motion artifact.  As such, there is limited assessment for pulmonary artery thromboembolism.  No definite large central filling defect to suggest large central pulmonary embolus or compelling evidence of pulmonary artery thromboembolism to the proximal segmental levels.  However, evaluation for more distal PE is precluded.  Correlation with lower extremity venous Doppler ultrasound and D-dimer value as warranted.  Additionally, evaluation of pulmonary parenchyma is limited due to respiratory motion artifact.  Apparent interval resolution of previously identified bilateral lower lobe consolidative change seen on CT of 03/10/2024.  Additionally, there is decreased consolidation in the left upper lobe/lingula relative to this examination with a few minimal residual streaky opacities in this region.  No definite new large region of consolidation, pleural fluid or pneumothorax identified.    Visualized osseous structures appear intact and unchanged.  Structures of the upper abdomen demonstrate no acute abnormalities.                                       X-Ray Chest PA And Lateral (Final result)  Result time 07/12/24 01:15:02      Final result by Nixon Cano MD (07/12/24 01:15:02)                   Impression:      No acute cardiopulmonary process identified.      Electronically signed by: iNxon Cano MD  Date:    07/12/2024  Time:    01:15               Narrative:     EXAMINATION:  XR CHEST PA AND LATERAL    CLINICAL HISTORY:  Shortness of breath; Other chest pain    TECHNIQUE:  PA and lateral views of the chest were performed.    COMPARISON:  03/10/2024.    FINDINGS:  Cardiac silhouette is normal in size.  Lungs are symmetrically expanded.  Mild chronic coarse interstitial opacities are seen in the left mid and bilateral lower lung zones.  No evidence of focal consolidative process, pneumothorax, or significant pleural effusion.  No acute osseous abnormality identified.                                       X-Ray Chest PA And Lateral (Final result)  Result time 07/12/24 01:15:02      Final result by Nixon Cano MD (07/12/24 01:15:02)                   Impression:      No acute cardiopulmonary process identified.      Electronically signed by: Nixon Cano MD  Date:    07/12/2024  Time:    01:15               Narrative:    EXAMINATION:  XR CHEST PA AND LATERAL    CLINICAL HISTORY:  Shortness of breath; Other chest pain    TECHNIQUE:  PA and lateral views of the chest were performed.    COMPARISON:  03/10/2024.    FINDINGS:  Cardiac silhouette is normal in size.  Lungs are symmetrically expanded.  Mild chronic coarse interstitial opacities are seen in the left mid and bilateral lower lung zones.  No evidence of focal consolidative process, pneumothorax, or significant pleural effusion.  No acute osseous abnormality identified.                                    X-Rays:   Independently Interpreted Readings:   Chest X-Ray: Personal interpretation:  Mild cardiomegaly, no convincing effusion, no obvious pneumothorax, increased perihilar interstitial changes, no convincing acute dense peripheral lobar consolidation, questionable mild scarred appearance compared to previous 03/10/2024.     Medications   ketorolac injection 15 mg (has no administration in time range)   aluminum-magnesium hydroxide-simethicone 200-200-20 mg/5 mL suspension 30 mL (30 mLs Oral Given 7/12/24 0041)      And   LIDOcaine viscous HCl 2% oral solution 15 mL (15 mLs Oral Given 7/12/24 0041)   pantoprazole injection 40 mg (40 mg Intravenous Given 7/12/24 0049)   albuterol-ipratropium 2.5 mg-0.5 mg/3 mL nebulizer solution 3 mL (3 mLs Nebulization Given 7/12/24 0047)   iohexoL (OMNIPAQUE 350) injection 75 mL (75 mLs Intravenous Given 7/12/24 0148)     Medical Decision Making  Differential diagnosis: PE, pneumonia, acute chest, bronchitis, reactive airway, viral URI, pancreatitis, cholecystitis, GERD, constipation, small-bowel obstruction, pain crisis, aplastic crisis, pericarditis, myocarditis    Amount and/or Complexity of Data Reviewed  External Data Reviewed: notes.  Labs: ordered. Decision-making details documented in ED Course.  Radiology: ordered and independent interpretation performed. Decision-making details documented in ED Course.     Details: No dense lobar consolidation.  Elevated D-dimer.  CTA pending.  Questionable evidence of constipation on chest x-ray.  ECG/medicine tests: ordered and independent interpretation performed. Decision-making details documented in ED Course.    Risk  OTC drugs.  Prescription drug management.               ED Course as of 07/12/24 0512   Fri Jul 12, 2024   0127 Mild chronic leukocytosis since March of this year, not acutely worsened.  No significant anemia. [SM]   0314 Following CT, patient was complaining of left upper extremity pain, he states from the flush of IV contrast.  He asked to have the IV removed.  I spoke with patient at length regarding his pain and concerns.  His compartments are soft without any tenderness, good distal pulses, no obvious findings to suggest significant extravasation, no evidence of compartment syndrome.  I have offered NSAID or opiate pain medication to see if any improvement of his pain, he refused.  He asked to speak with house supervisor, which we have contacted.  Initially he wished to leave, however given possibility of acute chest, I  have strongly encouraged him to stay just until the CT is resulted at which time I will discuss reulst with him promptly. [SM]   0317 Has remained afebrile in the ED, there is no hypoxia, no new consolidation on CTA, no convincing evidence of proximal PE despite limited exam.  Unfortunately, patient eloped prior to my re-evaluation. [SM]      ED Course User Index  [SM] Cornelius Rai PA-C                             Clinical Impression:  Final diagnoses:  [R07.81] Pleuritic chest pain  [R06.02] SOB (shortness of breath)  [J06.9] Viral URI with cough (Primary)          ED Disposition Condition    Eloped                 Cornelius Rai, PA-C  07/12/24 4627

## 2024-07-12 NOTE — ED NOTES
"Pt back from CT scan and verbalizing pain in arm where IV contrast was injected into IV. IV flushed twice (2- 10cc saline flush syringes used to check patency. ) @ slow speed first, then at fast speed to insure patency before IV contrast injection. Pt verbally confirmed that he felt no discomfort with two separate 10cc saline flushes which were injected into IV @ slow and fast speed before transfer to CT scan.  After pt was transported back from CT he verbalized that he was upset and that his L. Upper arm and L. Forearm were sore after contrast study. I immediately assessed arm for warmth, redness, inflammation and for signs of excavation of contrast. No signs were noted before I proceeded to check patency of IV using 2- 10 cc saline flushes. IV flushed without difficulty and patient verbalized that he felt no discomfort while IV was being flushed and that he only felt residual discomfort from contrast injection. Blood return also noted after ensuring patency of IV. I assured pt that most likely his discomfort is due to the speed and force of the IV contrast injection coupled with location of PIV. Pt offered warm compress or cold compress, depending on his preference, to help with discomfort. Pt stated that he did not want warm or cold compress, but that he wanted to leave. Again, I asked pt if he would be willing to talk with provider about his concerns and if he would be willing to atleast stay for CT scan results and to discuss his blood work results. Pt adamant about leaving and again began to express his discomfort after receiving IV contrast. I attempted again to de-escalate situation and advised pt that he should stay and wait on results and further treatment, pt adamant about leaving and is asking to speak to someone "higher up". MD Rhonda and ANDRE Rai informed. ANDRE Rai states that he will come speak with pt. Pt notified that provider would be at bedside soon to speak with him.   "

## 2024-07-12 NOTE — ED NOTES
CT tech @ bedside for transport to CT scan. Tech notified that pt has large amount of scar tissue to veins in both R and L AC. Area as well as L, R forearms, which caused me to insert 20G PIV in left hand to decrease the likelihood of IV infiltration during CT scan which required IV contrast.  IV patency ensured x2 using 2-10 cc saline flush syringes, injected at slow speed followed by fast speed to ensure that 20G PIV in L. Hand could be used for IV contrast during scan.  Pt AAOx4 while Ct tech and I ensured IV patency. Pt verbalized before transport that he felt no discomfort during or after saline flushes were injected into PIV.

## 2024-07-12 NOTE — ED NOTES
Pt able to successfully ambulate without assistance to restroom and back in assigned ED room. Pt connected back to cardiac monitor. VS WNL. Pt given blanket, siderails up x2. Call light and urinal within reach, with instructions to call for nurse if any other needs arise.

## 2024-10-11 ENCOUNTER — HOSPITAL ENCOUNTER (EMERGENCY)
Facility: HOSPITAL | Age: 34
Discharge: LEFT BEFORE DISCHARGE | End: 2024-10-11
Attending: STUDENT IN AN ORGANIZED HEALTH CARE EDUCATION/TRAINING PROGRAM
Payer: MEDICAID

## 2024-10-11 VITALS
OXYGEN SATURATION: 95 % | HEIGHT: 66 IN | BODY MASS INDEX: 28.93 KG/M2 | HEART RATE: 67 BPM | DIASTOLIC BLOOD PRESSURE: 95 MMHG | TEMPERATURE: 98 F | SYSTOLIC BLOOD PRESSURE: 153 MMHG | WEIGHT: 180 LBS | RESPIRATION RATE: 20 BRPM

## 2024-10-11 DIAGNOSIS — M79.89 ARM SWELLING: ICD-10-CM

## 2024-10-11 DIAGNOSIS — L03.90 CELLULITIS, UNSPECIFIED CELLULITIS SITE: Primary | ICD-10-CM

## 2024-10-11 LAB
ALBUMIN SERPL BCP-MCNC: 3.7 G/DL (ref 3.5–5.2)
ALP SERPL-CCNC: 81 U/L (ref 55–135)
ALT SERPL W/O P-5'-P-CCNC: 20 U/L (ref 10–44)
ANION GAP SERPL CALC-SCNC: 9 MMOL/L (ref 8–16)
APTT PPP: 29.1 SEC (ref 21–32)
AST SERPL-CCNC: 16 U/L (ref 10–40)
BASOPHILS # BLD AUTO: 0.07 K/UL (ref 0–0.2)
BASOPHILS NFR BLD: 0.5 % (ref 0–1.9)
BILIRUB SERPL-MCNC: 0.9 MG/DL (ref 0.1–1)
BUN SERPL-MCNC: 6 MG/DL (ref 6–20)
CALCIUM SERPL-MCNC: 9.1 MG/DL (ref 8.7–10.5)
CHLORIDE SERPL-SCNC: 104 MMOL/L (ref 95–110)
CO2 SERPL-SCNC: 28 MMOL/L (ref 23–29)
CREAT SERPL-MCNC: 0.8 MG/DL (ref 0.5–1.4)
DIFFERENTIAL METHOD BLD: ABNORMAL
EOSINOPHIL # BLD AUTO: 1.6 K/UL (ref 0–0.5)
EOSINOPHIL NFR BLD: 11.1 % (ref 0–8)
ERYTHROCYTE [DISTWIDTH] IN BLOOD BY AUTOMATED COUNT: 14.7 % (ref 11.5–14.5)
EST. GFR  (NO RACE VARIABLE): >60 ML/MIN/1.73 M^2
GLUCOSE SERPL-MCNC: 98 MG/DL (ref 70–110)
HCT VFR BLD AUTO: 31.9 % (ref 40–54)
HGB BLD-MCNC: 11.6 G/DL (ref 14–18)
IMM GRANULOCYTES # BLD AUTO: 0.05 K/UL (ref 0–0.04)
IMM GRANULOCYTES NFR BLD AUTO: 0.3 % (ref 0–0.5)
INR PPP: 1 (ref 0.8–1.2)
LYMPHOCYTES # BLD AUTO: 5.8 K/UL (ref 1–4.8)
LYMPHOCYTES NFR BLD: 39.8 % (ref 18–48)
MCH RBC QN AUTO: 32 PG (ref 27–31)
MCHC RBC AUTO-ENTMCNC: 36.4 G/DL (ref 32–36)
MCV RBC AUTO: 88 FL (ref 82–98)
MONOCYTES # BLD AUTO: 1.8 K/UL (ref 0.3–1)
MONOCYTES NFR BLD: 12.4 % (ref 4–15)
NEUTROPHILS # BLD AUTO: 5.2 K/UL (ref 1.8–7.7)
NEUTROPHILS NFR BLD: 35.9 % (ref 38–73)
NRBC BLD-RTO: 0 /100 WBC
PLATELET # BLD AUTO: 457 K/UL (ref 150–450)
PMV BLD AUTO: 9.8 FL (ref 9.2–12.9)
POTASSIUM SERPL-SCNC: 3.8 MMOL/L (ref 3.5–5.1)
PROT SERPL-MCNC: 7.1 G/DL (ref 6–8.4)
PROTHROMBIN TIME: 11.4 SEC (ref 9–12.5)
RBC # BLD AUTO: 3.63 M/UL (ref 4.6–6.2)
SODIUM SERPL-SCNC: 141 MMOL/L (ref 136–145)
WBC # BLD AUTO: 14.56 K/UL (ref 3.9–12.7)

## 2024-10-11 PROCEDURE — 96365 THER/PROPH/DIAG IV INF INIT: CPT

## 2024-10-11 PROCEDURE — 63600175 PHARM REV CODE 636 W HCPCS: Mod: JZ,JG | Performed by: STUDENT IN AN ORGANIZED HEALTH CARE EDUCATION/TRAINING PROGRAM

## 2024-10-11 PROCEDURE — 85730 THROMBOPLASTIN TIME PARTIAL: CPT | Performed by: STUDENT IN AN ORGANIZED HEALTH CARE EDUCATION/TRAINING PROGRAM

## 2024-10-11 PROCEDURE — 80053 COMPREHEN METABOLIC PANEL: CPT | Performed by: STUDENT IN AN ORGANIZED HEALTH CARE EDUCATION/TRAINING PROGRAM

## 2024-10-11 PROCEDURE — 25000003 PHARM REV CODE 250: Performed by: STUDENT IN AN ORGANIZED HEALTH CARE EDUCATION/TRAINING PROGRAM

## 2024-10-11 PROCEDURE — 99285 EMERGENCY DEPT VISIT HI MDM: CPT | Mod: 25

## 2024-10-11 PROCEDURE — 85025 COMPLETE CBC W/AUTO DIFF WBC: CPT | Performed by: STUDENT IN AN ORGANIZED HEALTH CARE EDUCATION/TRAINING PROGRAM

## 2024-10-11 PROCEDURE — 85610 PROTHROMBIN TIME: CPT | Performed by: STUDENT IN AN ORGANIZED HEALTH CARE EDUCATION/TRAINING PROGRAM

## 2024-10-11 RX ORDER — CEPHALEXIN 500 MG/1
500 CAPSULE ORAL 4 TIMES DAILY
Qty: 20 CAPSULE | Refills: 0 | Status: SHIPPED | OUTPATIENT
Start: 2024-10-11 | End: 2024-10-11

## 2024-10-11 RX ORDER — CLINDAMYCIN PHOSPHATE 600 MG/50ML
600 INJECTION, SOLUTION INTRAVENOUS
Status: COMPLETED | OUTPATIENT
Start: 2024-10-11 | End: 2024-10-11

## 2024-10-11 RX ORDER — CEPHALEXIN 500 MG/1
500 CAPSULE ORAL 4 TIMES DAILY
Qty: 20 CAPSULE | Refills: 0 | Status: SHIPPED | OUTPATIENT
Start: 2024-10-11 | End: 2024-10-16

## 2024-10-11 RX ORDER — CEPHALEXIN 250 MG/1
500 CAPSULE ORAL
Status: COMPLETED | OUTPATIENT
Start: 2024-10-11 | End: 2024-10-11

## 2024-10-11 RX ORDER — CLINDAMYCIN HYDROCHLORIDE 150 MG/1
450 CAPSULE ORAL EVERY 6 HOURS
Qty: 120 CAPSULE | Refills: 0 | Status: SHIPPED | OUTPATIENT
Start: 2024-10-11 | End: 2024-10-21

## 2024-10-11 RX ADMIN — CLINDAMYCIN PHOSPHATE 600 MG: 600 INJECTION, SOLUTION INTRAVENOUS at 03:10

## 2024-10-11 RX ADMIN — CEPHALEXIN 500 MG: 250 CAPSULE ORAL at 05:10

## 2024-10-11 NOTE — Clinical Note
"Katie"Mr Parham" Prasad was seen and treated in our emergency department on 10/10/2024.  He may return to work on 10/13/2024.       If you have any questions or concerns, please don't hesitate to call.      Summer Yost RN    "

## 2024-10-11 NOTE — DISCHARGE INSTRUCTIONS
Take Keflex and clindamycin as directed.  Immediately return if you develop high fevers, general malaise, nausea, vomiting, significant progression of the swelling and redness of your elbow.  Thank you.

## 2024-10-11 NOTE — ED PROVIDER NOTES
"Encounter Date: 10/10/2024    SCRIBE #1 NOTE: I, Romain Trevino Do, am scribing for, and in the presence of,  Mauricio Ambrosio MD. I have scribed the following portions of the note - Other sections scribed: HPI, ROS, PE.       History     Chief Complaint   Patient presents with    Arm Swelling     Pt arrived to the ED due to right arm swelling and redness since yesterday. Pt reports having IV inserted at [facility] on Monday during hospital stay.      Katie Parham is a 34 y.o. 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 Patient notes a recent hospital admission, endorses receiving an IV to his right upper extremity prior to this episode. He notes initial redness yesterday with swelling today. He denies previous similar blistering. No other exacerbating or alleviating factors. Patient denies fever, chills, nausea, emesis, or other associated symptoms. Patient denies any IV drug use. He notes a penicillin allergy.     Per chart review 10/05  Patient was seen at [facility] for sickle cell pain crisis and admitted for 3 days, discharged 10/08.     The history is provided by the patient. No  was used.     Review of patient's allergies indicates:   Allergen Reactions    Penicillins Anaphylaxis     Past Medical History:   Diagnosis Date    Asthma     Broken thumb 2017    Left    Cigarette smoker     Hemoglobin S-C disease     Sickle cell anemia      Past Surgical History:   Procedure Laterality Date    HAND SURGERY Left 12/21/2017    ORIF thumb    ORIF mandible  01/31/2013    spleenectomy       Family History   Family history unknown: Yes     Social History     Tobacco Use    Smoking status: Every Day     Current packs/day: 0.25     Types: Cigarettes, Cigars    Smokeless tobacco: Never    Tobacco comments:     encouraged to quit.    Substance Use Topics    Alcohol " use: Yes     Comment: socially    Drug use: No     Review of Systems   Constitutional:  Negative for chills and fever.   HENT:  Negative for congestion and sore throat.    Eyes:  Negative for visual disturbance.   Respiratory:  Negative for cough and shortness of breath.    Cardiovascular:  Negative for chest pain.   Gastrointestinal:  Negative for abdominal pain, nausea and vomiting.   Genitourinary:  Negative for dysuria.   Skin:  Negative for rash.   Neurological:  Negative for headaches.   Psychiatric/Behavioral:  Negative for confusion.        Physical Exam     Initial Vitals [10/11/24 0031]   BP Pulse Resp Temp SpO2   (!) 141/87 68 20 98.1 °F (36.7 °C) 96 %      MAP       --         Physical Exam    Nursing note and vitals reviewed.  Constitutional: Vital signs are normal. He appears well-developed and well-nourished. He is active.  Non-toxic appearance. No distress.   HENT:   Head: Normocephalic and atraumatic. Mouth/Throat: Mucous membranes are normal.   Eyes: EOM are normal.   Neck: Trachea normal. Neck supple.   Cardiovascular:  Normal rate, regular rhythm and normal heart sounds.           Pulses:       Radial pulses are 2+ on the right side.   Pulmonary/Chest: Breath sounds normal. No tachypnea. No respiratory distress.   Abdominal: Abdomen is soft. Bowel sounds are normal. He exhibits no distension. There is no abdominal tenderness.   Musculoskeletal:         General: No edema. Normal range of motion.      Cervical back: Neck supple.      Comments: Mild erythema, warmth, and tenderness to the anterior and distal right bicep with small overlying blisters. Full range of motion to the right elbow without pain.      Neurological: He is alert.   Skin: Skin is warm, dry and intact.   Psychiatric: He has a normal mood and affect.         ED Course   Procedures  Labs Reviewed   CBC W/ AUTO DIFFERENTIAL - Abnormal       Result Value    WBC 14.56 (*)     RBC 3.63 (*)     Hemoglobin 11.6 (*)     Hematocrit 31.9  (*)     MCV 88      MCH 32.0 (*)     MCHC 36.4 (*)     RDW 14.7 (*)     Platelets 457 (*)     MPV 9.8      Immature Granulocytes 0.3      Gran # (ANC) 5.2      Immature Grans (Abs) 0.05 (*)     Lymph # 5.8 (*)     Mono # 1.8 (*)     Eos # 1.6 (*)     Baso # 0.07      nRBC 0      Gran % 35.9 (*)     Lymph % 39.8      Mono % 12.4      Eosinophil % 11.1 (*)     Basophil % 0.5      Differential Method Automated     COMPREHENSIVE METABOLIC PANEL    Sodium 141      Potassium 3.8      Chloride 104      CO2 28      Glucose 98      BUN 6      Creatinine 0.8      Calcium 9.1      Total Protein 7.1      Albumin 3.7      Total Bilirubin 0.9      Alkaline Phosphatase 81      AST 16      ALT 20      eGFR >60      Anion Gap 9     PROTIME-INR    Prothrombin Time 11.4      INR 1.0     APTT    aPTT 29.1            Imaging Results              X-Ray Elbow Complete Right (Final result)  Result time 10/11/24 04:35:08      Final result by Anibal Palma MD (10/11/24 04:35:08)                   Impression:      No acute displaced fracture or dislocation.    Pads not evaluated due to suboptimal positioning on lateral view.    Soft tissue swelling.      Electronically signed by: Anibal Palma MD  Date:    10/11/2024  Time:    04:35               Narrative:    EXAMINATION:  XR ELBOW COMPLETE 3 VIEW RIGHT    CLINICAL HISTORY:  . Other specified soft tissue disorders    TECHNIQUE:  AP, lateral, and radial head views of the right elbow were performed.    COMPARISON:  None    FINDINGS:  No acute displaced fracture or dislocation.  Fat pad not evaluated due to suboptimal positioning on lateral view.  Cartilage spaces are maintained.  Diffuse soft tissue swelling about the elbow.                                       US Upper Extremity Veins Right (Final result)  Result time 10/11/24 02:28:34      Final result by Anibal Palma MD (10/11/24 02:28:34)                   Impression:      No thrombus in central veins of the right upper  extremity.      Electronically signed by: Anibal Palma MD  Date:    10/11/2024  Time:    02:28               Narrative:    EXAMINATION:  US UPPER EXTREMITY VEINS RIGHT    CLINICAL HISTORY:  pain;    TECHNIQUE:  Duplex and color flow Doppler evaluation and dynamic compression was performed of the right upper extremity veins.    COMPARISON:  None    FINDINGS:  Central veins: The internal jugular, subclavian, and axillary veins are patent and free of thrombus.    Arm veins: The brachial, basilic, and cephalic veins are patent and compressible.    Contralateral subclavian/internal jugular veins: Not evaluated.    Other findings: None.                                       Medications   clindamycin in D5W 600 mg/50 mL IVPB 600 mg (0 mg Intravenous Stopped 10/11/24 0338)   cephALEXin capsule 500 mg (500 mg Oral Given 10/11/24 0518)     Medical Decision Making  Amount and/or Complexity of Data Reviewed  External Data Reviewed: notes.     Details: See HPI.  Labs: ordered. Decision-making details documented in ED Course.  Radiology: ordered. Decision-making details documented in ED Course.    Vitals are unremarkable   Patient is well-appearing and in no acute distress   He denies any systemic symptoms   He does have some swelling, erythema, mild tenderness, redness and warmth around the right elbow  He is able to range the right elbow completely   I suspect he has a cellulitis and do not suspect that he has a septic joint  I do not suspect an aggressive necrotizing skin and soft tissue infection  Ultrasound of the right arm does not show any evidence of DVT  X-ray of the right elbow shows soft tissue edema but no evidence of gas  He does have sickle cell disease predisposing him to significant infection  Given 600 of IV clinda as well as a p.o. dose of 500 mg of Keflex  I feel the patient is stable for discharge on Keflex and clindamycin for a trial of outpatient antibiotics  Given strict return precautions for  progression of his redness, swelling and warmth, development of fever, chills, nausea, vomiting, malaise, decreased responsiveness  Patient verbalized understanding agreement with the plan    I discussed with the patient/family the diagnosis, treatment plan, indications for return to the emergency department, and for expected follow-up. The patient/family verbalized an understanding. The patient/family is asked if there are any questions or concerns. We discuss the case, until all issues are addressed to the patient/familys satisfaction. Patient/family understands and is agreeable to the plan.   Mauricio Ambrosio    DISCLAIMER: This note was prepared with [employer] voice recognition transcription software. Garbled syntax, mangled pronouns, and other bizarre constructions may be attributed to that software system.          Scribe Attestation:   Scribe #1: I performed the above scribed service and the documentation accurately describes the services I performed. I attest to the accuracy of the note.                               Clinical Impression:  Final diagnoses:  [M79.89] Arm swelling  [L03.90] Cellulitis, unspecified cellulitis site (Primary)       I, Mauricio Ambrosio MD, personally performed the services described in this documentation. All medical record entries made by the scribe were at my direction and in my presence. I have reviewed the chart and agree that the record reflects my personal performance and is accurate and complete.      DISCLAIMER: This note was prepared with [employer] voice recognition transcription software. Garbled syntax, mangled pronouns, and other bizarre constructions may be attributed to that software system.     ED Disposition Condition    Discharge Stable          ED Prescriptions       Medication Sig Dispense Start Date End Date Auth. Provider    cephALEXin (KEFLEX) 500 MG capsule Take 1 capsule (500 mg total) by mouth 4 (four) times daily. for 5 days 20 capsule 10/11/2024 10/16/2024  Mauricio Ambrosio MD    clindamycin (CLEOCIN) 150 MG capsule Take 3 capsules (450 mg total) by mouth every 6 (six) hours. for 10 days 120 capsule 10/11/2024 10/21/2024 Mauricio Ambrosio MD          Follow-up Information    None          Mauricio Ambrosio MD  10/11/24 0534

## 2024-12-06 NOTE — PLAN OF CARE
Problem: Adult Inpatient Plan of Care  Goal: Optimal Comfort and Wellbeing  Pain medication given as ordered by MD.  Intervention: Provide Person-Centered Care  8/23/2020 0202 by Capo Feliz RN  Flowsheets (Taken 8/23/2020 0202)  Trust Relationship/Rapport:   care explained   thoughts/feelings acknowledged  8/23/2020 0158 by Capo Feliz RN  Flowsheets (Taken 8/23/2020 0158)  Trust Relationship/Rapport: questions answered     Problem: Respiratory Compromise (Pneumonia)  Goal: Effective Oxygenation and Ventilation  Intervention: Optimize Oxygenation and Ventilation  Flowsheets (Taken 8/23/2020 0152)  Airway/Ventilation Management: airway patency maintained  Head of Bed (HOB): HOB at 30-45 degrees      Pt admitted from cath lab, awakening and following commands, R selene CD&I- soft around site, NSR on tele

## 2024-12-23 NOTE — PLAN OF CARE
All needs met. SW notified nurse Krista that patient is ready for discharge from case management standpoint.       04/13/23 1331   Final Note   Assessment Type Final Discharge Note   Anticipated Discharge Disposition Home   What phone number can be called within the next 1-3 days to see how you are doing after discharge? [phone]   Post-Acute Status   Post-Acute Authorization Other   Other Status No Post-Acute Service Needs   Discharge Delays None known at this time        Responsibility to children, family, or others/Supportive social network of family or friends/Positive therapeutic relationships

## 2025-06-02 NOTE — NURSING TRANSFER
Nursing Transfer Note      8/19/2019     Transfer From: ED    Transfer via wheelchair    Transfer with O2, cardiac monitoring    Transported by transport staff    Chart send with patient: Yes    Patient reassessed at: 8/19/19 at 5:00AM    Upon arrival to floor: cardiac monitor applied, patient oriented to room, call bell in reach and bed in lowest position   Received request via: Pharmacy    Was the patient seen in the last year in this department? Yes    Does the patient have an active prescription (recently filled or refills available) for medication(s) requested? No    Pharm[facility] Name: [facility]    Does the patient have alf Plus and need 100-day supply? (This applies to ALL medications) Yes, quantity updated to 100 days